# Patient Record
Sex: FEMALE | Race: WHITE | Employment: OTHER | URBAN - METROPOLITAN AREA
[De-identification: names, ages, dates, MRNs, and addresses within clinical notes are randomized per-mention and may not be internally consistent; named-entity substitution may affect disease eponyms.]

---

## 2018-03-12 ENCOUNTER — HOSPITAL ENCOUNTER (OUTPATIENT)
Dept: WOUND CARE | Age: 78
Discharge: HOME | End: 2018-03-12
Payer: MEDICARE

## 2018-03-12 VITALS
WEIGHT: 170 LBS | RESPIRATION RATE: 20 BRPM | BODY MASS INDEX: 27.32 KG/M2 | HEART RATE: 96 BPM | SYSTOLIC BLOOD PRESSURE: 138 MMHG | TEMPERATURE: 98.8 F | HEIGHT: 66 IN | DIASTOLIC BLOOD PRESSURE: 76 MMHG

## 2018-03-12 PROCEDURE — 11042 DBRDMT SUBQ TIS 1ST 20SQCM/<: CPT

## 2018-03-19 ENCOUNTER — HOSPITAL ENCOUNTER (OUTPATIENT)
Dept: WOUND CARE | Age: 78
Discharge: HOME | End: 2018-03-19
Payer: MEDICARE

## 2018-03-19 VITALS
DIASTOLIC BLOOD PRESSURE: 72 MMHG | RESPIRATION RATE: 18 BRPM | TEMPERATURE: 98.8 F | SYSTOLIC BLOOD PRESSURE: 140 MMHG | HEART RATE: 88 BPM

## 2018-03-19 PROCEDURE — 11042 DBRDMT SUBQ TIS 1ST 20SQCM/<: CPT

## 2018-03-19 NOTE — PROGRESS NOTES
Follow-Up Wound Progress Note  Alisa De La Fuente  AGE: [de-identified] y.o. GENDER: female  DOD: 1940  TODAY'S DATE:  3/19/2018      Subjective:     Ash Melton is a [de-identified] y.o. female who presents today for wound check. Wound Etiology : Nonhealing wound  Wound Location :  Right abdomen  Pain : {1/10     Abx : Absent   Cultures : None  The patient denies  any problems since last visit. Objective:    BP (!) 140/72   Pulse 88   Temp 98.8 °F (37.1 °C) (Oral)   Resp 18   Wound appears improved compared to last recheck    Fibrinous exudate - small amt    Hyperkeratotic tissue - small amt    Granulation tissue - moderate amt    Errythema - Absent   (this wound was originally measured on:)  Wound 02/19/18 Burn Abdomen Right # 1 aquired 3-1-81-Wound Length (cm): 1.9 cm  Wound 02/19/18 Burn Abdomen Right # 1 aquired 2-1-18-Wound Width (cm): 3.5 cm  Wound 02/19/18 Burn Abdomen Right # 1 aquired 2-1-18-Wound Depth (cm) : 0.2   Measurements shown are from today's visit. Wound 02/19/18 Burn Abdomen Right # 1 aquired 8-2-68-Wound Assessment: Red, Pink, Yellow     Wound 02/19/18 Burn Abdomen Right # 1 aquired 1-5-10-Wound Assessment: Red, Pink, Yellow  Wound 02/19/18 Burn Abdomen Right # 1 aquired 5-9-05-Sita-wound Assessment: Pink  Wound 02/19/18 Burn Abdomen Right # 1 aquired 6-5-94-Drainage Amount: Moderate  Wound 02/19/18 Burn Abdomen Right # 1 aquired 3-8-70-Drainage Description: Serosanguinous  Wound 02/19/18 Burn Abdomen Right # 1 aquired 2-1-18-Odor: None       Assessment:   Please refer to nursing measurements and assessment regarding wound size pre and post debridement. Wound check - Care provided includes removal of existing dressing and visual inspection    Procedure:  Lidocaine gel soaked gauze was applied per physician order at beginning of wound evaluation. It was subsequently removed.   With the patient in supine position, the wound(s) was debrided sharply of fibrotic, necrotic, and hyperkeratotic tissue, including a layer of surrounding healthy tissue using a curette for a total surface area of < 20 cm2. The skin was excised through the level of the subcutaneous. 100%% of the wound was debrided. Wound was copiously irrigated with normal saline solution. There was minimal bleeding that was controlled with pressure. Patient tolerated procedure well and was given proper instruction. Overall Wound Assessment  Wound is has slightly improved. Size has unchanged  Appearance has not changed    Plan:   Plan for wound - Dress per physician order  Treatment:     Compression : No   Offloading : Yes   Dressing : Aquacel Ag, daily   Additional Therapy :      1. Discussed appropriate home care of this wound. , Dispensed dressing supplies and instructions on their use., Wound redressed. 2. Patient instructions were given. 3. Follow up: 1 week.     Merline Amaya

## 2018-03-26 ENCOUNTER — HOSPITAL ENCOUNTER (OUTPATIENT)
Dept: WOUND CARE | Age: 78
Discharge: HOME | End: 2018-03-26
Payer: MEDICARE

## 2018-03-26 VITALS
HEIGHT: 66 IN | RESPIRATION RATE: 16 BRPM | SYSTOLIC BLOOD PRESSURE: 120 MMHG | HEART RATE: 80 BPM | BODY MASS INDEX: 27.32 KG/M2 | TEMPERATURE: 98.4 F | WEIGHT: 170 LBS | DIASTOLIC BLOOD PRESSURE: 80 MMHG

## 2018-03-26 PROCEDURE — 11042 DBRDMT SUBQ TIS 1ST 20SQCM/<: CPT

## 2018-03-26 NOTE — PLAN OF CARE
Problem: Wound:  Goal: Will show signs of wound healing; wound closure and no evidence of infection  Will show signs of wound healing; wound closure and no evidence of infection   Outcome: Ongoing      Problem: Blood Glucose:  Goal: Ability to maintain appropriate glucose levels will improve  Ability to maintain appropriate glucose levels will improve   Outcome: Ongoing

## 2018-04-02 ENCOUNTER — HOSPITAL ENCOUNTER (OUTPATIENT)
Dept: WOUND CARE | Age: 78
Discharge: HOME | End: 2018-04-02
Payer: MEDICARE

## 2018-04-02 VITALS
SYSTOLIC BLOOD PRESSURE: 158 MMHG | DIASTOLIC BLOOD PRESSURE: 78 MMHG | TEMPERATURE: 99.3 F | RESPIRATION RATE: 16 BRPM | HEART RATE: 80 BPM

## 2018-04-02 PROCEDURE — 11042 DBRDMT SUBQ TIS 1ST 20SQCM/<: CPT

## 2018-04-09 ENCOUNTER — HOSPITAL ENCOUNTER (OUTPATIENT)
Dept: WOUND CARE | Age: 78
Discharge: HOME | End: 2018-04-09
Payer: MEDICARE

## 2018-04-09 VITALS
HEART RATE: 80 BPM | WEIGHT: 170 LBS | RESPIRATION RATE: 16 BRPM | HEIGHT: 66 IN | BODY MASS INDEX: 27.32 KG/M2 | DIASTOLIC BLOOD PRESSURE: 76 MMHG | SYSTOLIC BLOOD PRESSURE: 152 MMHG | TEMPERATURE: 98.7 F

## 2018-04-09 PROCEDURE — 97597 DBRDMT OPN WND 1ST 20 CM/<: CPT

## 2018-04-09 RX ORDER — TRIAMCINOLONE ACETONIDE 0.25 MG/G
CREAM TOPICAL
Qty: 1 TUBE | Refills: 5 | Status: SHIPPED | OUTPATIENT
Start: 2018-04-09 | End: 2018-05-07 | Stop reason: ALTCHOICE

## 2018-04-16 ENCOUNTER — HOSPITAL ENCOUNTER (OUTPATIENT)
Dept: WOUND CARE | Age: 78
Discharge: HOME | End: 2018-04-16
Payer: MEDICARE

## 2018-04-16 VITALS
BODY MASS INDEX: 27.32 KG/M2 | SYSTOLIC BLOOD PRESSURE: 124 MMHG | WEIGHT: 170 LBS | TEMPERATURE: 98.7 F | HEART RATE: 72 BPM | RESPIRATION RATE: 16 BRPM | HEIGHT: 66 IN | DIASTOLIC BLOOD PRESSURE: 72 MMHG

## 2018-04-16 PROCEDURE — 11042 DBRDMT SUBQ TIS 1ST 20SQCM/<: CPT

## 2018-04-23 ENCOUNTER — HOSPITAL ENCOUNTER (OUTPATIENT)
Dept: WOUND CARE | Age: 78
Discharge: HOME | End: 2018-04-23
Payer: MEDICARE

## 2018-04-23 VITALS
DIASTOLIC BLOOD PRESSURE: 70 MMHG | SYSTOLIC BLOOD PRESSURE: 118 MMHG | RESPIRATION RATE: 16 BRPM | HEIGHT: 66 IN | HEART RATE: 76 BPM | TEMPERATURE: 98.4 F | WEIGHT: 170 LBS | BODY MASS INDEX: 27.32 KG/M2

## 2018-04-23 PROCEDURE — 97597 DBRDMT OPN WND 1ST 20 CM/<: CPT

## 2018-04-30 ENCOUNTER — HOSPITAL ENCOUNTER (OUTPATIENT)
Dept: WOUND CARE | Age: 78
Discharge: HOME | End: 2018-04-30
Payer: MEDICARE

## 2018-04-30 VITALS
TEMPERATURE: 98.3 F | RESPIRATION RATE: 16 BRPM | BODY MASS INDEX: 27.32 KG/M2 | HEART RATE: 84 BPM | SYSTOLIC BLOOD PRESSURE: 118 MMHG | WEIGHT: 170 LBS | DIASTOLIC BLOOD PRESSURE: 70 MMHG | HEIGHT: 66 IN

## 2018-04-30 PROCEDURE — 97597 DBRDMT OPN WND 1ST 20 CM/<: CPT

## 2018-05-07 ENCOUNTER — HOSPITAL ENCOUNTER (OUTPATIENT)
Dept: WOUND CARE | Age: 78
Discharge: HOME | End: 2018-05-07
Payer: MEDICARE

## 2018-05-07 VITALS
RESPIRATION RATE: 16 BRPM | BODY MASS INDEX: 27.32 KG/M2 | TEMPERATURE: 98.7 F | SYSTOLIC BLOOD PRESSURE: 130 MMHG | HEIGHT: 66 IN | WEIGHT: 170 LBS | DIASTOLIC BLOOD PRESSURE: 80 MMHG | HEART RATE: 84 BPM

## 2018-05-07 PROCEDURE — 97597 DBRDMT OPN WND 1ST 20 CM/<: CPT

## 2018-05-14 ENCOUNTER — HOSPITAL ENCOUNTER (OUTPATIENT)
Dept: WOUND CARE | Age: 78
Discharge: HOME | End: 2018-05-14
Payer: MEDICARE

## 2018-05-14 VITALS
HEIGHT: 66 IN | SYSTOLIC BLOOD PRESSURE: 132 MMHG | WEIGHT: 170 LBS | TEMPERATURE: 98.8 F | DIASTOLIC BLOOD PRESSURE: 68 MMHG | RESPIRATION RATE: 18 BRPM | BODY MASS INDEX: 27.32 KG/M2 | HEART RATE: 80 BPM

## 2018-05-14 PROCEDURE — 97597 DBRDMT OPN WND 1ST 20 CM/<: CPT

## 2018-05-21 ENCOUNTER — HOSPITAL ENCOUNTER (OUTPATIENT)
Dept: WOUND CARE | Age: 78
Discharge: HOME | End: 2018-05-21
Payer: MEDICARE

## 2018-05-21 VITALS
WEIGHT: 170 LBS | DIASTOLIC BLOOD PRESSURE: 74 MMHG | BODY MASS INDEX: 27.32 KG/M2 | RESPIRATION RATE: 16 BRPM | TEMPERATURE: 98.7 F | SYSTOLIC BLOOD PRESSURE: 132 MMHG | HEIGHT: 66 IN | HEART RATE: 102 BPM

## 2018-05-21 PROCEDURE — 97597 DBRDMT OPN WND 1ST 20 CM/<: CPT

## 2018-06-04 ENCOUNTER — HOSPITAL ENCOUNTER (OUTPATIENT)
Dept: WOUND CARE | Age: 78
Discharge: HOME | End: 2018-06-04
Payer: MEDICARE

## 2018-06-04 VITALS
SYSTOLIC BLOOD PRESSURE: 140 MMHG | WEIGHT: 165 LBS | DIASTOLIC BLOOD PRESSURE: 80 MMHG | HEART RATE: 76 BPM | BODY MASS INDEX: 26.63 KG/M2 | TEMPERATURE: 98.4 F | RESPIRATION RATE: 16 BRPM

## 2018-06-04 PROCEDURE — 97597 DBRDMT OPN WND 1ST 20 CM/<: CPT

## 2018-06-11 ENCOUNTER — HOSPITAL ENCOUNTER (OUTPATIENT)
Dept: WOUND CARE | Age: 78
Discharge: HOME | End: 2018-06-11
Payer: MEDICARE

## 2018-06-11 VITALS
TEMPERATURE: 98.6 F | RESPIRATION RATE: 16 BRPM | DIASTOLIC BLOOD PRESSURE: 70 MMHG | SYSTOLIC BLOOD PRESSURE: 140 MMHG | HEART RATE: 88 BPM

## 2018-06-11 PROCEDURE — 97597 DBRDMT OPN WND 1ST 20 CM/<: CPT

## 2018-06-18 ENCOUNTER — HOSPITAL ENCOUNTER (OUTPATIENT)
Dept: WOUND CARE | Age: 78
Discharge: HOME | End: 2018-06-18
Payer: MEDICARE

## 2018-06-18 VITALS
RESPIRATION RATE: 16 BRPM | BODY MASS INDEX: 26.52 KG/M2 | DIASTOLIC BLOOD PRESSURE: 66 MMHG | SYSTOLIC BLOOD PRESSURE: 116 MMHG | WEIGHT: 165 LBS | HEART RATE: 84 BPM | HEIGHT: 66 IN | TEMPERATURE: 98.5 F

## 2018-06-18 PROCEDURE — 97597 DBRDMT OPN WND 1ST 20 CM/<: CPT

## 2018-06-25 ENCOUNTER — HOSPITAL ENCOUNTER (OUTPATIENT)
Dept: WOUND CARE | Age: 78
Discharge: HOME | End: 2018-06-25
Payer: MEDICARE

## 2018-06-25 VITALS
HEIGHT: 66 IN | TEMPERATURE: 98.8 F | DIASTOLIC BLOOD PRESSURE: 74 MMHG | BODY MASS INDEX: 26.52 KG/M2 | HEART RATE: 56 BPM | SYSTOLIC BLOOD PRESSURE: 122 MMHG | WEIGHT: 165 LBS | RESPIRATION RATE: 16 BRPM

## 2018-06-25 PROCEDURE — 97597 DBRDMT OPN WND 1ST 20 CM/<: CPT

## 2018-07-02 ENCOUNTER — HOSPITAL ENCOUNTER (OUTPATIENT)
Dept: WOUND CARE | Age: 78
Discharge: HOME | End: 2018-07-02
Payer: MEDICARE

## 2018-07-02 VITALS
RESPIRATION RATE: 16 BRPM | HEART RATE: 84 BPM | SYSTOLIC BLOOD PRESSURE: 112 MMHG | TEMPERATURE: 98.6 F | DIASTOLIC BLOOD PRESSURE: 66 MMHG | HEIGHT: 66 IN | WEIGHT: 165 LBS | BODY MASS INDEX: 26.52 KG/M2

## 2018-07-02 PROCEDURE — 99214 OFFICE O/P EST MOD 30 MIN: CPT

## 2018-07-02 NOTE — PROGRESS NOTES
Follow-Up Wound Progress Note  Alisa De La Fuente  AGE: [de-identified] y.o. GENDER: female  DOD: 1940  TODAY'S DATE:  7/2/2018      Subjective:     Sharon Santacruz is a [de-identified] y.o. female who presents today for wound check. Wound Etiology : Nonhealing wound  Wound Location :  Right abdomen  Pain : {1/10     Abx : Absent   Cultures : None  The patient denies  any problems since last visit. Objective:    /66   Pulse 84   Temp 98.6 °F (37 °C) (Oral)   Resp 16   Ht 5' 6\" (1.676 m)   Wt 165 lb (74.8 kg)   BMI 26.63 kg/m²   Wound appears improved compared to last recheck    Fibrinous exudate - small amt    Hyperkeratotic tissue - small amt    Granulation tissue - small amt    Errythema - Absent   (this wound was originally measured on:)  Wound 02/19/18 Burn Abdomen Right # 1 aquired 0-0-08-Wound Length (cm): 0.5 cm  Wound 02/19/18 Burn Abdomen Right # 1 aquired 0-2-70-Wound Width (cm): 0.7 cm  Wound 02/19/18 Burn Abdomen Right # 1 aquired 1-6-07-Wound Depth (cm) : 0.1   Measurements shown are from today's visit. Wound 02/19/18 Burn Abdomen Right # 1 aquired 4-3-61-Wound Assessment: Red     Wound 02/19/18 Burn Abdomen Right # 1 aquired 7-2-63-Wound Assessment: Red  Wound 02/19/18 Burn Abdomen Right # 1 aquired 3-3-81-Sita-wound Assessment: Pink  Wound 02/19/18 Burn Abdomen Right # 1 aquired 1-2-28-Drainage Amount: Scant  Wound 02/19/18 Burn Abdomen Right # 1 aquired 0-2-34-Drainage Description: Serosanguinous  Wound 02/19/18 Burn Abdomen Right # 1 aquired 2-1-18-Odor: None       Assessment:   Please refer to nursing measurements and assessment regarding wound size pre and post debridement. Wound check - Care provided includes removal of existing dressing and visual inspection    Procedure:  Wound not debrided  Overall Wound Assessment  Wound is has moderately improved.     Size has decreased  Appearance has improved    Plan:   Plan for wound - Dress per physician order  Treatment:     Compression : No   Offloading : Yes   Dressing : Latrice   Additional Therapy :      1. Discussed appropriate home care of this wound. , Dispensed dressing supplies and instructions on their use., Wound redressed. 2. Patient instructions were given. 3. Follow up: 1 week.     Titi Colin

## 2018-07-16 ENCOUNTER — HOSPITAL ENCOUNTER (OUTPATIENT)
Dept: WOUND CARE | Age: 78
Discharge: HOME | End: 2018-07-16
Payer: MEDICARE

## 2018-07-16 VITALS
BODY MASS INDEX: 27.12 KG/M2 | TEMPERATURE: 99.5 F | RESPIRATION RATE: 16 BRPM | DIASTOLIC BLOOD PRESSURE: 68 MMHG | HEART RATE: 80 BPM | WEIGHT: 168 LBS | SYSTOLIC BLOOD PRESSURE: 114 MMHG

## 2018-07-16 PROCEDURE — 97597 DBRDMT OPN WND 1ST 20 CM/<: CPT

## 2018-07-16 NOTE — PROGRESS NOTES
tissue, including a layer of surrounding healthy tissue using a curette for a total surface area of < 20 cm2. The skin was excised through the level of the partial thickness. 100%% of the wound was debrided. Wound was copiously irrigated with normal saline solution. There was minimal bleeding that was controlled with pressure. Patient tolerated procedure well and was given proper instruction. Overall Wound Assessment  Wound is has moderately improved. Size has decreased  Appearance has improved    Plan:   Plan for wound - Dress per physician order  Treatment:     Compression : No   Offloading : Yes   Dressing : Latrice   Additional Therapy :      1. Discussed appropriate home care of this wound. , Dispensed dressing supplies and instructions on their use., Wound redressed. 2. Patient instructions were given.   3. Follow up: 2 weeks    Jazmín Baer

## 2018-07-30 ENCOUNTER — HOSPITAL ENCOUNTER (OUTPATIENT)
Dept: WOUND CARE | Age: 78
Discharge: HOME | End: 2018-07-30
Payer: MEDICARE

## 2018-07-30 VITALS
HEIGHT: 66 IN | DIASTOLIC BLOOD PRESSURE: 72 MMHG | HEART RATE: 64 BPM | RESPIRATION RATE: 16 BRPM | TEMPERATURE: 98.8 F | BODY MASS INDEX: 27 KG/M2 | SYSTOLIC BLOOD PRESSURE: 122 MMHG | WEIGHT: 168 LBS

## 2018-07-30 PROCEDURE — 99212 OFFICE O/P EST SF 10 MIN: CPT

## 2020-07-20 ENCOUNTER — APPOINTMENT (OUTPATIENT)
Dept: GENERAL RADIOLOGY | Age: 80
End: 2020-07-20
Payer: MEDICARE

## 2020-07-20 ENCOUNTER — HOSPITAL ENCOUNTER (EMERGENCY)
Age: 80
Discharge: HOME | End: 2020-07-20
Attending: EMERGENCY MEDICINE
Payer: MEDICARE

## 2020-07-20 VITALS
OXYGEN SATURATION: 95 % | TEMPERATURE: 98.7 F | SYSTOLIC BLOOD PRESSURE: 152 MMHG | HEART RATE: 82 BPM | RESPIRATION RATE: 18 BRPM | DIASTOLIC BLOOD PRESSURE: 85 MMHG

## 2020-07-20 LAB
ALBUMIN SERPL-MCNC: 3.9 G/DL (ref 3.5–5.2)
ALP BLD-CCNC: 62 U/L (ref 35–104)
ALT SERPL-CCNC: 14 U/L (ref 0–32)
ANION GAP SERPL CALCULATED.3IONS-SCNC: 13 MMOL/L (ref 7–16)
AST SERPL-CCNC: 26 U/L (ref 0–31)
BASOPHILS ABSOLUTE: 0.04 E9/L (ref 0–0.2)
BASOPHILS RELATIVE PERCENT: 0.5 % (ref 0–2)
BILIRUB SERPL-MCNC: <0.2 MG/DL (ref 0–1.2)
BILIRUBIN URINE: NEGATIVE
BLOOD, URINE: NEGATIVE
BUN BLDV-MCNC: 16 MG/DL (ref 8–23)
CALCIUM SERPL-MCNC: 9.4 MG/DL (ref 8.6–10.2)
CHLORIDE BLD-SCNC: 100 MMOL/L (ref 98–107)
CHP ED QC CHECK: YES
CHP ED QC CHECK: YES
CLARITY: CLEAR
CO2: 27 MMOL/L (ref 22–29)
COLOR: YELLOW
CREAT SERPL-MCNC: 0.8 MG/DL (ref 0.5–1)
EOSINOPHILS ABSOLUTE: 0.22 E9/L (ref 0.05–0.5)
EOSINOPHILS RELATIVE PERCENT: 2.9 % (ref 0–6)
GFR AFRICAN AMERICAN: >60
GFR NON-AFRICAN AMERICAN: >60 ML/MIN/1.73
GLUCOSE BLD-MCNC: 104 MG/DL
GLUCOSE BLD-MCNC: 136 MG/DL
GLUCOSE BLD-MCNC: 68 MG/DL (ref 74–99)
GLUCOSE URINE: 250 MG/DL
HCT VFR BLD CALC: 39.7 % (ref 34–48)
HEMOGLOBIN: 12.5 G/DL (ref 11.5–15.5)
IMMATURE GRANULOCYTES #: 0.03 E9/L
IMMATURE GRANULOCYTES %: 0.4 % (ref 0–5)
KETONES, URINE: ABNORMAL MG/DL
LEUKOCYTE ESTERASE, URINE: NEGATIVE
LYMPHOCYTES ABSOLUTE: 2.13 E9/L (ref 1.5–4)
LYMPHOCYTES RELATIVE PERCENT: 28.3 % (ref 20–42)
MCH RBC QN AUTO: 29.3 PG (ref 26–35)
MCHC RBC AUTO-ENTMCNC: 31.5 % (ref 32–34.5)
MCV RBC AUTO: 93 FL (ref 80–99.9)
METER GLUCOSE: 104 MG/DL (ref 74–99)
METER GLUCOSE: 136 MG/DL (ref 74–99)
MONOCYTES ABSOLUTE: 0.7 E9/L (ref 0.1–0.95)
MONOCYTES RELATIVE PERCENT: 9.3 % (ref 2–12)
NEUTROPHILS ABSOLUTE: 4.4 E9/L (ref 1.8–7.3)
NEUTROPHILS RELATIVE PERCENT: 58.6 % (ref 43–80)
NITRITE, URINE: NEGATIVE
PDW BLD-RTO: 13.8 FL (ref 11.5–15)
PH UA: 5.5 (ref 5–9)
PLATELET # BLD: 141 E9/L (ref 130–450)
PMV BLD AUTO: 11 FL (ref 7–12)
POTASSIUM SERPL-SCNC: 4.3 MMOL/L (ref 3.5–5)
PROTEIN UA: NEGATIVE MG/DL
RBC # BLD: 4.27 E12/L (ref 3.5–5.5)
SODIUM BLD-SCNC: 140 MMOL/L (ref 132–146)
SPECIFIC GRAVITY UA: 1.02 (ref 1–1.03)
TOTAL PROTEIN: 6.4 G/DL (ref 6.4–8.3)
TROPONIN: <0.01 NG/ML (ref 0–0.03)
UROBILINOGEN, URINE: 0.2 E.U./DL
WBC # BLD: 7.5 E9/L (ref 4.5–11.5)

## 2020-07-20 PROCEDURE — 99285 EMERGENCY DEPT VISIT HI MDM: CPT

## 2020-07-20 PROCEDURE — 80053 COMPREHEN METABOLIC PANEL: CPT

## 2020-07-20 PROCEDURE — 85025 COMPLETE CBC W/AUTO DIFF WBC: CPT

## 2020-07-20 PROCEDURE — 81003 URINALYSIS AUTO W/O SCOPE: CPT

## 2020-07-20 PROCEDURE — 82962 GLUCOSE BLOOD TEST: CPT

## 2020-07-20 PROCEDURE — 71045 X-RAY EXAM CHEST 1 VIEW: CPT

## 2020-07-20 PROCEDURE — 93005 ELECTROCARDIOGRAM TRACING: CPT | Performed by: EMERGENCY MEDICINE

## 2020-07-20 PROCEDURE — 84484 ASSAY OF TROPONIN QUANT: CPT

## 2020-07-20 RX ORDER — AMOXICILLIN AND CLAVULANATE POTASSIUM 875; 125 MG/1; MG/1
1 TABLET, FILM COATED ORAL ONCE
Status: DISCONTINUED | OUTPATIENT
Start: 2020-07-20 | End: 2020-07-20 | Stop reason: HOSPADM

## 2020-07-20 RX ORDER — AMOXICILLIN AND CLAVULANATE POTASSIUM 875; 125 MG/1; MG/1
1 TABLET, FILM COATED ORAL 2 TIMES DAILY
Qty: 14 TABLET | Refills: 0 | Status: SHIPPED | OUTPATIENT
Start: 2020-07-20 | End: 2020-07-27

## 2020-07-20 ASSESSMENT — PAIN SCALES - GENERAL
PAINLEVEL_OUTOF10: 0
PAINLEVEL_OUTOF10: 5

## 2020-07-20 ASSESSMENT — PAIN DESCRIPTION - ORIENTATION: ORIENTATION: RIGHT;LEFT;ANTERIOR

## 2020-07-20 ASSESSMENT — PAIN DESCRIPTION - PAIN TYPE: TYPE: ACUTE PAIN

## 2020-07-20 ASSESSMENT — PAIN DESCRIPTION - LOCATION: LOCATION: HEAD

## 2020-07-20 ASSESSMENT — PAIN DESCRIPTION - DESCRIPTORS: DESCRIPTORS: ACHING;THROBBING

## 2020-07-20 ASSESSMENT — PAIN DESCRIPTION - FREQUENCY: FREQUENCY: CONTINUOUS

## 2020-07-20 NOTE — ED NOTES
Bed: 07  Expected date:   Expected time:   Means of arrival:   Comments:  EMS     Minna Farias RN  07/20/20 [id number]

## 2020-07-20 NOTE — ED PROVIDER NOTES
tape        ---------------------------------------------------PHYSICAL EXAM--------------------------------------    Constitutional/General: Alert and oriented x3  Head: Normocephalic and atraumatic  Eyes: PERRL, EOMI, sclera non icteric  Mouth: Oropharynx clear, handling secretions, no trismus, no asymmetry of the posterior oropharynx or uvular edema  Neck: Supple, full ROM, no stridor, no meningeal signs  Respiratory: Lungs clear to auscultation bilaterally, no wheezes, rales, or rhonchi. Not in respiratory distress  Cardiovascular:  Regular rate. Regular rhythm. 2+ distal pulses. Equal extremity pulses. Chest: No chest wall tenderness  GI:  Abdomen Soft, Non tender, Non distended. No rebound, guarding, or rigidity. No pulsatile masses. Musculoskeletal: Moves all extremities x 4. Warm and well perfused, no clubbing, cyanosis, or edema. Capillary refill <3 seconds  Integument: skin warm and dry. No rashes. Neurologic: GCS 15, no focal deficits, symmetric strength 5/5 in the upper and lower extremities bilaterally. NIH is 0  Psychiatric: Normal Affect          -------------------------------------------------- RESULTS -------------------------------------------------  I have personally reviewed all laboratory and imaging results for this patient. Results are listed below.      LABS: (Lab results interpreted by me)  Results for orders placed or performed during the hospital encounter of 07/20/20   CBC Auto Differential   Result Value Ref Range    WBC 7.5 4.5 - 11.5 E9/L    RBC 4.27 3.50 - 5.50 E12/L    Hemoglobin 12.5 11.5 - 15.5 g/dL    Hematocrit 39.7 34.0 - 48.0 %    MCV 93.0 80.0 - 99.9 fL    MCH 29.3 26.0 - 35.0 pg    MCHC 31.5 (L) 32.0 - 34.5 %    RDW 13.8 11.5 - 15.0 fL    Platelets 625 980 - 381 E9/L    MPV 11.0 7.0 - 12.0 fL    Neutrophils % 58.6 43.0 - 80.0 %    Immature Granulocytes % 0.4 0.0 - 5.0 %    Lymphocytes % 28.3 20.0 - 42.0 %    Monocytes % 9.3 2.0 - 12.0 %    Eosinophils % 2.9 0.0 - 6.0 % Basophils % 0.5 0.0 - 2.0 %    Neutrophils Absolute 4.40 1.80 - 7.30 E9/L    Immature Granulocytes # 0.03 E9/L    Lymphocytes Absolute 2.13 1.50 - 4.00 E9/L    Monocytes Absolute 0.70 0.10 - 0.95 E9/L    Eosinophils Absolute 0.22 0.05 - 0.50 E9/L    Basophils Absolute 0.04 0.00 - 0.20 E9/L   Comprehensive Metabolic Panel   Result Value Ref Range    Sodium 140 132 - 146 mmol/L    Potassium 4.3 3.5 - 5.0 mmol/L    Chloride 100 98 - 107 mmol/L    CO2 27 22 - 29 mmol/L    Anion Gap 13 7 - 16 mmol/L    Glucose 68 (L) 74 - 99 mg/dL    BUN 16 8 - 23 mg/dL    CREATININE 0.8 0.5 - 1.0 mg/dL    GFR Non-African American >60 >=60 mL/min/1.73    GFR African American >60     Calcium 9.4 8.6 - 10.2 mg/dL    Total Protein 6.4 6.4 - 8.3 g/dL    Alb 3.9 3.5 - 5.2 g/dL    Total Bilirubin <0.2 0.0 - 1.2 mg/dL    Alkaline Phosphatase 62 35 - 104 U/L    ALT 14 0 - 32 U/L    AST 26 0 - 31 U/L   Troponin   Result Value Ref Range    Troponin <0.01 0.00 - 0.03 ng/mL   Urinalysis   Result Value Ref Range    Color, UA Yellow Straw/Yellow    Clarity, UA Clear Clear    Glucose, Ur 250 (A) Negative mg/dL    Bilirubin Urine Negative Negative    Ketones, Urine TRACE (A) Negative mg/dL    Specific Gravity, UA 1.025 1.005 - 1.030    Blood, Urine Negative Negative    pH, UA 5.5 5.0 - 9.0    Protein, UA Negative Negative mg/dL    Urobilinogen, Urine 0.2 <2.0 E.U./dL    Nitrite, Urine Negative Negative    Leukocyte Esterase, Urine Negative Negative   POCT Glucose   Result Value Ref Range    Meter Glucose 104 (H) 74 - 99 mg/dL   POCT Glucose   Result Value Ref Range    Glucose 104 mg/dL    QC OK? yes    POCT Glucose   Result Value Ref Range    Glucose 136 mg/dL    QC OK?  Yes    POCT Glucose   Result Value Ref Range    Meter Glucose 136 (H) 74 - 99 mg/dL   ,       RADIOLOGY:  Interpreted by Radiologist unless otherwise specified  XR CHEST PORTABLE   Final Result   Minimal atelectasis/infiltrates and pleural effusion in the left base   likely pneumonia or mild CHF. EKG Interpretation  Interpreted by emergency department physician, Dr. Adam Gutierrez, rate 79, no STEMI        ------------------------- NURSING NOTES AND VITALS REVIEWED ---------------------------   The nursing notes within the ED encounter and vital signs as below have been reviewed by myself  BP (!) 152/85   Pulse 82   Temp 98.7 °F (37.1 °C) (Oral)   Resp 18   SpO2 95%     Oxygen Saturation Interpretation: Normal    The patients available past medical records and past encounters were reviewed. ------------------------------ ED COURSE/MEDICAL DECISION MAKING----------------------  Medications   amoxicillin-clavulanate (AUGMENTIN) 875-125 MG per tablet 1 tablet (has no administration in time range)           The cardiac monitor revealed sinus with a heart rate in the 80s as interpreted by me. The cardiac monitor was ordered secondary to the patient's hypoglycemia and to monitor the patient for dysrhythmia. CPT [id number]         Medical Decision Making:    Patient was given a meal tray on arrival.  Labs and imaging reviewed. Reevaluation, she is resting comfortably. Exam unchanged. She currently has no symptoms or complaints. She overall feels improved. She would like to go home. No focal neuro deficits, overall well-appearing. Given her chest x-ray findings, patient will be started on Augmentin. She is to follow-up with her PCP in 1 to 2 days. She is educated on signs symptoms that require emergent evaluation. Counseling: The emergency provider has spoken with the patient and discussed todays results, in addition to providing specific details for the plan of care and counseling regarding the diagnosis and prognosis. Questions are answered at this time and they are agreeable with the plan.       --------------------------------- IMPRESSION AND DISPOSITION ---------------------------------    IMPRESSION  1.  Hypoglycemia        DISPOSITION  Disposition: Discharge to home  Patient condition is stable        NOTE: This report was transcribed using voice recognition software.  Every effort was made to ensure accuracy; however, inadvertent computerized transcription errors may be present        Lynne Mosley MD  07/20/20 [id number]

## 2020-07-21 LAB
EKG ATRIAL RATE: 79 BPM
EKG P AXIS: 55 DEGREES
EKG P-R INTERVAL: 136 MS
EKG Q-T INTERVAL: 384 MS
EKG QRS DURATION: 82 MS
EKG QTC CALCULATION (BAZETT): 440 MS
EKG R AXIS: 10 DEGREES
EKG T AXIS: 18 DEGREES
EKG VENTRICULAR RATE: 79 BPM

## 2020-07-21 PROCEDURE — 93010 ELECTROCARDIOGRAM REPORT: CPT | Performed by: INTERNAL MEDICINE

## 2021-04-26 ENCOUNTER — APPOINTMENT (OUTPATIENT)
Dept: GENERAL RADIOLOGY | Age: 81
DRG: 494 | End: 2021-04-26
Payer: MEDICARE

## 2021-04-26 ENCOUNTER — APPOINTMENT (OUTPATIENT)
Dept: CT IMAGING | Age: 81
DRG: 494 | End: 2021-04-26
Payer: MEDICARE

## 2021-04-26 ENCOUNTER — HOSPITAL ENCOUNTER (INPATIENT)
Age: 81
LOS: 7 days | Discharge: NURSING, REHAB OR LONG-TERM CARE | DRG: 494 | End: 2021-05-03
Attending: EMERGENCY MEDICINE | Admitting: FAMILY MEDICINE
Payer: MEDICARE

## 2021-04-26 DIAGNOSIS — R29.6 MULTIPLE FALLS: ICD-10-CM

## 2021-04-26 DIAGNOSIS — S82.892A CLOSED FRACTURE OF LEFT ANKLE, INITIAL ENCOUNTER: Primary | ICD-10-CM

## 2021-04-26 DIAGNOSIS — R42 LIGHTHEADED: ICD-10-CM

## 2021-04-26 PROBLEM — S82.842A ANKLE FRACTURE, BIMALLEOLAR, CLOSED, LEFT, INITIAL ENCOUNTER: Status: ACTIVE | Noted: 2021-04-26

## 2021-04-26 LAB
ALBUMIN SERPL-MCNC: 4.1 G/DL (ref 3.5–5.2)
ALP BLD-CCNC: 69 U/L (ref 35–104)
ALT SERPL-CCNC: 12 U/L (ref 0–32)
ANION GAP SERPL CALCULATED.3IONS-SCNC: 14 MMOL/L (ref 7–16)
APTT: 27.9 SEC (ref 24.5–35.1)
AST SERPL-CCNC: 21 U/L (ref 0–31)
BASOPHILS ABSOLUTE: 0.04 E9/L (ref 0–0.2)
BASOPHILS RELATIVE PERCENT: 0.3 % (ref 0–2)
BILIRUB SERPL-MCNC: <0.2 MG/DL (ref 0–1.2)
BUN BLDV-MCNC: 26 MG/DL (ref 6–23)
CALCIUM SERPL-MCNC: 9.8 MG/DL (ref 8.6–10.2)
CHLORIDE BLD-SCNC: 98 MMOL/L (ref 98–107)
CO2: 27 MMOL/L (ref 22–29)
CREAT SERPL-MCNC: 1 MG/DL (ref 0.5–1)
EOSINOPHILS ABSOLUTE: 0.24 E9/L (ref 0.05–0.5)
EOSINOPHILS RELATIVE PERCENT: 2.1 % (ref 0–6)
GFR AFRICAN AMERICAN: >60
GFR NON-AFRICAN AMERICAN: 53 ML/MIN/1.73
GLUCOSE BLD-MCNC: 88 MG/DL (ref 74–99)
HCT VFR BLD CALC: 43.5 % (ref 34–48)
HEMOGLOBIN: 13.6 G/DL (ref 11.5–15.5)
IMMATURE GRANULOCYTES #: 0.07 E9/L
IMMATURE GRANULOCYTES %: 0.6 % (ref 0–5)
INR BLD: 1
LYMPHOCYTES ABSOLUTE: 2.33 E9/L (ref 1.5–4)
LYMPHOCYTES RELATIVE PERCENT: 20 % (ref 20–42)
MCH RBC QN AUTO: 29 PG (ref 26–35)
MCHC RBC AUTO-ENTMCNC: 31.3 % (ref 32–34.5)
MCV RBC AUTO: 92.8 FL (ref 80–99.9)
MONOCYTES ABSOLUTE: 0.84 E9/L (ref 0.1–0.95)
MONOCYTES RELATIVE PERCENT: 7.2 % (ref 2–12)
NEUTROPHILS ABSOLUTE: 8.11 E9/L (ref 1.8–7.3)
NEUTROPHILS RELATIVE PERCENT: 69.8 % (ref 43–80)
PDW BLD-RTO: 14.3 FL (ref 11.5–15)
PLATELET # BLD: 166 E9/L (ref 130–450)
PMV BLD AUTO: 10.2 FL (ref 7–12)
POTASSIUM REFLEX MAGNESIUM: 4.7 MMOL/L (ref 3.5–5)
PROTHROMBIN TIME: 10.5 SEC (ref 9.3–12.4)
RBC # BLD: 4.69 E12/L (ref 3.5–5.5)
SODIUM BLD-SCNC: 139 MMOL/L (ref 132–146)
TOTAL PROTEIN: 7 G/DL (ref 6.4–8.3)
TROPONIN: <0.01 NG/ML (ref 0–0.03)
WBC # BLD: 11.6 E9/L (ref 4.5–11.5)

## 2021-04-26 PROCEDURE — 86900 BLOOD TYPING SEROLOGIC ABO: CPT

## 2021-04-26 PROCEDURE — 85730 THROMBOPLASTIN TIME PARTIAL: CPT

## 2021-04-26 PROCEDURE — 6360000002 HC RX W HCPCS: Performed by: STUDENT IN AN ORGANIZED HEALTH CARE EDUCATION/TRAINING PROGRAM

## 2021-04-26 PROCEDURE — 99285 EMERGENCY DEPT VISIT HI MDM: CPT

## 2021-04-26 PROCEDURE — 80053 COMPREHEN METABOLIC PANEL: CPT

## 2021-04-26 PROCEDURE — 71045 X-RAY EXAM CHEST 1 VIEW: CPT

## 2021-04-26 PROCEDURE — 85610 PROTHROMBIN TIME: CPT

## 2021-04-26 PROCEDURE — 84484 ASSAY OF TROPONIN QUANT: CPT

## 2021-04-26 PROCEDURE — 96376 TX/PRO/DX INJ SAME DRUG ADON: CPT

## 2021-04-26 PROCEDURE — 85025 COMPLETE CBC W/AUTO DIFF WBC: CPT

## 2021-04-26 PROCEDURE — 86901 BLOOD TYPING SEROLOGIC RH(D): CPT

## 2021-04-26 PROCEDURE — 73610 X-RAY EXAM OF ANKLE: CPT

## 2021-04-26 PROCEDURE — 73590 X-RAY EXAM OF LOWER LEG: CPT

## 2021-04-26 PROCEDURE — 73564 X-RAY EXAM KNEE 4 OR MORE: CPT

## 2021-04-26 PROCEDURE — 86850 RBC ANTIBODY SCREEN: CPT

## 2021-04-26 PROCEDURE — 70450 CT HEAD/BRAIN W/O DYE: CPT

## 2021-04-26 PROCEDURE — 2500000003 HC RX 250 WO HCPCS: Performed by: STUDENT IN AN ORGANIZED HEALTH CARE EDUCATION/TRAINING PROGRAM

## 2021-04-26 PROCEDURE — 96374 THER/PROPH/DIAG INJ IV PUSH: CPT

## 2021-04-26 PROCEDURE — 72125 CT NECK SPINE W/O DYE: CPT

## 2021-04-26 PROCEDURE — 73630 X-RAY EXAM OF FOOT: CPT

## 2021-04-26 PROCEDURE — 99222 1ST HOSP IP/OBS MODERATE 55: CPT | Performed by: ORTHOPAEDIC SURGERY

## 2021-04-26 PROCEDURE — 2140000000 HC CCU INTERMEDIATE R&B

## 2021-04-26 PROCEDURE — 93005 ELECTROCARDIOGRAM TRACING: CPT | Performed by: STUDENT IN AN ORGANIZED HEALTH CARE EDUCATION/TRAINING PROGRAM

## 2021-04-26 RX ORDER — ONDANSETRON 2 MG/ML
4 INJECTION INTRAMUSCULAR; INTRAVENOUS EVERY 6 HOURS PRN
Status: DISCONTINUED | OUTPATIENT
Start: 2021-04-26 | End: 2021-05-03 | Stop reason: HOSPADM

## 2021-04-26 RX ORDER — LIDOCAINE HYDROCHLORIDE AND EPINEPHRINE 10; 10 MG/ML; UG/ML
10 INJECTION, SOLUTION INFILTRATION; PERINEURAL ONCE
Status: COMPLETED | OUTPATIENT
Start: 2021-04-26 | End: 2021-04-26

## 2021-04-26 RX ORDER — PREGABALIN 50 MG/1
50 CAPSULE ORAL 3 TIMES DAILY
Status: ON HOLD | COMMUNITY
End: 2022-10-05

## 2021-04-26 RX ORDER — MORPHINE SULFATE 4 MG/ML
4 INJECTION, SOLUTION INTRAMUSCULAR; INTRAVENOUS
Status: DISCONTINUED | OUTPATIENT
Start: 2021-04-26 | End: 2021-05-03 | Stop reason: HOSPADM

## 2021-04-26 RX ORDER — MIDAZOLAM HYDROCHLORIDE 1 MG/ML
1 INJECTION INTRAMUSCULAR; INTRAVENOUS ONCE
Status: COMPLETED | OUTPATIENT
Start: 2021-04-26 | End: 2021-04-26

## 2021-04-26 RX ORDER — MORPHINE SULFATE 2 MG/ML
2 INJECTION, SOLUTION INTRAMUSCULAR; INTRAVENOUS ONCE
Status: COMPLETED | OUTPATIENT
Start: 2021-04-26 | End: 2021-04-26

## 2021-04-26 RX ORDER — OXYCODONE HYDROCHLORIDE AND ACETAMINOPHEN 5; 325 MG/1; MG/1
1 TABLET ORAL EVERY 6 HOURS PRN
Status: DISCONTINUED | OUTPATIENT
Start: 2021-04-26 | End: 2021-05-03 | Stop reason: HOSPADM

## 2021-04-26 RX ADMIN — MORPHINE SULFATE 4 MG: 4 INJECTION, SOLUTION INTRAMUSCULAR; INTRAVENOUS at 22:02

## 2021-04-26 RX ADMIN — LIDOCAINE HYDROCHLORIDE AND EPINEPHRINE 10 ML: 10; 10 INJECTION, SOLUTION INFILTRATION; PERINEURAL at 20:15

## 2021-04-26 RX ADMIN — MIDAZOLAM 1 MG: 1 INJECTION INTRAMUSCULAR; INTRAVENOUS at 20:28

## 2021-04-26 RX ADMIN — MORPHINE SULFATE 2 MG: 2 INJECTION, SOLUTION INTRAMUSCULAR; INTRAVENOUS at 19:17

## 2021-04-26 ASSESSMENT — PAIN DESCRIPTION - PAIN TYPE: TYPE: ACUTE PAIN

## 2021-04-26 ASSESSMENT — ENCOUNTER SYMPTOMS
ABDOMINAL PAIN: 0
BACK PAIN: 0
SHORTNESS OF BREATH: 0

## 2021-04-26 ASSESSMENT — PAIN SCALES - GENERAL: PAINLEVEL_OUTOF10: 6

## 2021-04-26 NOTE — ED NOTES
Bed: HF  Expected date:   Expected time:   Means of arrival: [facility]  Comments:  Jaime Jaquez, RN  04/26/21 [id number]

## 2021-04-26 NOTE — ED NOTES
Bed: 21  Expected date:   Expected time:   Means of arrival:   Comments:  BRUCE Joshi, AMY  04/26/21 [id number]

## 2021-04-26 NOTE — ED PROVIDER NOTES
HPI   49-year-old female patient presented to emergency department for chief complaint of fall. Patient complaining of left ankle pain. States that she was feeling lightheaded prior to the fall and \"seeing stars . \"  Patient did not have a loss of consciousness. Patient not complaining of any chest pain, nausea, vomiting, diarrhea, shortness of breath. Patient is complaining of left ankle pain and swelling. She is unable to ambulate on that ankle. Pain is worse with weightbearing and just touching the area, this is present at the area as well. Patient has not tried anything for pain. Review of Systems   Constitutional:        Fall   Respiratory: Negative for shortness of breath. Cardiovascular: Negative for chest pain. Gastrointestinal: Negative for abdominal pain. Genitourinary: Negative for difficulty urinating and dysuria. Musculoskeletal: Negative for back pain and neck pain. Ankle swelling and pain   Neurological:        Lightheadedness   All other systems reviewed and are negative. Physical Exam  Vitals signs and nursing note reviewed. Constitutional:       Appearance: Normal appearance. HENT:      Head: Normocephalic and atraumatic. Nose: Nose normal. No congestion. Mouth/Throat:      Mouth: Mucous membranes are moist.      Pharynx: Oropharynx is clear. Eyes:      Conjunctiva/sclera: Conjunctivae normal.      Pupils: Pupils are equal, round, and reactive to light. Neck:      Musculoskeletal: Normal range of motion and neck supple. Cardiovascular:      Rate and Rhythm: Normal rate and regular rhythm. Pulses: Normal pulses. Dorsalis pedis pulses are 2+ on the right side and 2+ on the left side. Posterior tibial pulses are 2+ on the right side and 2+ on the left side. Heart sounds: Normal heart sounds. Pulmonary:      Effort: Pulmonary effort is normal. No respiratory distress. Breath sounds: Normal breath sounds.    Abdominal: General: Bowel sounds are normal. There is no distension. Tenderness: There is no abdominal tenderness. Musculoskeletal:      Right shoulder: She exhibits no tenderness. Left shoulder: She exhibits no tenderness. Right hip: She exhibits no tenderness. Left hip: She exhibits no tenderness. Left knee: She exhibits normal range of motion and no swelling. No tenderness found. Right ankle: She exhibits normal range of motion, no swelling and no ecchymosis. No tenderness. Left ankle: She exhibits decreased range of motion, swelling and ecchymosis. Tenderness. Lateral malleolus and medial malleolus tenderness found. Cervical back: She exhibits normal range of motion and no tenderness. Thoracic back: She exhibits no tenderness. Lumbar back: She exhibits no tenderness. Legs:    Skin:     General: Skin is warm and dry. Capillary Refill: Capillary refill takes less than 2 seconds. Neurological:      General: No focal deficit present. Mental Status: She is alert. Procedures     MDM   18-year-old female patient presents to emergency department for increasing falls, lightheadedness as well as left ankle pain and swelling. Patient's left ankle is fractured, bimalleolar. Patient worked up with troponin, EKG as well as head neck CTs. Lab work is unremarkable no acute changes on her EKG. Orthopedics consulting on patient and will be down to see her in the emergency department. To be admitted for further work-up of her increasing falls and lightheadedness. Unable to do orthostatics on patient here as she has an acute ankle fracture. EKG: This EKG is signed and interpreted by me.     Rate: 93  Rhythm: Sinus  Interpretation: no acute changes  Comparison: stable as compared to patient's most recent EKG    ED Course as of Apr 26 2020   Mon Apr 26, 2021 2020 Pain improved with morphine.    [FG]      ED Course User Index  [FG] Yves Padilla DO ED Course as of Apr 26 2020 Mon Apr 26, 2021 2020 Pain improved with morphine.    [FG]      ED Course User Index  [FG] Annetta Pérez, DO       --------------------------------------------- PAST HISTORY ---------------------------------------------  Past Medical History:  has a past medical history of Arthritis, Colostomy in place [facility]), Diabetes mellitus ([facility] [address] Diverticulitis, GERD (gastroesophageal reflux disease), Hernia, History of blood transfusion, Hyperlipidemia, and Hypertension. Past Surgical History:  has a past surgical history that includes Hysterectomy; Cholecystectomy; hernia repair; Abdomen surgery; colostomy; Appendectomy; Thyroid surgery; and ileostomy or jejunostomy. Social History:  reports that she has never smoked. She has quit using smokeless tobacco. She reports current alcohol use. She reports that she does not use drugs. Family History: family history is not on file. The patients home medications have been reviewed.     Allergies: Vancomycin and Adhesive tape    -------------------------------------------------- RESULTS -------------------------------------------------    LABS:  Results for orders placed or performed during the hospital encounter of 04/26/21   CBC Auto Differential   Result Value Ref Range    WBC 11.6 (H) 4.5 - 11.5 E9/L    RBC 4.69 3.50 - 5.50 E12/L    Hemoglobin 13.6 11.5 - 15.5 g/dL    Hematocrit 43.5 34.0 - 48.0 %    MCV 92.8 80.0 - 99.9 fL    MCH 29.0 26.0 - 35.0 pg    MCHC 31.3 (L) 32.0 - 34.5 %    RDW 14.3 11.5 - 15.0 fL    Platelets 041 658 - 134 E9/L    MPV 10.2 7.0 - 12.0 fL    Neutrophils % 69.8 43.0 - 80.0 %    Immature Granulocytes % 0.6 0.0 - 5.0 %    Lymphocytes % 20.0 20.0 - 42.0 %    Monocytes % 7.2 2.0 - 12.0 %    Eosinophils % 2.1 0.0 - 6.0 %    Basophils % 0.3 0.0 - 2.0 %    Neutrophils Absolute 8.11 (H) 1.80 - 7.30 E9/L    Immature Granulocytes # 0.07 E9/L    Lymphocytes Absolute 2.33 1.50 - 4.00 E9/L    Monocytes Absolute 0.84 0.10 - 0.95 E9/L    Eosinophils Absolute 0.24 0.05 - 0.50 E9/L    Basophils Absolute 0.04 0.00 - 0.20 E9/L   Comprehensive Metabolic Panel w/ Reflex to MG   Result Value Ref Range    Sodium 139 132 - 146 mmol/L    Potassium reflex Magnesium 4.7 3.5 - 5.0 mmol/L    Chloride 98 98 - 107 mmol/L    CO2 27 22 - 29 mmol/L    Anion Gap 14 7 - 16 mmol/L    Glucose 88 74 - 99 mg/dL    BUN 26 (H) 6 - 23 mg/dL    CREATININE 1.0 0.5 - 1.0 mg/dL    GFR Non-African American 53 >=60 mL/min/1.73    GFR African American >60     Calcium 9.8 8.6 - 10.2 mg/dL    Total Protein 7.0 6.4 - 8.3 g/dL    Albumin 4.1 3.5 - 5.2 g/dL    Total Bilirubin <0.2 0.0 - 1.2 mg/dL    Alkaline Phosphatase 69 35 - 104 U/L    ALT 12 0 - 32 U/L    AST 21 0 - 31 U/L   Troponin   Result Value Ref Range    Troponin <0.01 0.00 - 0.03 ng/mL       RADIOLOGY:  CT Head WO Contrast   Final Result   No acute abnormality of the cervical spine. There is age-appropriate atrophy   and small-vessel ischemic disease. CT CERVICAL SPINE. There is no acute displaced fracture in the cervical spine. The prevertebral   soft tissues are normal.  Degenerative changes are identified from C2-T1 with   osteophytes and multilevel disc bulges. Impression      No acute displaced fracture in the cervical spine. Diffuse degenerative changes with multilevel disc bulges from C2-T1. CT Cervical Spine WO Contrast   Final Result   No acute abnormality of the cervical spine. There is age-appropriate atrophy   and small-vessel ischemic disease. CT CERVICAL SPINE. There is no acute displaced fracture in the cervical spine. The prevertebral   soft tissues are normal.  Degenerative changes are identified from C2-T1 with   osteophytes and multilevel disc bulges. Impression      No acute displaced fracture in the cervical spine. Diffuse degenerative changes with multilevel disc bulges from C2-T1.          XR ANKLE RIGHT (MIN 3 VIEWS)   Final Result Fractures of the left ankle involving the medial malleolus and distal fibula   with disruption of the ankle mortise. Soft tissue swelling predominantly laterally. Mild degenerative changes of the left knee with chondrocalcinosis along the   medial tibiofemoral joint space and small joint effusion. No acute fracture or dislocation of the right ankle. Tiny well corticated   bony fragment adjacent to the right medial malleolus which may represent   sequela from old avulsion injury. Soft tissue swelling overlying the lateral malleolus of the right ankle. Right tibiotalar joint effusion. Other chronic or incidental findings as noted. XR KNEE LEFT (MIN 4 VIEWS)   Final Result   Fractures of the left ankle involving the medial malleolus and distal fibula   with disruption of the ankle mortise. Soft tissue swelling predominantly laterally. Mild degenerative changes of the left knee with chondrocalcinosis along the   medial tibiofemoral joint space and small joint effusion. No acute fracture or dislocation of the right ankle. Tiny well corticated   bony fragment adjacent to the right medial malleolus which may represent   sequela from old avulsion injury. Soft tissue swelling overlying the lateral malleolus of the right ankle. Right tibiotalar joint effusion. Other chronic or incidental findings as noted. XR TIBIA FIBULA LEFT (2 VIEWS)   Final Result   Fractures of the left ankle involving the medial malleolus and distal fibula   with disruption of the ankle mortise. Soft tissue swelling predominantly laterally. Mild degenerative changes of the left knee with chondrocalcinosis along the   medial tibiofemoral joint space and small joint effusion. No acute fracture or dislocation of the right ankle. Tiny well corticated   bony fragment adjacent to the right medial malleolus which may represent   sequela from old avulsion injury. Soft tissue swelling overlying the lateral malleolus of the right ankle. Right tibiotalar joint effusion. Other chronic or incidental findings as noted. XR ANKLE LEFT (MIN 3 VIEWS)   Final Result   Fractures of the left ankle involving the medial malleolus and distal fibula   with disruption of the ankle mortise. Soft tissue swelling predominantly laterally. Mild degenerative changes of the left knee with chondrocalcinosis along the   medial tibiofemoral joint space and small joint effusion. No acute fracture or dislocation of the right ankle. Tiny well corticated   bony fragment adjacent to the right medial malleolus which may represent   sequela from old avulsion injury. Soft tissue swelling overlying the lateral malleolus of the right ankle. Right tibiotalar joint effusion. Other chronic or incidental findings as noted. XR CHEST PORTABLE   Final Result   Cardiomegaly with no evidence of failure or acute infiltrates. Decreased inspiration. XR FOOT LEFT (MIN 3 VIEWS)    (Results Pending)   XR ANKLE LEFT (MIN 3 VIEWS)    (Results Pending)         ------------------------- NURSING NOTES AND VITALS REVIEWED ---------------------------  Date / Time Roomed:  4/26/2021  5:06 PM  ED Bed Assignment:  21/21    The nursing notes within the ED encounter and vital signs as below have been reviewed.      Patient Vitals for the past 24 hrs:   BP Temp Temp src Pulse Resp SpO2 Height Weight   04/26/21 1709 123/85 96.9 °F (36.1 °C) Temporal 96 16 94 % 5' 5\" (1.651 m) 188 lb (85.3 kg)       Oxygen Saturation Interpretation: Normal    ------------------------------------------ PROGRESS NOTES ------------------------------------------  Re-evaluation(s):  Time: 8pm  Patients symptoms show improvement in pain  Repeat physical examination is not changed    Counseling:  I have spoken with the patient and discussed todays results, in addition to providing specific details for the plan of care and counseling regarding the diagnosis and prognosis. Their questions are answered at this time and they are agreeable with the plan of admission.    --------------------------------- ADDITIONAL PROVIDER NOTES ---------------------------------  Consultations:  Time: 8PM. Spoke with Dr. Sameer Abreu. Discussed case. They will admit the patient. This patient's ED course included: a personal history and physicial examination, re-evaluation prior to disposition, multiple bedside re-evaluations, IV medications, cardiac monitoring and continuous pulse oximetry    This patient has remained hemodynamically stable during their ED course. Diagnosis:  1. Closed fracture of left ankle, initial encounter    2. Multiple falls    3. Lightheaded        Disposition:  Patient's disposition: Admit to intermediate  Patient's condition is stable.                Jalil Barnes DO  Resident  04/26/21 [id number]

## 2021-04-26 NOTE — LETTER
[place] Department Medicaid  CERTIFICATION OF NECESSITY  FOR NON-EMERGENCY TRANSPORTATION   BY GROUND AMBULANCE      Individual Information   1. Name: Gudelia Dobbs 2. [place] Medicaid Billing Number:    3. Address: [address] [facility] [address]      Transportation Provider Information   4. Provider Name: GABRIELLA   5. [place] Medicaid Provider Number:  National Provider Identifier (NPI):      Certification  7. Criteria:  During transport, this individual requires:  [x] Medical treatment or continuous     supervision by an EMT. [] The administration or regulation of oxygen by another person. [] Supervised protective restraint. 8. Period Beginning Date: 2021    9. Length  [x] Not more than 10 day(s)  [] One Year     Additional Information Relevant to Certification   10. Comments or Explanations, If Necessary or Appropriate     ALTERED MENTAL STATUS   A&O X 1   LEFT ANKLE FRACTURE     Certifying Practitioner Information   11. Name of Practitioner: Anat Soriano MD   12. [place] Medicaid Provider Number, If Applicable:  [address] Provider Identifier (NPI):     Signature Information   14. Date of Signature: 2021 15. Name of Person Signing Emily Cardoso :AMY   16. Signature and Professional Designation: Emily Cardoso -[phone]      [facility] [address]  Rev. 2015         [address] [place] [address] Encounter Date/Time: 2021 [address] [place] [address] Account: [de-identified]    MRN: [id number]    Patient: Gudelia Dobbs    Contact Serial #: [id number]      ENCOUNTER          Patient Class: I Private Enc? No Unit RM BD: SEYZ 5WE [id number]   Hospital Service: Med/Surg   Encounter DX: Ankle fracture, bimalleo*   ADM Provider: Rupa Maguire MD   Procedure:     ATT Provider: Rupa Maguire MD   REF Provider:        Admission DX:  Ankle fracture, bimalleolar, closed, left, initial encounter and codes:       PATIENT                 Name: Gudelia Dobbs : 1940 (81 yrs)   Address: [place] [address]

## 2021-04-27 LAB
ABO/RH: NORMAL
ANION GAP SERPL CALCULATED.3IONS-SCNC: 8 MMOL/L (ref 7–16)
ANTIBODY SCREEN: NORMAL
BACTERIA: ABNORMAL /HPF
BILIRUBIN URINE: ABNORMAL
BLOOD, URINE: NEGATIVE
BUN BLDV-MCNC: 29 MG/DL (ref 6–23)
CALCIUM SERPL-MCNC: 8.9 MG/DL (ref 8.6–10.2)
CHLORIDE BLD-SCNC: 98 MMOL/L (ref 98–107)
CLARITY: CLEAR
CO2: 29 MMOL/L (ref 22–29)
COLOR: YELLOW
CREAT SERPL-MCNC: 0.9 MG/DL (ref 0.5–1)
EKG ATRIAL RATE: 93 BPM
EKG P AXIS: 59 DEGREES
EKG P-R INTERVAL: 134 MS
EKG Q-T INTERVAL: 352 MS
EKG QRS DURATION: 76 MS
EKG QTC CALCULATION (BAZETT): 437 MS
EKG R AXIS: 21 DEGREES
EKG T AXIS: 31 DEGREES
EKG VENTRICULAR RATE: 93 BPM
EPITHELIAL CELLS, UA: ABNORMAL /HPF
GFR AFRICAN AMERICAN: >60
GFR NON-AFRICAN AMERICAN: 60 ML/MIN/1.73
GLUCOSE BLD-MCNC: 121 MG/DL (ref 74–99)
GLUCOSE URINE: NEGATIVE MG/DL
HCT VFR BLD CALC: 36.4 % (ref 34–48)
HEMOGLOBIN: 11.4 G/DL (ref 11.5–15.5)
KETONES, URINE: ABNORMAL MG/DL
LEUKOCYTE ESTERASE, URINE: ABNORMAL
MCH RBC QN AUTO: 29.2 PG (ref 26–35)
MCHC RBC AUTO-ENTMCNC: 31.3 % (ref 32–34.5)
MCV RBC AUTO: 93.3 FL (ref 80–99.9)
METER GLUCOSE: 169 MG/DL (ref 74–99)
METER GLUCOSE: 214 MG/DL (ref 74–99)
NITRITE, URINE: NEGATIVE
PDW BLD-RTO: 14.4 FL (ref 11.5–15)
PH UA: 5.5 (ref 5–9)
PLATELET # BLD: 154 E9/L (ref 130–450)
PMV BLD AUTO: 10.7 FL (ref 7–12)
POTASSIUM SERPL-SCNC: 5 MMOL/L (ref 3.5–5)
PROTEIN UA: NEGATIVE MG/DL
RBC # BLD: 3.9 E12/L (ref 3.5–5.5)
RBC UA: ABNORMAL /HPF (ref 0–2)
SODIUM BLD-SCNC: 135 MMOL/L (ref 132–146)
SPECIFIC GRAVITY UA: 1.02 (ref 1–1.03)
UROBILINOGEN, URINE: 0.2 E.U./DL
WBC # BLD: 12.9 E9/L (ref 4.5–11.5)
WBC UA: ABNORMAL /HPF (ref 0–5)

## 2021-04-27 PROCEDURE — 2140000000 HC CCU INTERMEDIATE R&B

## 2021-04-27 PROCEDURE — 81001 URINALYSIS AUTO W/SCOPE: CPT

## 2021-04-27 PROCEDURE — 6360000002 HC RX W HCPCS: Performed by: STUDENT IN AN ORGANIZED HEALTH CARE EDUCATION/TRAINING PROGRAM

## 2021-04-27 PROCEDURE — 80048 BASIC METABOLIC PNL TOTAL CA: CPT

## 2021-04-27 PROCEDURE — 6370000000 HC RX 637 (ALT 250 FOR IP): Performed by: STUDENT IN AN ORGANIZED HEALTH CARE EDUCATION/TRAINING PROGRAM

## 2021-04-27 PROCEDURE — 6370000000 HC RX 637 (ALT 250 FOR IP): Performed by: FAMILY MEDICINE

## 2021-04-27 PROCEDURE — 85027 COMPLETE CBC AUTOMATED: CPT

## 2021-04-27 PROCEDURE — 82962 GLUCOSE BLOOD TEST: CPT

## 2021-04-27 RX ORDER — DEXTROSE MONOHYDRATE 25 G/50ML
12.5 INJECTION, SOLUTION INTRAVENOUS PRN
Status: DISCONTINUED | OUTPATIENT
Start: 2021-04-27 | End: 2021-05-03 | Stop reason: HOSPADM

## 2021-04-27 RX ORDER — PREGABALIN 50 MG/1
50 CAPSULE ORAL 3 TIMES DAILY
Status: DISCONTINUED | OUTPATIENT
Start: 2021-04-27 | End: 2021-05-03 | Stop reason: HOSPADM

## 2021-04-27 RX ORDER — ACETAMINOPHEN 325 MG/1
650 TABLET ORAL EVERY 6 HOURS PRN
Status: DISCONTINUED | OUTPATIENT
Start: 2021-04-27 | End: 2021-05-03 | Stop reason: HOSPADM

## 2021-04-27 RX ORDER — GLIPIZIDE 5 MG/1
5 TABLET ORAL
Status: DISCONTINUED | OUTPATIENT
Start: 2021-04-28 | End: 2021-05-03 | Stop reason: HOSPADM

## 2021-04-27 RX ORDER — HYDROCODONE BITARTRATE AND ACETAMINOPHEN 10; 325 MG/1; MG/1
1 TABLET ORAL EVERY 4 HOURS PRN
Status: DISCONTINUED | OUTPATIENT
Start: 2021-04-27 | End: 2021-05-03 | Stop reason: HOSPADM

## 2021-04-27 RX ORDER — ROSUVASTATIN CALCIUM 20 MG/1
20 TABLET, COATED ORAL DAILY
Status: DISCONTINUED | OUTPATIENT
Start: 2021-04-27 | End: 2021-05-03 | Stop reason: HOSPADM

## 2021-04-27 RX ORDER — ENALAPRIL MALEATE 5 MG/1
20 TABLET ORAL DAILY
Status: DISCONTINUED | OUTPATIENT
Start: 2021-04-27 | End: 2021-05-03 | Stop reason: HOSPADM

## 2021-04-27 RX ORDER — GLIPIZIDE 5 MG/1
5 TABLET ORAL
Status: DISCONTINUED | OUTPATIENT
Start: 2021-04-27 | End: 2021-04-27

## 2021-04-27 RX ORDER — PANTOPRAZOLE SODIUM 40 MG/1
40 TABLET, DELAYED RELEASE ORAL
Status: DISCONTINUED | OUTPATIENT
Start: 2021-04-27 | End: 2021-05-03 | Stop reason: HOSPADM

## 2021-04-27 RX ORDER — DEXTROSE MONOHYDRATE 50 MG/ML
100 INJECTION, SOLUTION INTRAVENOUS PRN
Status: DISCONTINUED | OUTPATIENT
Start: 2021-04-27 | End: 2021-05-03 | Stop reason: HOSPADM

## 2021-04-27 RX ORDER — PAROXETINE HYDROCHLORIDE 20 MG/1
40 TABLET, FILM COATED ORAL DAILY
Status: DISCONTINUED | OUTPATIENT
Start: 2021-04-27 | End: 2021-05-03 | Stop reason: HOSPADM

## 2021-04-27 RX ORDER — GLYBURIDE 5 MG/1
5 TABLET ORAL 2 TIMES DAILY WITH MEALS
Status: DISCONTINUED | OUTPATIENT
Start: 2021-04-27 | End: 2021-04-27 | Stop reason: CLARIF

## 2021-04-27 RX ORDER — NICOTINE POLACRILEX 4 MG
15 LOZENGE BUCCAL PRN
Status: DISCONTINUED | OUTPATIENT
Start: 2021-04-27 | End: 2021-05-03 | Stop reason: HOSPADM

## 2021-04-27 RX ADMIN — OXYCODONE AND ACETAMINOPHEN 1 TABLET: 5; 325 TABLET ORAL at 18:28

## 2021-04-27 RX ADMIN — ENALAPRIL MALEATE 20 MG: 5 TABLET ORAL at 10:19

## 2021-04-27 RX ADMIN — HYDROCODONE BITARTRATE AND ACETAMINOPHEN 1 TABLET: 10; 325 TABLET ORAL at 21:14

## 2021-04-27 RX ADMIN — MORPHINE SULFATE 4 MG: 4 INJECTION, SOLUTION INTRAMUSCULAR; INTRAVENOUS at 16:34

## 2021-04-27 RX ADMIN — HYDROCODONE BITARTRATE AND ACETAMINOPHEN 1 TABLET: 10; 325 TABLET ORAL at 01:08

## 2021-04-27 RX ADMIN — PAROXETINE 40 MG: 20 TABLET, FILM COATED ORAL at 10:20

## 2021-04-27 RX ADMIN — INSULIN LISPRO 2 UNITS: 100 INJECTION, SOLUTION INTRAVENOUS; SUBCUTANEOUS at 17:55

## 2021-04-27 RX ADMIN — HYDROCODONE BITARTRATE AND ACETAMINOPHEN 1 TABLET: 10; 325 TABLET ORAL at 05:07

## 2021-04-27 RX ADMIN — GLIPIZIDE 5 MG: 5 TABLET ORAL at 10:19

## 2021-04-27 RX ADMIN — INSULIN LISPRO 1 UNITS: 100 INJECTION, SOLUTION INTRAVENOUS; SUBCUTANEOUS at 22:37

## 2021-04-27 RX ADMIN — PREGABALIN 50 MG: 50 CAPSULE ORAL at 21:14

## 2021-04-27 RX ADMIN — PANTOPRAZOLE SODIUM 40 MG: 40 TABLET, DELAYED RELEASE ORAL at 10:19

## 2021-04-27 ASSESSMENT — PAIN DESCRIPTION - ORIENTATION: ORIENTATION: LEFT

## 2021-04-27 ASSESSMENT — PAIN SCALES - GENERAL
PAINLEVEL_OUTOF10: 6
PAINLEVEL_OUTOF10: 10

## 2021-04-27 ASSESSMENT — PAIN DESCRIPTION - DESCRIPTORS: DESCRIPTORS: PATIENT UNABLE TO DESCRIBE

## 2021-04-27 ASSESSMENT — PAIN DESCRIPTION - PAIN TYPE: TYPE: ACUTE PAIN

## 2021-04-27 ASSESSMENT — PAIN DESCRIPTION - LOCATION: LOCATION: ANKLE

## 2021-04-27 ASSESSMENT — ENCOUNTER SYMPTOMS
ABDOMINAL PAIN: 0
SHORTNESS OF BREATH: 0

## 2021-04-27 NOTE — PLAN OF CARE
Problem: Skin Integrity:  Goal: Will show no infection signs and symptoms  Description: Will show no infection signs and symptoms  Outcome: Met This Shift     Problem: Falls - Risk of:  Goal: Absence of physical injury  Description: Absence of physical injury  Outcome: Met This Shift

## 2021-04-27 NOTE — PROGRESS NOTES
Department of Orthopedic Surgery  Resident Progress Note    Patient seen and examined. Pain controlled. No new complaints. Denies chest pain, shortness of breath, dizziness/lightheadedness. Did not sleep well. Patient seen in ER this morning. VITALS:  /80   Pulse 77   Temp 96.9 °F (36.1 °C) (Temporal)   Resp 16   Ht 5' 5\" (1.651 m)   Wt 188 lb (85.3 kg)   SpO2 97%   BMI 31.28 kg/m²     General: alert and oriented to person, place and time, well-developed and well-nourished, in no acute distress    MUSCULOSKELETAL:   left lower extremity:  · Splint c/d/I  · Palpable Compartments soft and compressible  · +wiggling of toes  · +Brisk Cap refill, Toes warm and perfused  · Distal sensation grossly intact to toes. CBC:   Lab Results   Component Value Date    WBC 12.9 04/27/2021    HGB 11.4 04/27/2021    HCT 36.4 04/27/2021     04/27/2021     PT/INR:    Lab Results   Component Value Date    PROTIME 10.5 04/26/2021    INR 1.0 04/26/2021       ASSESSMENT  · L Bimalleolar fracture subluxation. PLAN      · Continue physical therapy and protocol: NWB - LLE  · Okay for diet today, currently we will assess swelling over the next 24 hours and consider interventions if still in house. If unable to complete ORIF while in house can follow up as outpatient. · Deep venous thrombosis prophylaxis - per primary team, early mobilization  · PT/OT  · Pain Control: IV and PO  · Monitor Labs  · Appreciative of medical care. · Will continue to follow closely.

## 2021-04-27 NOTE — CONSULTS
Department of Orthopedic Surgery  Resident Consult Note          Reason for Consult:  Left Ankle Pain    HISTORY OF PRESENT ILLNESS:       Patient is a 78 y.o. female who presents with ankle pain after fall from standing height. Pt endorses Hitting Head. Anticoagulation - ASA. The patient is community Ambulator without assist normally. She will use a powered cart if she is shopping at [employer] . Pt lives at home with her daughter who is seen at bedside. Patient has a significant history of diabetes, neuropathy, hypertension, hyperlipidemia, previous bowel surgeries. Patient states that she has seen an orthopedic surgeon in the past but would like to establish care while in house with Dr. Alyse Alcaraz. She denies numbness/tingling/paresthesias different from baseline. Denies any other orthopedic complaints at this time. Per family she has suffered from multiple recent falls lately. Patient does not recall much from this associated event and is unsure whether she lost consciousness or not. Past Medical History:        Diagnosis Date    Arthritis     [facility] [address]     Diabetes mellitus [facility] [address]     Diverticulitis     GERD (gastroesophageal reflux disease)     Hernia     History of blood transfusion     Hyperlipidemia     Hypertension      Past Surgical History:        Procedure Laterality Date    ABDOMEN SURGERY      DIVERTICULITIS    APPENDECTOMY      CHOLECYSTECTOMY      COLOSTOMY      HERNIA REPAIR      OCT 2012    HYSTERECTOMY      ILEOSTOMY OR JEJUNOSTOMY      done and reversed    THYROID SURGERY      partial thyroidectomy     Current Medications:   Current Facility-Administered Medications: lidocaine-EPINEPHrine 1 %-1:847982 injection 10 mL, 10 mL, Other, Once  midazolam (VERSED) injection 1 mg, 1 mg, Intravenous, Once  Allergies:  Vancomycin and Adhesive tape    Social History:   TOBACCO:   reports that she has never smoked.  She has quit using smokeless tobacco.  ETOH: 43.5 04/26/2021    MCV 92.8 04/26/2021    MCH 29.0 04/26/2021    MCHC 31.3 04/26/2021    RDW 14.3 04/26/2021     04/26/2021    MPV 10.2 04/26/2021     PT/INR:  No results found for: PROTIME, INR    Radiology Review:  04/26/21 - XR left ankle  There is a bimalleolar fracture subluxation, there is associated talar subluxation with this fracture. No other fractures about the foot or more proximal tibia or fibula are noted. X-ray left knee  No acute fractures dislocations noted. There is medial joint space sclerosis as well as mild loss of compartment consistent with osteoarthritis. X-ray right ankle  No acute fractures or dislocations are noted. There is mild deformity of her fibula from most likely a previous fibular fracture. There is mild ankle sclerotic changes as well consistent with mild osteoarthritis. IMPRESSION:   ·  Closed, Left Bimalleolar Ankle Fracture    PLAN:  After informed consent was verbally obtained. Ankle underwent closed reduction with application of well padded 3-sided posterior splint. Neurovascular status was unchanged  Non-weight bearing to injured extremity  Pain medication IV and PO  Continue ice and elevation to decrease swelling  Currently she is too swollen for acute operative interventions. We will continue to monitor while in house and try and reduce swelling. If still in house on Thursday, 4/28 will re-assess swelling and consider ORIF at that time. · Discussed with Dr. Aicha Patel      I have seen and evaluated the patient and agree with the above assessment on today's visit. I have performed the key components of the history and physical examination and concur completely with the findings and plans as documented. Agree with ROS, examination, FMH, PMH, PSH, SocHx, and allergies as above. Patient physically seen and examined. Patient status post fall with left bimalleolar ankle fracture.   Will follow soft tissue and will need operative intervention once soft tissues allow definitive fixation. Patient is currently neurovascularly intact reduced in a splint. To the patient my surgery in detail. I explained the risks and complications of surgery with the patient including but not limited to death from anesthesia, possible neurovascular damage, possible infection, possible nonunion, possible hardware failure, possible need for further surgery, etc.  Patient understood this, asked appropriate questions and decided to go forward with the procedure. Physical Examination:   General appearance: alert, well appearing, and in no distress,  normal appearing weight. No visible signs of trauma   Mental status: alert, oriented to person, place, and time, normal mood, behavior, speech, dress, motor activity, and thought processes  Abdomen: soft, nondistended  Resp:   resp easy and unlabored, no audible wheezes note, normal symmetrical expansion of both hemithoraces  Cardiac: distal pulses palpable, skin and extremities well perfused  Neurological: alert, oriented X3, normal speech, no focal findings or movement disorder noted, motor and sensory grossly normal bilaterally, normal muscle tone, no tremors  HEENT: normochephalic atraumatic, external ears and eyes normal, sclera normal, neck supple, no nasal discharge. Extremities:   peripheral pulses normal, no edema, redness or tenderness in the calves   Skin: normal coloration, no rashes or open wounds, no suspicious skin lesions noted  Psych: Affect euthymic   Musculoskeletal:   Extremity:  3 with above examination. ELECTRONICALLY signed by:    Marlon Michelle MD  4/29/21   This is been dictated utilizing voice recognition software. All efforts have been made to make the note accurate although inadvertent errors may be present.

## 2021-04-27 NOTE — H&P
HISTORY AND PHYSICAL             Date: 4/27/2021        Patient Name: James Records     YOB: 1940      Age:  78 y.o. Chief Complaint     Chief Complaint   Patient presents with    Fall     MECHANICAL FALL, ANKLE PAIN LEFT          History Obtained From   patient    History of Present Illness   Patient had a mechanical fall at home and landed hard on her left ankle. She is resting comfortably at this time. Past Medical History     Past Medical History:   Diagnosis Date    Arthritis     Colostomy in [facility])     Diabetes mellitus ([address]     Diverticulitis     GERD (gastroesophageal reflux disease)     Hernia     History of blood transfusion     Hyperlipidemia     Hypertension         Past Surgical History     Past Surgical History:   Procedure Laterality Date    ABDOMEN SURGERY      DIVERTICULITIS    APPENDECTOMY      CHOLECYSTECTOMY      COLOSTOMY      HERNIA REPAIR      OCT 2012    HYSTERECTOMY      ILEOSTOMY OR JEJUNOSTOMY      done and reversed    THYROID SURGERY      partial thyroidectomy        Medications Prior to Admission     Prior to Admission medications    Medication Sig Start Date End Date Taking? Authorizing Provider   pregabalin (LYRICA) 50 MG capsule Take 50 mg by mouth 3 times daily.    Yes Historical Provider, MD   aspirin 81 MG tablet Take 81 mg by mouth daily   Yes Historical Provider, MD   acetaminophen (TYLENOL) 325 MG tablet Take 650 mg by mouth every 6 hours as needed for Pain   Yes Historical Provider, MD   rosuvastatin (CRESTOR) 20 MG tablet Take 20 mg by mouth daily   Yes Historical Provider, MD   Liraglutide (VICTOZA) 18 MG/3ML SOPN SC injection Inject 1.8 mg into the skin daily    Yes Historical Provider, MD   PARoxetine (PAXIL) 40 MG tablet Take 40 mg by mouth daily    Yes Historical Provider, MD   glyBURIDE (DIABETA) 5 MG tablet Take 5 mg by mouth daily    Yes Historical Provider, MD   enalapril (VASOTEC) 20 MG tablet Take 20 mg by mouth daily is warm and dry. Capillary Refill: Capillary refill takes less than 2 seconds. Neurological:      General: No focal deficit present. Mental Status: She is alert. Mental status is at baseline.    Psychiatric:         Mood and Affect: Mood normal.         Behavior: Behavior normal.         Labs      Recent Results (from the past 24 hour(s))   CBC Auto Differential    Collection Time: 04/26/21  5:38 PM   Result Value Ref Range    WBC 11.6 (H) 4.5 - 11.5 E9/L    RBC 4.69 3.50 - 5.50 E12/L    Hemoglobin 13.6 11.5 - 15.5 g/dL    Hematocrit 43.5 34.0 - 48.0 %    MCV 92.8 80.0 - 99.9 fL    MCH 29.0 26.0 - 35.0 pg    MCHC 31.3 (L) 32.0 - 34.5 %    RDW 14.3 11.5 - 15.0 fL    Platelets 728 873 - 330 E9/L    MPV 10.2 7.0 - 12.0 fL    Neutrophils % 69.8 43.0 - 80.0 %    Immature Granulocytes % 0.6 0.0 - 5.0 %    Lymphocytes % 20.0 20.0 - 42.0 %    Monocytes % 7.2 2.0 - 12.0 %    Eosinophils % 2.1 0.0 - 6.0 %    Basophils % 0.3 0.0 - 2.0 %    Neutrophils Absolute 8.11 (H) 1.80 - 7.30 E9/L    Immature Granulocytes # 0.07 E9/L    Lymphocytes Absolute 2.33 1.50 - 4.00 E9/L    Monocytes Absolute 0.84 0.10 - 0.95 E9/L    Eosinophils Absolute 0.24 0.05 - 0.50 E9/L    Basophils Absolute 0.04 0.00 - 0.20 E9/L   Comprehensive Metabolic Panel w/ Reflex to MG    Collection Time: 04/26/21  5:38 PM   Result Value Ref Range    Sodium 139 132 - 146 mmol/L    Potassium reflex Magnesium 4.7 3.5 - 5.0 mmol/L    Chloride 98 98 - 107 mmol/L    CO2 27 22 - 29 mmol/L    Anion Gap 14 7 - 16 mmol/L    Glucose 88 74 - 99 mg/dL    BUN 26 (H) 6 - 23 mg/dL    CREATININE 1.0 0.5 - 1.0 mg/dL    GFR Non-African American 53 >=60 mL/min/1.73    GFR African American >60     Calcium 9.8 8.6 - 10.2 mg/dL    Total Protein 7.0 6.4 - 8.3 g/dL    Albumin 4.1 3.5 - 5.2 g/dL    Total Bilirubin <0.2 0.0 - 1.2 mg/dL    Alkaline Phosphatase 69 35 - 104 U/L    ALT 12 0 - 32 U/L    AST 21 0 - 31 U/L   Troponin    Collection Time: 04/26/21  5:38 PM   Result Value Ref Range    Troponin <0.01 0.00 - 0.03 ng/mL   Protime-INR    Collection Time: 04/26/21  8:11 PM   Result Value Ref Range    Protime 10.5 9.3 - 12.4 sec    INR 1.0    APTT    Collection Time: 04/26/21  8:11 PM   Result Value Ref Range    aPTT 27.9 24.5 - 35.1 sec   TYPE AND SCREEN    Collection Time: 04/26/21 11:01 PM   Result Value Ref Range    ABO/Rh A POS     Antibody Screen NEG    Urinalysis with Microscopic    Collection Time: 04/27/21  1:03 AM   Result Value Ref Range    Color, UA Yellow Straw/Yellow    Clarity, UA Clear Clear    Glucose, Ur Negative Negative mg/dL    Bilirubin Urine SMALL (A) Negative    Ketones, Urine TRACE (A) Negative mg/dL    Specific Gravity, UA 1.025 1.005 - 1.030    Blood, Urine Negative Negative    pH, UA 5.5 5.0 - 9.0    Protein, UA Negative Negative mg/dL    Urobilinogen, Urine 0.2 <2.0 E.U./dL    Nitrite, Urine Negative Negative    Leukocyte Esterase, Urine SMALL (A) Negative    WBC, UA 2-5 0 - 5 /HPF    RBC, UA 0-1 0 - 2 /HPF    Epithelial Cells, UA RARE /HPF    Bacteria, UA RARE (A) None Seen /HPF   CBC    Collection Time: 04/27/21  5:04 AM   Result Value Ref Range    WBC 12.9 (H) 4.5 - 11.5 E9/L    RBC 3.90 3.50 - 5.50 E12/L    Hemoglobin 11.4 (L) 11.5 - 15.5 g/dL    Hematocrit 36.4 34.0 - 48.0 %    MCV 93.3 80.0 - 99.9 fL    MCH 29.2 26.0 - 35.0 pg    MCHC 31.3 (L) 32.0 - 34.5 %    RDW 14.4 11.5 - 15.0 fL    Platelets 742 495 - 457 E9/L    MPV 10.7 7.0 - 12.0 fL   Basic metabolic panel    Collection Time: 04/27/21  5:04 AM   Result Value Ref Range    Sodium 135 132 - 146 mmol/L    Potassium 5.0 3.5 - 5.0 mmol/L    Chloride 98 98 - 107 mmol/L    CO2 29 22 - 29 mmol/L    Anion Gap 8 7 - 16 mmol/L    Glucose 121 (H) 74 - 99 mg/dL    BUN 29 (H) 6 - 23 mg/dL    CREATININE 0.9 0.5 - 1.0 mg/dL    GFR Non-African American 60 >=60 mL/min/1.73    GFR African American >60     Calcium 8.9 8.6 - 10.2 mg/dL        Imaging/Diagnostics Last 24 Hours   Xr Knee Left (min 4 Views)    Result Date: 4/26/2021  EXAMINATION: THREE XRAY VIEWS OF THE LEFT ANKLE;   XRAY VIEWS OF THE LEFT TIBIA AND FIBULA; FOUR XRAY VIEWS OF THE LEFT KNEE; THREE XRAY VIEWS OF THE RIGHT ANKLE 4/26/2021 5:24 pm COMPARISON: None. HISTORY: ORDERING SYSTEM PROVIDED HISTORY: fall swelling, tenderness TECHNOLOGIST PROVIDED HISTORY: Reason for exam:->fall swelling, tenderness What reading provider will be dictating this exam?->CRC FINDINGS: Fractures of the medial malleolus and distal fibula with lateral displacement of the distal fragments. Disruption of the left ankle mortise with lateral displacement of the talus in relationship to the tibia. No talar dome fracture. Soft tissue swelling predominantly laterally. No additional more proximal fractures of the tibia or fibula. No acute fracture or dislocation of the left knee. Alignment is anatomic. 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 Small joint effusion. No acute fracture or dislocation of the right ankle. Alignment is anatomic. Tiny well corticated bony fragment adjacent to the medial malleolus which may represent sequela from old avulsion injury. Soft tissue swelling overlying the lateral malleolus. Tibiotalar joint effusion. Calcification along the course of the plantar aponeurosis which may represent sequela from plantar fasciitis. Fractures of the left ankle involving the medial malleolus and distal fibula with disruption of the ankle mortise. Soft tissue swelling predominantly laterally. Mild degenerative changes of the left knee with chondrocalcinosis along the medial tibiofemoral joint space and small joint effusion. No acute fracture or dislocation of the right ankle. Tiny well corticated bony fragment adjacent to the right medial malleolus which may represent sequela from old avulsion injury. Soft tissue swelling overlying the lateral malleolus of the right ankle. Right tibiotalar joint effusion. Other chronic or incidental findings as noted. Xr Tibia Fibula Left (2 Views)    Result Date: 4/26/2021  EXAMINATION: THREE XRAY VIEWS OF THE LEFT ANKLE;   XRAY VIEWS OF THE LEFT TIBIA AND FIBULA; FOUR XRAY VIEWS OF THE LEFT KNEE; THREE XRAY VIEWS OF THE RIGHT ANKLE 4/26/2021 5:24 pm COMPARISON: None. HISTORY: ORDERING SYSTEM PROVIDED HISTORY: fall swelling, tenderness TECHNOLOGIST PROVIDED HISTORY: Reason for exam:->fall swelling, tenderness What reading provider will be dictating this exam?->CRC FINDINGS: Fractures of the medial malleolus and distal fibula with lateral displacement of the distal fragments. Disruption of the left ankle mortise with lateral displacement of the talus in relationship to the tibia. No talar dome fracture. Soft tissue swelling predominantly laterally. No additional more proximal fractures of the tibia or fibula. No acute fracture or dislocation of the left knee. Alignment is anatomic. 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 Small joint effusion. No acute fracture or dislocation of the right ankle. Alignment is anatomic. Tiny well corticated bony fragment adjacent to the medial malleolus which may represent sequela from old avulsion injury. Soft tissue swelling overlying the lateral malleolus. Tibiotalar joint effusion. Calcification along the course of the plantar aponeurosis which may represent sequela from plantar fasciitis. Fractures of the left ankle involving the medial malleolus and distal fibula with disruption of the ankle mortise. Soft tissue swelling predominantly laterally. Mild degenerative changes of the left knee with chondrocalcinosis along the medial tibiofemoral joint space and small joint effusion. No acute fracture or dislocation of the right ankle. Tiny well corticated bony fragment adjacent to the right medial malleolus which may represent sequela from old avulsion injury. Soft tissue swelling overlying the lateral malleolus of the right ankle. Right tibiotalar joint effusion. Other chronic or incidental findings as noted. Xr Ankle Left (min 3 Views)    Result Date: 4/26/2021  EXAMINATION: THREE XRAY VIEWS OF THE LEFT ANKLE;   XRAY VIEWS OF THE LEFT TIBIA AND FIBULA; FOUR XRAY VIEWS OF THE LEFT KNEE; THREE XRAY VIEWS OF THE RIGHT ANKLE 4/26/2021 5:24 pm COMPARISON: None. HISTORY: ORDERING SYSTEM PROVIDED HISTORY: fall swelling, tenderness TECHNOLOGIST PROVIDED HISTORY: Reason for exam:->fall swelling, tenderness What reading provider will be dictating this exam?->CRC FINDINGS: Fractures of the medial malleolus and distal fibula with lateral displacement of the distal fragments. Disruption of the left ankle mortise with lateral displacement of the talus in relationship to the tibia. No talar dome fracture. Soft tissue swelling predominantly laterally. No additional more proximal fractures of the tibia or fibula. No acute fracture or dislocation of the left knee. Alignment is anatomic. 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 Small joint effusion. No acute fracture or dislocation of the right ankle. Alignment is anatomic. Tiny well corticated bony fragment adjacent to the medial malleolus which may represent sequela from old avulsion injury. Soft tissue swelling overlying the lateral malleolus. Tibiotalar joint effusion. Calcification along the course of the plantar aponeurosis which may represent sequela from plantar fasciitis. Fractures of the left ankle involving the medial malleolus and distal fibula with disruption of the ankle mortise. Soft tissue swelling predominantly laterally. Mild degenerative changes of the left knee with chondrocalcinosis along the medial tibiofemoral joint space and small joint effusion. No acute fracture or dislocation of the right ankle. Tiny well corticated bony fragment adjacent to the right medial malleolus which may represent sequela from old avulsion injury.  Soft tissue swelling overlying the lateral malleolus of the right ankle. Right tibiotalar joint effusion. Other chronic or incidental findings as noted. Xr Ankle Right (min 3 Views)    Result Date: 4/26/2021  EXAMINATION: THREE XRAY VIEWS OF THE LEFT ANKLE;   XRAY VIEWS OF THE LEFT TIBIA AND FIBULA; FOUR XRAY VIEWS OF THE LEFT KNEE; THREE XRAY VIEWS OF THE RIGHT ANKLE 4/26/2021 5:24 pm COMPARISON: None. HISTORY: ORDERING SYSTEM PROVIDED HISTORY: fall swelling, tenderness TECHNOLOGIST PROVIDED HISTORY: Reason for exam:->fall swelling, tenderness What reading provider will be dictating this exam?->CRC FINDINGS: Fractures of the medial malleolus and distal fibula with lateral displacement of the distal fragments. Disruption of the left ankle mortise with lateral displacement of the talus in relationship to the tibia. No talar dome fracture. Soft tissue swelling predominantly laterally. No additional more proximal fractures of the tibia or fibula. No acute fracture or dislocation of the left knee. Alignment is anatomic. 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 Small joint effusion. No acute fracture or dislocation of the right ankle. Alignment is anatomic. Tiny well corticated bony fragment adjacent to the medial malleolus which may represent sequela from old avulsion injury. Soft tissue swelling overlying the lateral malleolus. Tibiotalar joint effusion. Calcification along the course of the plantar aponeurosis which may represent sequela from plantar fasciitis. Fractures of the left ankle involving the medial malleolus and distal fibula with disruption of the ankle mortise. Soft tissue swelling predominantly laterally. Mild degenerative changes of the left knee with chondrocalcinosis along the medial tibiofemoral joint space and small joint effusion. No acute fracture or dislocation of the right ankle. Tiny well corticated bony fragment adjacent to the right medial malleolus which may represent sequela from old avulsion injury.  Soft tissue swelling cervical spine was performed without the administration of intravenous contrast. Multiplanar reformatted images are provided for review. Dose modulation, iterative reconstruction, and/or weight based adjustment of the mA/kV was utilized to reduce the radiation dose to as low as reasonably achievable.; CT of the head was performed without the administration of intravenous contrast. Dose modulation, iterative reconstruction, and/or weight based adjustment of the mA/kV was utilized to reduce the radiation dose to as low as reasonably achievable. COMPARISON: None. HISTORY: ORDERING SYSTEM PROVIDED HISTORY: fall TECHNOLOGIST PROVIDED HISTORY: Reason for exam:->fall Decision Support Exception->Emergency Medical Condition (MA) What reading provider will be dictating this exam?->CRC FINDINGS: BONES/ALIGNMENT: There is no acute fracture or traumatic malalignment. DEGENERATIVE CHANGES: No significant degenerative changes. SOFT TISSUES: There is no prevertebral soft tissue swelling. No acute abnormality of the cervical spine. There is age-appropriate atrophy and small-vessel ischemic disease. CT CERVICAL SPINE. There is no acute displaced fracture in the cervical spine. The prevertebral soft tissues are normal.  Degenerative changes are identified from C2-T1 with osteophytes and multilevel disc bulges. Impression No acute displaced fracture in the cervical spine. Diffuse degenerative changes with multilevel disc bulges from C2-T1. Xr Chest Portable    Result Date: 4/26/2021  EXAMINATION: ONE XRAY VIEW OF THE CHEST 4/26/2021 5:24 pm COMPARISON: July 20, 2020 HISTORY: ORDERING SYSTEM PROVIDED HISTORY: lightheaded TECHNOLOGIST PROVIDED HISTORY: Reason for exam:->lightheaded What reading provider will be dictating this exam?->CRC FINDINGS: Cardiac silhouette is mildly enlarged. Decreased inspiration. No evidence of airspace consolidation or pleural effusions. The pulmonary vasculature is within normal limits. Cardiomegaly with no evidence of failure or acute infiltrates. Decreased inspiration. Assessment      Hospital Problems           Last Modified POA    Ankle fracture, bimalleolar, closed, left, initial encounter 4/26/2021 Yes      falls  DM  HTN  Hyperlipidemia    Plan   Ortho did closed reduction  Monitor exam for 24 hours and see if ORIF needed this admission. PT/OT  Pain control  Continue home meds.     Consultations Ordered:  IP CONSULT TO ORTHOPEDIC SURGERY  IP CONSULT TO INTERNAL MEDICINE  IP CONSULT TO CASE MANAGEMENT    Electronically signed by Linh Mendiola MD on 4/27/21 at 6:42 AM EDT

## 2021-04-28 LAB
ANION GAP SERPL CALCULATED.3IONS-SCNC: 8 MMOL/L (ref 7–16)
BUN BLDV-MCNC: 36 MG/DL (ref 6–23)
CALCIUM SERPL-MCNC: 9.6 MG/DL (ref 8.6–10.2)
CHLORIDE BLD-SCNC: 98 MMOL/L (ref 98–107)
CO2: 30 MMOL/L (ref 22–29)
CREAT SERPL-MCNC: 1.1 MG/DL (ref 0.5–1)
GFR AFRICAN AMERICAN: 57
GFR NON-AFRICAN AMERICAN: 47 ML/MIN/1.73
GLUCOSE BLD-MCNC: 142 MG/DL (ref 74–99)
HCT VFR BLD CALC: 39.9 % (ref 34–48)
HEMOGLOBIN: 12.4 G/DL (ref 11.5–15.5)
MCH RBC QN AUTO: 29.2 PG (ref 26–35)
MCHC RBC AUTO-ENTMCNC: 31.1 % (ref 32–34.5)
MCV RBC AUTO: 93.9 FL (ref 80–99.9)
METER GLUCOSE: 140 MG/DL (ref 74–99)
METER GLUCOSE: 180 MG/DL (ref 74–99)
METER GLUCOSE: 186 MG/DL (ref 74–99)
METER GLUCOSE: 222 MG/DL (ref 74–99)
PDW BLD-RTO: 14.5 FL (ref 11.5–15)
PLATELET # BLD: 153 E9/L (ref 130–450)
PMV BLD AUTO: 10.8 FL (ref 7–12)
POTASSIUM SERPL-SCNC: 4.9 MMOL/L (ref 3.5–5)
RBC # BLD: 4.25 E12/L (ref 3.5–5.5)
SODIUM BLD-SCNC: 136 MMOL/L (ref 132–146)
WBC # BLD: 11.5 E9/L (ref 4.5–11.5)

## 2021-04-28 PROCEDURE — 85027 COMPLETE CBC AUTOMATED: CPT

## 2021-04-28 PROCEDURE — 36415 COLL VENOUS BLD VENIPUNCTURE: CPT

## 2021-04-28 PROCEDURE — 1200000000 HC SEMI PRIVATE

## 2021-04-28 PROCEDURE — 6360000002 HC RX W HCPCS: Performed by: STUDENT IN AN ORGANIZED HEALTH CARE EDUCATION/TRAINING PROGRAM

## 2021-04-28 PROCEDURE — 6370000000 HC RX 637 (ALT 250 FOR IP): Performed by: FAMILY MEDICINE

## 2021-04-28 PROCEDURE — 82962 GLUCOSE BLOOD TEST: CPT

## 2021-04-28 PROCEDURE — 97162 PT EVAL MOD COMPLEX 30 MIN: CPT

## 2021-04-28 PROCEDURE — 80048 BASIC METABOLIC PNL TOTAL CA: CPT

## 2021-04-28 PROCEDURE — 97530 THERAPEUTIC ACTIVITIES: CPT

## 2021-04-28 RX ADMIN — INSULIN LISPRO 1 UNITS: 100 INJECTION, SOLUTION INTRAVENOUS; SUBCUTANEOUS at 11:28

## 2021-04-28 RX ADMIN — HYDROCODONE BITARTRATE AND ACETAMINOPHEN 1 TABLET: 10; 325 TABLET ORAL at 06:19

## 2021-04-28 RX ADMIN — MORPHINE SULFATE 4 MG: 4 INJECTION, SOLUTION INTRAMUSCULAR; INTRAVENOUS at 04:20

## 2021-04-28 RX ADMIN — ROSUVASTATIN 20 MG: 20 TABLET, FILM COATED ORAL at 08:08

## 2021-04-28 RX ADMIN — PREGABALIN 50 MG: 50 CAPSULE ORAL at 20:20

## 2021-04-28 RX ADMIN — PREGABALIN 50 MG: 50 CAPSULE ORAL at 08:08

## 2021-04-28 RX ADMIN — ENALAPRIL MALEATE 20 MG: 5 TABLET ORAL at 08:09

## 2021-04-28 RX ADMIN — INSULIN LISPRO 1 UNITS: 100 INJECTION, SOLUTION INTRAVENOUS; SUBCUTANEOUS at 20:21

## 2021-04-28 RX ADMIN — PANTOPRAZOLE SODIUM 40 MG: 40 TABLET, DELAYED RELEASE ORAL at 06:14

## 2021-04-28 RX ADMIN — PAROXETINE 40 MG: 20 TABLET, FILM COATED ORAL at 08:09

## 2021-04-28 RX ADMIN — INSULIN LISPRO 1 UNITS: 100 INJECTION, SOLUTION INTRAVENOUS; SUBCUTANEOUS at 16:52

## 2021-04-28 ASSESSMENT — PAIN SCALES - GENERAL
PAINLEVEL_OUTOF10: 0
PAINLEVEL_OUTOF10: 10

## 2021-04-28 ASSESSMENT — ENCOUNTER SYMPTOMS
ABDOMINAL PAIN: 0
SHORTNESS OF BREATH: 0

## 2021-04-28 ASSESSMENT — PAIN DESCRIPTION - LOCATION: LOCATION: LEG

## 2021-04-28 ASSESSMENT — PAIN DESCRIPTION - ORIENTATION: ORIENTATION: LEFT

## 2021-04-28 NOTE — PROGRESS NOTES
Progress Note  Date:2021       CE:[id number]  Patient Quynh Preston     YOB: 1940     Age:81 y.o. Patient says she still has moderate left ankle pain. Subjective    Subjective:  Symptoms:  Stable. No shortness of breath or chest pain. Diet:  Adequate intake. Activity level: Impaired due to pain. Pain:  She complains of pain that is mild. Review of Systems   Constitutional: Positive for activity change. Negative for fever. HENT: Negative for congestion. Respiratory: Negative for shortness of breath. Cardiovascular: Negative for chest pain. Gastrointestinal: Negative for abdominal pain. Genitourinary: Negative for difficulty urinating. Musculoskeletal: Positive for gait problem and joint swelling. Neurological: Negative for dizziness. Objective         Vitals Last 24 Hours:  TEMPERATURE:  Temp  Av.6 °F (35.9 °C)  Min: 96.3 °F (35.7 °C)  Max: 97 °F (36.1 °C)  RESPIRATIONS RANGE: Resp  Avg: 15.5  Min: 14  Max: 17  PULSE OXIMETRY RANGE: SpO2  Av %  Min: 89 %  Max: 98 %  PULSE RANGE: Pulse  Av.3  Min: 78  Max: 105  BLOOD PRESSURE RANGE: Systolic (17HXU), LFD:468 , Min:102 , HIW:130   ; Diastolic (59MBC), HEU:13, Min:54, Max:87    I/O (24Hr): Intake/Output Summary (Last 24 hours) at 2021 0645  Last data filed at 2021 0626  Gross per 24 hour   Intake 120 ml   Output 320 ml   Net -200 ml     Objective:  General Appearance:  Comfortable. Vital signs: (most recent): Blood pressure 137/61, pulse 88, temperature 97 °F (36.1 °C), temperature source Temporal, resp. rate 14, height 5' 5\" (1.651 m), weight 188 lb (85.3 kg), SpO2 98 %. No fever. Lungs:  Normal effort and normal respiratory rate. Breath sounds clear to auscultation. Heart: Normal rate. Regular rhythm.   S1 normal and S2 normal.      Labs/Imaging/Diagnostics    Labs:  CBC:  Recent Labs     21  1738 21  0504 21  0423   WBC 11.6* 12.9* 11.5   RBC 4. 69 3.90 4.25   HGB 13.6 11.4* 12.4   HCT 43.5 36.4 39.9   MCV 92.8 93.3 93.9   RDW 14.3 14.4 14.5    154 153     CHEMISTRIES:  Recent Labs     04/26/21  1738 04/27/21  0504 04/28/21  0423    135 136   K 4.7 5.0 4.9   CL 98 98 98   CO2 27 29 30*   BUN 26* 29* 36*   CREATININE 1.0 0.9 1.1*   GLUCOSE 88 121* 142*     PT/INR:  Recent Labs     04/26/21 2011   PROTIME 10.5   INR 1.0     APTT:  Recent Labs     04/26/21 2011   APTT 27.9     LIVER PROFILE:  Recent Labs     04/26/21  1738   AST 21   ALT 12   BILITOT <0.2   ALKPHOS 69       Imaging Last 24 Hours:  Xr Knee Left (min 4 Views)    Result Date: 4/26/2021  EXAMINATION: THREE XRAY VIEWS OF THE LEFT ANKLE;   XRAY VIEWS OF THE LEFT TIBIA AND FIBULA; FOUR XRAY VIEWS OF THE LEFT KNEE; THREE XRAY VIEWS OF THE RIGHT ANKLE 4/26/2021 5:24 pm COMPARISON: None. HISTORY: ORDERING SYSTEM PROVIDED HISTORY: fall swelling, tenderness TECHNOLOGIST PROVIDED HISTORY: Reason for exam:->fall swelling, tenderness What reading provider will be dictating this exam?->CRC FINDINGS: Fractures of the medial malleolus and distal fibula with lateral displacement of the distal fragments. Disruption of the left ankle mortise with lateral displacement of the talus in relationship to the tibia. No talar dome fracture. Soft tissue swelling predominantly laterally. No additional more proximal fractures of the tibia or fibula. No acute fracture or dislocation of the left knee. Alignment is anatomic. 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 Small joint effusion. No acute fracture or dislocation of the right ankle. Alignment is anatomic. Tiny well corticated bony fragment adjacent to the medial malleolus which may represent sequela from old avulsion injury. Soft tissue swelling overlying the lateral malleolus. Tibiotalar joint effusion. Calcification along the course of the plantar aponeurosis which may represent sequela from plantar fasciitis.      Fractures of the left ankle involving the medial malleolus and distal fibula with disruption of the ankle mortise. Soft tissue swelling predominantly laterally. Mild degenerative changes of the left knee with chondrocalcinosis along the medial tibiofemoral joint space and small joint effusion. No acute fracture or dislocation of the right ankle. Tiny well corticated bony fragment adjacent to the right medial malleolus which may represent sequela from old avulsion injury. Soft tissue swelling overlying the lateral malleolus of the right ankle. Right tibiotalar joint effusion. Other chronic or incidental findings as noted. Xr Tibia Fibula Left (2 Views)    Result Date: 4/26/2021  EXAMINATION: THREE XRAY VIEWS OF THE LEFT ANKLE;   XRAY VIEWS OF THE LEFT TIBIA AND FIBULA; FOUR XRAY VIEWS OF THE LEFT KNEE; THREE XRAY VIEWS OF THE RIGHT ANKLE 4/26/2021 5:24 pm COMPARISON: None. HISTORY: ORDERING SYSTEM PROVIDED HISTORY: fall swelling, tenderness TECHNOLOGIST PROVIDED HISTORY: Reason for exam:->fall swelling, tenderness What reading provider will be dictating this exam?->CRC FINDINGS: Fractures of the medial malleolus and distal fibula with lateral displacement of the distal fragments. Disruption of the left ankle mortise with lateral displacement of the talus in relationship to the tibia. No talar dome fracture. Soft tissue swelling predominantly laterally. No additional more proximal fractures of the tibia or fibula. No acute fracture or dislocation of the left knee. Alignment is anatomic. 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 Small joint effusion. No acute fracture or dislocation of the right ankle. Alignment is anatomic. Tiny well corticated bony fragment adjacent to the medial malleolus which may represent sequela from old avulsion injury. Soft tissue swelling overlying the lateral malleolus. Tibiotalar joint effusion.  Calcification along the course of the plantar aponeurosis which may represent sequela from plantar fasciitis. Fractures of the left ankle involving the medial malleolus and distal fibula with disruption of the ankle mortise. Soft tissue swelling predominantly laterally. Mild degenerative changes of the left knee with chondrocalcinosis along the medial tibiofemoral joint space and small joint effusion. No acute fracture or dislocation of the right ankle. Tiny well corticated bony fragment adjacent to the right medial malleolus which may represent sequela from old avulsion injury. Soft tissue swelling overlying the lateral malleolus of the right ankle. Right tibiotalar joint effusion. Other chronic or incidental findings as noted. Xr Ankle Left (min 3 Views)    Result Date: 4/26/2021  EXAMINATION: THREE XRAY VIEWS OF THE LEFT ANKLE;   XRAY VIEWS OF THE LEFT TIBIA AND FIBULA; FOUR XRAY VIEWS OF THE LEFT KNEE; THREE XRAY VIEWS OF THE RIGHT ANKLE 4/26/2021 5:24 pm COMPARISON: None. HISTORY: ORDERING SYSTEM PROVIDED HISTORY: fall swelling, tenderness TECHNOLOGIST PROVIDED HISTORY: Reason for exam:->fall swelling, tenderness What reading provider will be dictating this exam?->CRC FINDINGS: Fractures of the medial malleolus and distal fibula with lateral displacement of the distal fragments. Disruption of the left ankle mortise with lateral displacement of the talus in relationship to the tibia. No talar dome fracture. Soft tissue swelling predominantly laterally. No additional more proximal fractures of the tibia or fibula. No acute fracture or dislocation of the left knee. Alignment is anatomic. 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 Small joint effusion. No acute fracture or dislocation of the right ankle. Alignment is anatomic. Tiny well corticated bony fragment adjacent to the medial malleolus which may represent sequela from old avulsion injury. Soft tissue swelling overlying the lateral malleolus. Tibiotalar joint effusion.  Calcification along the course of the plantar aponeurosis which may represent sequela from plantar fasciitis. Fractures of the left ankle involving the medial malleolus and distal fibula with disruption of the ankle mortise. Soft tissue swelling predominantly laterally. Mild degenerative changes of the left knee with chondrocalcinosis along the medial tibiofemoral joint space and small joint effusion. No acute fracture or dislocation of the right ankle. Tiny well corticated bony fragment adjacent to the right medial malleolus which may represent sequela from old avulsion injury. Soft tissue swelling overlying the lateral malleolus of the right ankle. Right tibiotalar joint effusion. Other chronic or incidental findings as noted. Xr Ankle Right (min 3 Views)    Result Date: 4/26/2021  EXAMINATION: THREE XRAY VIEWS OF THE LEFT ANKLE;   XRAY VIEWS OF THE LEFT TIBIA AND FIBULA; FOUR XRAY VIEWS OF THE LEFT KNEE; THREE XRAY VIEWS OF THE RIGHT ANKLE 4/26/2021 5:24 pm COMPARISON: None. HISTORY: ORDERING SYSTEM PROVIDED HISTORY: fall swelling, tenderness TECHNOLOGIST PROVIDED HISTORY: Reason for exam:->fall swelling, tenderness What reading provider will be dictating this exam?->CRC FINDINGS: Fractures of the medial malleolus and distal fibula with lateral displacement of the distal fragments. Disruption of the left ankle mortise with lateral displacement of the talus in relationship to the tibia. No talar dome fracture. Soft tissue swelling predominantly laterally. No additional more proximal fractures of the tibia or fibula. No acute fracture or dislocation of the left knee. Alignment is anatomic. 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 Small joint effusion. No acute fracture or dislocation of the right ankle. Alignment is anatomic. Tiny well corticated bony fragment adjacent to the medial malleolus which may represent sequela from old avulsion injury. Soft tissue swelling overlying the lateral malleolus. Tibiotalar joint effusion. spine.  There is age-appropriate atrophy and small-vessel ischemic disease. CT CERVICAL SPINE. There is no acute displaced fracture in the cervical spine. The prevertebral soft tissues are normal.  Degenerative changes are identified from C2-T1 with osteophytes and multilevel disc bulges. Impression No acute displaced fracture in the cervical spine. Diffuse degenerative changes with multilevel disc bulges from C2-T1. Ct Cervical Spine Wo Contrast    Result Date: 4/26/2021  EXAMINATION: CT OF THE CERVICAL SPINE WITHOUT CONTRAST; CT OF THE HEAD WITHOUT CONTRAST 4/26/2021 7:02 pm TECHNIQUE: CT of the cervical spine was performed without the administration of intravenous contrast. Multiplanar reformatted images are provided for review. Dose modulation, iterative reconstruction, and/or weight based adjustment of the mA/kV was utilized to reduce the radiation dose to as low as reasonably achievable.; CT of the head was performed without the administration of intravenous contrast. Dose modulation, iterative reconstruction, and/or weight based adjustment of the mA/kV was utilized to reduce the radiation dose to as low as reasonably achievable. COMPARISON: None. HISTORY: ORDERING SYSTEM PROVIDED HISTORY: fall TECHNOLOGIST PROVIDED HISTORY: Reason for exam:->fall Decision Support Exception->Emergency Medical Condition (MA) What reading provider will be dictating this exam?->CRC FINDINGS: BONES/ALIGNMENT: There is no acute fracture or traumatic malalignment. DEGENERATIVE CHANGES: No significant degenerative changes. SOFT TISSUES: There is no prevertebral soft tissue swelling. No acute abnormality of the cervical spine. There is age-appropriate atrophy and small-vessel ischemic disease. CT CERVICAL SPINE. There is no acute displaced fracture in the cervical spine. The prevertebral soft tissues are normal.  Degenerative changes are identified from C2-T1 with osteophytes and multilevel disc bulges.  Impression No acute displaced fracture in the cervical spine. Diffuse degenerative changes with multilevel disc bulges from C2-T1. Xr Chest Portable    Result Date: 4/26/2021  EXAMINATION: ONE XRAY VIEW OF THE CHEST 4/26/2021 5:24 pm COMPARISON: July 20, 2020 HISTORY: ORDERING SYSTEM PROVIDED HISTORY: lightheaded TECHNOLOGIST PROVIDED HISTORY: Reason for exam:->lightheaded What reading provider will be dictating this exam?->CRC FINDINGS: Cardiac silhouette is mildly enlarged. Decreased inspiration. No evidence of airspace consolidation or pleural effusions. The pulmonary vasculature is within normal limits. Cardiomegaly with no evidence of failure or acute infiltrates. Decreased inspiration. Assessment//Plan           Hospital Problems           Last Modified POA    Ankle fracture, bimalleolar, closed, left, initial encounter 4/26/2021 Yes        Assessment:  (Ankle fracture, bimalleolar, closed, left, initial encounter 4/26/2021 Yes     falls  DM  HTN  Hyperlipidemia  ). Plan:   (Will see if patient can ambulate at all. If not, possible ORIF this admission  Continue meds. ).        Electronically signed by Teagan Mkceon MD on 4/28/21 at 6:45 AM EDT

## 2021-04-28 NOTE — CARE COORDINATION
Transition of care: Met with pt and pt's daughter, Raquel Rao, in room. Pt lives with her , Brii Pike in a 1 story home. Has 4 steps with 1 hand rail to back entrance of home. PTA -pt was independent with ADLs. Family provides transportation. DME- [employer], rollator, cane, wheelchair and safety rails in bathroom. Hx of yasmine but is unsure of name of facility. We discussed PT eval AM-PAC 9/24 and NT with ambulation. They are agreeable to [facility]. I provided Raquel Rao with yasmine list for [facility] to make choices. PCP is Dr. Dorie Ayoub and pharmacy is [facility] in [email or web address].  Sw/cm will follow

## 2021-04-28 NOTE — PATIENT CARE CONFERENCE
P Quality Flow/Interdisciplinary Rounds Progress Note        Quality Flow Rounds held on April 28, 2021    Disciplines Attending:  Bedside Nurse, ,  and Nursing Unit Bed[address]icha Smet[address] was admitted on 4/26/2021  5:06 PM    Anticipated Discharge Date:  Expected Discharge Date: 04/30/21    Disposition:    Subhash Score:  Subhash Scale Score: 16    Readmission Risk              Risk of Unplanned Readmission:        11           Discussed patient goal for the day, patient clinical progression, and barriers to discharge.   The following Goal(s) of the Day/Commitment(s) have been identified:  to transfer to a general floor by nayely Lopez  April 28, 2021

## 2021-04-28 NOTE — PLAN OF CARE
Problem: Pain:  Goal: Pain level will decrease  Description: Pain level will decrease  4/28/2021 1723 by Deneen Tony, RN  Outcome: Met This Shift  4/28/2021 0945 by Deneen Tony RN  Outcome: Met This Shift  Goal: Control of acute pain  Description: Control of acute pain  4/28/2021 1723 by Deneen Tony, RN  Outcome: Met This Shift  4/28/2021 0945 by Deneen Tony RN  Outcome: Met This Shift  Goal: Control of chronic pain  Description: Control of chronic pain  Outcome: Met This Shift

## 2021-04-28 NOTE — PLAN OF CARE
Problem: Pain:  Goal: Pain level will decrease  Description: Pain level will decrease  4/28/2021 0945 by Arianna Sesay RN  Outcome: Met This Shift  4/28/2021 0127 by Tyler Ram RN  Outcome: Met This Shift  Goal: Control of acute pain  Description: Control of acute pain  Outcome: Met This Shift     Problem: Skin Integrity:  Goal: Will show no infection signs and symptoms  Description: Will show no infection signs and symptoms  4/28/2021 0127 by Tyler Ram RN  Outcome: Met This Shift  Goal: Absence of new skin breakdown  Description: Absence of new skin breakdown  4/28/2021 0127 by Tyler Ram RN  Outcome: Met This Shift     Problem: Falls - Risk of:  Goal: Will remain free from falls  Description: Will remain free from falls  4/28/2021 0127 by Tyler Ram RN  Outcome: Met This Shift

## 2021-04-28 NOTE — PLAN OF CARE
Problem: Pain:  Goal: Pain level will decrease  Description: Pain level will decrease  Outcome: Met This Shift     Problem: Skin Integrity:  Goal: Will show no infection signs and symptoms  Description: Will show no infection signs and symptoms  4/28/2021 0127 by Reg Del Valle RN  Outcome: Met This Shift  4/27/2021 1750 by Aliyah Lyman RN  Outcome: Met This Shift  Goal: Absence of new skin breakdown  Description: Absence of new skin breakdown  Outcome: Met This Shift     Problem: Falls - Risk of:  Goal: Will remain free from falls  Description: Will remain free from falls  Outcome: Met This Shift  Goal: Absence of physical injury  Description: Absence of physical injury  4/27/2021 1750 by Aliyah Lyman RN  Outcome: Met This Shift

## 2021-04-28 NOTE — PROGRESS NOTES
Physical Therapy  Physical Therapy Initial Assessment     Name: Michael Looney  : 1940  MRN: [id number]    Referring Provider:  Larry Georges MD    Date of Service: 2021    Evaluating PT:  Moiseabner Tang, PT, DPT [id number]    Room #:  5531/7472-F  Diagnosis:  L ankle fracture bimalleolar  Precautions: Falls, NWB LLE, bed alarm, O2  Procedure/Surgery:  NA  PMHx/PSHx:  DM, arthritis, HLD, HTN, colostomy bag  Equipment Needs:  TBD    SUBJECTIVE:  Pt is a questionnable historian. Pt reported living with daughter and  in a 1 story home with 5 stairs to enter and 1 rail. Pt ambulated with [employer] PTA. OBJECTIVE:   Initial Evaluation  Date:  Treatment Short Term/ Long Term   Goals   AM-PAC 6 Clicks      Was pt agreeable to Eval/treatment? Yes     Does pt have pain? Yes, LLE, \"a little bit\" at rest, increased with movement but unable to rate     Bed Mobility  Rolling: NT  Supine to sit: Choco x 2 (one for LLE) with HOB elevated  Sit to supine: ModA x 2  Scooting: ModA  Supervision   Transfers Sit to stand: ModA x 2  Stand to sit: ModA x 2  Stand pivot: NT  Choco with AAD   Ambulation   NT  >15 feet with Choco with AAD   Stair negotiation: ascended and descended NT  >2 steps with 2 rails ModA   ROM BUE:  Defer to OT note  BLE:  WFL     Strength BUE:  Defer to OT note  RLE: at least 3/5 grossly  LLE: deferred testing, able to move  Increase by 1/3 MMT grade   Balance Dynamic Sitting EOB:  ModA  Static Standing:  ModA x 2 with [employer]  Dynamic Sitting EOB:  Supervision  Dynamic Standing:  Choco with [employer]     Pt is A & O x 4  Sensation:  No reported paresthesias   Edema:  None noted    Therapeutic Exercises:  NA    Patient education  Pt educated on safety    Patient response to education:   Pt verbalized understanding Pt demonstrated skill Pt requires further education in this area   partial partial yes     ASSESSMENT:    Comments:  Pt was in bed upon arrival, agreeable to initial evaluation.  Pt educated on WB precautions prior to mobility. Throughout session, pt demonstrated limited safety awareness, sporadic thinking, and limited ability to communicate complete thoughts despite being A&O x 4. Increased time required for all tasks due to impaired cognition. Pt utilized bedrail with BUE, but required assistance to reach EOB, specifically for LLE. In sitting at EOB, pt demonstrated unsteadiness that worsened with dynamic activity, including 1 posterior LOB that required assistance to recover. Pt required assistance to maintain LLE NWB status to stand from bed. In standing with WW, pt demonstrated forward flexed posture that pt was able to briefly correct. Pt experienced 1 posterior LOB that required assistance to recover and was returned to the bed. Pt was left in bed with all needs met and call light in reach. Bed alarm reactivated. Treatment:  Patient practiced and was instructed in the following treatment:     Bed mobility training - pt given verbal and tactile cues to facilitate proper sequencing and safety during supine>sit as well as provided with physical assistance to complete task    Sitting EOB for >10 minutes for upright tolerance, postural awareness and BLE ROM   Transfer training - pt was given verbal and tactile cues to facilitate proper hand placement, technique and safety during sit to stand and stand to sit as well as provided with physical assistance to complete task.  Skilled positioning - Pt placed in the chair position with pillows utilized to facilitate upright posture, joint and skin integrity, and interaction with environment. Pt's/ family goals   1. Return home    Patient and or family understand(s) diagnosis, prognosis, and plan of care.   yes    PLAN OF CARE:    Current Treatment Recommendations     [x] Strengthening     [] ROM   [x] Balance Training   [x] Endurance Training   [x] Transfer Training   [x] Gait Training   [x] Stair Training   [] Positioning   [x]

## 2021-04-29 LAB
ANION GAP SERPL CALCULATED.3IONS-SCNC: 10 MMOL/L (ref 7–16)
BUN BLDV-MCNC: 25 MG/DL (ref 6–23)
CALCIUM SERPL-MCNC: 9.6 MG/DL (ref 8.6–10.2)
CHLORIDE BLD-SCNC: 94 MMOL/L (ref 98–107)
CO2: 30 MMOL/L (ref 22–29)
CREAT SERPL-MCNC: 0.9 MG/DL (ref 0.5–1)
GFR AFRICAN AMERICAN: >60
GFR NON-AFRICAN AMERICAN: 60 ML/MIN/1.73
GLUCOSE BLD-MCNC: 150 MG/DL (ref 74–99)
HCT VFR BLD CALC: 38.6 % (ref 34–48)
HEMOGLOBIN: 11.9 G/DL (ref 11.5–15.5)
MCH RBC QN AUTO: 28.9 PG (ref 26–35)
MCHC RBC AUTO-ENTMCNC: 30.8 % (ref 32–34.5)
MCV RBC AUTO: 93.7 FL (ref 80–99.9)
METER GLUCOSE: 160 MG/DL (ref 74–99)
METER GLUCOSE: 170 MG/DL (ref 74–99)
METER GLUCOSE: 179 MG/DL (ref 74–99)
METER GLUCOSE: 264 MG/DL (ref 74–99)
PDW BLD-RTO: 14.2 FL (ref 11.5–15)
PLATELET # BLD: 137 E9/L (ref 130–450)
PMV BLD AUTO: 10.4 FL (ref 7–12)
POTASSIUM SERPL-SCNC: 4.8 MMOL/L (ref 3.5–5)
RBC # BLD: 4.12 E12/L (ref 3.5–5.5)
SARS-COV-2, NAAT: NOT DETECTED
SODIUM BLD-SCNC: 134 MMOL/L (ref 132–146)
WBC # BLD: 9.6 E9/L (ref 4.5–11.5)

## 2021-04-29 PROCEDURE — 97530 THERAPEUTIC ACTIVITIES: CPT

## 2021-04-29 PROCEDURE — 82962 GLUCOSE BLOOD TEST: CPT

## 2021-04-29 PROCEDURE — 97166 OT EVAL MOD COMPLEX 45 MIN: CPT

## 2021-04-29 PROCEDURE — 6370000000 HC RX 637 (ALT 250 FOR IP): Performed by: FAMILY MEDICINE

## 2021-04-29 PROCEDURE — 87635 SARS-COV-2 COVID-19 AMP PRB: CPT

## 2021-04-29 PROCEDURE — 85027 COMPLETE CBC AUTOMATED: CPT

## 2021-04-29 PROCEDURE — 6370000000 HC RX 637 (ALT 250 FOR IP): Performed by: STUDENT IN AN ORGANIZED HEALTH CARE EDUCATION/TRAINING PROGRAM

## 2021-04-29 PROCEDURE — 1200000000 HC SEMI PRIVATE

## 2021-04-29 PROCEDURE — 80048 BASIC METABOLIC PNL TOTAL CA: CPT

## 2021-04-29 PROCEDURE — 36415 COLL VENOUS BLD VENIPUNCTURE: CPT

## 2021-04-29 RX ADMIN — PREGABALIN 50 MG: 50 CAPSULE ORAL at 20:39

## 2021-04-29 RX ADMIN — OXYCODONE AND ACETAMINOPHEN 1 TABLET: 5; 325 TABLET ORAL at 14:00

## 2021-04-29 RX ADMIN — INSULIN LISPRO 3 UNITS: 100 INJECTION, SOLUTION INTRAVENOUS; SUBCUTANEOUS at 11:36

## 2021-04-29 RX ADMIN — INSULIN LISPRO 1 UNITS: 100 INJECTION, SOLUTION INTRAVENOUS; SUBCUTANEOUS at 08:50

## 2021-04-29 RX ADMIN — PREGABALIN 50 MG: 50 CAPSULE ORAL at 08:51

## 2021-04-29 RX ADMIN — PAROXETINE 40 MG: 20 TABLET, FILM COATED ORAL at 08:47

## 2021-04-29 RX ADMIN — GLIPIZIDE 5 MG: 5 TABLET ORAL at 08:47

## 2021-04-29 RX ADMIN — ENALAPRIL MALEATE 20 MG: 5 TABLET ORAL at 08:46

## 2021-04-29 RX ADMIN — PANTOPRAZOLE SODIUM 40 MG: 40 TABLET, DELAYED RELEASE ORAL at 06:49

## 2021-04-29 RX ADMIN — ROSUVASTATIN 20 MG: 20 TABLET, FILM COATED ORAL at 08:46

## 2021-04-29 RX ADMIN — HYDROCODONE BITARTRATE AND ACETAMINOPHEN 1 TABLET: 10; 325 TABLET ORAL at 08:51

## 2021-04-29 RX ADMIN — HYDROCODONE BITARTRATE AND ACETAMINOPHEN 1 TABLET: 10; 325 TABLET ORAL at 23:18

## 2021-04-29 RX ADMIN — INSULIN LISPRO 1 UNITS: 100 INJECTION, SOLUTION INTRAVENOUS; SUBCUTANEOUS at 20:38

## 2021-04-29 ASSESSMENT — PAIN SCALES - GENERAL
PAINLEVEL_OUTOF10: 4
PAINLEVEL_OUTOF10: 8
PAINLEVEL_OUTOF10: 10
PAINLEVEL_OUTOF10: 8
PAINLEVEL_OUTOF10: 5

## 2021-04-29 ASSESSMENT — PAIN DESCRIPTION - LOCATION
LOCATION: LEG
LOCATION: LEG

## 2021-04-29 ASSESSMENT — ENCOUNTER SYMPTOMS
SHORTNESS OF BREATH: 0
ABDOMINAL PAIN: 0

## 2021-04-29 ASSESSMENT — PAIN SCALES - WONG BAKER: WONGBAKER_NUMERICALRESPONSE: 4

## 2021-04-29 ASSESSMENT — PAIN DESCRIPTION - ONSET: ONSET: ON-GOING

## 2021-04-29 ASSESSMENT — PAIN DESCRIPTION - DESCRIPTORS
DESCRIPTORS: ACHING;DISCOMFORT;SORE
DESCRIPTORS: ACHING;DISCOMFORT;CONSTANT

## 2021-04-29 ASSESSMENT — PAIN DESCRIPTION - PAIN TYPE
TYPE: ACUTE PAIN
TYPE: ACUTE PAIN

## 2021-04-29 ASSESSMENT — PAIN DESCRIPTION - ORIENTATION: ORIENTATION: LEFT

## 2021-04-29 ASSESSMENT — PAIN DESCRIPTION - PROGRESSION: CLINICAL_PROGRESSION: GRADUALLY WORSENING

## 2021-04-29 NOTE — PROGRESS NOTES
Patient stated that she sat down on floor because she \"wanted to find her daughter\". Patient denies having pain or discomfort. Patient also states that she sat intentionally. Patient appears to be without injury, no changes from morning assessment. Daughter Ney Prescott updated.

## 2021-04-29 NOTE — PLAN OF CARE
Problem: Pain:  Goal: Pain level will decrease  Description: Pain level will decrease  4/29/2021 0353 by Vida Grijalva RN  Outcome: Met This Shift     Problem: Pain:  Goal: Control of acute pain  Description: Control of acute pain  4/29/2021 0353 by Vida Grijalva RN  Outcome: Met This Shift

## 2021-04-29 NOTE — PROGRESS NOTES
9. 6   RBC 3.90 4.25 4.12   HGB 11.4* 12.4 11.9   HCT 36.4 39.9 38.6   MCV 93.3 93.9 93.7   RDW 14.4 14.5 14.2    153 137     CHEMISTRIES:  Recent Labs     04/27/21  0504 04/28/21  0423 04/29/21  0457    136 134   K 5.0 4.9 4.8   CL 98 98 94*   CO2 29 30* 30*   BUN 29* 36* 25*   CREATININE 0.9 1.1* 0.9   GLUCOSE 121* 142* 150*     PT/INR:  Recent Labs     04/26/21 2011   PROTIME 10.5   INR 1.0     APTT:  Recent Labs     04/26/21 2011   APTT 27.9     LIVER PROFILE:  Recent Labs     04/26/21  1738   AST 21   ALT 12   BILITOT <0.2   ALKPHOS 69       Imaging Last 24 Hours:  Xr Knee Left (min 4 Views)    Result Date: 4/26/2021  EXAMINATION: THREE XRAY VIEWS OF THE LEFT ANKLE;   XRAY VIEWS OF THE LEFT TIBIA AND FIBULA; FOUR XRAY VIEWS OF THE LEFT KNEE; THREE XRAY VIEWS OF THE RIGHT ANKLE 4/26/2021 5:24 pm COMPARISON: None. HISTORY: ORDERING SYSTEM PROVIDED HISTORY: fall swelling, tenderness TECHNOLOGIST PROVIDED HISTORY: Reason for exam:->fall swelling, tenderness What reading provider will be dictating this exam?->CRC FINDINGS: Fractures of the medial malleolus and distal fibula with lateral displacement of the distal fragments. Disruption of the left ankle mortise with lateral displacement of the talus in relationship to the tibia. No talar dome fracture. Soft tissue swelling predominantly laterally. No additional more proximal fractures of the tibia or fibula. No acute fracture or dislocation of the left knee. Alignment is anatomic. 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 Small joint effusion. No acute fracture or dislocation of the right ankle. Alignment is anatomic. Tiny well corticated bony fragment adjacent to the medial malleolus which may represent sequela from old avulsion injury. Soft tissue swelling overlying the lateral malleolus. Tibiotalar joint effusion. Calcification along the course of the plantar aponeurosis which may represent sequela from plantar fasciitis. Fractures of the left ankle involving the medial malleolus and distal fibula with disruption of the ankle mortise. Soft tissue swelling predominantly laterally. Mild degenerative changes of the left knee with chondrocalcinosis along the medial tibiofemoral joint space and small joint effusion. No acute fracture or dislocation of the right ankle. Tiny well corticated bony fragment adjacent to the right medial malleolus which may represent sequela from old avulsion injury. Soft tissue swelling overlying the lateral malleolus of the right ankle. Right tibiotalar joint effusion. Other chronic or incidental findings as noted. Xr Tibia Fibula Left (2 Views)    Result Date: 4/26/2021  EXAMINATION: THREE XRAY VIEWS OF THE LEFT ANKLE;   XRAY VIEWS OF THE LEFT TIBIA AND FIBULA; FOUR XRAY VIEWS OF THE LEFT KNEE; THREE XRAY VIEWS OF THE RIGHT ANKLE 4/26/2021 5:24 pm COMPARISON: None. HISTORY: ORDERING SYSTEM PROVIDED HISTORY: fall swelling, tenderness TECHNOLOGIST PROVIDED HISTORY: Reason for exam:->fall swelling, tenderness What reading provider will be dictating this exam?->CRC FINDINGS: Fractures of the medial malleolus and distal fibula with lateral displacement of the distal fragments. Disruption of the left ankle mortise with lateral displacement of the talus in relationship to the tibia. No talar dome fracture. Soft tissue swelling predominantly laterally. No additional more proximal fractures of the tibia or fibula. No acute fracture or dislocation of the left knee. Alignment is anatomic. 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 Small joint effusion. No acute fracture or dislocation of the right ankle. Alignment is anatomic. Tiny well corticated bony fragment adjacent to the medial malleolus which may represent sequela from old avulsion injury. Soft tissue swelling overlying the lateral malleolus. Tibiotalar joint effusion.  Calcification along the course of the plantar aponeurosis which may represent sequela from plantar fasciitis. Fractures of the left ankle involving the medial malleolus and distal fibula with disruption of the ankle mortise. Soft tissue swelling predominantly laterally. Mild degenerative changes of the left knee with chondrocalcinosis along the medial tibiofemoral joint space and small joint effusion. No acute fracture or dislocation of the right ankle. Tiny well corticated bony fragment adjacent to the right medial malleolus which may represent sequela from old avulsion injury. Soft tissue swelling overlying the lateral malleolus of the right ankle. Right tibiotalar joint effusion. Other chronic or incidental findings as noted. Xr Ankle Left (min 3 Views)    Result Date: 4/26/2021  EXAMINATION: THREE XRAY VIEWS OF THE LEFT ANKLE;   XRAY VIEWS OF THE LEFT TIBIA AND FIBULA; FOUR XRAY VIEWS OF THE LEFT KNEE; THREE XRAY VIEWS OF THE RIGHT ANKLE 4/26/2021 5:24 pm COMPARISON: None. HISTORY: ORDERING SYSTEM PROVIDED HISTORY: fall swelling, tenderness TECHNOLOGIST PROVIDED HISTORY: Reason for exam:->fall swelling, tenderness What reading provider will be dictating this exam?->CRC FINDINGS: Fractures of the medial malleolus and distal fibula with lateral displacement of the distal fragments. Disruption of the left ankle mortise with lateral displacement of the talus in relationship to the tibia. No talar dome fracture. Soft tissue swelling predominantly laterally. No additional more proximal fractures of the tibia or fibula. No acute fracture or dislocation of the left knee. Alignment is anatomic. 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 Small joint effusion. No acute fracture or dislocation of the right ankle. Alignment is anatomic. Tiny well corticated bony fragment adjacent to the medial malleolus which may represent sequela from old avulsion injury. Soft tissue swelling overlying the lateral malleolus. Tibiotalar joint effusion.  Calcification along the course of the plantar aponeurosis which may represent sequela from plantar fasciitis. Fractures of the left ankle involving the medial malleolus and distal fibula with disruption of the ankle mortise. Soft tissue swelling predominantly laterally. Mild degenerative changes of the left knee with chondrocalcinosis along the medial tibiofemoral joint space and small joint effusion. No acute fracture or dislocation of the right ankle. Tiny well corticated bony fragment adjacent to the right medial malleolus which may represent sequela from old avulsion injury. Soft tissue swelling overlying the lateral malleolus of the right ankle. Right tibiotalar joint effusion. Other chronic or incidental findings as noted. Xr Ankle Right (min 3 Views)    Result Date: 4/26/2021  EXAMINATION: THREE XRAY VIEWS OF THE LEFT ANKLE;   XRAY VIEWS OF THE LEFT TIBIA AND FIBULA; FOUR XRAY VIEWS OF THE LEFT KNEE; THREE XRAY VIEWS OF THE RIGHT ANKLE 4/26/2021 5:24 pm COMPARISON: None. HISTORY: ORDERING SYSTEM PROVIDED HISTORY: fall swelling, tenderness TECHNOLOGIST PROVIDED HISTORY: Reason for exam:->fall swelling, tenderness What reading provider will be dictating this exam?->CRC FINDINGS: Fractures of the medial malleolus and distal fibula with lateral displacement of the distal fragments. Disruption of the left ankle mortise with lateral displacement of the talus in relationship to the tibia. No talar dome fracture. Soft tissue swelling predominantly laterally. No additional more proximal fractures of the tibia or fibula. No acute fracture or dislocation of the left knee. Alignment is anatomic. 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 Small joint effusion. No acute fracture or dislocation of the right ankle. Alignment is anatomic. Tiny well corticated bony fragment adjacent to the medial malleolus which may represent sequela from old avulsion injury. Soft tissue swelling overlying the lateral malleolus.  Tibiotalar joint effusion. Calcification along the course of the plantar aponeurosis which may represent sequela from plantar fasciitis. Fractures of the left ankle involving the medial malleolus and distal fibula with disruption of the ankle mortise. Soft tissue swelling predominantly laterally. Mild degenerative changes of the left knee with chondrocalcinosis along the medial tibiofemoral joint space and small joint effusion. No acute fracture or dislocation of the right ankle. Tiny well corticated bony fragment adjacent to the right medial malleolus which may represent sequela from old avulsion injury. Soft tissue swelling overlying the lateral malleolus of the right ankle. Right tibiotalar joint effusion. Other chronic or incidental findings as noted. Ct Head Wo Contrast    Result Date: 4/26/2021  EXAMINATION: CT OF THE CERVICAL SPINE WITHOUT CONTRAST; CT OF THE HEAD WITHOUT CONTRAST 4/26/2021 7:02 pm TECHNIQUE: CT of the cervical spine was performed without the administration of intravenous contrast. Multiplanar reformatted images are provided for review. Dose modulation, iterative reconstruction, and/or weight based adjustment of the mA/kV was utilized to reduce the radiation dose to as low as reasonably achievable.; CT of the head was performed without the administration of intravenous contrast. Dose modulation, iterative reconstruction, and/or weight based adjustment of the mA/kV was utilized to reduce the radiation dose to as low as reasonably achievable. COMPARISON: None. HISTORY: ORDERING SYSTEM PROVIDED HISTORY: fall TECHNOLOGIST PROVIDED HISTORY: Reason for exam:->fall Decision Support Exception->Emergency Medical Condition ([place]) What reading provider will be dictating this exam?->CRC FINDINGS: BONES/ALIGNMENT: There is no acute fracture or traumatic malalignment. DEGENERATIVE CHANGES: No significant degenerative changes. SOFT TISSUES: There is no prevertebral soft tissue swelling.      No acute abnormality of the cervical spine. There is age-appropriate atrophy and small-vessel ischemic disease. CT CERVICAL SPINE. There is no acute displaced fracture in the cervical spine. The prevertebral soft tissues are normal.  Degenerative changes are identified from C2-T1 with osteophytes and multilevel disc bulges. Impression No acute displaced fracture in the cervical spine. Diffuse degenerative changes with multilevel disc bulges from C2-T1. Ct Cervical Spine Wo Contrast    Result Date: 4/26/2021  EXAMINATION: CT OF THE CERVICAL SPINE WITHOUT CONTRAST; CT OF THE HEAD WITHOUT CONTRAST 4/26/2021 7:02 pm TECHNIQUE: CT of the cervical spine was performed without the administration of intravenous contrast. Multiplanar reformatted images are provided for review. Dose modulation, iterative reconstruction, and/or weight based adjustment of the mA/kV was utilized to reduce the radiation dose to as low as reasonably achievable.; CT of the head was performed without the administration of intravenous contrast. Dose modulation, iterative reconstruction, and/or weight based adjustment of the mA/kV was utilized to reduce the radiation dose to as low as reasonably achievable. COMPARISON: None. HISTORY: ORDERING SYSTEM PROVIDED HISTORY: fall TECHNOLOGIST PROVIDED HISTORY: Reason for exam:->fall Decision Support Exception->Emergency Medical Condition (MA) What reading provider will be dictating this exam?->CRC FINDINGS: BONES/ALIGNMENT: There is no acute fracture or traumatic malalignment. DEGENERATIVE CHANGES: No significant degenerative changes. SOFT TISSUES: There is no prevertebral soft tissue swelling. No acute abnormality of the cervical spine. There is age-appropriate atrophy and small-vessel ischemic disease. CT CERVICAL SPINE. There is no acute displaced fracture in the cervical spine. The prevertebral soft tissues are normal.  Degenerative changes are identified from C2-T1 with osteophytes and multilevel disc bulges. Impression No acute displaced fracture in the cervical spine. Diffuse degenerative changes with multilevel disc bulges from C2-T1. Xr Chest Portable    Result Date: 4/26/2021  EXAMINATION: ONE XRAY VIEW OF THE CHEST 4/26/2021 5:24 pm COMPARISON: July 20, 2020 HISTORY: ORDERING SYSTEM PROVIDED HISTORY: lightheaded TECHNOLOGIST PROVIDED HISTORY: Reason for exam:->lightheaded What reading provider will be dictating this exam?->CRC FINDINGS: Cardiac silhouette is mildly enlarged. Decreased inspiration. No evidence of airspace consolidation or pleural effusions. The pulmonary vasculature is within normal limits. Cardiomegaly with no evidence of failure or acute infiltrates. Decreased inspiration. Assessment//Plan           Hospital Problems           Last Modified POA    Ankle fracture, bimalleolar, closed, left, initial encounter 4/26/2021 Yes        Assessment:  (Ankle fracture, bimalleolar, closed, left, initial encounter 4/26/2021 Yes     falls  DM  HTN  Hyperlipidemia  ). Plan:   (Continue to monitor   Ortho following  Patient agreeable to [facility]. ).

## 2021-04-29 NOTE — PROGRESS NOTES
Dr. Mg Girard replied. To continue to watch patient. Tele sitter requested but unavailable, patient added to list. Bed alarm on, 3 side rail up and non-skid socks applied.

## 2021-04-29 NOTE — PROGRESS NOTES
OCCUPATIONAL THERAPY INITIAL EVALUATION      Date:2021  Patient Name: Anastasia Rodgers  MRN: [id number]  : 1940  Room: [address] [place] [address]A  Referring Provider:  Chang Zamora MD    Modified [place] Scale (MRS)  Score     Description  0             No symptoms  1             No significant disability despite symptoms  2             Slight disability; able to look after own affairs  3             Moderate disability; able to ambulate without assist/ requires assist with ADLs  4             Moderate/Severe disability;requires assist to ambulate/assist with ADLs  5             Severe disability;bedridden/incontinent   6               Score:   4    Evaluating OT: Mariajose Keith OTR/L   License #  [id number]     -PAC Daily Activity Raw Score:     Recommended Adaptive Equipment:  TBD     Diagnosis: L Bimalleolar fracture subluxation   Patient presented to ED for trauma/ s/p fall    Surgery:   Past Surgical History:   Procedure Laterality Date    ABDOMEN SURGERY      DIVERTICULITIS    APPENDECTOMY      CHOLECYSTECTOMY      COLOSTOMY      HERNIA REPAIR      OCT 2012    HYSTERECTOMY      ILEOSTOMY OR JEJUNOSTOMY      done and reversed    THYROID SURGERY      partial thyroidectomy      Pertinent Medical History:   Past Medical History:   Diagnosis Date    Arthritis     [facility]     Diabetes mellitus ([facility]     Diverticulitis     GERD (gastroesophageal reflux disease)     Hernia     History of blood transfusion     Hyperlipidemia     Hypertension       Precautions:  Falls, NWB L LE, bed/chair alarm, colostomy     Home Living: Pt. Is questionable historian. Dtr. Suzette present. Pt lives with dtr. in a 1 story with 4 steps to enter and 1 HR.   Bathroom setup: walk in shower, std. commode   Equipment owned: shower seat, rollator walker, w/c, sp cane    Prior Level of Function: Ind. with ADLs , family assist with IADLs; ambulated rollator in home, electric cart in [employer]  Driving: family drives  Occupation: retired [employer]    Pain Level: minimal L LE pain at rest, 10/10 with minimal ax.  RN informed & able to medicate  Cognition: A&O: x3 but noted periods of confusion & decreased attn. To task; Follows 1 step directions   Memory:  Fair-   Sequencing:  Poor+/fair-   Problem solving:  Poor+/fair-   Judgement/safety:  Fair-     Functional Assessment:   Initial Eval Status  Date: 4- Treatment Status  Date: Short Term Goals = Long Term Goals  Treatment frequency: 3-5 days   Feeding Stand by Assist with lunch tray  Mod I    Grooming Minimal Assist  seated   Modified [place]    UB Dressing Moderate Assist   Set up/ Supervision   LB Dressing Dependent   Supervision    Bathing Dep with sim. task  Stand by Assist    Toileting NT  Supervision    Bed Mobility  Supine to sit: NT, pt. Received sitting EOB with dtr. present  Sit to supine: Moderate Assist x2  Supine to sit: Modified [place]   Sit to supine: Modified [place]    Functional Transfers Moderate Assist x2 with sit <> stand, SPT x2 to & from bedside chair with ww. Max cues to maintain NWB L LE  Stand by Assist    Functional Mobility Maximal Assist x2 with ww 2 small shuffle steps to & from EOB<> chair  Stand by Assist    Balance Sitting:     Static:  SBA/Sup    Dynamic:Min A  Standing: Max A      Activity Tolerance Fair- lt. Ax.  spO2 & HR WFL  Fair+ with lt./mod. ax. Visual/  Perceptual Glasses: yes        Safety Awareness Fair-                    Fair+/good     Hand dominance: R     Strength ROM Additional Info:    RUE  Grossly WFL  WFL good  and wfl FMC/dexterity noted during ADL tasks     LUE Grossly WFL  WFL good  and wfl FMC/dexterity noted during ADL tasks       Hearing: min. [place]   Sensation:  Pt. c/o no numbness/ tingling B UE  Tone: WFL   Edema: none noted B UE                            Comments: Upon arrival, patient supine in bed, cleared by Nursing, agreeable to OT.  Pt demonstrating fair understanding of education/techniques, requiring additional training / education. At end of session, patient returned to supine in bed per pt. request, all needs met, RN notified, with call light and phone within reach, all lines and tubes intact. Pt would benefit from continued skilled OT to increase safety and independence with completion of ADL/iADL tasks, functional transfers/mobility to improve independence and quality of life. Treatment:  OT services provided include: facilitation of bed mobility/ADLs, safe transfer training, skilled monitoring of vitals & pt.'s response to treatment, instruction/training on safe & adapted techniques within precautions for completion of ADLs, proper posture and/or positioning, facilitation of functional seated & standing tolerance & balance ax. during ADLs and functional ax., skilled cuing on hand placement & proper body mechanics during functional transfer/mobility training with focus on safety, technique, precautions. Pt.  also Instructed RE: safe transfers/mobility, ADL training, role of OT, E.C. techniques, treatment plan, recs. , prec. Eval Complexity:   · moderate Complexity  · History: Expanded chart review of medical records and additional review of physical, cognitive, or psychosocial history related to current functional performance  · Exam: 3+ performance deficits  · Assistance/Modification: Mod/max assistance or modifications required to perform tasks. May have comorbidities that affect occupational performance.     Assessment of current deficits:    Functional mobility [x]  ADLs [x] Strength [x]  Cognition []  Functional transfers  [x] IADLs [x] Safety Awareness [x]  Endurance [x]  Fine Motor Coordination [] Balance [x] Vision/perception [] Sensation []   Gross Motor Coordination [] ROM [] Delirium []                  Motor Control []   Plan of Care: 2-4 days/week for 1-2 weeks PRN   ADL retraining/adapted techniques and AE recommendations to increase functional independence within precautions                    Energy conservation techniques to improve tolerance for selfcare routine   Functional transfer/mobility training/DME recommendations for increased independence, safety and fall prevention         Patient/family education to increase safety and functional independence             Environmental modifications for safe mobility and completion of ADLs                             Therapeutic activity to improve functional performance during ADLs. Therapeutic exercise to improve tolerance and functional strength for ADLs   Balance retraining/tolerance tasks for facilitation of postural control with dynamic challenges during ADLs . Positioning to improve functional independence- importance of elevating L LE    Rehab Potential:  Good for established goals     Patient / Family Goal: to go to Rehab/ get stronger      Patient and/or family were instructed on functional diagnosis, prognosis/goals and OT plan of care. Demonstrated fair understanding. Eval Complexity: moderate      Time In: 12:45  Time Out: 13:30  Total Treatment Time: 30    Min Units   OT Eval Low 15881       OT Eval Medium 02144  x    OT Eval High 20375       OT Re-Eval [id number]       Therapeutic Ex 18523       Therapeutic Activities 02003  15  1   ADL/Self Care 54387       Orthotic Management 83089       Neuro Re-Ed [id number]       Non-Billable Time          Evaluation Time includes thorough review of current medical information, gathering information on past medical history/social history and prior level of function, completion of standardized testing/informal observation of tasks, assessment of data and education on plan of care and goals. Mariajose Keith, OTR/L   License #  [id number]

## 2021-04-29 NOTE — CARE COORDINATION
Pt's daughter has chosen [facility] for rehab. Referral made to [address]. Will need covid-19.  Anna Eli [phone]

## 2021-04-29 NOTE — PROGRESS NOTES
Physical Therapy  Treatment Note    Name: Toby Vázquez  : 1940  MRN: [id number]    Referring Provider:  Burgess Jose Maria MD    Date of Service: 2021    Evaluating PT:  Elyse Awad, PT, DPT [id number]    Room #:  9133/2748-A  Diagnosis:  L ankle fracture bimalleolar  Precautions: Falls, NWB LLE, bed alarm, O2  Procedure/Surgery:  NA  PMHx/PSHx:  DM, arthritis, HLD, HTN, colostomy bag  Equipment Needs:  TBD    SUBJECTIVE:  Pt is a questionnable historian. Pt reported living with daughter and  in a 1 story home with 5 stairs to enter and 1 rail. Pt ambulated with [employer] PTA. OBJECTIVE:   Initial Evaluation  Date:  Treatment  Date: 2021 Short Term/ Long Term   Goals   AM-PAC 6 Clicks     Was pt agreeable to Eval/treatment? Yes yes    Does pt have pain? Yes, LLE, \"a little bit\" at rest, increased with movement but unable to rate LLE pain with movement    Bed Mobility  Rolling: NT  Supine to sit: Choco x 2 (one for LLE) with HOB elevated  Sit to supine: ModA x 2  Scooting: ModA Rolling: modA  Supine to sit: NT  Sit to supine: maxA x 2  Scooting: maxA Supervision   Transfers Sit to stand: ModA x 2  Stand to sit: ModA x 2  Stand pivot: NT Sit<>stand: maxA x 2  Stand pivot: modA x 2 front [employer] with AAD   Ambulation   NT NT >15 feet with Choco with AAD   Stair negotiation: ascended and descended NT NT >2 steps with 2 rails ModA   ROM BUE:  Defer to OT note  BLE:  WFL     Strength BUE:  Defer to OT note  RLE: at least 3/5 grossly  LLE: deferred testing, able to move  Increase by 1/3 MMT grade   Balance Dynamic Sitting EOB:  ModA  Static Standing:  ModA x 2 with [employer] Sitting EOB; SBA  Dynamic standing: modA x2 front [employer] Dynamic Sitting EOB:  Supervision  Dynamic Standing:  Choco with [employer]     Pt is A & O x 4  Sensation:  Pt denies numbness and tingling to extremities  Edema:  unremarkable    Patient education  Pt educated on role of PT intervention.   Pt educated on safety in room with utilization of call light for assistance with mobility. Pt educated on importance of maximizing OOB time by transferring to bedside chair for meals and ambulating to bathroom/transferring to bedside commode with assistance from nursing and therapy staff to increase functional activity tolerance and overall functional independence. Patient response to education:   Pt verbalized understanding Pt demonstrated skill Pt requires further education in this area   yes yes yes     ASSESSMENT:    Comments:  RN cleared pt for activity prior to session. Pt received seated in chair and agreeable to PT intervention with OT collaboration at this time. Pt performed all functional mobility as noted above. Pt required two person assistance for basic transfer chair>bed. Pt limited by pain and weight bearing restrictions to LLE. Pt returned to supine at end of session and left with all needs met and call light in reach. Pt requires continued skilled PT intervention for the purposes of maximizing functional mobility and independence by addressing deficits described above. Treatment:  Patient practiced and was instructed in the following treatment:     Therapeutic Activities Completed:  o Functional mobility as noted above:   - Bed mobility: maxA x 2 sit>supine. Max VC and hand over hand guidance to facilitate efficient use of BUE on bed rail to promote more independent completion of task. Difficulty elevating legs to mattress.    - Transfer training: STS maxA x 2 from EOB and chair. Cues for proper hand placement and sequencing with poor follow through of cues. Stand pivot with front [employer] modA x 2. Difficulty advancing RLE and maintaining NWB of LLE.   o Skilled repositioning in supine with HOB elevated for comfort and LLE propped on pillow. o Pt education as noted above. PLAN:    Patient is making fair progress towards established goals. Will continue with current POC.       Time in  1320  Time out 1335    Total Treatment Time  15 minutes     CPT codes:  [] Gait training 52016 0 minutes  [] Manual therapy 63917 0 minutes  [x] Therapeutic activities 54664 15 minutes  [] Therapeutic exercises 33282 0 minutes  [] Neuromuscular reeducation 66904 0 minutes    Wilber Zapata, PT, DPT  [id number]

## 2021-04-29 NOTE — PROGRESS NOTES
Department of Orthopedic Surgery  Resident Progress Note    Patient seen and examined. Pain controlled. No new complaints. Denies chest pain, shortness of breath, dizziness/lightheadedness. VITALS:  BP (!) 135/94   Pulse 96   Temp 97.8 °F (36.6 °C) (Temporal)   Resp 16   Ht 5' 5\" (1.651 m)   Wt 188 lb (85.3 kg)   SpO2 97%   BMI 31.28 kg/m²     General: alert and oriented to person, place and time, well-developed and well-nourished, in no acute distress    MUSCULOSKELETAL:   left lower extremity:  · Splint c/d/I  · Palpable Compartments soft and compressible  · +wiggling of toes  · +Brisk Cap refill, Toes warm and perfused  · Distal sensation grossly intact to toes. CBC:   Lab Results   Component Value Date    WBC 9.6 04/29/2021    HGB 11.9 04/29/2021    HCT 38.6 04/29/2021     04/29/2021     PT/INR:    Lab Results   Component Value Date    PROTIME 10.5 04/26/2021    INR 1.0 04/26/2021       ASSESSMENT  · L Bimalleolar fracture subluxation. PLAN      · Continue physical therapy and protocol: NWB - LLE  · Deep venous thrombosis prophylaxis - per primary team, early mobilization  · PT/OT  · Pain Control: IV and PO  · Monitor Labs  · Appreciative of medical care.   · Dc- ok for discharger per orthopedics with outpatient follow up   · Discuss with attending

## 2021-04-30 ENCOUNTER — ANESTHESIA EVENT (OUTPATIENT)
Dept: OPERATING ROOM | Age: 81
DRG: 494 | End: 2021-04-30
Payer: MEDICARE

## 2021-04-30 DIAGNOSIS — S82.842A ANKLE FRACTURE, BIMALLEOLAR, CLOSED, LEFT, INITIAL ENCOUNTER: Primary | ICD-10-CM

## 2021-04-30 LAB
ANION GAP SERPL CALCULATED.3IONS-SCNC: 9 MMOL/L (ref 7–16)
BUN BLDV-MCNC: 21 MG/DL (ref 6–23)
CALCIUM SERPL-MCNC: 9.5 MG/DL (ref 8.6–10.2)
CHLORIDE BLD-SCNC: 98 MMOL/L (ref 98–107)
CO2: 26 MMOL/L (ref 22–29)
CREAT SERPL-MCNC: 0.8 MG/DL (ref 0.5–1)
GFR AFRICAN AMERICAN: >60
GFR NON-AFRICAN AMERICAN: >60 ML/MIN/1.73
GLUCOSE BLD-MCNC: 146 MG/DL (ref 74–99)
HCT VFR BLD CALC: 40.2 % (ref 34–48)
HEMOGLOBIN: 12.7 G/DL (ref 11.5–15.5)
MCH RBC QN AUTO: 30.2 PG (ref 26–35)
MCHC RBC AUTO-ENTMCNC: 31.6 % (ref 32–34.5)
MCV RBC AUTO: 95.7 FL (ref 80–99.9)
METER GLUCOSE: 132 MG/DL (ref 74–99)
METER GLUCOSE: 231 MG/DL (ref 74–99)
METER GLUCOSE: 267 MG/DL (ref 74–99)
METER GLUCOSE: 296 MG/DL (ref 74–99)
PDW BLD-RTO: 14.5 FL (ref 11.5–15)
PLATELET # BLD: 177 E9/L (ref 130–450)
PMV BLD AUTO: 11.5 FL (ref 7–12)
POTASSIUM SERPL-SCNC: 4.7 MMOL/L (ref 3.5–5)
POTASSIUM SERPL-SCNC: 5.8 MMOL/L (ref 3.5–5)
RBC # BLD: 4.2 E12/L (ref 3.5–5.5)
SODIUM BLD-SCNC: 133 MMOL/L (ref 132–146)
WBC # BLD: 10.5 E9/L (ref 4.5–11.5)

## 2021-04-30 PROCEDURE — 84132 ASSAY OF SERUM POTASSIUM: CPT

## 2021-04-30 PROCEDURE — 1200000000 HC SEMI PRIVATE

## 2021-04-30 PROCEDURE — 97535 SELF CARE MNGMENT TRAINING: CPT

## 2021-04-30 PROCEDURE — 97530 THERAPEUTIC ACTIVITIES: CPT

## 2021-04-30 PROCEDURE — 6370000000 HC RX 637 (ALT 250 FOR IP): Performed by: FAMILY MEDICINE

## 2021-04-30 PROCEDURE — 36415 COLL VENOUS BLD VENIPUNCTURE: CPT

## 2021-04-30 PROCEDURE — 85027 COMPLETE CBC AUTOMATED: CPT

## 2021-04-30 PROCEDURE — 80048 BASIC METABOLIC PNL TOTAL CA: CPT

## 2021-04-30 PROCEDURE — 6370000000 HC RX 637 (ALT 250 FOR IP): Performed by: STUDENT IN AN ORGANIZED HEALTH CARE EDUCATION/TRAINING PROGRAM

## 2021-04-30 PROCEDURE — 82962 GLUCOSE BLOOD TEST: CPT

## 2021-04-30 RX ADMIN — PREGABALIN 50 MG: 50 CAPSULE ORAL at 07:48

## 2021-04-30 RX ADMIN — PREGABALIN 50 MG: 50 CAPSULE ORAL at 21:19

## 2021-04-30 RX ADMIN — GLIPIZIDE 5 MG: 5 TABLET ORAL at 07:47

## 2021-04-30 RX ADMIN — PANTOPRAZOLE SODIUM 40 MG: 40 TABLET, DELAYED RELEASE ORAL at 07:48

## 2021-04-30 RX ADMIN — INSULIN LISPRO 2 UNITS: 100 INJECTION, SOLUTION INTRAVENOUS; SUBCUTANEOUS at 12:01

## 2021-04-30 RX ADMIN — PREGABALIN 50 MG: 50 CAPSULE ORAL at 14:31

## 2021-04-30 RX ADMIN — PAROXETINE 40 MG: 20 TABLET, FILM COATED ORAL at 07:48

## 2021-04-30 RX ADMIN — OXYCODONE AND ACETAMINOPHEN 1 TABLET: 5; 325 TABLET ORAL at 21:18

## 2021-04-30 RX ADMIN — ROSUVASTATIN 20 MG: 20 TABLET, FILM COATED ORAL at 07:48

## 2021-04-30 RX ADMIN — INSULIN LISPRO 3 UNITS: 100 INJECTION, SOLUTION INTRAVENOUS; SUBCUTANEOUS at 18:41

## 2021-04-30 RX ADMIN — ENALAPRIL MALEATE 20 MG: 5 TABLET ORAL at 07:48

## 2021-04-30 RX ADMIN — INSULIN LISPRO 2 UNITS: 100 INJECTION, SOLUTION INTRAVENOUS; SUBCUTANEOUS at 21:20

## 2021-04-30 RX ADMIN — OXYCODONE AND ACETAMINOPHEN 1 TABLET: 5; 325 TABLET ORAL at 02:51

## 2021-04-30 ASSESSMENT — PAIN SCALES - GENERAL
PAINLEVEL_OUTOF10: 8
PAINLEVEL_OUTOF10: 0

## 2021-04-30 ASSESSMENT — PAIN DESCRIPTION - ORIENTATION
ORIENTATION: LEFT
ORIENTATION: LEFT

## 2021-04-30 ASSESSMENT — PAIN - FUNCTIONAL ASSESSMENT: PAIN_FUNCTIONAL_ASSESSMENT: PREVENTS OR INTERFERES SOME ACTIVE ACTIVITIES AND ADLS

## 2021-04-30 ASSESSMENT — ENCOUNTER SYMPTOMS
SHORTNESS OF BREATH: 0
ABDOMINAL PAIN: 0

## 2021-04-30 ASSESSMENT — PAIN DESCRIPTION - PROGRESSION: CLINICAL_PROGRESSION: NOT CHANGED

## 2021-04-30 ASSESSMENT — PAIN DESCRIPTION - LOCATION: LOCATION: LEG

## 2021-04-30 NOTE — PROGRESS NOTES
OT BEDSIDE TREATMENT NOTE      Date:2021  Patient Name: Malini Strickland  MRN: [id number]  : 1940  Room: [facility]A     Per OT Eval:    Evaluating OT: Trini Keith OTR/L   License #  [id number]      -PAC Daily Activity Raw Score:      Recommended Adaptive Equipment:  TBD      Diagnosis: L Bimalleolar fracture subluxation   Patient presented to ED for trauma/ s/p fall     Surgery:   Past Surgical History         Past Surgical History:   Procedure Laterality Date    ABDOMEN SURGERY         DIVERTICULITIS    APPENDECTOMY        CHOLECYSTECTOMY        COLOSTOMY        HERNIA REPAIR         OCT 2012    HYSTERECTOMY        ILEOSTOMY OR JEJUNOSTOMY         done and reversed    THYROID SURGERY         partial thyroidectomy         Pertinent Medical History:   Past Medical History        Past Medical History:   Diagnosis Date    Arthritis      Colostomy in place [facility])      Diabetes mellitus ([facility] [address]      Diverticulitis      GERD (gastroesophageal reflux disease)      Hernia      History of blood transfusion      Hyperlipidemia      Hypertension           Precautions:  Falls, NWB L LE, bed/chair alarm, colostomy     Home Living: Pt. Is questionable historian. Dtr. Suzette present. Pt lives with dtr. in a 1 story with 4 steps to enter and 1 HR. Bathroom setup: walk in shower, std. commode   Equipment owned: shower seat, rollator walker, w/c, sp cane     Prior Level of Function: Ind. with ADLs , family assist with IADLs; ambulated rollator in home, electric cart in [employer]  Driving: family drives  Occupation: retired [employer]     Pain Level: Pt did not complain of pain this session  Cognition: A&O: x3 but noted periods of confusion & decreased attn. To task;  Follows 1 step directions              Memory:  Fair-              Sequencing:  Poor+/fair-              Problem solving:  Poor+/fair-              Judgement/safety:  Fair-                Functional Assessment:    Initial Eval Status Date: 4- Treatment Status  Date: 4/30/21 Short Term Goals = Long Term Goals  Treatment frequency: 3-5 days   Feeding Stand by Assist with lunch tray  SBA  Pt seated upright in chair eating of lunch meal, assistance to open of packages Mod I    Grooming Minimal Assist  seated   SBA  Pt washed face, applied deodorant seated EOB Modified [place]    UB Dressing Moderate Assist   DNT  modA per previous level Set up/ Supervision   LB Dressing Dependent   SBA-socks  Pt donned/doffed sock to RLE seated EOB using of cross over technique    SBA/max-pants  Pt able to thread pants over feet seated EOB, requiring maxA to stand and pull over hips   Supervision    Bathing Dep with sim. task modA   Simulated task  Pt able to wash of UB and RLE using cross over technique, requiring assistance to stand and wash of buttocks/talia area  Stand by Assist    Toileting NT DNT  Supervision    Bed Mobility  Supine to sit: NT, pt. Received sitting EOB with dtr. present  Sit to supine: Moderate Assist x2  Enma  Supine<>EOB    HOB slightly elevated with use of bed rail and LLE assist Supine to sit: Modified [place]   Sit to supine: Modified [place]    Functional Transfers Moderate Assist x2 with sit <> stand, SPT x2 to & from bedside chair with ww. Max cues to maintain NWB L LE maxA  Sit to Stand  Stand to Sit  Stand Pivot Transfer EOB<>Chair with w.w. Cueing for hand placement, with 2nd person assist for safety  Stand by Assist    Functional Mobility Maximal Assist x2 with ww 2 small shuffle steps to & from EOB<> chair  DNT Stand by Assist    Balance Sitting:     Static:  SBA/Sup    Dynamic:Min A  Standing: Max A   Sitting EOB:  SBA  ~12mins    Standing:  maxA     Activity Tolerance Fair- lt. Ax.  spO2 & HR WFL  Fair- Fair+ with lt./mod. ax.    Visual/  Perceptual Glasses: yes          Safety Awareness Fair-                     Fair+/good      Hand dominance: R       Strength ROM Additional Info:    [facility]  [facility] good  and wfl FMC/dexterity noted during ADL tasks      LUE Grossly WFL  WFL good  and wfl FMC/dexterity noted during ADL tasks         Hearing: min. [place]   Sensation:  Pt. c/o no numbness/ tingling B UE  Tone: WFL   Edema: none noted B UE      Education:  Pt was educated through out treatment regarding precautions to follow, proper technique & safety with bed mobility, functional transfers, techniques to assist with LB dressing/bathing tasks to improve safety & prevent falls and allow pt to return home safely. Pt also educated and completed of BUE exercises while seated EOB, for 10reps in all planes, with rest breaks, focusing on increasing of ROM and prevention of contractures, encourgaing pt to complete daily. Comments: Upon arrival pt seated upright in bed, agreeable to therapy, speaking with nursing okaying pt to be seen this session. Pt completed of bed mobility, transfers, ADL tasks, unsupported sitting balance at EOB and UB exercises throughout session. At end of session *** all lines and tubes intact, call light within reach. · Pt has made *** progress towards set goals.    · Continue with current plan of care      Treatment Time In:***            Treatment Time Out: ***               Treatment Charges: Mins Units   Ther Ex  37609     Manual Therapy 29035     Thera Activities 85930     ADL/Home Mgt 84701     Neuro Re-ed 95419     Group Therapy      Orthotic manage/training  88870     Non-Billable Time     Total Timed Treatment *** ***        Elissa IBANEZ/L [id number]

## 2021-04-30 NOTE — CARE COORDINATION
Maryanne has accepted the pt. HENS, ambulance form and envelope in soft chart.  Emily Cardoso -[phone]

## 2021-04-30 NOTE — PROGRESS NOTES
OT BEDSIDE TREATMENT NOTE      Date:2021  Patient Name: Simin Mclean  MRN: [id number]  : 1940  Room: [address] [place] [address]-A     Per OT Eval:    Evaluating OT: Wolf Keith OTR/L   License #  [id number]      -PAC Daily Activity Raw Score: 15/24     Recommended Adaptive Equipment:  TBD      Diagnosis: L Bimalleolar fracture subluxation   Patient presented to ED for trauma/ s/p fall     Surgery:   Past Surgical History         Past Surgical History:   Procedure Laterality Date    ABDOMEN SURGERY         DIVERTICULITIS    APPENDECTOMY        CHOLECYSTECTOMY        COLOSTOMY        HERNIA REPAIR         OCT 2012    HYSTERECTOMY        ILEOSTOMY OR JEJUNOSTOMY         done and reversed    THYROID SURGERY         partial thyroidectomy         Pertinent Medical History:   Past Medical History        Past Medical History:   Diagnosis Date    Arthritis      [facility])      Diabetes mellitus ([facility] [address]      Diverticulitis      GERD (gastroesophageal reflux disease)      Hernia      History of blood transfusion      Hyperlipidemia      Hypertension           Precautions:  Falls, NWB L LE, bed/chair alarm, colostomy     Home Living: Pt. Is questionable historian. Dtr. Suzette present. Pt lives with dtr. in a 1 story with 4 steps to enter and 1 HR. Bathroom setup: walk in shower, std. commode   Equipment owned: shower seat, rollator walker, w/c, sp cane     Prior Level of Function: Ind. with ADLs , family assist with IADLs; ambulated rollator in home, electric cart in [employer]  Driving: family drives  Occupation: retired [employer]     Pain Level: Pt did not complain of pain this session  Cognition: A&O: x3 but noted periods of confusion & decreased attn. To task;  Follows 1 step directions              Memory:  Fair-              Sequencing:  Poor+/fair-              Problem solving:  Poor+/fair-              Judgement/safety:  Fair-                Functional Assessment:    Initial Eval Status  Date: 4- Treatment Status  Date: 4/30/21 Short Term Goals = Long Term Goals  Treatment frequency: 3-5 days   Feeding Stand by Assist with lunch tray  SBA  Pt seated upright in chair eating of lunch meal, assistance to open of packages Mod I    Grooming Minimal Assist  seated   SBA  Pt washed face, applied deodorant seated EOB Modified [place]    UB Dressing Moderate Assist   DNT  modA per previous level Set up/ Supervision   LB Dressing Dependent   SBA-socks  Pt donned/doffed sock to RLE seated EOB using of cross over technique    maxA-pants  Pt able to thread pants over feet seated EOB, requiring maxA to stand and pull over hips   Supervision    Bathing Dep with sim. task modA   Simulated task  Pt able to wash of UB and RLE using cross over technique, requiring assistance to stand and wash of buttocks/talia area  Stand by Assist    Toileting NT DNT  Supervision    Bed Mobility  Supine to sit: NT, pt. Received sitting EOB with dtr. present  Sit to supine: Moderate Assist x2  Enma  Supine<>EOB    HOB slightly elevated with use of bed rail and LLE assist Supine to sit: Modified [place]   Sit to supine: Modified [place]    Functional Transfers Moderate Assist x2 with sit <> stand, SPT x2 to & from bedside chair with ww. Max cues to maintain NWB L LE maxAx2  Sit to Stand  Stand to Sit    maxAx2  Stand Pivot Transfer EOB<>Chair with w.w. Cueing for hand placement and to follow of NWB LLE precautions  Stand by Assist    Functional Mobility Maximal Assist x2 with ww 2 small shuffle steps to & from EOB<> chair  DNT Stand by Assist    Balance Sitting:     Static:  SBA/Sup    Dynamic:Min A  Standing: Max A   Sitting EOB:  SBA  ~12mins    Standing:  maxA     Activity Tolerance Fair- lt. Ax.  spO2 & HR WFL  Fair- Fair+ with lt./mod. ax.    Visual/  Perceptual Glasses: yes          Safety Awareness Fair-                     Fair+/good      Hand dominance: R       Strength ROM Additional Info: RUE  Grossly WFL  WFL good  and wfl FMC/dexterity noted during ADL tasks      LUE Grossly WFL  WFL good  and wfl FMC/dexterity noted during ADL tasks         Hearing: min. [place]   Sensation:  Pt. c/o no numbness/ tingling B UE  Tone: WFL   Edema: none noted B UE      Education:  Pt was educated through out treatment regarding precautions to follow, proper technique & safety with bed mobility, functional transfers, techniques to assist with LB dressing/bathing tasks to improve safety & prevent falls and allow pt to return home safely. Pt also educated and completed of BUE exercises while seated EOB, for 10reps in all planes, with rest breaks, focusing on increasing of ROM and prevention of contractures, encourgaing pt to complete daily. Comments: Upon arrival pt seated upright in bed, agreeable to therapy, speaking with nursing okaying pt to be seen this session. Pt completed of bed mobility, transfers, ADL tasks, unsupported sitting balance at EOB and UB exercises throughout session. PT present to assist with transfers due to pt's current assist levels and for safety. At end of session, pt seated upright in chair, all lines and tubes intact, call light within reach. · Pt has made fair progress towards set goals.    · Continue with current plan of care focusing on increasing of independency with transfers and ADL tasks      Treatment Time In: 1:05pm           Treatment Time Out: 1:30pm            Treatment Charges: Mins Units   Ther Ex  22374     Manual Therapy 53453     Thera Activities 17268 17 1   ADL/Home Mgt 55704 8 1   Neuro Re-ed 77579     Group Therapy      Orthotic manage/training  79243     Non-Billable Time     Total Timed Treatment 25 2        Elissa Del Valle IBANEZ/L [id number]

## 2021-04-30 NOTE — PROGRESS NOTES
Patient's son Guzman Rene ([phone]) would like an update from physicians and case management. Patient did give permission to share information with Nikki Rawls.

## 2021-04-30 NOTE — PROGRESS NOTES
Physical Therapy  Treatment Note    Name: Gudelia Dobbs  : 1940  MRN: [id number]    Referring Provider:  Rupa Maguire MD    Date of Service: 2021    Evaluating PT:  Sumitpetty Mantilla, PT, DPT [id number]    Room #:  1723/5615-V  Diagnosis:  L ankle fracture bimalleolar  Precautions: Falls, NWB LLE, bed alarm, O2  Procedure/Surgery:  NA  PMHx/PSHx:  DM, arthritis, HLD, HTN, colostomy bag  Equipment Needs:  TBD    SUBJECTIVE:  Pt is a questionnable historian. Pt reported living with daughter and  in a 1 story home with 5 stairs to enter and 1 rail. Pt ambulated with [facility] PTA. OBJECTIVE:   Initial Evaluation  Date:  Treatment  Date: 2021 Short Term/ Long Term   Goals   AM-PAC 6 Clicks  92/37    Was pt agreeable to Eval/treatment? Yes yes    Does pt have pain? Yes, LLE, \"a little bit\" at rest, increased with movement but unable to rate LLE pain with movement    Bed Mobility  Rolling: NT  Supine to sit: Choco x 2 (one for LLE) with HOB elevated  Sit to supine: ModA x 2  Scooting: ModA Rolling: nt  Supine to sit: NT  Sit to supine: nt    Scooting: NT Supervision   Transfers Sit to stand: ModA x 2  Stand to sit: ModA x 2  Stand pivot: NT Sit<>stand: maxA x 2  Stand pivot: modA x 2 front [employer] with AAD   Ambulation   NT NT >15 feet with Choco with AAD   Stair negotiation: ascended and descended NT NT >2 steps with 2 rails ModA   ROM BUE:  Defer to OT note  BLE:  WFL     Strength BUE:  Defer to OT note  RLE: at least 3/5 grossly  LLE: deferred testing, able to move  Increase by 1/3 MMT grade   Balance Dynamic Sitting EOB:  ModA  Static Standing:  ModA x 2 with [facility] Sitting EOB; SBA  Dynamic standing: modA x2 front [facility] Dynamic Sitting EOB:  Supervision  Dynamic Standing:  Choco with [facility]     Pt is A & O x 4    Patient education  Pt educated on transfer technique and how to use ww and ue usage for hopping/scooting on her foor.   Pt sitting on eob upon arrival.  Pt performed sit to stand with max assist of 2. Pt has difficulty with midway transfer fully extending right knee and uprighting herself. Once pt upright she is able to maintain nwb, she fatigues quickly. Pt educated on safety in room with utilization of call light for assistance with mobility. Returned to assist pt on bed pan. Pt stood 2 x max assist 2. Pt sat on bedpan and pt urinated. Bed pan removed pt cleaned and back sitting in chair. Patient response to education:   Pt verbalized understanding Pt demonstrated skill Pt requires further education in this area   yes yes yes     ASSESSMENT:    Comments:  RN cleared pt for activity prior to session. Pt received seated in chair and agreeable to PT intervention with OT collaboration at this time. Pt performed all functional mobility as noted above. Pt required two person assistance for basic transfer chair>bed. Pt limited by pain and weight bearing restrictions to LLE. Pt returned to supine at end of session and left with all needs met and call light in reach. Pt requires continued skilled PT intervention for the purposes of maximizing functional mobility and independence by addressing deficits described above. Treatment:  Patient practiced and was instructed in the following treatment:     Therapeutic Activities Completed:  o Functional mobility as noted above:   - Transfer training: STS maxA x 2 from EOB and chair. Cues for proper hand placement and sequencing with poor follow through of cues. Stand pivot with front [address] modA x 2.    -   -    o Pt education as noted above. PLAN:    Patient is making fair progress towards established goals. Will continue with current POC.       Time in  115  Time out  128    time  205 to 215     Total Treatment Time  25 minutes     CPT codes:  [] Gait training 33400 0 minutes  [] Manual therapy 32228 0 minutes  [x] Therapeutic activities 26455 25 minutes  [] Therapeutic exercises 46032 0 minutes  [] Neuromuscular reeducation 58192 0 minutes    Liliana Becker, [address]

## 2021-04-30 NOTE — PROGRESS NOTES
Progress Note  Date:2021       OMTV:[id number]  Patient Lázaro Smith     YOB: 1940     Age:81 y.o. Patient says she still has moderate left ankle pain. Subjective    Subjective:  Symptoms:  Stable. No shortness of breath or chest pain. Diet:  Adequate intake. Activity level: Impaired due to pain. Pain:  She complains of pain that is mild. Review of Systems   Constitutional: Positive for activity change. Negative for fever. HENT: Negative for congestion. Respiratory: Negative for shortness of breath. Cardiovascular: Negative for chest pain. Gastrointestinal: Negative for abdominal pain. Genitourinary: Negative for difficulty urinating. Musculoskeletal: Positive for gait problem and joint swelling. Neurological: Negative for dizziness. Objective         Vitals Last 24 Hours:  TEMPERATURE:  Temp  Av.9 °F (36.1 °C)  Min: 96.2 °F (35.7 °C)  Max: 97.6 °F (36.4 °C)  RESPIRATIONS RANGE: Resp  Av.5  Min: 16  Max: 17  PULSE OXIMETRY RANGE: SpO2  Av.3 %  Min: 93 %  Max: 95 %  PULSE RANGE: Pulse  Av.5  Min: 92  Max: 99  BLOOD PRESSURE RANGE: Systolic (42JGU), MR , Min:128 , PELON:367   ; Diastolic (36ULO), ORA:83, Min:62, Max:95    I/O (24Hr): Intake/Output Summary (Last 24 hours) at 2021 0626  Last data filed at 2021  Gross per 24 hour   Intake 360 ml   Output --   Net 360 ml     Objective:  General Appearance:  Comfortable. Vital signs: (most recent): Blood pressure 138/65, pulse 94, temperature 97.2 °F (36.2 °C), temperature source Temporal, resp. rate 16, height 5' 5\" (1.651 m), weight 188 lb (85.3 kg), SpO2 94 %. No fever. Lungs:  Normal effort and normal respiratory rate. Breath sounds clear to auscultation. Heart: Normal rate. Regular rhythm.   S1 normal and S2 normal.      Labs/Imaging/Diagnostics    Labs:  CBC:  Recent Labs     21  0423 21  0457   WBC 11.5 9.6   RBC 4.25 4.12   HGB 12.4 11.9   HCT 39.9 38.6   MCV 93.9 93.7   RDW 14.5 14.2    137     CHEMISTRIES:  Recent Labs     04/28/21  0423 04/29/21  0457    134   K 4.9 4.8   CL 98 94*   CO2 30* 30*   BUN 36* 25*   CREATININE 1.1* 0.9   GLUCOSE 142* 150*     PT/INR:  No results for input(s): PROTIME, INR in the last 72 hours. APTT:  No results for input(s): APTT in the last 72 hours. LIVER PROFILE:  No results for input(s): AST, ALT, BILIDIR, BILITOT, ALKPHOS in the last 72 hours. Imaging Last 24 Hours:  Xr Knee Left (min 4 Views)    Result Date: 4/26/2021  EXAMINATION: THREE XRAY VIEWS OF THE LEFT ANKLE;   XRAY VIEWS OF THE LEFT TIBIA AND FIBULA; FOUR XRAY VIEWS OF THE LEFT KNEE; THREE XRAY VIEWS OF THE RIGHT ANKLE 4/26/2021 5:24 pm COMPARISON: None. HISTORY: ORDERING SYSTEM PROVIDED HISTORY: fall swelling, tenderness TECHNOLOGIST PROVIDED HISTORY: Reason for exam:->fall swelling, tenderness What reading provider will be dictating this exam?->CRC FINDINGS: Fractures of the medial malleolus and distal fibula with lateral displacement of the distal fragments. Disruption of the left ankle mortise with lateral displacement of the talus in relationship to the tibia. No talar dome fracture. Soft tissue swelling predominantly laterally. No additional more proximal fractures of the tibia or fibula. No acute fracture or dislocation of the left knee. Alignment is anatomic. 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 Small joint effusion. No acute fracture or dislocation of the right ankle. Alignment is anatomic. Tiny well corticated bony fragment adjacent to the medial malleolus which may represent sequela from old avulsion injury. Soft tissue swelling overlying the lateral malleolus. Tibiotalar joint effusion. Calcification along the course of the plantar aponeurosis which may represent sequela from plantar fasciitis.      Fractures of the left ankle involving the medial malleolus and distal fibula with disruption of the ankle mortise. Soft tissue swelling predominantly laterally. Mild degenerative changes of the left knee with chondrocalcinosis along the medial tibiofemoral joint space and small joint effusion. No acute fracture or dislocation of the right ankle. Tiny well corticated bony fragment adjacent to the right medial malleolus which may represent sequela from old avulsion injury. Soft tissue swelling overlying the lateral malleolus of the right ankle. Right tibiotalar joint effusion. Other chronic or incidental findings as noted. Xr Tibia Fibula Left (2 Views)    Result Date: 4/26/2021  EXAMINATION: THREE XRAY VIEWS OF THE LEFT ANKLE;   XRAY VIEWS OF THE LEFT TIBIA AND FIBULA; FOUR XRAY VIEWS OF THE LEFT KNEE; THREE XRAY VIEWS OF THE RIGHT ANKLE 4/26/2021 5:24 pm COMPARISON: None. HISTORY: ORDERING SYSTEM PROVIDED HISTORY: fall swelling, tenderness TECHNOLOGIST PROVIDED HISTORY: Reason for exam:->fall swelling, tenderness What reading provider will be dictating this exam?->CRC FINDINGS: Fractures of the medial malleolus and distal fibula with lateral displacement of the distal fragments. Disruption of the left ankle mortise with lateral displacement of the talus in relationship to the tibia. No talar dome fracture. Soft tissue swelling predominantly laterally. No additional more proximal fractures of the tibia or fibula. No acute fracture or dislocation of the left knee. Alignment is anatomic. 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 Small joint effusion. No acute fracture or dislocation of the right ankle. Alignment is anatomic. Tiny well corticated bony fragment adjacent to the medial malleolus which may represent sequela from old avulsion injury. Soft tissue swelling overlying the lateral malleolus. Tibiotalar joint effusion. Calcification along the course of the plantar aponeurosis which may represent sequela from plantar fasciitis.      Fractures of the left ankle involving the medial malleolus and distal fibula with disruption of the ankle mortise. Soft tissue swelling predominantly laterally. Mild degenerative changes of the left knee with chondrocalcinosis along the medial tibiofemoral joint space and small joint effusion. No acute fracture or dislocation of the right ankle. Tiny well corticated bony fragment adjacent to the right medial malleolus which may represent sequela from old avulsion injury. Soft tissue swelling overlying the lateral malleolus of the right ankle. Right tibiotalar joint effusion. Other chronic or incidental findings as noted. Xr Ankle Left (min 3 Views)    Result Date: 4/26/2021  EXAMINATION: THREE XRAY VIEWS OF THE LEFT ANKLE;   XRAY VIEWS OF THE LEFT TIBIA AND FIBULA; FOUR XRAY VIEWS OF THE LEFT KNEE; THREE XRAY VIEWS OF THE RIGHT ANKLE 4/26/2021 5:24 pm COMPARISON: None. HISTORY: ORDERING SYSTEM PROVIDED HISTORY: fall swelling, tenderness TECHNOLOGIST PROVIDED HISTORY: Reason for exam:->fall swelling, tenderness What reading provider will be dictating this exam?->CRC FINDINGS: Fractures of the medial malleolus and distal fibula with lateral displacement of the distal fragments. Disruption of the left ankle mortise with lateral displacement of the talus in relationship to the tibia. No talar dome fracture. Soft tissue swelling predominantly laterally. No additional more proximal fractures of the tibia or fibula. No acute fracture or dislocation of the left knee. Alignment is anatomic. 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 Small joint effusion. No acute fracture or dislocation of the right ankle. Alignment is anatomic. Tiny well corticated bony fragment adjacent to the medial malleolus which may represent sequela from old avulsion injury. Soft tissue swelling overlying the lateral malleolus. Tibiotalar joint effusion. Calcification along the course of the plantar aponeurosis which may represent sequela from plantar fasciitis.      Fractures of the left ankle involving the medial malleolus and distal fibula with disruption of the ankle mortise. Soft tissue swelling predominantly laterally. Mild degenerative changes of the left knee with chondrocalcinosis along the medial tibiofemoral joint space and small joint effusion. No acute fracture or dislocation of the right ankle. Tiny well corticated bony fragment adjacent to the right medial malleolus which may represent sequela from old avulsion injury. Soft tissue swelling overlying the lateral malleolus of the right ankle. Right tibiotalar joint effusion. Other chronic or incidental findings as noted. Xr Ankle Right (min 3 Views)    Result Date: 4/26/2021  EXAMINATION: THREE XRAY VIEWS OF THE LEFT ANKLE;   XRAY VIEWS OF THE LEFT TIBIA AND FIBULA; FOUR XRAY VIEWS OF THE LEFT KNEE; THREE XRAY VIEWS OF THE RIGHT ANKLE 4/26/2021 5:24 pm COMPARISON: None. HISTORY: ORDERING SYSTEM PROVIDED HISTORY: fall swelling, tenderness TECHNOLOGIST PROVIDED HISTORY: Reason for exam:->fall swelling, tenderness What reading provider will be dictating this exam?->CRC FINDINGS: Fractures of the medial malleolus and distal fibula with lateral displacement of the distal fragments. Disruption of the left ankle mortise with lateral displacement of the talus in relationship to the tibia. No talar dome fracture. Soft tissue swelling predominantly laterally. No additional more proximal fractures of the tibia or fibula. No acute fracture or dislocation of the left knee. Alignment is anatomic. 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 Small joint effusion. No acute fracture or dislocation of the right ankle. Alignment is anatomic. Tiny well corticated bony fragment adjacent to the medial malleolus which may represent sequela from old avulsion injury. Soft tissue swelling overlying the lateral malleolus. Tibiotalar joint effusion.  Calcification along the course of the plantar aponeurosis which may represent sequela from plantar fasciitis. Fractures of the left ankle involving the medial malleolus and distal fibula with disruption of the ankle mortise. Soft tissue swelling predominantly laterally. Mild degenerative changes of the left knee with chondrocalcinosis along the medial tibiofemoral joint space and small joint effusion. No acute fracture or dislocation of the right ankle. Tiny well corticated bony fragment adjacent to the right medial malleolus which may represent sequela from old avulsion injury. Soft tissue swelling overlying the lateral malleolus of the right ankle. Right tibiotalar joint effusion. Other chronic or incidental findings as noted. Ct Head Wo Contrast    Result Date: 4/26/2021  EXAMINATION: CT OF THE CERVICAL SPINE WITHOUT CONTRAST; CT OF THE HEAD WITHOUT CONTRAST 4/26/2021 7:02 pm TECHNIQUE: CT of the cervical spine was performed without the administration of intravenous contrast. Multiplanar reformatted images are provided for review. Dose modulation, iterative reconstruction, and/or weight based adjustment of the mA/kV was utilized to reduce the radiation dose to as low as reasonably achievable.; CT of the head was performed without the administration of intravenous contrast. Dose modulation, iterative reconstruction, and/or weight based adjustment of the mA/kV was utilized to reduce the radiation dose to as low as reasonably achievable. COMPARISON: None. HISTORY: ORDERING SYSTEM PROVIDED HISTORY: fall TECHNOLOGIST PROVIDED HISTORY: Reason for exam:->fall Decision Support Exception->Emergency Medical Condition (MA) What reading provider will be dictating this exam?->CRC FINDINGS: BONES/ALIGNMENT: There is no acute fracture or traumatic malalignment. DEGENERATIVE CHANGES: No significant degenerative changes. SOFT TISSUES: There is no prevertebral soft tissue swelling. No acute abnormality of the cervical spine. There is age-appropriate atrophy and small-vessel ischemic disease. CT CERVICAL SPINE. There is no acute displaced fracture in the cervical spine. The prevertebral soft tissues are normal.  Degenerative changes are identified from C2-T1 with osteophytes and multilevel disc bulges. Impression No acute displaced fracture in the cervical spine. Diffuse degenerative changes with multilevel disc bulges from C2-T1. Ct Cervical Spine Wo Contrast    Result Date: 4/26/2021  EXAMINATION: CT OF THE CERVICAL SPINE WITHOUT CONTRAST; CT OF THE HEAD WITHOUT CONTRAST 4/26/2021 7:02 pm TECHNIQUE: CT of the cervical spine was performed without the administration of intravenous contrast. Multiplanar reformatted images are provided for review. Dose modulation, iterative reconstruction, and/or weight based adjustment of the mA/kV was utilized to reduce the radiation dose to as low as reasonably achievable.; CT of the head was performed without the administration of intravenous contrast. Dose modulation, iterative reconstruction, and/or weight based adjustment of the mA/kV was utilized to reduce the radiation dose to as low as reasonably achievable. COMPARISON: None. HISTORY: ORDERING SYSTEM PROVIDED HISTORY: fall TECHNOLOGIST PROVIDED HISTORY: Reason for exam:->fall Decision Support Exception->Emergency Medical Condition (MA) What reading provider will be dictating this exam?->CRC FINDINGS: BONES/ALIGNMENT: There is no acute fracture or traumatic malalignment. DEGENERATIVE CHANGES: No significant degenerative changes. SOFT TISSUES: There is no prevertebral soft tissue swelling. No acute abnormality of the cervical spine. There is age-appropriate atrophy and small-vessel ischemic disease. CT CERVICAL SPINE. There is no acute displaced fracture in the cervical spine. The prevertebral soft tissues are normal.  Degenerative changes are identified from C2-T1 with osteophytes and multilevel disc bulges. Impression No acute displaced fracture in the cervical spine.  Diffuse degenerative changes with multilevel disc bulges from C2-T1. Xr Chest Portable    Result Date: 4/26/2021  EXAMINATION: ONE XRAY VIEW OF THE CHEST 4/26/2021 5:24 pm COMPARISON: July 20, 2020 HISTORY: ORDERING SYSTEM PROVIDED HISTORY: lightheaded TECHNOLOGIST PROVIDED HISTORY: Reason for exam:->lightheaded What reading provider will be dictating this exam?->CRC FINDINGS: Cardiac silhouette is mildly enlarged. Decreased inspiration. No evidence of airspace consolidation or pleural effusions. The pulmonary vasculature is within normal limits. Cardiomegaly with no evidence of failure or acute infiltrates. Decreased inspiration. Assessment//Plan           Hospital Problems           Last Modified POA    Ankle fracture, bimalleolar, closed, left, initial encounter 4/26/2021 Yes    Closed fracture of left ankle 4/29/2021 Yes        Assessment:  (Ankle fracture, bimalleolar, closed, left, initial encounter 4/26/2021 Yes     falls  DM  HTN  Hyperlipidemia  ). Plan:   (Continue to monitor   Ortho following  Patient agreeable to [facility]. Can discharge when approved as no surgical intervention planned at this time. ).

## 2021-05-01 ENCOUNTER — ANESTHESIA (OUTPATIENT)
Dept: OPERATING ROOM | Age: 81
DRG: 494 | End: 2021-05-01
Payer: MEDICARE

## 2021-05-01 ENCOUNTER — APPOINTMENT (OUTPATIENT)
Dept: GENERAL RADIOLOGY | Age: 81
DRG: 494 | End: 2021-05-01
Payer: MEDICARE

## 2021-05-01 VITALS
TEMPERATURE: 98.8 F | DIASTOLIC BLOOD PRESSURE: 71 MMHG | RESPIRATION RATE: 11 BRPM | SYSTOLIC BLOOD PRESSURE: 107 MMHG | OXYGEN SATURATION: 100 %

## 2021-05-01 LAB
ABO/RH: NORMAL
ANION GAP SERPL CALCULATED.3IONS-SCNC: 12 MMOL/L (ref 7–16)
ANTIBODY SCREEN: NORMAL
BUN BLDV-MCNC: 30 MG/DL (ref 6–23)
CALCIUM SERPL-MCNC: 9.6 MG/DL (ref 8.6–10.2)
CHLORIDE BLD-SCNC: 100 MMOL/L (ref 98–107)
CO2: 27 MMOL/L (ref 22–29)
CREAT SERPL-MCNC: 0.9 MG/DL (ref 0.5–1)
GFR AFRICAN AMERICAN: >60
GFR NON-AFRICAN AMERICAN: 60 ML/MIN/1.73
GLUCOSE BLD-MCNC: 158 MG/DL (ref 74–99)
HCT VFR BLD CALC: 38.7 % (ref 34–48)
HEMOGLOBIN: 11.8 G/DL (ref 11.5–15.5)
MCH RBC QN AUTO: 28.9 PG (ref 26–35)
MCHC RBC AUTO-ENTMCNC: 30.5 % (ref 32–34.5)
MCV RBC AUTO: 94.9 FL (ref 80–99.9)
METER GLUCOSE: 188 MG/DL (ref 74–99)
METER GLUCOSE: 218 MG/DL (ref 74–99)
METER GLUCOSE: 333 MG/DL (ref 74–99)
METER GLUCOSE: 378 MG/DL (ref 74–99)
PDW BLD-RTO: 14.4 FL (ref 11.5–15)
PLATELET # BLD: 187 E9/L (ref 130–450)
PMV BLD AUTO: 10.8 FL (ref 7–12)
POTASSIUM SERPL-SCNC: 4.9 MMOL/L (ref 3.5–5)
RBC # BLD: 4.08 E12/L (ref 3.5–5.5)
SODIUM BLD-SCNC: 139 MMOL/L (ref 132–146)
WBC # BLD: 11.1 E9/L (ref 4.5–11.5)

## 2021-05-01 PROCEDURE — C1713 ANCHOR/SCREW BN/BN,TIS/BN: HCPCS | Performed by: ORTHOPAEDIC SURGERY

## 2021-05-01 PROCEDURE — 7100000001 HC PACU RECOVERY - ADDTL 15 MIN: Performed by: ORTHOPAEDIC SURGERY

## 2021-05-01 PROCEDURE — 2709999900 HC NON-CHARGEABLE SUPPLY: Performed by: ORTHOPAEDIC SURGERY

## 2021-05-01 PROCEDURE — 1200000000 HC SEMI PRIVATE

## 2021-05-01 PROCEDURE — 80048 BASIC METABOLIC PNL TOTAL CA: CPT

## 2021-05-01 PROCEDURE — 86900 BLOOD TYPING SEROLOGIC ABO: CPT

## 2021-05-01 PROCEDURE — 86850 RBC ANTIBODY SCREEN: CPT

## 2021-05-01 PROCEDURE — 6360000002 HC RX W HCPCS

## 2021-05-01 PROCEDURE — 0QSH04Z REPOSITION LEFT TIBIA WITH INTERNAL FIXATION DEVICE, OPEN APPROACH: ICD-10-PCS | Performed by: ORTHOPAEDIC SURGERY

## 2021-05-01 PROCEDURE — 6360000002 HC RX W HCPCS: Performed by: STUDENT IN AN ORGANIZED HEALTH CARE EDUCATION/TRAINING PROGRAM

## 2021-05-01 PROCEDURE — 2580000003 HC RX 258

## 2021-05-01 PROCEDURE — 6370000000 HC RX 637 (ALT 250 FOR IP): Performed by: STUDENT IN AN ORGANIZED HEALTH CARE EDUCATION/TRAINING PROGRAM

## 2021-05-01 PROCEDURE — 82962 GLUCOSE BLOOD TEST: CPT

## 2021-05-01 PROCEDURE — 86901 BLOOD TYPING SEROLOGIC RH(D): CPT

## 2021-05-01 PROCEDURE — 3700000001 HC ADD 15 MINUTES (ANESTHESIA): Performed by: ORTHOPAEDIC SURGERY

## 2021-05-01 PROCEDURE — 36415 COLL VENOUS BLD VENIPUNCTURE: CPT

## 2021-05-01 PROCEDURE — 2500000003 HC RX 250 WO HCPCS: Performed by: STUDENT IN AN ORGANIZED HEALTH CARE EDUCATION/TRAINING PROGRAM

## 2021-05-01 PROCEDURE — 7100000000 HC PACU RECOVERY - FIRST 15 MIN: Performed by: ORTHOPAEDIC SURGERY

## 2021-05-01 PROCEDURE — 27814 TREATMENT OF ANKLE FRACTURE: CPT | Performed by: ORTHOPAEDIC SURGERY

## 2021-05-01 PROCEDURE — 3600000015 HC SURGERY LEVEL 5 ADDTL 15MIN: Performed by: ORTHOPAEDIC SURGERY

## 2021-05-01 PROCEDURE — 6370000000 HC RX 637 (ALT 250 FOR IP): Performed by: ORTHOPAEDIC SURGERY

## 2021-05-01 PROCEDURE — 3209999900 FLUORO FOR SURGICAL PROCEDURES

## 2021-05-01 PROCEDURE — 6360000002 HC RX W HCPCS: Performed by: ANESTHESIOLOGY

## 2021-05-01 PROCEDURE — 2500000003 HC RX 250 WO HCPCS: Performed by: ORTHOPAEDIC SURGERY

## 2021-05-01 PROCEDURE — 2500000003 HC RX 250 WO HCPCS

## 2021-05-01 PROCEDURE — 2720000010 HC SURG SUPPLY STERILE: Performed by: ORTHOPAEDIC SURGERY

## 2021-05-01 PROCEDURE — 3600000005 HC SURGERY LEVEL 5 BASE: Performed by: ORTHOPAEDIC SURGERY

## 2021-05-01 PROCEDURE — 3700000000 HC ANESTHESIA ATTENDED CARE: Performed by: ORTHOPAEDIC SURGERY

## 2021-05-01 PROCEDURE — 73610 X-RAY EXAM OF ANKLE: CPT

## 2021-05-01 PROCEDURE — 85027 COMPLETE CBC AUTOMATED: CPT

## 2021-05-01 PROCEDURE — C1769 GUIDE WIRE: HCPCS | Performed by: ORTHOPAEDIC SURGERY

## 2021-05-01 DEVICE — SCREW BNE L16MM DIA2.7MM CORT S STL ST FULL THRD FOR SM: Type: IMPLANTABLE DEVICE | Site: ANKLE | Status: FUNCTIONAL

## 2021-05-01 DEVICE — SCREW CORTES 3.5MM SELF-TAPPING 18MM: Type: IMPLANTABLE DEVICE | Site: ANKLE | Status: FUNCTIONAL

## 2021-05-01 DEVICE — SCREW BNE L46MM DIA4MM S STL CANN SHT 1/3 THRD SM HEX SOCK: Type: IMPLANTABLE DEVICE | Site: ANKLE | Status: FUNCTIONAL

## 2021-05-01 DEVICE — SCREW BNE L14MM DIA3.5MM CORT S STL ST NONCANNULATED LOK: Type: IMPLANTABLE DEVICE | Site: ANKLE | Status: FUNCTIONAL

## 2021-05-01 DEVICE — WASHER ORTH DIA7MM FOR CANN SCR: Type: IMPLANTABLE DEVICE | Site: ANKLE | Status: FUNCTIONAL

## 2021-05-01 DEVICE — PLATE BNE L93MM 8 H S STL 1/3 TBLR LOK COMPR W/ CLLR FOR: Type: IMPLANTABLE DEVICE | Site: ANKLE | Status: FUNCTIONAL

## 2021-05-01 DEVICE — SCREW BNE L20MM DIA3.5MM CORT S STL ST NONCANNULATED LOK: Type: IMPLANTABLE DEVICE | Site: ANKLE | Status: FUNCTIONAL

## 2021-05-01 DEVICE — SCREW BNE L16MM DIA4MM CANC S STL ST CANN NONLOCKING FULL: Type: IMPLANTABLE DEVICE | Site: ANKLE | Status: FUNCTIONAL

## 2021-05-01 DEVICE — SCREW BNE L16MM DIA3.5MM CORT S STL ST LOK FULL THRD: Type: IMPLANTABLE DEVICE | Site: ANKLE | Status: FUNCTIONAL

## 2021-05-01 RX ORDER — PROMETHAZINE HYDROCHLORIDE 25 MG/ML
25 INJECTION, SOLUTION INTRAMUSCULAR; INTRAVENOUS PRN
Status: DISCONTINUED | OUTPATIENT
Start: 2021-05-01 | End: 2021-05-01 | Stop reason: HOSPADM

## 2021-05-01 RX ORDER — NEOSTIGMINE METHYLSULFATE 1 MG/ML
INJECTION, SOLUTION INTRAVENOUS PRN
Status: DISCONTINUED | OUTPATIENT
Start: 2021-05-01 | End: 2021-05-01 | Stop reason: SDUPTHER

## 2021-05-01 RX ORDER — ONDANSETRON 2 MG/ML
INJECTION INTRAMUSCULAR; INTRAVENOUS PRN
Status: DISCONTINUED | OUTPATIENT
Start: 2021-05-01 | End: 2021-05-01 | Stop reason: SDUPTHER

## 2021-05-01 RX ORDER — GLYCOPYRROLATE 1 MG/5 ML
SYRINGE (ML) INTRAVENOUS PRN
Status: DISCONTINUED | OUTPATIENT
Start: 2021-05-01 | End: 2021-05-01 | Stop reason: SDUPTHER

## 2021-05-01 RX ORDER — DIAPER,BRIEF,INFANT-TODD,DISP
EACH MISCELLANEOUS PRN
Status: DISCONTINUED | OUTPATIENT
Start: 2021-05-01 | End: 2021-05-01 | Stop reason: ALTCHOICE

## 2021-05-01 RX ORDER — HYDROCODONE BITARTRATE AND ACETAMINOPHEN 5; 325 MG/1; MG/1
1 TABLET ORAL EVERY 6 HOURS PRN
Qty: 28 TABLET | Refills: 0 | Status: SHIPPED | OUTPATIENT
Start: 2021-05-01 | End: 2021-05-08

## 2021-05-01 RX ORDER — LIDOCAINE HYDROCHLORIDE 10 MG/ML
INJECTION, SOLUTION INFILTRATION; PERINEURAL PRN
Status: DISCONTINUED | OUTPATIENT
Start: 2021-05-01 | End: 2021-05-01 | Stop reason: ALTCHOICE

## 2021-05-01 RX ORDER — ASPIRIN 81 MG/1
81 TABLET ORAL 2 TIMES DAILY
Qty: 56 TABLET | Refills: 0 | Status: SHIPPED | OUTPATIENT
Start: 2021-05-01 | End: 2021-05-29

## 2021-05-01 RX ORDER — FENTANYL CITRATE 50 UG/ML
INJECTION, SOLUTION INTRAMUSCULAR; INTRAVENOUS PRN
Status: DISCONTINUED | OUTPATIENT
Start: 2021-05-01 | End: 2021-05-01 | Stop reason: SDUPTHER

## 2021-05-01 RX ORDER — PHENYLEPHRINE HCL IN 0.9% NACL 1 MG/10 ML
SYRINGE (ML) INTRAVENOUS PRN
Status: DISCONTINUED | OUTPATIENT
Start: 2021-05-01 | End: 2021-05-01 | Stop reason: SDUPTHER

## 2021-05-01 RX ORDER — ROCURONIUM BROMIDE 10 MG/ML
INJECTION, SOLUTION INTRAVENOUS PRN
Status: DISCONTINUED | OUTPATIENT
Start: 2021-05-01 | End: 2021-05-01 | Stop reason: SDUPTHER

## 2021-05-01 RX ORDER — DEXAMETHASONE SODIUM PHOSPHATE 10 MG/ML
INJECTION INTRAMUSCULAR; INTRAVENOUS PRN
Status: DISCONTINUED | OUTPATIENT
Start: 2021-05-01 | End: 2021-05-01 | Stop reason: SDUPTHER

## 2021-05-01 RX ORDER — ASPIRIN 81 MG/1
81 TABLET ORAL 2 TIMES DAILY
Status: DISCONTINUED | OUTPATIENT
Start: 2021-05-01 | End: 2021-05-03 | Stop reason: HOSPADM

## 2021-05-01 RX ORDER — LIDOCAINE HYDROCHLORIDE 20 MG/ML
INJECTION, SOLUTION INTRAVENOUS PRN
Status: DISCONTINUED | OUTPATIENT
Start: 2021-05-01 | End: 2021-05-01 | Stop reason: SDUPTHER

## 2021-05-01 RX ORDER — SODIUM CHLORIDE 9 MG/ML
INJECTION, SOLUTION INTRAVENOUS CONTINUOUS PRN
Status: DISCONTINUED | OUTPATIENT
Start: 2021-05-01 | End: 2021-05-01 | Stop reason: SDUPTHER

## 2021-05-01 RX ORDER — MEPERIDINE HYDROCHLORIDE 25 MG/ML
12.5 INJECTION INTRAMUSCULAR; INTRAVENOUS; SUBCUTANEOUS EVERY 5 MIN PRN
Status: DISCONTINUED | OUTPATIENT
Start: 2021-05-01 | End: 2021-05-01 | Stop reason: HOSPADM

## 2021-05-01 RX ORDER — LABETALOL HYDROCHLORIDE 5 MG/ML
5 INJECTION, SOLUTION INTRAVENOUS EVERY 10 MIN PRN
Status: DISCONTINUED | OUTPATIENT
Start: 2021-05-01 | End: 2021-05-01 | Stop reason: HOSPADM

## 2021-05-01 RX ORDER — PROPOFOL 10 MG/ML
INJECTION, EMULSION INTRAVENOUS PRN
Status: DISCONTINUED | OUTPATIENT
Start: 2021-05-01 | End: 2021-05-01 | Stop reason: SDUPTHER

## 2021-05-01 RX ADMIN — Medication 2000 MG: at 07:22

## 2021-05-01 RX ADMIN — Medication 100 MCG: at 07:20

## 2021-05-01 RX ADMIN — ONDANSETRON HYDROCHLORIDE 4 MG: 2 INJECTION, SOLUTION INTRAMUSCULAR; INTRAVENOUS at 08:30

## 2021-05-01 RX ADMIN — FENTANYL CITRATE 25 MCG: 50 INJECTION, SOLUTION INTRAMUSCULAR; INTRAVENOUS at 07:05

## 2021-05-01 RX ADMIN — MORPHINE SULFATE 4 MG: 4 INJECTION, SOLUTION INTRAMUSCULAR; INTRAVENOUS at 19:52

## 2021-05-01 RX ADMIN — Medication 3 MG: at 08:30

## 2021-05-01 RX ADMIN — PROPOFOL 150 MG: 10 INJECTION, EMULSION INTRAVENOUS at 07:14

## 2021-05-01 RX ADMIN — INSULIN LISPRO 4 UNITS: 100 INJECTION, SOLUTION INTRAVENOUS; SUBCUTANEOUS at 16:44

## 2021-05-01 RX ADMIN — ROCURONIUM BROMIDE 30 MG: 10 INJECTION, SOLUTION INTRAVENOUS at 07:14

## 2021-05-01 RX ADMIN — Medication 0.5 MG: at 08:30

## 2021-05-01 RX ADMIN — SODIUM CHLORIDE: 9 INJECTION, SOLUTION INTRAVENOUS at 07:03

## 2021-05-01 RX ADMIN — HYDROMORPHONE HYDROCHLORIDE 0.25 MG: 1 INJECTION, SOLUTION INTRAMUSCULAR; INTRAVENOUS; SUBCUTANEOUS at 09:22

## 2021-05-01 RX ADMIN — ASPIRIN 81 MG: 81 TABLET, COATED ORAL at 19:48

## 2021-05-01 RX ADMIN — MORPHINE SULFATE 4 MG: 4 INJECTION, SOLUTION INTRAMUSCULAR; INTRAVENOUS at 14:32

## 2021-05-01 RX ADMIN — CEFAZOLIN 2000 MG: 10 INJECTION, POWDER, FOR SOLUTION INTRAVENOUS at 16:17

## 2021-05-01 RX ADMIN — Medication 50 MCG: at 07:29

## 2021-05-01 RX ADMIN — PREGABALIN 50 MG: 50 CAPSULE ORAL at 13:28

## 2021-05-01 RX ADMIN — Medication 0.2 MG: at 07:34

## 2021-05-01 RX ADMIN — PREGABALIN 50 MG: 50 CAPSULE ORAL at 19:48

## 2021-05-01 RX ADMIN — Medication 50 MCG: at 07:25

## 2021-05-01 RX ADMIN — DEXAMETHASONE SODIUM PHOSPHATE 10 MG: 10 INJECTION INTRAMUSCULAR; INTRAVENOUS at 07:25

## 2021-05-01 RX ADMIN — FENTANYL CITRATE 75 MCG: 50 INJECTION, SOLUTION INTRAMUSCULAR; INTRAVENOUS at 07:14

## 2021-05-01 RX ADMIN — LIDOCAINE HYDROCHLORIDE 100 MG: 20 INJECTION, SOLUTION INTRAVENOUS at 07:14

## 2021-05-01 RX ADMIN — MORPHINE SULFATE 4 MG: 4 INJECTION, SOLUTION INTRAMUSCULAR; INTRAVENOUS at 11:23

## 2021-05-01 RX ADMIN — FENTANYL CITRATE 25 MCG: 50 INJECTION, SOLUTION INTRAMUSCULAR; INTRAVENOUS at 07:45

## 2021-05-01 RX ADMIN — INSULIN LISPRO 2 UNITS: 100 INJECTION, SOLUTION INTRAVENOUS; SUBCUTANEOUS at 13:16

## 2021-05-01 RX ADMIN — INSULIN LISPRO 3 UNITS: 100 INJECTION, SOLUTION INTRAVENOUS; SUBCUTANEOUS at 21:44

## 2021-05-01 ASSESSMENT — PULMONARY FUNCTION TESTS
PIF_VALUE: 23
PIF_VALUE: 25
PIF_VALUE: 0
PIF_VALUE: 18
PIF_VALUE: 23
PIF_VALUE: 23
PIF_VALUE: 18
PIF_VALUE: 23
PIF_VALUE: 29
PIF_VALUE: 26
PIF_VALUE: 23
PIF_VALUE: 23
PIF_VALUE: 19
PIF_VALUE: 1
PIF_VALUE: 16
PIF_VALUE: 1
PIF_VALUE: 18
PIF_VALUE: 23
PIF_VALUE: 18
PIF_VALUE: 23
PIF_VALUE: 21
PIF_VALUE: 23
PIF_VALUE: 24
PIF_VALUE: 1
PIF_VALUE: 23
PIF_VALUE: 23
PIF_VALUE: 18
PIF_VALUE: 23
PIF_VALUE: 23
PIF_VALUE: 2
PIF_VALUE: 23
PIF_VALUE: 23
PIF_VALUE: 18
PIF_VALUE: 18
PIF_VALUE: 1
PIF_VALUE: 23
PIF_VALUE: 18
PIF_VALUE: 25
PIF_VALUE: 18
PIF_VALUE: 23
PIF_VALUE: 2
PIF_VALUE: 19

## 2021-05-01 ASSESSMENT — PAIN DESCRIPTION - PAIN TYPE
TYPE: SURGICAL PAIN

## 2021-05-01 ASSESSMENT — PAIN SCALES - GENERAL
PAINLEVEL_OUTOF10: 0
PAINLEVEL_OUTOF10: 10
PAINLEVEL_OUTOF10: 6
PAINLEVEL_OUTOF10: 7

## 2021-05-01 ASSESSMENT — PAIN DESCRIPTION - FREQUENCY: FREQUENCY: CONTINUOUS

## 2021-05-01 ASSESSMENT — PAIN DESCRIPTION - ONSET
ONSET: ON-GOING
ONSET: ON-GOING

## 2021-05-01 ASSESSMENT — ENCOUNTER SYMPTOMS
ABDOMINAL PAIN: 0
SHORTNESS OF BREATH: 0

## 2021-05-01 ASSESSMENT — PAIN DESCRIPTION - ORIENTATION
ORIENTATION: LEFT
ORIENTATION: LEFT
ORIENTATION: LEFT;LOWER

## 2021-05-01 ASSESSMENT — LIFESTYLE VARIABLES: SMOKING_STATUS: 0

## 2021-05-01 ASSESSMENT — PAIN DESCRIPTION - LOCATION
LOCATION: LEG
LOCATION: LEG

## 2021-05-01 NOTE — ANESTHESIA PRE PROCEDURE
0.25 mg  0.25 mg Intravenous Q5 Min PRN Tavon Peralta MD        HYDROmorphone (DILAUDID) injection 0.5 mg  0.5 mg Intravenous Q5 Min PRN Tavon Peralta MD        acetaminophen (TYLENOL) tablet 650 mg  650 mg Oral Q6H PRN Chuck Borges MD        enalapril (VASOTEC) tablet 20 mg  20 mg Oral Daily Chuck Borges MD   20 mg at 04/30/21 0748    pantoprazole (PROTONIX) tablet 40 mg  40 mg Oral QAM AC Chuck Borges MD   Stopped at 05/01/21 0618    PARoxetine (PAXIL) tablet 40 mg  40 mg Oral Daily Chuck Borges MD   40 mg at 04/30/21 3741    rosuvastatin (CRESTOR) tablet 20 mg  20 mg Oral Daily Chuck Borges MD   20 mg at 04/30/21 0748    insulin lispro (HUMALOG) injection vial 0-6 Units  0-6 Units Subcutaneous TID [facility] Chuck Borges MD   Stopped at 05/01/21 0618    insulin lispro (HUMALOG) injection vial 0-3 Units  0-3 Units Subcutaneous Nightly Chuck Borges MD   2 Units at 04/30/21 2120    glucose (GLUTOSE) 40 % oral gel 15 g  15 g Oral PRN Chuck Borges MD        dextrose 50 % IV solution  12.5 g Intravenous PRN Chuck Borges MD        glucagon (rDNA) injection 1 mg  1 mg Intramuscular PRN Chuck Borges MD        dextrose 5 % solution  100 mL/hr Intravenous PRN Chuck Borges MD        HYDROcodone-acetaminophen [facility])  MG per tablet 1 tablet  1 tablet Oral Q4H PRN Chuck Borges MD   1 tablet at 04/29/21 2318    ceFAZolin (ANCEF) 2000 mg in sterile water 20 mL IV syringe  2,000 mg Intravenous See Admin Instructions Joni Gutierrez DO        glipiZIDE (GLUCOTROL) tablet 5 mg  5 mg Oral QAM AC Chuck Borges MD   Stopped at 05/01/21 0618    [Held by provider] Liraglutide (VICTOZA) SC injection 1.8 mg  1.8 mg Subcutaneous Daily Chuck Borges MD        pregabalin (LYRICA) capsule 50 mg  50 mg Oral TID Chuck Borges MD   50 mg at 04/30/21 2119    05/01/21 (!) 149/81   07/20/20 (!) 152/85   07/30/18 122/72       NPO Status: Time of last liquid consumption: 2300                        Time of last solid consumption: 2300                        Date of last liquid consumption: 04/30/21                        Date of last solid food consumption: 04/30/21    BMI:   Wt Readings from Last 3 Encounters:   04/26/21 188 lb (85.3 kg)   07/30/18 168 lb (76.2 kg)   07/16/18 168 lb (76.2 kg)     Body mass index is 31.28 kg/m². CBC:   Lab Results   Component Value Date    WBC 10.5 04/30/2021    RBC 4.20 04/30/2021    HGB 12.7 04/30/2021    HCT 40.2 04/30/2021    MCV 95.7 04/30/2021    RDW 14.5 04/30/2021     04/30/2021       CMP:   Lab Results   Component Value Date     04/30/2021    K 4.7 04/30/2021    K 4.7 04/26/2021    CL 98 04/30/2021    CO2 26 04/30/2021    BUN 21 04/30/2021    CREATININE 0.8 04/30/2021    GFRAA >60 04/30/2021    LABGLOM >60 04/30/2021    GLUCOSE 146 04/30/2021    GLUCOSE 107 03/31/2012    PROT 7.0 04/26/2021    CALCIUM 9.5 04/30/2021    BILITOT <0.2 04/26/2021    ALKPHOS 69 04/26/2021    AST 21 04/26/2021    ALT 12 04/26/2021       POC Tests: No results for input(s): POCGLU, POCNA, POCK, POCCL, POCBUN, POCHEMO, POCHCT in the last 72 hours.     Coags:   Lab Results   Component Value Date    PROTIME 10.5 04/26/2021    INR 1.0 04/26/2021    APTT 27.9 04/26/2021       HCG (If Applicable): No results found for: PREGTESTUR, PREGSERUM, HCG, HCGQUANT     ABGs: No results found for: PHART, PO2ART, YSW8GVQ, VDP0KLP, BEART, [id number]     Type & Screen (If Applicable):  No results found for: LABABO, LABRH    Drug/Infectious Status (If Applicable):  No results found for: HIV, HEPCAB    COVID-19 Screening (If Applicable):   Lab Results   Component Value Date    COVID19 Not Detected 04/29/2021           Anesthesia Evaluation  Patient summary reviewed and Nursing notes reviewed no history of anesthetic complications:   Airway: Mallampati: II  TM distance: >3 FB   Neck ROM: limited  Mouth opening: > = 3 FB Dental:    (+) edentulous      Pulmonary:Negative Pulmonary ROS breath sounds clear to auscultation      (-) not a current smoker                           Cardiovascular:    (+) hypertension:, hyperlipidemia      ECG reviewed  Rhythm: regular  Rate: normal           Beta Blocker:  Not on Beta Blocker         Neuro/Psych:   Negative Neuro/Psych ROS              GI/Hepatic/Renal:   (+) GERD:,          ROS comment: Colostomy d/t diverticulitis. Endo/Other:    (+) DiabetesType II DM, using insulin, blood dyscrasia (ASA last 4/27): anticoagulation therapy, arthritis: OA., .                 Abdominal:           Vascular: negative vascular ROS. Anesthesia Plan      general     ASA 3       Induction: intravenous. BIS  MIPS: Postoperative opioids intended, Prophylactic antiemetics administered and Postoperative trial extubation. Anesthetic plan and risks discussed with patient. Use of blood products discussed with patient whom consented to blood products. Plan discussed with CRNA and attending. Cosme Lowe RN   5/1/2021    Pt seen, examined, chart reviewed, plan discussed.   [facility]  5/1/2021  6:54 AM

## 2021-05-01 NOTE — ANESTHESIA POSTPROCEDURE EVALUATION
Department of Anesthesiology  Postprocedure Note    Patient: Davida Ibrahim  MRN: [id number]  YOB: 1940  Date of evaluation: 5/1/2021  Time:  7:54 PM     Procedure Summary     Date: 05/01/21 Room / Location: [place] [address] / [facility]    Anesthesia Start: 0703 Anesthesia Stop: 6299    Procedure: LEFT ANKLE OPEN REDUCTION INTERNAL FIXATION--SYNTHES (Left Leg Lower) Diagnosis: (/)    Surgeons: Estefanía Barraza MD Responsible Provider: Fred Boone MD    Anesthesia Type: general ASA Status: 3          Anesthesia Type: general    Venessa Phase I: Venessa Score: 9    Venessa Phase II:      Last vitals: Reviewed and per EMR flowsheets.        Anesthesia Post Evaluation    Patient location during evaluation: PACU  Patient participation: complete - patient participated  Level of consciousness: awake and alert  Airway patency: patent  Nausea & Vomiting: no nausea and no vomiting  Complications: no  Cardiovascular status: hemodynamically stable and blood pressure returned to baseline  Respiratory status: acceptable  Hydration status: euvolemic

## 2021-05-01 NOTE — OP NOTE
Operative Note      Patient: Isac Tipton  YOB: 1940  MRN: [id number]    Date of Procedure: 5/1/2021    Pre-Op Diagnosis: Left closed bimalleolar ankle fracture    Post-Op Diagnosis: Same         Procedure:  Open reduction internal fixation left closed bimalleolar ankle fracture with stress view of the syndesmosis under anesthesia      Surgeon(s):  Sheryl Lvoe MD    Assistant:   Resident: Maude Fox DO; Moi Robin DO; Jacinto Baeza DO    Anesthesia: General    Estimated Blood Loss (mL): Minimal    Complications: None    Specimens:   * No specimens in log *    Implants:  Implant Name Type Inv.  Item Serial No.  Lot No. LRB No. Used Action   SCREW BNE L16MM DIA2.7MM NAIDA S STL ST FULL THRD FOR SM  SCREW BNE L16MM DIA2.7MM NAIDA S STL ST FULL THRD FOR SM  [employer]  Left 1 Implanted   3.5MM CORTEX SCREW SELF-TAPPING 18MM  3.5MM CORTEX SCREW SELF-TAPPING 18MM  [employer]WD  Left 1 Implanted   SCREW BNE L20MM DIA3.5MM NAIDA S STL ST NONCANNULATED DAVID  SCREW BNE L20MM DIA3.5MM NAIDA S STL ST NONCANNULATED DAVID  [employer] [place]  Left 1 Implanted   PLATE BNE [id number] 8 H S STL 1/3 TBLR DAVID COMPR W/ CLLR FOR  PLATE BNE [id number] 8 H S STL 1/3 TBLR DAVID COMPR W/ CLLR FOR  DE[employer]WD  Left 1 Implanted   SCREW BNE L14MM DIA3.5MM NAIDA S STL ST NONCANNULATED DAVID  SCREW BNE L14MM DIA3.5MM NAIDA S STL ST NONCANNULATED DAVID  DE[employer]WD  Left 4 Implanted   SCREW BNE L16MM DIA3.5MM NAIDA S STL ST DAVID FULL THRD  SCREW BNE L16MM DIA3.5MM NAIDA S STL ST DAVID FULL THRD  DE[employer]WD  Left 1 Implanted   WASHER ORTH DIA7MM FOR REMA SCR  WASHER ORTH DIA7MM FOR REMA SCR  DEPU[employer] [place]-  Left 1 Implanted   SCREW BNE L16MM DIA4MM CANC S STL ST REMA NONLOCKING FULL  SCREW BNE L16MM DIA4MM CANC S STL ST REMA NONLOCKING FULL  DEPU[employer]  Left 1 Implanted   SCREW BNE L46MM DIA4MM S JOE HODGSON SHT 1/3 THRD SM HEX SOCK  SCREW BNE L46MM DIA4MM S STL REMA SHT 1/3 THRD SM HEX SOCK  [employer]WD  Left 1 Implanted         Drains:   Colostomy LLQ (Active)   Stomal Appliance 1 piece 04/29/21 1030   Stoma  Assessment Red 04/30/21 2000   Mucocutaneous Junction Intact 04/28/21 0626   Peristomal Assessment Intact; Clean 04/30/21 0800   Stool Appearance Loose 04/29/21 2000   Stool Color Brown 04/30/21 2000   Stool Amount Small 04/29/21 2000   Output (mL) 20 ml 04/28/21 4912       Findings: Syndesmosis was stable on testing after fixation of the lateral and medial malleoli    Detailed Description of Procedure:   Patient was brought to the operating room in a supine position on a hospital bed. Patient was transferred to the operating room table by multiple individuals in a safe fashion with anesthesia in control of the patient's C-spine and airway. Once on the operating table, all points of pressure were identified and well-padded. A tourniquet was applied to the patient's left upper thigh. The patient's left lower extremity was sterilely prepped and draped in the standard orthopedic fashion. A timeout was performed indicating the appropriate identification of the patient, the procedure to be performed, and the side to be performed upon. This was agreed upon by all individuals in the room. A subcutaneous approach was marked out the left fibula and a medial malleolus approach were marked out. An Esmarch was applied. Tourniquet was elevated to 275 mmHg. The fibular incision was made with a 10 blade scalpel. Careful dissection was carried to identify and protect the superficial peroneal nerve. Subperiosteal dissection was carried out the fracture site. Patient did have poor bone quality. The fracture site was cleaned out irrigated and clamped. A 2.7 mm lag screw was placed from anterior to posterior. Once this is in position an 8 hole one third tubular plate was contoured and a bicortical screw was placed in the shaft.   Fluoroscopy was used to confirm the positioning the plate. One locking screw and cancellous screw were placed distally as well as multiple bicortical screws proximally. Attention was turned the medial malleolus with incision was created with a 10 blade. Careful dissection was carried out to identify and protect the saphenous vein. The fracture site was then cleared out and irrigated. Is then clamped with a pointed reduction clamp utilizing dental pick for fine tuning of the reduction. A guidewire for a 4.0 mm cannulated screw was put in position. The near cortex was drilled and a partially threaded 46 mm 4.0 mm cannulated screw with washer was put in position. This was checked under fluoroscopy and did compress the fracture nicely. Is at this point time that we obtained a mortise view which was intact and then stressed the ankle with an external rotation stress. The mortise was stable. Final fluoroscopy showed hardware in good position with ankle anatomically reduced. The wounds were ema irrigated sterile normal saline. The subcutaneous tissue was closed with 2-0 Monocryl and skin with 3-0 nylon. Patient then had bacitracin and Xeroform placed over incisions as well as a well-padded 3 sided splint put in position. She was reversed of anesthesia without complication taken to the PACU in stable condition. Postoperative plan:  1. Strict nonweightbearing left lower extremity    2. DVT prophylaxis in the form of aspirin as an outpatient orally    3. Follow my office in 2 weeks for x-rays of left ankle and skin check with most likely suture removal and transition into a boot.       Electronically signed by Krupa Narayan MD on 5/1/2021 at 8:15 AM

## 2021-05-01 NOTE — PROGRESS NOTES
HCT 38.6 40.2   MCV 93.7 95.7   RDW 14.2 14.5    177     CHEMISTRIES:  Recent Labs     04/29/21  0457 04/30/21  0528 04/30/21  1046    133  --    K 4.8 5.8* 4.7   CL 94* 98  --    CO2 30* 26  --    BUN 25* 21  --    CREATININE 0.9 0.8  --    GLUCOSE 150* 146*  --      PT/INR:  No results for input(s): PROTIME, INR in the last 72 hours. APTT:  No results for input(s): APTT in the last 72 hours. LIVER PROFILE:  No results for input(s): AST, ALT, BILIDIR, BILITOT, ALKPHOS in the last 72 hours. Imaging Last 24 Hours:  Xr Knee Left (min 4 Views)    Result Date: 4/26/2021  EXAMINATION: THREE XRAY VIEWS OF THE LEFT ANKLE;   XRAY VIEWS OF THE LEFT TIBIA AND FIBULA; FOUR XRAY VIEWS OF THE LEFT KNEE; THREE XRAY VIEWS OF THE RIGHT ANKLE 4/26/2021 5:24 pm COMPARISON: None. HISTORY: ORDERING SYSTEM PROVIDED HISTORY: fall swelling, tenderness TECHNOLOGIST PROVIDED HISTORY: Reason for exam:->fall swelling, tenderness What reading provider will be dictating this exam?->CRC FINDINGS: Fractures of the medial malleolus and distal fibula with lateral displacement of the distal fragments. Disruption of the left ankle mortise with lateral displacement of the talus in relationship to the tibia. No talar dome fracture. Soft tissue swelling predominantly laterally. No additional more proximal fractures of the tibia or fibula. No acute fracture or dislocation of the left knee. Alignment is anatomic. 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 Small joint effusion. No acute fracture or dislocation of the right ankle. Alignment is anatomic. Tiny well corticated bony fragment adjacent to the medial malleolus which may represent sequela from old avulsion injury. Soft tissue swelling overlying the lateral malleolus. Tibiotalar joint effusion. Calcification along the course of the plantar aponeurosis which may represent sequela from plantar fasciitis.      Fractures of the left ankle involving the medial malleolus and distal fibula with disruption of the ankle mortise. Soft tissue swelling predominantly laterally. Mild degenerative changes of the left knee with chondrocalcinosis along the medial tibiofemoral joint space and small joint effusion. No acute fracture or dislocation of the right ankle. Tiny well corticated bony fragment adjacent to the right medial malleolus which may represent sequela from old avulsion injury. Soft tissue swelling overlying the lateral malleolus of the right ankle. Right tibiotalar joint effusion. Other chronic or incidental findings as noted. Xr Tibia Fibula Left (2 Views)    Result Date: 4/26/2021  EXAMINATION: THREE XRAY VIEWS OF THE LEFT ANKLE;   XRAY VIEWS OF THE LEFT TIBIA AND FIBULA; FOUR XRAY VIEWS OF THE LEFT KNEE; THREE XRAY VIEWS OF THE RIGHT ANKLE 4/26/2021 5:24 pm COMPARISON: None. HISTORY: ORDERING SYSTEM PROVIDED HISTORY: fall swelling, tenderness TECHNOLOGIST PROVIDED HISTORY: Reason for exam:->fall swelling, tenderness What reading provider will be dictating this exam?->CRC FINDINGS: Fractures of the medial malleolus and distal fibula with lateral displacement of the distal fragments. Disruption of the left ankle mortise with lateral displacement of the talus in relationship to the tibia. No talar dome fracture. Soft tissue swelling predominantly laterally. No additional more proximal fractures of the tibia or fibula. No acute fracture or dislocation of the left knee. Alignment is anatomic. 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 Small joint effusion. No acute fracture or dislocation of the right ankle. Alignment is anatomic. Tiny well corticated bony fragment adjacent to the medial malleolus which may represent sequela from old avulsion injury. Soft tissue swelling overlying the lateral malleolus. Tibiotalar joint effusion. Calcification along the course of the plantar aponeurosis which may represent sequela from plantar fasciitis. Fractures of the left ankle involving the medial malleolus and distal fibula with disruption of the ankle mortise. Soft tissue swelling predominantly laterally. Mild degenerative changes of the left knee with chondrocalcinosis along the medial tibiofemoral joint space and small joint effusion. No acute fracture or dislocation of the right ankle. Tiny well corticated bony fragment adjacent to the right medial malleolus which may represent sequela from old avulsion injury. Soft tissue swelling overlying the lateral malleolus of the right ankle. Right tibiotalar joint effusion. Other chronic or incidental findings as noted. Xr Ankle Left (min 3 Views)    Result Date: 4/26/2021  EXAMINATION: THREE XRAY VIEWS OF THE LEFT ANKLE;   XRAY VIEWS OF THE LEFT TIBIA AND FIBULA; FOUR XRAY VIEWS OF THE LEFT KNEE; THREE XRAY VIEWS OF THE RIGHT ANKLE 4/26/2021 5:24 pm COMPARISON: None. HISTORY: ORDERING SYSTEM PROVIDED HISTORY: fall swelling, tenderness TECHNOLOGIST PROVIDED HISTORY: Reason for exam:->fall swelling, tenderness What reading provider will be dictating this exam?->CRC FINDINGS: Fractures of the medial malleolus and distal fibula with lateral displacement of the distal fragments. Disruption of the left ankle mortise with lateral displacement of the talus in relationship to the tibia. No talar dome fracture. Soft tissue swelling predominantly laterally. No additional more proximal fractures of the tibia or fibula. No acute fracture or dislocation of the left knee. Alignment is anatomic. 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 Small joint effusion. No acute fracture or dislocation of the right ankle. Alignment is anatomic. Tiny well corticated bony fragment adjacent to the medial malleolus which may represent sequela from old avulsion injury. Soft tissue swelling overlying the lateral malleolus. Tibiotalar joint effusion.  Calcification along the course of the plantar aponeurosis which may represent sequela from plantar fasciitis. Fractures of the left ankle involving the medial malleolus and distal fibula with disruption of the ankle mortise. Soft tissue swelling predominantly laterally. Mild degenerative changes of the left knee with chondrocalcinosis along the medial tibiofemoral joint space and small joint effusion. No acute fracture or dislocation of the right ankle. Tiny well corticated bony fragment adjacent to the right medial malleolus which may represent sequela from old avulsion injury. Soft tissue swelling overlying the lateral malleolus of the right ankle. Right tibiotalar joint effusion. Other chronic or incidental findings as noted. Xr Ankle Right (min 3 Views)    Result Date: 4/26/2021  EXAMINATION: THREE XRAY VIEWS OF THE LEFT ANKLE;   XRAY VIEWS OF THE LEFT TIBIA AND FIBULA; FOUR XRAY VIEWS OF THE LEFT KNEE; THREE XRAY VIEWS OF THE RIGHT ANKLE 4/26/2021 5:24 pm COMPARISON: None. HISTORY: ORDERING SYSTEM PROVIDED HISTORY: fall swelling, tenderness TECHNOLOGIST PROVIDED HISTORY: Reason for exam:->fall swelling, tenderness What reading provider will be dictating this exam?->CRC FINDINGS: Fractures of the medial malleolus and distal fibula with lateral displacement of the distal fragments. Disruption of the left ankle mortise with lateral displacement of the talus in relationship to the tibia. No talar dome fracture. Soft tissue swelling predominantly laterally. No additional more proximal fractures of the tibia or fibula. No acute fracture or dislocation of the left knee. Alignment is anatomic. 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 Small joint effusion. No acute fracture or dislocation of the right ankle. Alignment is anatomic. Tiny well corticated bony fragment adjacent to the medial malleolus which may represent sequela from old avulsion injury. Soft tissue swelling overlying the lateral malleolus. Tibiotalar joint effusion.  Calcification along the course of the plantar aponeurosis which may represent sequela from plantar fasciitis. Fractures of the left ankle involving the medial malleolus and distal fibula with disruption of the ankle mortise. Soft tissue swelling predominantly laterally. Mild degenerative changes of the left knee with chondrocalcinosis along the medial tibiofemoral joint space and small joint effusion. No acute fracture or dislocation of the right ankle. Tiny well corticated bony fragment adjacent to the right medial malleolus which may represent sequela from old avulsion injury. Soft tissue swelling overlying the lateral malleolus of the right ankle. Right tibiotalar joint effusion. Other chronic or incidental findings as noted. Ct Head Wo Contrast    Result Date: 4/26/2021  EXAMINATION: CT OF THE CERVICAL SPINE WITHOUT CONTRAST; CT OF THE HEAD WITHOUT CONTRAST 4/26/2021 7:02 pm TECHNIQUE: CT of the cervical spine was performed without the administration of intravenous contrast. Multiplanar reformatted images are provided for review. Dose modulation, iterative reconstruction, and/or weight based adjustment of the mA/kV was utilized to reduce the radiation dose to as low as reasonably achievable.; CT of the head was performed without the administration of intravenous contrast. Dose modulation, iterative reconstruction, and/or weight based adjustment of the mA/kV was utilized to reduce the radiation dose to as low as reasonably achievable. COMPARISON: None. HISTORY: ORDERING SYSTEM PROVIDED HISTORY: fall TECHNOLOGIST PROVIDED HISTORY: Reason for exam:->fall Decision Support Exception->Emergency Medical Condition (MA) What reading provider will be dictating this exam?->CRC FINDINGS: BONES/ALIGNMENT: There is no acute fracture or traumatic malalignment. DEGENERATIVE CHANGES: No significant degenerative changes. SOFT TISSUES: There is no prevertebral soft tissue swelling. No acute abnormality of the cervical spine.   There is

## 2021-05-02 LAB
ANION GAP SERPL CALCULATED.3IONS-SCNC: 10 MMOL/L (ref 7–16)
BUN BLDV-MCNC: 27 MG/DL (ref 6–23)
CALCIUM SERPL-MCNC: 10.1 MG/DL (ref 8.6–10.2)
CHLORIDE BLD-SCNC: 100 MMOL/L (ref 98–107)
CO2: 30 MMOL/L (ref 22–29)
CREAT SERPL-MCNC: 0.8 MG/DL (ref 0.5–1)
GFR AFRICAN AMERICAN: >60
GFR NON-AFRICAN AMERICAN: >60 ML/MIN/1.73
GLUCOSE BLD-MCNC: 211 MG/DL (ref 74–99)
HCT VFR BLD CALC: 38.3 % (ref 34–48)
HEMOGLOBIN: 11.8 G/DL (ref 11.5–15.5)
MCH RBC QN AUTO: 29.4 PG (ref 26–35)
MCHC RBC AUTO-ENTMCNC: 30.8 % (ref 32–34.5)
MCV RBC AUTO: 95.3 FL (ref 80–99.9)
METER GLUCOSE: 199 MG/DL (ref 74–99)
METER GLUCOSE: 278 MG/DL (ref 74–99)
METER GLUCOSE: 304 MG/DL (ref 74–99)
METER GLUCOSE: 337 MG/DL (ref 74–99)
PDW BLD-RTO: 14.1 FL (ref 11.5–15)
PLATELET # BLD: 217 E9/L (ref 130–450)
PMV BLD AUTO: 10.4 FL (ref 7–12)
POTASSIUM SERPL-SCNC: 5.4 MMOL/L (ref 3.5–5)
RBC # BLD: 4.02 E12/L (ref 3.5–5.5)
SODIUM BLD-SCNC: 140 MMOL/L (ref 132–146)
WBC # BLD: 13.2 E9/L (ref 4.5–11.5)

## 2021-05-02 PROCEDURE — 6370000000 HC RX 637 (ALT 250 FOR IP): Performed by: STUDENT IN AN ORGANIZED HEALTH CARE EDUCATION/TRAINING PROGRAM

## 2021-05-02 PROCEDURE — 36415 COLL VENOUS BLD VENIPUNCTURE: CPT

## 2021-05-02 PROCEDURE — 82962 GLUCOSE BLOOD TEST: CPT

## 2021-05-02 PROCEDURE — 97164 PT RE-EVAL EST PLAN CARE: CPT

## 2021-05-02 PROCEDURE — 80048 BASIC METABOLIC PNL TOTAL CA: CPT

## 2021-05-02 PROCEDURE — 6360000002 HC RX W HCPCS: Performed by: STUDENT IN AN ORGANIZED HEALTH CARE EDUCATION/TRAINING PROGRAM

## 2021-05-02 PROCEDURE — 1200000000 HC SEMI PRIVATE

## 2021-05-02 PROCEDURE — 85027 COMPLETE CBC AUTOMATED: CPT

## 2021-05-02 PROCEDURE — 2500000003 HC RX 250 WO HCPCS: Performed by: STUDENT IN AN ORGANIZED HEALTH CARE EDUCATION/TRAINING PROGRAM

## 2021-05-02 PROCEDURE — 97168 OT RE-EVAL EST PLAN CARE: CPT

## 2021-05-02 PROCEDURE — 97535 SELF CARE MNGMENT TRAINING: CPT

## 2021-05-02 PROCEDURE — 97530 THERAPEUTIC ACTIVITIES: CPT

## 2021-05-02 RX ADMIN — INSULIN LISPRO 1 UNITS: 100 INJECTION, SOLUTION INTRAVENOUS; SUBCUTANEOUS at 08:34

## 2021-05-02 RX ADMIN — GLIPIZIDE 5 MG: 5 TABLET ORAL at 06:13

## 2021-05-02 RX ADMIN — PREGABALIN 50 MG: 50 CAPSULE ORAL at 20:41

## 2021-05-02 RX ADMIN — ROSUVASTATIN 20 MG: 20 TABLET, FILM COATED ORAL at 08:33

## 2021-05-02 RX ADMIN — INSULIN LISPRO 4 UNITS: 100 INJECTION, SOLUTION INTRAVENOUS; SUBCUTANEOUS at 11:12

## 2021-05-02 RX ADMIN — PREGABALIN 50 MG: 50 CAPSULE ORAL at 13:22

## 2021-05-02 RX ADMIN — PAROXETINE 40 MG: 20 TABLET, FILM COATED ORAL at 08:34

## 2021-05-02 RX ADMIN — MORPHINE SULFATE 4 MG: 4 INJECTION, SOLUTION INTRAMUSCULAR; INTRAVENOUS at 22:17

## 2021-05-02 RX ADMIN — HYDROCODONE BITARTRATE AND ACETAMINOPHEN 1 TABLET: 10; 325 TABLET ORAL at 11:46

## 2021-05-02 RX ADMIN — ENALAPRIL MALEATE 20 MG: 5 TABLET ORAL at 08:33

## 2021-05-02 RX ADMIN — PREGABALIN 50 MG: 50 CAPSULE ORAL at 08:33

## 2021-05-02 RX ADMIN — ASPIRIN 81 MG: 81 TABLET, COATED ORAL at 08:34

## 2021-05-02 RX ADMIN — HYDROCODONE BITARTRATE AND ACETAMINOPHEN 1 TABLET: 10; 325 TABLET ORAL at 06:14

## 2021-05-02 RX ADMIN — CEFAZOLIN 2000 MG: 10 INJECTION, POWDER, FOR SOLUTION INTRAVENOUS at 00:09

## 2021-05-02 RX ADMIN — OXYCODONE AND ACETAMINOPHEN 1 TABLET: 5; 325 TABLET ORAL at 04:29

## 2021-05-02 RX ADMIN — INSULIN LISPRO 3 UNITS: 100 INJECTION, SOLUTION INTRAVENOUS; SUBCUTANEOUS at 16:39

## 2021-05-02 RX ADMIN — HYDROCODONE BITARTRATE AND ACETAMINOPHEN 1 TABLET: 10; 325 TABLET ORAL at 20:45

## 2021-05-02 RX ADMIN — PANTOPRAZOLE SODIUM 40 MG: 40 TABLET, DELAYED RELEASE ORAL at 06:13

## 2021-05-02 RX ADMIN — ASPIRIN 81 MG: 81 TABLET, COATED ORAL at 20:41

## 2021-05-02 RX ADMIN — INSULIN LISPRO 2 UNITS: 100 INJECTION, SOLUTION INTRAVENOUS; SUBCUTANEOUS at 20:42

## 2021-05-02 ASSESSMENT — PAIN SCALES - GENERAL
PAINLEVEL_OUTOF10: 4
PAINLEVEL_OUTOF10: 7
PAINLEVEL_OUTOF10: 0
PAINLEVEL_OUTOF10: 7
PAINLEVEL_OUTOF10: 6
PAINLEVEL_OUTOF10: 0

## 2021-05-02 ASSESSMENT — PAIN DESCRIPTION - ONSET
ONSET: ON-GOING

## 2021-05-02 ASSESSMENT — PAIN DESCRIPTION - LOCATION
LOCATION: LEG
LOCATION: LEG

## 2021-05-02 ASSESSMENT — PAIN DESCRIPTION - PAIN TYPE
TYPE: SURGICAL PAIN
TYPE: ACUTE PAIN;SURGICAL PAIN

## 2021-05-02 ASSESSMENT — PAIN DESCRIPTION - FREQUENCY
FREQUENCY: CONTINUOUS

## 2021-05-02 ASSESSMENT — PAIN DESCRIPTION - DESCRIPTORS
DESCRIPTORS: ACHING;DISCOMFORT;DULL
DESCRIPTORS: ACHING;CONSTANT;DISCOMFORT
DESCRIPTORS: ACHING;DULL;DISCOMFORT
DESCRIPTORS: ACHING;DISCOMFORT

## 2021-05-02 ASSESSMENT — ENCOUNTER SYMPTOMS
ABDOMINAL PAIN: 0
SHORTNESS OF BREATH: 0

## 2021-05-02 ASSESSMENT — PAIN DESCRIPTION - ORIENTATION: ORIENTATION: LEFT

## 2021-05-02 NOTE — PROGRESS NOTES
Progress Note  Date:2021       WVZT:8121/0756-F  Patient Omaira Sage     YOB: 1940     Age:81 y.o. Patient says she still has moderate left ankle pain. Subjective    Subjective:  Symptoms:  Stable. No shortness of breath or chest pain. Diet:  Adequate intake. Activity level: Impaired due to pain. Pain:  She complains of pain that is mild. Review of Systems   Constitutional: Positive for activity change. Negative for fever. HENT: Negative for congestion. Respiratory: Negative for shortness of breath. Cardiovascular: Negative for chest pain. Gastrointestinal: Negative for abdominal pain. Genitourinary: Negative for difficulty urinating. Musculoskeletal: Positive for gait problem and joint swelling. Neurological: Negative for dizziness. Objective         Vitals Last 24 Hours:  TEMPERATURE:  Temp  Av.1 °F (36.7 °C)  Min: 96.9 °F (36.1 °C)  Max: 98.8 °F (37.1 °C)  RESPIRATIONS RANGE: Resp  Av.9  Min: 0  Max: 20  PULSE OXIMETRY RANGE: SpO2  Av.1 %  Min: 88 %  Max: 100 %  PULSE RANGE: Pulse  Av.8  Min: 63  Max: 104  BLOOD PRESSURE RANGE: Systolic (98CVL), OZK:718 , Min:78 , RWO:341   ; Diastolic (14ZES), ZFP:85, Min:49, Max:101    I/O (24Hr): Intake/Output Summary (Last 24 hours) at 2021 0714  Last data filed at 2021 0645  Gross per 24 hour   Intake 1340 ml   Output 20 ml   Net 1320 ml     Objective:  General Appearance:  Comfortable. Vital signs: (most recent): Blood pressure (!) 155/70, pulse 100, temperature 96.9 °F (36.1 °C), temperature source Temporal, resp. rate 16, height 5' 5\" (1.651 m), weight 188 lb (85.3 kg), SpO2 97 %. No fever. Lungs:  Normal effort and normal respiratory rate. Breath sounds clear to auscultation. Heart: Normal rate. Regular rhythm.   S1 normal and S2 normal.      Labs/Imaging/Diagnostics    Labs:  CBC:  Recent Labs     21  0528 21  0552   WBC 10.5 11.1   RBC 4.20 4.08 Fractures of the left ankle involving the medial malleolus and distal fibula with disruption of the ankle mortise. Soft tissue swelling predominantly laterally. Mild degenerative changes of the left knee with chondrocalcinosis along the medial tibiofemoral joint space and small joint effusion. No acute fracture or dislocation of the right ankle. Tiny well corticated bony fragment adjacent to the right medial malleolus which may represent sequela from old avulsion injury. Soft tissue swelling overlying the lateral malleolus of the right ankle. Right tibiotalar joint effusion. Other chronic or incidental findings as noted. Xr Ankle Left (min 3 Views)    Result Date: 4/26/2021  EXAMINATION: THREE XRAY VIEWS OF THE LEFT ANKLE;   XRAY VIEWS OF THE LEFT TIBIA AND FIBULA; FOUR XRAY VIEWS OF THE LEFT KNEE; THREE XRAY VIEWS OF THE RIGHT ANKLE 4/26/2021 5:24 pm COMPARISON: None. HISTORY: ORDERING SYSTEM PROVIDED HISTORY: fall swelling, tenderness TECHNOLOGIST PROVIDED HISTORY: Reason for exam:->fall swelling, tenderness What reading provider will be dictating this exam?->CRC FINDINGS: Fractures of the medial malleolus and distal fibula with lateral displacement of the distal fragments. Disruption of the left ankle mortise with lateral displacement of the talus in relationship to the tibia. No talar dome fracture. Soft tissue swelling predominantly laterally. No additional more proximal fractures of the tibia or fibula. No acute fracture or dislocation of the left knee. Alignment is anatomic. 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 Small joint effusion. No acute fracture or dislocation of the right ankle. Alignment is anatomic. Tiny well corticated bony fragment adjacent to the medial malleolus which may represent sequela from old avulsion injury. Soft tissue swelling overlying the lateral malleolus. Tibiotalar joint effusion.  Calcification along the course of the plantar aponeurosis which may represent sequela from plantar fasciitis. Fractures of the left ankle involving the medial malleolus and distal fibula with disruption of the ankle mortise. Soft tissue swelling predominantly laterally. Mild degenerative changes of the left knee with chondrocalcinosis along the medial tibiofemoral joint space and small joint effusion. No acute fracture or dislocation of the right ankle. Tiny well corticated bony fragment adjacent to the right medial malleolus which may represent sequela from old avulsion injury. Soft tissue swelling overlying the lateral malleolus of the right ankle. Right tibiotalar joint effusion. Other chronic or incidental findings as noted. Xr Ankle Right (min 3 Views)    Result Date: 4/26/2021  EXAMINATION: THREE XRAY VIEWS OF THE LEFT ANKLE;   XRAY VIEWS OF THE LEFT TIBIA AND FIBULA; FOUR XRAY VIEWS OF THE LEFT KNEE; THREE XRAY VIEWS OF THE RIGHT ANKLE 4/26/2021 5:24 pm COMPARISON: None. HISTORY: ORDERING SYSTEM PROVIDED HISTORY: fall swelling, tenderness TECHNOLOGIST PROVIDED HISTORY: Reason for exam:->fall swelling, tenderness What reading provider will be dictating this exam?->CRC FINDINGS: Fractures of the medial malleolus and distal fibula with lateral displacement of the distal fragments. Disruption of the left ankle mortise with lateral displacement of the talus in relationship to the tibia. No talar dome fracture. Soft tissue swelling predominantly laterally. No additional more proximal fractures of the tibia or fibula. No acute fracture or dislocation of the left knee. Alignment is anatomic. 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 Small joint effusion. No acute fracture or dislocation of the right ankle. Alignment is anatomic. Tiny well corticated bony fragment adjacent to the medial malleolus which may represent sequela from old avulsion injury. Soft tissue swelling overlying the lateral malleolus. Tibiotalar joint effusion.  Calcification along the course of the plantar aponeurosis which may represent sequela from plantar fasciitis. Fractures of the left ankle involving the medial malleolus and distal fibula with disruption of the ankle mortise. Soft tissue swelling predominantly laterally. Mild degenerative changes of the left knee with chondrocalcinosis along the medial tibiofemoral joint space and small joint effusion. No acute fracture or dislocation of the right ankle. Tiny well corticated bony fragment adjacent to the right medial malleolus which may represent sequela from old avulsion injury. Soft tissue swelling overlying the lateral malleolus of the right ankle. Right tibiotalar joint effusion. Other chronic or incidental findings as noted. Ct Head Wo Contrast    Result Date: 4/26/2021  EXAMINATION: CT OF THE CERVICAL SPINE WITHOUT CONTRAST; CT OF THE HEAD WITHOUT CONTRAST 4/26/2021 7:02 pm TECHNIQUE: CT of the cervical spine was performed without the administration of intravenous contrast. Multiplanar reformatted images are provided for review. Dose modulation, iterative reconstruction, and/or weight based adjustment of the mA/kV was utilized to reduce the radiation dose to as low as reasonably achievable.; CT of the head was performed without the administration of intravenous contrast. Dose modulation, iterative reconstruction, and/or weight based adjustment of the mA/kV was utilized to reduce the radiation dose to as low as reasonably achievable. COMPARISON: None. HISTORY: ORDERING SYSTEM PROVIDED HISTORY: fall TECHNOLOGIST PROVIDED HISTORY: Reason for exam:->fall Decision Support Exception->Emergency Medical Condition (MA) What reading provider will be dictating this exam?->CRC FINDINGS: BONES/ALIGNMENT: There is no acute fracture or traumatic malalignment. DEGENERATIVE CHANGES: No significant degenerative changes. SOFT TISSUES: There is no prevertebral soft tissue swelling. No acute abnormality of the cervical spine.   There is age-appropriate atrophy and small-vessel ischemic disease. CT CERVICAL SPINE. There is no acute displaced fracture in the cervical spine. The prevertebral soft tissues are normal.  Degenerative changes are identified from C2-T1 with osteophytes and multilevel disc bulges. Impression No acute displaced fracture in the cervical spine. Diffuse degenerative changes with multilevel disc bulges from C2-T1. Ct Cervical Spine Wo Contrast    Result Date: 4/26/2021  EXAMINATION: CT OF THE CERVICAL SPINE WITHOUT CONTRAST; CT OF THE HEAD WITHOUT CONTRAST 4/26/2021 7:02 pm TECHNIQUE: CT of the cervical spine was performed without the administration of intravenous contrast. Multiplanar reformatted images are provided for review. Dose modulation, iterative reconstruction, and/or weight based adjustment of the mA/kV was utilized to reduce the radiation dose to as low as reasonably achievable.; CT of the head was performed without the administration of intravenous contrast. Dose modulation, iterative reconstruction, and/or weight based adjustment of the mA/kV was utilized to reduce the radiation dose to as low as reasonably achievable. COMPARISON: None. HISTORY: ORDERING SYSTEM PROVIDED HISTORY: fall TECHNOLOGIST PROVIDED HISTORY: Reason for exam:->fall Decision Support Exception->Emergency Medical Condition (MA) What reading provider will be dictating this exam?->CRC FINDINGS: BONES/ALIGNMENT: There is no acute fracture or traumatic malalignment. DEGENERATIVE CHANGES: No significant degenerative changes. SOFT TISSUES: There is no prevertebral soft tissue swelling. No acute abnormality of the cervical spine. There is age-appropriate atrophy and small-vessel ischemic disease. CT CERVICAL SPINE. There is no acute displaced fracture in the cervical spine. The prevertebral soft tissues are normal.  Degenerative changes are identified from C2-T1 with osteophytes and multilevel disc bulges.  Impression No acute displaced fracture in the cervical spine. Diffuse degenerative changes with multilevel disc bulges from C2-T1. Xr Chest Portable    Result Date: 4/26/2021  EXAMINATION: ONE XRAY VIEW OF THE CHEST 4/26/2021 5:24 pm COMPARISON: July 20, 2020 HISTORY: ORDERING SYSTEM PROVIDED HISTORY: lightheaded TECHNOLOGIST PROVIDED HISTORY: Reason for exam:->lightheaded What reading provider will be dictating this exam?->CRC FINDINGS: Cardiac silhouette is mildly enlarged. Decreased inspiration. No evidence of airspace consolidation or pleural effusions. The pulmonary vasculature is within normal limits. Cardiomegaly with no evidence of failure or acute infiltrates. Decreased inspiration. Assessment//Plan           Hospital Problems           Last Modified POA    Ankle fracture, bimalleolar, closed, left, initial encounter 4/26/2021 Yes    Closed fracture of left ankle 4/29/2021 Yes        Assessment:  (Ankle fracture, bimalleolar, closed, left, initial encounter 4/26/2021 Yes     falls  DM  HTN  Hyperlipidemia  ). Plan:   (Stable after surgery. Monitor exam.  Pain control. Continue meds. Rehab planning. ).

## 2021-05-02 NOTE — PROGRESS NOTES
Occupational Therapy  OCCUPATIONAL THERAPY RE- EVALUATION      Date:2021  Patient Name: Malini Strickland  MRN: [id number]  : 1940  Room: [address] [place], [place] [address]    Evaluating OT: Ernestina Upton OTR/L   Referring provider: Jose Miguel Dorsey MD    AM-PAC Daily Activity Raw Score:   Recommended Adaptive Equipment:continue to assess; pt may need tub bench, bedside commode, AD     Diagnosis:  L ankle fracture bimalleolar  Surgery:  L ORIF closed bimalleolar ankle fracture    Pertinent Medical History: DM, arthritis, HLD, HTN, colostomy bag    Precautions: Falls, NWB LLE, bed alarm, O2, TSM     Home Living: Pt ? Historian,  lives with dtr and   in a 1 story house with 5 step(s) to enter and 1 rail(s); bed/bath on 1st floor  Bathroom setup: Walk-in shower   Equipment owned: shower chair, w/c, Rollator, SPC, electric cart    Prior Level of Function:  Ind. with ADLs , family assist with IADLs; ambulated rollator in home, electric cart in [employer] store  Driving: family drives  Occupation: retired [employer]    Pain Level: No c/o pain this session   Cognition:A&O: x3 but noted periods of confusion & decreased attn.  To task; Follows 1 step directions              Memory:  Fair-              Sequencing:  Poor+/fair-              Problem solving:  Poor+/fair-              Judgement/safety:  Fair-     Functional Assessment:   RE Eval Status  Date:  Treatment Status  Date: Short Term Goals  Treatment frequency: 1-3x/week on PRN    Feeding IND     Grooming/Hygiene Mod I seated   Mod I standing      UB Dressing Min A   SBA     LB Dressing Mod A   SBA    Bathing Mod A     Toileting Mod A   SBA   Bed Mobility  Supine to sit: Choco with HOB elevated  Sit to supine: NT     Functional Transfers Sit to stand: ModA  Stand to sit: ModA   Stand pivot: ModA x 2 with WW-assist needed to elevate LE  SBA with AD   Functional Mobility Mod Ax2 2 hops at bedside with [facility]   SBA     Balance Dynamic Sitting EOB:  Choco  Dynamic Standing:  ModA x 2 with [employer]     Endurance/Activity Tolerance F   F+     Visual/  Perceptual Glasses: yes; veronika WFL        UE strengthening    See UE Assessment Below  G tolerance  For BUE AROM/AAROM in all planes to improve overall function for ADL tasks      UE Assessment  Hand dominance: R     Strength ROM  Comments   RUE  Proximal: 4-/5  Distal: 4/5 Proximal:WFL  Distal: WFL G  and G FMC/dexterity noted during ADL tasks   LUE Proximal: 4-/5  Distal: 4/5 Proximal: WFL  Distal: WFL G  and G FMC/dexterity noted during ADL tasks     Hearing: WFL  Sensation: Mild In L foot   Tone: grossly WNL  Edeme: mild in LLE                              Comments:  Upon arrival pt supine in bed. After Bed mobility, dressing task attempt, functional transfers and mobility as above,  pt left in chair for self feeding task and hygiene tasks, pt left with all devices within reach, all lines and tubes intact. Overall, patient demonstrates modreate difficulties with completion of BADLs and IADLs. Factors contributing to these difficulties include precautions, ?cognition, decreased endurance, and generalized weakness. Based on patient's functional performance as stated above and level of assistance needed prior to admission, this therapist believes that the patient would benefit from Continued therapy for ADL retraining, strengthening, building endurance/activity tolerance and overall functioning in order to safely return home. Treatment:     ? ADL completion: Self-care retraining for the above-mentioned ADLs; training on proper hand placement, safety technique, sequencing, and energy conservation techniques during ADLs and while completing functional transfers. ? Cognitive/Perceptual training: retraining exercises to improve attention, mentation, and spatial awareness for ADLs & transfers.   ? Skilled positioning: Pt properly positioned using pillows and bed mechanics to improve interaction with environment, overall functioning and decrease/prevent edema and contractures. Assessment of current deficits:   Functional mobility [x]  ADLs [x] Strength [x]  Cognition []  Functional transfers  [x] IADLs [x] Safety Awareness [x]  Endurance [x]  Fine Motor Coordination [] Balance [x] Vision/perception [] Sensation []   Gross Motor Coordination [] ROM [] Delirium []                  Motor Control []     Plan of Care: 1-3 days/week for 1-2 weeks PRN   [x]ADL retraining/adapted techniques and AE recommendations to increase functional independence within precautions                    [x]Energy conservation techniques to improve tolerance for selfcare routine   [x]Functional transfer/mobility training/DME recommendations for increased independence, safety and fall prevention         [x]Patient/family education to increase safety and functional independence             [x]Environmental modifications for safe mobility and completion of ADLs                             [x]Cognitive retraining ex's to improve problem solving skills & safe participation in ADLs/IADLs     []Sensory re-education techniques to improve extremity awareness, maintain skin integrity and improve hand function                             []Visual/Perceptual retraining  to improve body awareness and safety during transfers and ADLs  []Splinting/positioning needs to maintain joint/skin integrity and prevent contractures  [x]Therapeutic activity to improve functional performance during ADLs. [x]Therapeutic exercise to improve tolerance and functional strength for ADLs   [x]Balance retraining/tolerance tasks for facilitation of postural control with dynamic challenges during ADLs .   []Neuromuscular re-education: facilitation of righting/equilibrium reactions, midline orientation, scapular stability/mobility, Normalization muscle     tone and facilitation active functional movement/Attention                         [x]Delirium prevention/treatment    []Positioning to improve functional independence  []Other:       Rehab Potential:  Good for established goals     Patient / Family Goal: No goals specifically stated at this time; no family present. Patient and/or family were instructed on functional diagnosis, prognosis/goals and OT plan of care. Demonstrated F understanding. Time In:0755  Time Out: 0815  Total Treatment Time:10    Min Units   OT Eval Low 36751       OT Eval Medium 57833      OT Eval High 99747       OT Re-Eval [id number]  10     Therapeutic Ex 17394       Therapeutic Activities 42181       ADL/Self Care 75259  10     Orthotic Management 67424       Neuro Re-Ed 68120       Non-Billable Time          Evaluation Time includes thorough review of current medical information, gathering information on past medical history/social history and prior level of function, completion of standardized testing/informal observation of tasks, assessment of data and education on plan of care and goals.       Gabbie Howell, OTR/L   2922

## 2021-05-02 NOTE — PROGRESS NOTES
Department of Orthopedic Surgery  Resident Progress Note    POD 1. Patient seen and examined. Pain controlled. No new complaints. Denies chest pain, shortness of breath, dizziness/lightheadedness.      VITALS:  /60   Pulse 62   Temp 97 °F (36.1 °C) (Temporal)   Resp 17   Ht 5' 5\" (1.651 m)   Wt 188 lb (85.3 kg)   SpO2 92%   BMI 31.28 kg/m²     General: in no acute distress and alert    MUSCULOSKELETAL:   left lower extremity:  · Dressing C/D/I  · Compartments soft and compressible  · +PF/DF/EHL  · +2/4 DP & PT pulses, Brisk Cap refill, Toes warm and perfused  · Distal sensation grossly intact to Peroneals, Sural, Saphenous, and tibial nrs    CBC:   Lab Results   Component Value Date    WBC 13.2 05/02/2021    HGB 11.8 05/02/2021    HCT 38.3 05/02/2021     05/02/2021     PT/INR:    Lab Results   Component Value Date    PROTIME 10.5 04/26/2021    INR 1.0 04/26/2021       ASSESSMENT  · S/P left ankle ORIF-5/1/2021    PLAN      · Continue physical therapy and protocol: NWB -left LE  · 24 hour abx coverage-completed  · Deep venous thrombosis prophylaxis -ASA 81, early mobilization  · PT/OT  · Pain Control: IV and PO  · Monitor H&H-11.8  · Discharge planning: Home versus acute rehab, okay to discharge from orthopedic standpoint would appreciate therapy and social work/case management input    Electronically signed by Lakesha Gonzáles DO on 5/2/2021 at 12:00 PM

## 2021-05-02 NOTE — PROGRESS NOTES
Physical Therapy  Physical Therapy Re-Assessment    Name: Yary Myers  : 1940  MRN: [id number]    Referring Provider:  Jorge Luis Curran MD    Date of Service: 2021    Evaluating PT:  Elizabeth Yepez, PT, DPT [id number]    Room #:  1602/1495-O  Diagnosis:  L ankle fracture bimalleolar  Precautions: Falls, NWB LLE, bed alarm, O2, TSM  Procedure/Surgery:   L ORIF closed bimalleolar ankle fracture  PMHx/PSHx:  DM, arthritis, HLD, HTN, colostomy bag  Equipment Needs:  TBD    SUBJECTIVE:  Pt is a questionnable historian. Pt reported living with daughter and  in a 1 story home with 5 stairs to enter and 1 rail. Pt ambulated with [employer] PTA. OBJECTIVE:   Re-Evaluation  Date: 2021 Treatment Short Term/ Long Term   Goals   AM-PAC 6 Clicks      Was pt agreeable to Eval/treatment? Yes     Does pt have pain? No reported pain     Bed Mobility  Rolling: NT  Supine to sit: Choco with HOB elevated  Sit to supine: NT  Scooting: Choco  Mod. Independent   Transfers Sit to stand: ModA  Stand to sit: ModA   Stand pivot: ModA x 2 with [employer]  SBA with AAD   Ambulation   NT  >15 feet with SBA with AAD   Stair negotiation: ascended and descended NT  >2 steps with 2 rails ModA   ROM BUE:  Defer to OT note  BLE:  WFL     Strength BUE:  Defer to OT note  RLE: 4/5  LLE: at least 2/5 knee extension, hip flexion  Increase by 1/3 MMT grade   Balance Dynamic Sitting EOB:  Choco  Dynamic Standing:  ModA x 2 with [employer]  Dynamic Sitting EOB:  Mod.  Independent  Dynamic Standing:  SBA with [employer]     Pt is A & O x 3-4 (self, place, year, stating \"end of April\" as month)  Sensation:  No reported paresthesias   Edema:  None noted    Therapeutic Exercises:  NA    Patient education  Pt educated on safety    Patient response to education:   Pt verbalized understanding Pt demonstrated skill Pt requires further education in this area   partial partial yes     ASSESSMENT:    Comments:  Pt was in bed upon arrival, agreeable to Complexity PT evaluation [id number]  []? Moderate Complexity PT evaluation 63632  []? High Complexity PT evaluation [id number]  [x]? PT Re-evaluation [id number]  []? Gait training 64985 - minutes  []? Manual therapy 00416 - minutes  [x]? Therapeutic activities 87499 15 minutes  []? Therapeutic exercises 12716 - minutes  []?  Neuromuscular reeducation 61369 - minutes      [address] [facility], [facility]     Iram Langley, [address], [place]  [id number]

## 2021-05-03 VITALS
WEIGHT: 188 LBS | RESPIRATION RATE: 18 BRPM | BODY MASS INDEX: 31.32 KG/M2 | TEMPERATURE: 97.2 F | OXYGEN SATURATION: 95 % | HEART RATE: 81 BPM | DIASTOLIC BLOOD PRESSURE: 62 MMHG | SYSTOLIC BLOOD PRESSURE: 130 MMHG | HEIGHT: 65 IN

## 2021-05-03 LAB
ANION GAP SERPL CALCULATED.3IONS-SCNC: 8 MMOL/L (ref 7–16)
BUN BLDV-MCNC: 37 MG/DL (ref 6–23)
CALCIUM SERPL-MCNC: 9.5 MG/DL (ref 8.6–10.2)
CHLORIDE BLD-SCNC: 101 MMOL/L (ref 98–107)
CO2: 29 MMOL/L (ref 22–29)
CREAT SERPL-MCNC: 1 MG/DL (ref 0.5–1)
GFR AFRICAN AMERICAN: >60
GFR NON-AFRICAN AMERICAN: 53 ML/MIN/1.73
GLUCOSE BLD-MCNC: 199 MG/DL (ref 74–99)
HCT VFR BLD CALC: 38.9 % (ref 34–48)
HEMOGLOBIN: 12.1 G/DL (ref 11.5–15.5)
MCH RBC QN AUTO: 29.5 PG (ref 26–35)
MCHC RBC AUTO-ENTMCNC: 31.1 % (ref 32–34.5)
MCV RBC AUTO: 94.9 FL (ref 80–99.9)
METER GLUCOSE: 187 MG/DL (ref 74–99)
METER GLUCOSE: 238 MG/DL (ref 74–99)
METER GLUCOSE: 297 MG/DL (ref 74–99)
PDW BLD-RTO: 14 FL (ref 11.5–15)
PLATELET # BLD: 234 E9/L (ref 130–450)
PMV BLD AUTO: 10.2 FL (ref 7–12)
POTASSIUM SERPL-SCNC: 5.1 MMOL/L (ref 3.5–5)
RBC # BLD: 4.1 E12/L (ref 3.5–5.5)
SODIUM BLD-SCNC: 138 MMOL/L (ref 132–146)
WBC # BLD: 13.4 E9/L (ref 4.5–11.5)

## 2021-05-03 PROCEDURE — 85027 COMPLETE CBC AUTOMATED: CPT

## 2021-05-03 PROCEDURE — 97530 THERAPEUTIC ACTIVITIES: CPT

## 2021-05-03 PROCEDURE — 36415 COLL VENOUS BLD VENIPUNCTURE: CPT

## 2021-05-03 PROCEDURE — 97535 SELF CARE MNGMENT TRAINING: CPT

## 2021-05-03 PROCEDURE — 80048 BASIC METABOLIC PNL TOTAL CA: CPT

## 2021-05-03 PROCEDURE — 6370000000 HC RX 637 (ALT 250 FOR IP): Performed by: STUDENT IN AN ORGANIZED HEALTH CARE EDUCATION/TRAINING PROGRAM

## 2021-05-03 PROCEDURE — 82962 GLUCOSE BLOOD TEST: CPT

## 2021-05-03 RX ADMIN — ROSUVASTATIN 20 MG: 20 TABLET, FILM COATED ORAL at 09:16

## 2021-05-03 RX ADMIN — OXYCODONE AND ACETAMINOPHEN 1 TABLET: 5; 325 TABLET ORAL at 09:17

## 2021-05-03 RX ADMIN — INSULIN LISPRO 2 UNITS: 100 INJECTION, SOLUTION INTRAVENOUS; SUBCUTANEOUS at 12:16

## 2021-05-03 RX ADMIN — OXYCODONE AND ACETAMINOPHEN 1 TABLET: 5; 325 TABLET ORAL at 16:28

## 2021-05-03 RX ADMIN — ENALAPRIL MALEATE 20 MG: 5 TABLET ORAL at 09:12

## 2021-05-03 RX ADMIN — ASPIRIN 81 MG: 81 TABLET, COATED ORAL at 09:12

## 2021-05-03 RX ADMIN — PREGABALIN 50 MG: 50 CAPSULE ORAL at 14:48

## 2021-05-03 RX ADMIN — PREGABALIN 50 MG: 50 CAPSULE ORAL at 09:12

## 2021-05-03 RX ADMIN — PANTOPRAZOLE SODIUM 40 MG: 40 TABLET, DELAYED RELEASE ORAL at 07:19

## 2021-05-03 RX ADMIN — OXYCODONE AND ACETAMINOPHEN 1 TABLET: 5; 325 TABLET ORAL at 01:37

## 2021-05-03 RX ADMIN — PAROXETINE 40 MG: 20 TABLET, FILM COATED ORAL at 09:12

## 2021-05-03 RX ADMIN — INSULIN LISPRO 1 UNITS: 100 INJECTION, SOLUTION INTRAVENOUS; SUBCUTANEOUS at 09:12

## 2021-05-03 RX ADMIN — GLIPIZIDE 5 MG: 5 TABLET ORAL at 07:19

## 2021-05-03 RX ADMIN — INSULIN LISPRO 3 UNITS: 100 INJECTION, SOLUTION INTRAVENOUS; SUBCUTANEOUS at 16:33

## 2021-05-03 ASSESSMENT — PAIN DESCRIPTION - ONSET
ONSET: GRADUAL
ONSET: ON-GOING

## 2021-05-03 ASSESSMENT — PAIN DESCRIPTION - FREQUENCY: FREQUENCY: CONTINUOUS

## 2021-05-03 ASSESSMENT — PAIN SCALES - GENERAL
PAINLEVEL_OUTOF10: 3
PAINLEVEL_OUTOF10: 9
PAINLEVEL_OUTOF10: 7
PAINLEVEL_OUTOF10: 0

## 2021-05-03 ASSESSMENT — ENCOUNTER SYMPTOMS
SHORTNESS OF BREATH: 0
ABDOMINAL PAIN: 0

## 2021-05-03 ASSESSMENT — PAIN - FUNCTIONAL ASSESSMENT: PAIN_FUNCTIONAL_ASSESSMENT: PREVENTS OR INTERFERES WITH ALL ACTIVE AND SOME PASSIVE ACTIVITIES

## 2021-05-03 ASSESSMENT — PAIN DESCRIPTION - DESCRIPTORS: DESCRIPTORS: CONSTANT;DISCOMFORT;ACHING

## 2021-05-03 ASSESSMENT — PAIN DESCRIPTION - PROGRESSION: CLINICAL_PROGRESSION: GRADUALLY WORSENING

## 2021-05-03 ASSESSMENT — PAIN DESCRIPTION - ORIENTATION: ORIENTATION: LEFT

## 2021-05-03 NOTE — DISCHARGE INSTR - COC
Continuity of Care Form    Patient Name: Colleen Pacheco   :  1940  MRN:  [id number]    Admit date:  2021  Discharge date:  ***    Code Status Order: Full Code   Advance Directives:   Advance Care Flowsheet Documentation       Date/Time Healthcare Directive Type of Healthcare Directive Copy in [address] Agent's Name Healthcare Agent's Phone Number    21 1624  Yes, patient has an advance directive for healthcare treatment  Living will  --  --  --  --            Admitting Physician:  Christine Lee MD  PCP: Junaid Daniels MD    Discharging Nurse: [facility] Unit/Room#: 4677/4940-B  Discharging Unit Phone Number: ***    Emergency Contact:   Extended Emergency Contact Information  Primary Emergency Contact: Alice Brunson  Address: [address] Phone: [phone]  Relation: Spouse  Secondary Emergency Contact: Farrukh Card  Address: [address] Phone: [phone]  Relation: Child    Past Surgical History:  Past Surgical History:   Procedure Laterality Date    ABDOMEN SURGERY      DIVERTICULITIS    ANKLE FRACTURE SURGERY Left 2021    LEFT ANKLE OPEN REDUCTION INTERNAL FIXATION--SYNTHES performed by Bertram Fry MD at [address] [place] [address]    Thad Laboy De Patricia      done and reversed    THYROID SURGERY      partial thyroidectomy       Immunization History:   Immunization History   Administered Date(s) Administered    Tdap (Boostrix, Adacel) 2016       Active Problems:  Patient Active Problem List   Diagnosis Code    Pelvic abscess in female N73.9    Ankle fracture, bimalleolar, closed, left, initial encounter S82.842A    Closed fracture of left ankle S82.892A       Isolation/Infection: Isolation            No Isolation          Patient Infection Status       None to display            Nurse Assessment:  Last Vital Signs: BP (!) 100/59   Pulse 102   Temp 97.1 °F (36.2 °C) (Temporal)   Resp 16   Ht 5' 5\" (1.651 m)   Wt 188 lb (85.3 kg)   SpO2 92%   BMI 31.28 kg/m²     Last documented pain score (0-10 scale): Pain Level: 0  Last Weight:   Wt Readings from Last 1 Encounters:   21 188 lb (85.3 kg)     Mental Status:  oriented and alert CONFUSED AT TIMES    IV Access:  - None    Nursing Mobility/ADLs:  Walking   {[facility] DME ZFJA:537290852}  Transfer  {[facility] DME FTPM:319737022}  Bathing  {[facility] DME YDOH:833507968}  Dressing  {[facility] DME FFVH:050811419}  Toileting  {[facility] DME MBN}  Feeding  {[facility] BXCZ:439991960}  Med Admin  {[facility] UVOY:478899619}  Med Delivery   { [facility] MED Delivery:757829815}    Wound Care Documentation and Therapy:  Wound 16 Laceration Finger (Comment which one) Left 1.5 cm (Active)   Number of days: 1590        Elimination:  Continence:   · Bowel: {YES / DV:63098}  · Bladder: {YES / GS:32214}  Urinary Catheter: {Urinary Catheter:345071982}   Colostomy/Ileostomy/Ileal Conduit: {YES / L}  Colostomy LLQ-Stomal Appliance: 1 piece  Colostomy LLQ-Stoma  Assessment: Red  Colostomy LLQ-Mucocutaneous Junction: Intact  Colostomy LLQ-Peristomal Assessment: Intact, Clean  Colostomy LLQ-Stool Appearance: Loose  Colostomy LLQ-Stool Color: Brown  Colostomy LLQ-Stool Amount: Small  Colostomy LLQ-Output (mL): 0 ml    Date of Last BM: ***    Intake/Output Summary (Last 24 hours) at 5/3/2021 0757  Last data filed at 5/3/2021 0600  Gross per 24 hour   Intake 1020 ml   Output --   Net 1020 ml     I/O last 3 completed shifts: In: 1020 [P.O.:1020]  Out: -     Safety Concerns: At Risk for Falls    Impairments/Disabilities:      None    Nutrition Therapy:  Current Nutrition Therapy:   [address] Diet List:799591419}    Routes of Feeding: Oral  Liquids:  Thin Liquids  Daily Fluid Restriction: no  Last Modified Barium Swallow with Video (Video Swallowing Test): not done    Treatments at the Time of Hospital Discharge:   Respiratory Treatments: ***  Oxygen Therapy:  is not on home oxygen therapy. Ventilator:    - No ventilator support    Rehab Therapies: Physical Therapy and Occupational Therapy  Weight Bearing Status/Restrictions: Non-weight bearing on left leg  Other Medical Equipment (for information only, NOT a DME order):  walker  Other Treatments: ***    Patient's personal belongings (please select all that are sent with patient):  {[facility] DME Belongings:513328720}    RN SIGNATURE:  {Esignature:744791514}    CASE MANAGEMENT/SOCIAL WORK SECTION    Inpatient Status Date: ***    Readmission Risk Assessment Score:  Readmission Risk              Risk of Unplanned Readmission:        11           Discharging to Facility/ Agency   · Name: [facility]  · Address:  · Phone:  · Fax:    Dialysis Facility (if applicable)   · Name:  · Address:  · Dialysis Schedule:  · Phone:  · Fax:    / signature: Charle Fothergill Mosconi RN     PHYSICIAN SECTION    Prognosis: {Prognosis:3778691222}    Condition at Discharge: [facility] Patient Condition:113533291}    Rehab Potential (if transferring to Rehab): {Prognosis:3257224730}    Recommended Labs or Other Treatments After Discharge: ***    Physician Certification: I certify the above information and transfer of Ousmane Ruiz  is necessary for the continuing treatment of the diagnosis listed and that she requires Yehuda Clevelanduel for less 30 days.      Update Admission H&P: {P DME Changes in CIPHL:218487942}    PHYSICIAN SIGNATURE:  Snow Dietrich MD

## 2021-05-03 NOTE — CARE COORDINATION
Transportation has been arranged for 5:00PM with [address] will cover expenses. Daughter, Vasiliy Camarillo, nursing and facility notified.  Jocelyne Harding [phone]

## 2021-05-03 NOTE — DISCHARGE SUMMARY
Discharge Summary    Date: 5/3/2021  Patient Name: Toby Vázquez YOB: 1940 Age: 78 y.o. Admit Date: 4/26/2021  Discharge Date: 5/3/2021  Discharge Condition: Stable    Admission Diagnosis  Ankle fracture, bimalleolar, closed, left, initial encounter (W96.942L)     Discharge Diagnosis  Active Problems: Ankle fracture, bimalleolar, closed, left, initial encounter Closed fracture of left ankleResolved Problems: * No resolved hospital problems. [facility] Stay  Narrative of Hospital Course:  Patient admitted for fall and left ankle fracture. After no improvement with conservative therapy, she had surgery. Agreed to rehab. Consultants:  IP CONSULT TO ORTHOPEDIC SURGERYIP CONSULT TO INTERNAL MEDICINEIP CONSULT TO CASE MANAGEMENT    Surgeries/procedures Performed:       Treatments:           Discharge Plan/Disposition:  To [facility]/Incidental Findings Requiring Follow Up:    Patient Instructions:    Diet: Diabetic Diet    Activity:Activity as Tolerated  For number of days (if applicable): Other Instructions:    Provider Follow-Up:   No follow-ups on file. Significant Diagnostic Studies:    Recent Labs:  Admission on 04/26/2021No results displayed because visit has over 200 results. ------------    Radiology last 7 days:  Xr Knee Left (min 4 Views)Result Date: 4/26/2021Fractures of the left ankle involving the medial malleolus and distal fibula with disruption of the ankle mortise. Soft tissue swelling predominantly laterally. Mild degenerative changes of the left knee with chondrocalcinosis along the medial tibiofemoral joint space and small joint effusion. No acute fracture or dislocation of the right ankle. Tiny well corticated bony fragment adjacent to the right medial malleolus which may represent sequela from old avulsion injury. Soft tissue swelling overlying the lateral malleolus of the right ankle. Right tibiotalar joint effusion.  Other chronic or incidental findings as noted. Xr Tibia Fibula Left (2 Views)Result Date: 4/26/2021Fractures of the left ankle involving the medial malleolus and distal fibula with disruption of the ankle mortise. Soft tissue swelling predominantly laterally. Mild degenerative changes of the left knee with chondrocalcinosis along the medial tibiofemoral joint space and small joint effusion. No acute fracture or dislocation of the right ankle. Tiny well corticated bony fragment adjacent to the right medial malleolus which may represent sequela from old avulsion injury. Soft tissue swelling overlying the lateral malleolus of the right ankle. Right tibiotalar joint effusion. Other chronic or incidental findings as noted. Xr Ankle Left (min 3 Views)Result Date: 5/1/2021Status post ORIF of bimalleolar fractures Xr Ankle Left (min 3 Views)Result Date: 4/29/2021Left lower extremity splint place. Interval reduction fracture/dislocation at tibiotalar joint with improved alignment. Xr Ankle Left (min 3 Views)Result Date: 4/26/2021Fractures of the left ankle involving the medial malleolus and distal fibula with disruption of the ankle mortise. Soft tissue swelling predominantly laterally. Mild degenerative changes of the left knee with chondrocalcinosis along the medial tibiofemoral joint space and small joint effusion. No acute fracture or dislocation of the right ankle. Tiny well corticated bony fragment adjacent to the right medial malleolus which may represent sequela from old avulsion injury. Soft tissue swelling overlying the lateral malleolus of the right ankle. Right tibiotalar joint effusion. Other chronic or incidental findings as noted. Xr Ankle Right (min 3 Views)Result Date: 4/26/2021Fractures of the left ankle involving the medial malleolus and distal fibula with disruption of the ankle mortise. Soft tissue swelling predominantly laterally.  Mild degenerative changes of the left knee with chondrocalcinosis along the medial tibiofemoral joint space and small joint effusion. No acute fracture or dislocation of the right ankle. Tiny well corticated bony fragment adjacent to the right medial malleolus which may represent sequela from old avulsion injury. Soft tissue swelling overlying the lateral malleolus of the right ankle. Right tibiotalar joint effusion. Other chronic or incidental findings as noted. Ct Head Wo ContrastResult Date: 4/26/2021No acute abnormality of the cervical spine. There is age-appropriate atrophy and small-vessel ischemic disease. CT CERVICAL SPINE. There is no acute displaced fracture in the cervical spine. The prevertebral soft tissues are normal.  Degenerative changes are identified from C2-T1 with osteophytes and multilevel disc bulges. Impression No acute displaced fracture in the cervical spine. Diffuse degenerative changes with multilevel disc bulges from C2-T1. Ct Cervical Spine Wo ContrastResult Date: 4/26/2021No acute abnormality of the cervical spine. There is age-appropriate atrophy and small-vessel ischemic disease. CT CERVICAL SPINE. There is no acute displaced fracture in the cervical spine. The prevertebral soft tissues are normal.  Degenerative changes are identified from C2-T1 with osteophytes and multilevel disc bulges. Impression No acute displaced fracture in the cervical spine. Diffuse degenerative changes with multilevel disc bulges from C2-T1. Xr Chest PortableResult Date: 4/26/2021Cardiomegaly with no evidence of failure or acute infiltrates. Decreased inspiration. Fluoro For Surgical ProceduresResult Date: 5/1/2021Intraprocedural fluoroscopic spot images as above. See separate procedure report for more information. [unfilled]    Discharge Medications    Current Discharge Medication ListSTART taking these medicationsHYDROcodone-acetaminophen (NORCO) 5-325 MG per tabletTake 1 tablet by mouth every 6 hours as needed for Pain for up to 7 days. Intended supply: 7 days.  Take lowest dose possible to manage painQty: 28 tablet Refills: 0Comments: Reduce doses taken as pain becomes manageableAssociated Diagnoses:Closed fracture of left ankle, initial encounteraspirin EC 81 MG EC tabletTake 1 tablet by mouth 2 times daily for 28 daysQty: 56 tablet Refills: 0    Current Discharge Medication List    Current Discharge Medication ListCONTINUE these medications which have NOT CHANGEDpregabalin (LYRICA) 50 MG capsuleTake 50 mg by mouth 3 times daily. acetaminophen (TYLENOL) 325 MG tabletTake 650 mg by mouth every 6 hours as needed for Painrosuvastatin (CRESTOR) 20 MG tabletTake 20 mg by mouth dailyLiraglutide (VICTOZA) 18 MG/3ML SOPN SC injectionInject 1.8 mg into the skin daily PARoxetine (PAXIL) 40 MG tabletTake 40 mg by mouth daily glyBURIDE (DIABETA) 5 MG tabletTake 5 mg by mouth daily enalapril (VASOTEC) 20 MG tabletTake 20 mg by mouth daily lansoprazole (PREVACID) 30 MG capsuleTake 30 mg by mouth daily. metformin (GLUCOPHAGE) 500 MG tabletTake 1,000 mg by mouth 2 times daily (with meals). Current Discharge Medication ListSTOP taking these medicationsaspirin 81 MG tabletComments:Reason for Stopping:pravastatin (PRAVACHOL) 40 MG tabletComments:Reason for Stopping:    Time Spent on Discharge:1E] minutes were spent in patient examination, evaluation, counseling as well as medication reconciliation, prescriptions for required medications, discharge plan, and follow up.     Electronically signed by Ema Quispe MD on 5/3/21 at 11:40 AM EDT

## 2021-05-03 NOTE — PROGRESS NOTES
Occupational Therapy  OT BEDSIDE TREATMENT NOTE      Date:5/3/2021  Patient Name: German Rosenberg  MRN: [id number]  : 1940  Room: [address] [place] [address]-A     Evaluating OT: Ely Graff OTR/L   Referring provider: Jim Saini MD     AM-PAC Daily Activity Raw Score:   Recommended Adaptive Equipment: tub bench, bedside commode, LB AE      Diagnosis:  L ankle fracture bimalleolar  Surgery:  L ORIF closed bimalleolar ankle fracture    Pertinent Medical History: DM, arthritis, HLD, HTN, colostomy bag     Precautions: Falls, NWB LLE, bed alarm, O2, TSM     Home Living: Pt ? Historian,  lives with dtr and   in a 1 story house with 5 step(s) to enter and 1 rail(s); bed/bath on 1st floor  Bathroom setup: Walk-in shower   Equipment owned: shower chair, w/c, Rollator, [facility], electric cart     Prior Level of Function:  Ind. with ADLs , family assist with IADLs; ambulated rollator in home, electric cart in grocery store  Driving: family drives  Occupation: retired [employer]     Pain Level: pt reports left ankle pain   Cognition:A&O: x3 but noted periods of confusion & decreased attn.  To task; Follows 1 step directions              Memory:  Fair-              Sequencing:  Poor+              Problem solving:  Poor+              Judgement/safety:  Fair-                Functional Assessment:    RE Eval Status  Date:  Treatment Status  Date: 5/3 Short Term Goals  Treatment frequency: 1-3x/week on PRN    Feeding IND Ind      Grooming/Hygiene Mod I seated Set up  To comb hair and wash face seated    Mod I standing       UB Dressing Min A Min A  To don/doff gown seated in bedside chair   SBA      LB Dressing Mod A Mod A  To don underwear seated in bedside chair and standing to pull over hips   SBA    Bathing Mod A  Mod A  Seated in bedside chair     Toileting Mod A Mod A  per last tx   SBA   Bed Mobility  Supine to sit: Choco with HOB elevated  Sit to supine: NT Min A- supine>sit  Educated pt on technique to increase independence.        Functional Transfers Sit to stand: ModA  Stand to sit: ModA   Stand pivot: ModA x 2 with WW-assist needed to elevate LE  Mod A- sit<->stand  Cuing for hand placement  Mod A x 2- stand pivot transfer  Using w/w, poor ability to follow NWB SBA with AD   Functional Mobility Mod Ax2 2 hops at bedside with [facility] N/T   SBA     Balance Dynamic Sitting EOB:  Choco  Dynamic Standing:  ModA x 2 with [facility]  Dynamic Sitting EOB:  SBA  Dynamic Standing:  Mod A  with [facility]     Endurance/Activity Tolerance F  Fair  F+     Visual/  Perceptual Glasses: yes; gorssly WFL          UE strengthening     See UE Assessment Below   G tolerance  For BUE AROM/AAROM in all planes to improve overall function for ADL tasks        Comments: Upon arrival pt supine in bed. Pt educated on techniques to increase independence and safety during ADL's, bed mobility, and functional transfers. At end of session pt left seated in bedside chair, call light within reach. · Pt has made fair progress towards set goals.      · Continue with current plan of care    Treatment Time In: 8:40            Treatment Time Out: 9:05             Treatment Charges: Mins Units   Ther Ex  [id number]     Manual Therapy [phone]     Thera Activities [id number] 10 1   ADL/Home Mgt 07367 14 1   Neuro Re-ed 85332     Group Therapy      Orthotic manage/training  32692     Non-Billable Time     Total Timed Treatment 24 2       Shasha Rascon

## 2021-05-03 NOTE — PLAN OF CARE
Problem: Pain:  Goal: Pain level will decrease  Description: Pain level will decrease  Outcome: Completed  Goal: Control of acute pain  Description: Control of acute pain  Outcome: Completed  Goal: Control of chronic pain  Description: Control of chronic pain  Outcome: Completed     Problem: Skin Integrity:  Goal: Will show no infection signs and symptoms  Description: Will show no infection signs and symptoms  Outcome: Completed  Goal: Absence of new skin breakdown  Description: Absence of new skin breakdown  Outcome: Completed     Problem: Falls - Risk of:  Goal: Will remain free from falls  Description: Will remain free from falls  Outcome: Completed  Goal: Absence of physical injury  Description: Absence of physical injury  Outcome: Completed     Problem: Musculor/Skeletal Functional Status  Goal: Highest potential functional level  Outcome: Completed  Goal: Absence of falls  Outcome: Completed

## 2021-05-03 NOTE — PROGRESS NOTES
Progress Note  Date:5/3/2021       JANET:[id number]  Patient Dean [facility]     YOB: 1940     Age:81 y.o. Patient says she still has moderate left ankle pain, but improved today. Subjective    Subjective:  Symptoms:  Stable. No shortness of breath or chest pain. Diet:  Adequate intake. Activity level: Impaired due to pain. Pain:  She complains of pain that is mild. Review of Systems   Constitutional: Positive for activity change. Negative for fever. HENT: Negative for congestion. Respiratory: Negative for shortness of breath. Cardiovascular: Negative for chest pain. Gastrointestinal: Negative for abdominal pain. Genitourinary: Negative for difficulty urinating. Musculoskeletal: Positive for gait problem and joint swelling. Neurological: Negative for dizziness. Objective         Vitals Last 24 Hours:  TEMPERATURE:  Temp  Av °F (36.1 °C)  Min: 97 °F (36.1 °C)  Max: 97.1 °F (36.2 °C)  RESPIRATIONS RANGE: Resp  Av.7  Min: 16  Max: 17  PULSE OXIMETRY RANGE: SpO2  Av %  Min: 92 %  Max: 92 %  PULSE RANGE: Pulse  Av.7  Min: 62  Max: 102  BLOOD PRESSURE RANGE: Systolic (97FWN), WES:996 , Min:100 , OWEN:701   ; Diastolic (19GMC), MAGGIE:56, Min:55, Max:60    I/O (24Hr): Intake/Output Summary (Last 24 hours) at 5/3/2021 0609  Last data filed at 5/3/2021 0600  Gross per 24 hour   Intake 1100 ml   Output 0 ml   Net 1100 ml     Objective:  General Appearance:  Comfortable. Vital signs: (most recent): Blood pressure (!) 100/59, pulse 102, temperature 97.1 °F (36.2 °C), temperature source Temporal, resp. rate 16, height 5' 5\" (1.651 m), weight 188 lb (85.3 kg), SpO2 92 %. No fever. Lungs:  Normal effort and normal respiratory rate. Breath sounds clear to auscultation. Heart: Normal rate. Regular rhythm.   S1 normal and S2 normal.      Labs/Imaging/Diagnostics    Labs:  CBC:  Recent Labs     21  0552 21  0707   WBC 11.1 13.2*   RBC 4. 08 4.02   HGB 11.8 11.8   HCT 38.7 38.3   MCV 94.9 95.3   RDW 14.4 14.1    217     CHEMISTRIES:  Recent Labs     04/30/21  1046 05/01/21  0552 05/02/21  0707   NA  --  139 140   K 4.7 4.9 5.4*   CL  --  100 100   CO2  --  27 30*   BUN  --  30* 27*   CREATININE  --  0.9 0.8   GLUCOSE  --  158* 211*     PT/INR:  No results for input(s): PROTIME, INR in the last 72 hours. APTT:  No results for input(s): APTT in the last 72 hours. LIVER PROFILE:  No results for input(s): AST, ALT, BILIDIR, BILITOT, ALKPHOS in the last 72 hours. Imaging Last 24 Hours:  Xr Knee Left (min 4 Views)    Result Date: 4/26/2021  EXAMINATION: THREE XRAY VIEWS OF THE LEFT ANKLE;   XRAY VIEWS OF THE LEFT TIBIA AND FIBULA; FOUR XRAY VIEWS OF THE LEFT KNEE; THREE XRAY VIEWS OF THE RIGHT ANKLE 4/26/2021 5:24 pm COMPARISON: None. HISTORY: ORDERING SYSTEM PROVIDED HISTORY: fall swelling, tenderness TECHNOLOGIST PROVIDED HISTORY: Reason for exam:->fall swelling, tenderness What reading provider will be dictating this exam?->CRC FINDINGS: Fractures of the medial malleolus and distal fibula with lateral displacement of the distal fragments. Disruption of the left ankle mortise with lateral displacement of the talus in relationship to the tibia. No talar dome fracture. Soft tissue swelling predominantly laterally. No additional more proximal fractures of the tibia or fibula. No acute fracture or dislocation of the left knee. Alignment is anatomic. 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 Small joint effusion. No acute fracture or dislocation of the right ankle. Alignment is anatomic. Tiny well corticated bony fragment adjacent to the medial malleolus which may represent sequela from old avulsion injury. Soft tissue swelling overlying the lateral malleolus. Tibiotalar joint effusion. Calcification along the course of the plantar aponeurosis which may represent sequela from plantar fasciitis.      Fractures of the left ankle involving the medial malleolus and distal fibula with disruption of the ankle mortise. Soft tissue swelling predominantly laterally. Mild degenerative changes of the left knee with chondrocalcinosis along the medial tibiofemoral joint space and small joint effusion. No acute fracture or dislocation of the right ankle. Tiny well corticated bony fragment adjacent to the right medial malleolus which may represent sequela from old avulsion injury. Soft tissue swelling overlying the lateral malleolus of the right ankle. Right tibiotalar joint effusion. Other chronic or incidental findings as noted. Xr Tibia Fibula Left (2 Views)    Result Date: 4/26/2021  EXAMINATION: THREE XRAY VIEWS OF THE LEFT ANKLE;   XRAY VIEWS OF THE LEFT TIBIA AND FIBULA; FOUR XRAY VIEWS OF THE LEFT KNEE; THREE XRAY VIEWS OF THE RIGHT ANKLE 4/26/2021 5:24 pm COMPARISON: None. HISTORY: ORDERING SYSTEM PROVIDED HISTORY: fall swelling, tenderness TECHNOLOGIST PROVIDED HISTORY: Reason for exam:->fall swelling, tenderness What reading provider will be dictating this exam?->CRC FINDINGS: Fractures of the medial malleolus and distal fibula with lateral displacement of the distal fragments. Disruption of the left ankle mortise with lateral displacement of the talus in relationship to the tibia. No talar dome fracture. Soft tissue swelling predominantly laterally. No additional more proximal fractures of the tibia or fibula. No acute fracture or dislocation of the left knee. Alignment is anatomic. 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 Small joint effusion. No acute fracture or dislocation of the right ankle. Alignment is anatomic. Tiny well corticated bony fragment adjacent to the medial malleolus which may represent sequela from old avulsion injury. Soft tissue swelling overlying the lateral malleolus. Tibiotalar joint effusion.  Calcification along the course of the plantar aponeurosis which may represent sequela from plantar fasciitis. Fractures of the left ankle involving the medial malleolus and distal fibula with disruption of the ankle mortise. Soft tissue swelling predominantly laterally. Mild degenerative changes of the left knee with chondrocalcinosis along the medial tibiofemoral joint space and small joint effusion. No acute fracture or dislocation of the right ankle. Tiny well corticated bony fragment adjacent to the right medial malleolus which may represent sequela from old avulsion injury. Soft tissue swelling overlying the lateral malleolus of the right ankle. Right tibiotalar joint effusion. Other chronic or incidental findings as noted. Xr Ankle Left (min 3 Views)    Result Date: 4/26/2021  EXAMINATION: THREE XRAY VIEWS OF THE LEFT ANKLE;   XRAY VIEWS OF THE LEFT TIBIA AND FIBULA; FOUR XRAY VIEWS OF THE LEFT KNEE; THREE XRAY VIEWS OF THE RIGHT ANKLE 4/26/2021 5:24 pm COMPARISON: None. HISTORY: ORDERING SYSTEM PROVIDED HISTORY: fall swelling, tenderness TECHNOLOGIST PROVIDED HISTORY: Reason for exam:->fall swelling, tenderness What reading provider will be dictating this exam?->CRC FINDINGS: Fractures of the medial malleolus and distal fibula with lateral displacement of the distal fragments. Disruption of the left ankle mortise with lateral displacement of the talus in relationship to the tibia. No talar dome fracture. Soft tissue swelling predominantly laterally. No additional more proximal fractures of the tibia or fibula. No acute fracture or dislocation of the left knee. Alignment is anatomic. 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 Small joint effusion. No acute fracture or dislocation of the right ankle. Alignment is anatomic. Tiny well corticated bony fragment adjacent to the medial malleolus which may represent sequela from old avulsion injury. Soft tissue swelling overlying the lateral malleolus. Tibiotalar joint effusion.  Calcification along the course of the plantar aponeurosis along the course of the plantar aponeurosis which may represent sequela from plantar fasciitis. Fractures of the left ankle involving the medial malleolus and distal fibula with disruption of the ankle mortise. Soft tissue swelling predominantly laterally. Mild degenerative changes of the left knee with chondrocalcinosis along the medial tibiofemoral joint space and small joint effusion. No acute fracture or dislocation of the right ankle. Tiny well corticated bony fragment adjacent to the right medial malleolus which may represent sequela from old avulsion injury. Soft tissue swelling overlying the lateral malleolus of the right ankle. Right tibiotalar joint effusion. Other chronic or incidental findings as noted. Ct Head Wo Contrast    Result Date: 4/26/2021  EXAMINATION: CT OF THE CERVICAL SPINE WITHOUT CONTRAST; CT OF THE HEAD WITHOUT CONTRAST 4/26/2021 7:02 pm TECHNIQUE: CT of the cervical spine was performed without the administration of intravenous contrast. Multiplanar reformatted images are provided for review. Dose modulation, iterative reconstruction, and/or weight based adjustment of the mA/kV was utilized to reduce the radiation dose to as low as reasonably achievable.; CT of the head was performed without the administration of intravenous contrast. Dose modulation, iterative reconstruction, and/or weight based adjustment of the mA/kV was utilized to reduce the radiation dose to as low as reasonably achievable. COMPARISON: None. HISTORY: ORDERING SYSTEM PROVIDED HISTORY: fall TECHNOLOGIST PROVIDED HISTORY: Reason for exam:->fall Decision Support Exception->Emergency Medical Condition (MA) What reading provider will be dictating this exam?->CRC FINDINGS: BONES/ALIGNMENT: There is no acute fracture or traumatic malalignment. DEGENERATIVE CHANGES: No significant degenerative changes. SOFT TISSUES: There is no prevertebral soft tissue swelling. No acute abnormality of the cervical spine.   There fracture in the cervical spine. Diffuse degenerative changes with multilevel disc bulges from C2-T1. Xr Chest Portable    Result Date: 4/26/2021  EXAMINATION: ONE XRAY VIEW OF THE CHEST 4/26/2021 5:24 pm COMPARISON: July 20, 2020 HISTORY: ORDERING SYSTEM PROVIDED HISTORY: lightheaded TECHNOLOGIST PROVIDED HISTORY: Reason for exam:->lightheaded What reading provider will be dictating this exam?->CRC FINDINGS: Cardiac silhouette is mildly enlarged. Decreased inspiration. No evidence of airspace consolidation or pleural effusions. The pulmonary vasculature is within normal limits. Cardiomegaly with no evidence of failure or acute infiltrates. Decreased inspiration. Assessment//Plan           Hospital Problems           Last Modified POA    Ankle fracture, bimalleolar, closed, left, initial encounter 4/26/2021 Yes    Closed fracture of left ankle 4/29/2021 Yes        Assessment:  (Ankle fracture, bimalleolar, closed, left, initial encounter 4/26/2021 Yes     falls  DM  HTN  Hyperlipidemia  ). Plan:   (Stable after surgery. Monitor exam.  Pain control. Continue meds. Rehab planning. ).

## 2021-05-03 NOTE — PROGRESS NOTES
education  Pt educated on role of PT    Patient response to education:   Pt verbalized understanding Pt demonstrated skill Pt requires further education in this area   x x x     ASSESSMENT:    Comments:  Pt received in supine agreeable to PT evaluation. Pt educated on NWB precaution. Pt requiring increased time for all mobility due to pain and fatigue. Pt required cues and assistance to perform sit to stand transfer. Pt required assist to maintain NWB during transfer. Patient would benefit from continued skilled PT to maximize functional mobility independence. Treatment:  Patient practiced and was instructed in the following treatment:     Bed mobility- verbal cues to facilitate independence   Functional transfers-Verbal cues for proper positioning and sequencing to perform transfers safely with maximum independence. PLAN:      PLAN OF CARE:    Current Treatment Recommendations     [x] Strengthening     [x] ROM   [x] Balance Training   [x] Endurance Training    [x] Transfer Training   [x] Gait Training   [] Stair Training   [x] Positioning   [x] Safety and Education Training   [x] Patient/Caregiver Education   [x] HEP  [] Other       Patient is making good progress towards established goals. Will continue with current POC.       Time in  0830  Time out  0855    Total Treatment Time  25 minutes     CPT codes:  [] Gait training 28889 0 minutes  [] Manual therapy 23480 0 minutes  [x] Therapeutic activities 90201 25 minutes  [] Therapeutic exercises 44543 0 minutes  [] Neuromuscular reeducation 21934 0 minutes    Kristen Mares PT, DPT  [id number]

## 2021-05-03 NOTE — PLAN OF CARE
Problem: Pain:  Goal: Pain level will decrease  Description: Pain level will decrease  Outcome: Met This Shift     Problem: Skin Integrity:  Goal: Will show no infection signs and symptoms  Description: Will show no infection signs and symptoms  Outcome: Met This Shift     Problem: Falls - Risk of:  Goal: Will remain free from falls  Description: Will remain free from falls  Outcome: Met This Shift     Problem: Falls - Risk of:  Goal: Absence of physical injury  Description: Absence of physical injury  Outcome: Met This Shift

## 2021-05-17 ENCOUNTER — HOSPITAL ENCOUNTER (OUTPATIENT)
Dept: GENERAL RADIOLOGY | Age: 81
Discharge: HOME | End: 2021-05-19
Payer: MEDICARE

## 2021-05-17 ENCOUNTER — OFFICE VISIT (OUTPATIENT)
Dept: ORTHOPEDIC SURGERY | Age: 81
End: 2021-05-17
Payer: MEDICARE

## 2021-05-17 VITALS — BODY MASS INDEX: 29.16 KG/M2 | HEIGHT: 65 IN | TEMPERATURE: 97.2 F | WEIGHT: 175 LBS

## 2021-05-17 DIAGNOSIS — S82.892D CLOSED FRACTURE OF LEFT ANKLE WITH ROUTINE HEALING, SUBSEQUENT ENCOUNTER: ICD-10-CM

## 2021-05-17 DIAGNOSIS — S82.842A ANKLE FRACTURE, BIMALLEOLAR, CLOSED, LEFT, INITIAL ENCOUNTER: ICD-10-CM

## 2021-05-17 DIAGNOSIS — S82.842A ANKLE FRACTURE, BIMALLEOLAR, CLOSED, LEFT, INITIAL ENCOUNTER: Primary | ICD-10-CM

## 2021-05-17 PROCEDURE — 99212 OFFICE O/P EST SF 10 MIN: CPT

## 2021-05-17 PROCEDURE — 73610 X-RAY EXAM OF ANKLE: CPT

## 2021-05-17 PROCEDURE — 99024 POSTOP FOLLOW-UP VISIT: CPT | Performed by: PHYSICIAN ASSISTANT

## 2021-05-17 NOTE — PROGRESS NOTES
Patient here for a 2 week postop check L Dean fx ORIF. DOS 05/03/2021. Patient states that the foot just hurts, no other concerns.             Electronically signed by Kisha Sanchez MA on 5/17/2021 at 12:00 PM
Sutures intact both inner and outer lateral aspect of ankle. Outer aspect appears to have healed well. Skin is well approximated and dry. Sutures were removed without complication and steri strips were applied. Inner aspect of ankle appears slightly reddened. Scant amount of yellow crusted drainage apparent medial portion of suture line. Sutures in this area (x4) were left intact as directed. Remainder of sutures were removed without complication and steri strips were applied. Will follow up in one week for removal of remainder of sutures.
purulence or dehiscence. mild edema noted  Compartments supple throughout thigh and leg  Calf supple and not tender  negative Homans  Demonstrates active ankle dorsi and plantar flexion with some limitation due to swelling and immobilization. She wiggles all of her toes without difficulty. States sensation intact to touch in sural, deep peroneal, superficial peroneal, saphenous, posterior tibial  nerve distributions to foot/ankle. Palpable dorsalis pedis and posterior tibialis pulses, cap refill brisk in toes, foot warm/perfused. She presents today in a wheelchair. Temp 97.2 °F (36.2 °C) (Temporal)   Ht 5' 5\" (1.651 m)   Wt 175 lb (79.4 kg)   BMI 29.12 kg/m²     XR:   X-rays taken in the office today 3 views left ankle show the ORIF left bimalleolar ankle fracture with the hardware in stable position and alignment. Mortise is concentric. No evidence of hardware loosening or failure seen. Assessment:  1. Ankle fracture, bimalleolar, closed, left, subsequent encounter. Plan:  Reviewed x-rays with patient today in office     Continue nonweightbearing on left lower extremity in the boot. Okay to remove boot for daily skin checks, washings and gentle range of motion exercises of the ankle and toes. Okay to leave incision open to air if no drainage    Continue physical therapy at the facility. Continue baby aspirin for DVT prophylaxis. Follow-up in 1 week for wound check and likely removal of remaining sutures. Call with any questions or concerns. Electronically signed by ABELARDO Sarah on 5/17/2021 at 12:15 PM  Note: This report was completed using [employer] voiced recognition software. Every effort has been made to ensure accuracy; however, inadvertent computerized transcription errors may be present.

## 2021-05-17 NOTE — PATIENT INSTRUCTIONS
Continue nonweightbearing on left lower extremity in the boot. Okay to remove boot for daily skin checks, washings and gentle range of motion exercises of the ankle and toes. Okay to leave incision open to air if no drainage    Continue baby aspirin for DVT prophylaxis. Follow-up in 1 week for wound check and likely removal of remaining sutures. Call with any questions or concerns.

## 2021-05-24 ENCOUNTER — OFFICE VISIT (OUTPATIENT)
Dept: ORTHOPEDIC SURGERY | Age: 81
End: 2021-05-24
Payer: MEDICARE

## 2021-05-24 VITALS — TEMPERATURE: 98.1 F

## 2021-05-24 DIAGNOSIS — S82.842A ANKLE FRACTURE, BIMALLEOLAR, CLOSED, LEFT, INITIAL ENCOUNTER: Primary | ICD-10-CM

## 2021-05-24 PROCEDURE — 99024 POSTOP FOLLOW-UP VISIT: CPT | Performed by: PHYSICIAN ASSISTANT

## 2021-05-24 PROCEDURE — 99212 OFFICE O/P EST SF 10 MIN: CPT

## 2021-05-24 RX ORDER — PANTOPRAZOLE SODIUM 40 MG/1
40 TABLET, DELAYED RELEASE ORAL DAILY
Status: ON HOLD | COMMUNITY
End: 2022-10-05

## 2021-05-24 RX ORDER — ATORVASTATIN CALCIUM 40 MG/1
40 TABLET, FILM COATED ORAL DAILY
COMMUNITY

## 2021-05-24 RX ORDER — HYDROCODONE BITARTRATE AND ACETAMINOPHEN 5; 325 MG/1; MG/1
1 TABLET ORAL EVERY 6 HOURS PRN
Status: ON HOLD | COMMUNITY
End: 2022-10-05

## 2021-05-24 NOTE — PATIENT INSTRUCTIONS
INSTRUCTIONS FOR FACILITY    NWNORBERTO RUBALCAVA    Maintain boot at all times except during therapy and showering and for daily skin checks    Continue PT following ankle protocol at 3 week mercedes    Remaining sutures removed today and steri strips placed. Steri strips can come off in 7-10 days if they do not fall off sooner    Continue ASA 81mg BID. Pain control per facility physician. Can continue Norco, lyrica, tylenol as well as ice, elevation, compression     Follow up in 3 weeks.

## 2021-05-24 NOTE — PROGRESS NOTES
Chief Complaint   Patient presents with    Wound Check     Suture removal left ankle       OP:SURGEON: Dr. Jaosn Whaley MD  DATE OF PROCEDURE:  5-1-21  PROCEDURE: ORIF left bimalleolar ankle fracture    Subjective:  Jessica Fleming is approximately 3 weeks from above date of surgery. She is nonweightbearing the left lower extremity wearing walking boot at all times except for therapy sessions, skin checks when showering. Denies any drainage, warmth, erythema about her incision lines. Steri-Strips still intact from suture removal last week. Still has some sutures remaining. No new injuries or any other orthopedic complaints. Taking Norco, Lyrica, Tylenol per SNF. Patient does have baseline diabetes and states that she had some increased neuropathy upon suture removal last week. Still taking aspirin 81 mg twice daily for DVT prophylaxis. Participates in physical therapy following ankle protocol. Denies calf pain, CP, SOB, fever, chills. Review of Systems -    General ROS: negative for - chills, fatigue, fever or night sweats  Respiratory ROS: no cough, shortness of breath, or wheezing  Cardiovascular ROS: no chest pain or dyspnea on exertion  Gastrointestinal ROS: no abdominal pain, nausea, vomiting, diarrhea, constipation,or black or bloody stools  Genitourinary: no hematuria, dysuria, or incontinence   Musculoskeletal ROS: negative for -back or neck pain or stiffness, also see HPI  Neurological ROS: no TIA or stroke symptoms       Objective:    General: Alert and oriented X 3, normocephalic atraumatic, external ears and eye normal, sclera clear, no acute distress, respirations easy and unlabored with no audible wheezes, skin warm and dry, speech and dress appropriate for noted age, affect euthymic.     Extremity:  Left Lower Extremity  Skin clean dry and intact, without signs of infection  Incisions well approximated without signs of redness, warmth or drainage- sutures and steri strips intact   Mild edema and ecchymosis noted about the ankle and calf   Moderate TTP about incision lines   Compartments supple throughout thigh and leg  Calf supple and nontender  Demonstrates active knee flexion/extension, ankle plantar/dorsiflexion/great toe extension. States sensation intact to touch in sural/deep peroneal/superficial peroneal/saphenous/posterior tibial nerve distributions to foot/ankle. Palpable dorsalis pedis and posterior tibialis pulses, cap refill brisk in toes, foot warm/perfused. Temp 98.1 °F (36.7 °C) (Oral)         Assessment:   Diagnosis Orders   1. Ankle fracture, bimalleolar, closed, left, initial encounter  XR ANKLE LEFT (MIN 3 VIEWS)       Plan:  Reviewed x-rays with patient today in office     NWB L LE    Maintain boot at all times except during therapy and showering and for daily skin checks    Continue PT following ankle protocol at 3 week mercedes    Remaining sutures removed today and steri strips placed. Steri strips can come off in 7-10 days if they do not fall off sooner    Continue ASA 81mg BID. Pain control per facility physician. Can continue Norco, lyrica, tylenol as well as ice, elevation, compression     Follow up in 3 weeks. Electronically signed by Aram Conde PA-C on 5/24/2021 at 8:42 AM  Note: This report was completed using [employer] voiced recognition software. Every effort has been made to ensure accuracy; however, inadvertent computerized transcription errors may be present.

## 2021-06-14 ENCOUNTER — HOSPITAL ENCOUNTER (OUTPATIENT)
Dept: GENERAL RADIOLOGY | Age: 81
Discharge: HOME | End: 2021-06-16
Payer: MEDICARE

## 2021-06-14 ENCOUNTER — OFFICE VISIT (OUTPATIENT)
Dept: ORTHOPEDIC SURGERY | Age: 81
End: 2021-06-14
Payer: MEDICARE

## 2021-06-14 VITALS — TEMPERATURE: 97.4 F

## 2021-06-14 DIAGNOSIS — S82.842A ANKLE FRACTURE, BIMALLEOLAR, CLOSED, LEFT, INITIAL ENCOUNTER: ICD-10-CM

## 2021-06-14 DIAGNOSIS — S82.842A ANKLE FRACTURE, BIMALLEOLAR, CLOSED, LEFT, INITIAL ENCOUNTER: Primary | ICD-10-CM

## 2021-06-14 PROCEDURE — 73610 X-RAY EXAM OF ANKLE: CPT

## 2021-06-14 PROCEDURE — 99212 OFFICE O/P EST SF 10 MIN: CPT

## 2021-06-14 PROCEDURE — 99024 POSTOP FOLLOW-UP VISIT: CPT | Performed by: PHYSICIAN ASSISTANT

## 2021-06-14 NOTE — PROGRESS NOTES
Chief Complaint   Patient presents with    Wound Check     left ankle lateral incision    Post-Op Check     Left ankle ORIF on 5/1/2021       OP:SURGEON: Dr. Shari Gaming MD  DATE OF Radha Lindy left bimalleolar ankle fracture    Subjective:  Latoya Boogie is approximately 6 weeks the above date of surgery. She is currently residing in a skilled nursing facility. Hopes to go home this week. Active in physical therapy and independent exercises has maintained a boot to left lower extremity without any issues. Steri-Strips still remaining intact patient denies any open skin, has been showering normally without issues. Pain is controlled, but patient still does have mild intermittent pain about her incision line to the left ankle. Denies any warmth, drainage, fever, chills, malaise, chest pain, shortness of breath, calf pain, increased paresthesias. Still taking aspirin 81 mg twice daily for DVT prophylaxis. Review of Systems -    General ROS: negative for - chills, fatigue, fever or night sweats  Respiratory ROS: no cough, shortness of breath, or wheezing  Cardiovascular ROS: no chest pain or dyspnea on exertion  Gastrointestinal ROS: no abdominal pain, nausea, vomiting, diarrhea, constipation,or black or bloody stools  Genitourinary: no hematuria, dysuria, or incontinence   Musculoskeletal ROS: negative for -back or neck pain or stiffness, also see HPI  Neurological ROS: no TIA or stroke symptoms       Objective:    General: Alert and oriented X 3, normocephalic atraumatic, external ears and eye normal, sclera clear, no acute distress, respirations easy and unlabored with no audible wheezes, skin warm and dry, speech and dress appropriate for noted age, affect euthymic.     Extremity:  Left Lower Extremity  Skin clean dry and intact, without signs of infection  Incisions well approximated - small open area of skin without warmth, drainage, or erythema to proximal lateral incision line after removal of steri strips   Mild edema noted about ankle and foot   Compartments supple throughout thigh and leg  Calf supple and nontender  Demonstrates active knee flexion/extension, ankle plantar/dorsiflexion/great toe extension. States sensation intact to touch in sural/deep peroneal/superficial peroneal/saphenous/posterior tibial nerve distributions to foot/ankle. Palpable dorsalis pedis and posterior tibialis pulses, cap refill brisk in toes, foot warm/perfused. Mild TTP about incision lines              Temp 97.4 °F (36.3 °C) (Oral)     XR:   3 views of L ankle demonstrating interval healing appreciated to bimalleolar ankle fracture. Mortise without interval widening. Hardware remains intact without interval displacement, loosening, or failure. No acute fractures or dislocations or any other osseus abnormality identified. Assessment:   Diagnosis Orders   1. Ankle fracture, bimalleolar, closed, left, initial encounter  XR ANKLE LEFT (MIN 3 VIEWS)       Plan:   Reviewed x-rays with patient today in office    Remove steri-strips from surgical incision line. DSD to proximal lateral incision line. Continue monitoring for sxs or signs of infx. No Soaking or submerging incision in water until skin is fully healed.  WB:  Partial weight bearing left lower extremity with boot advancing 25 %/week if tolerable- use assistive devices if needed   Walking Boot: On at all times, can remove for hygiene and therapy   Therapy: Continue PT following ankle protocol at 6 wks   DVT: Continue with ASA 81 mg BID as ordered until WB then can dc    Tight control of blood glucose and adequate Ca and Vit D and protein intake     Follow up in 6 wks with repeat xr     Electronically signed by Aram Conde PA-C on 6/14/2021 at 10:02 AM  Note: This report was completed using [employer] voiced recognition software.   Every effort has been made to ensure accuracy; however, inadvertent computerized transcription errors may be present.

## 2021-06-14 NOTE — PATIENT INSTRUCTIONS
INSTRUCTIONS FOR FACILITY    Start progressive weightbearing on the on left lower extremity in Boot. Start with 25% of body weight for 7-10 days and if tolerating well, no significant increased pain or swelling ok to progress to 50% of body weight in Boot for 7-10 days. Then to 75% of body weight for 7-10 days, then to 100% on on left lower extremity in the Boot. Once full weight bearing in the Boot for 1-2 weeks and tolerating well OK to start weaning out of Boot. Continue aspirin twice daily for DVT prophylaxis until full weightbearing, then can discontinue. Pain control per facility physician. Continue ice, elevation, compression with Ace wrap. Small open area of skin to the proximal lateral incision line at the left ankle, keep dry and covered at all times except for showering. Okay to shower, no bathing or submerging until skin is fully healed. Pat dry with clean towel and reapply a dry sterile dressing. Continue physical therapy following ankle protocol at 6 weeks. Would strongly recommend home physical therapy once discharged from facility. Continue assistive devices if needed. Follow-up in approximately 6 weeks. Call with any questions or concerns.

## 2021-06-14 NOTE — PROGRESS NOTES
Malini Strickland is a 78 y.o. female who presents for follow up of left ankle    SURGEON: Dr. Mary Looney MD  Date of Injury/Surgery: 5/1/2021  Date last seen in office: 5/24/2021    Symptoms: better  New complaints: none    Cast/Splint, Brace, or Dressings: Clean, dry and intact and Taken down for visit    Incision(s): Edges approximated no redness    Weightbearing: left lower Non-weight bearing      Assistive device Wheelchair  Participating in therapy (location if yes)?  yes, [place]    Refills Needed: None  Order/Referral Needed: N/A

## 2021-07-28 ENCOUNTER — HOSPITAL ENCOUNTER (OUTPATIENT)
Dept: GENERAL RADIOLOGY | Age: 81
Discharge: HOME | End: 2021-07-30
Payer: MEDICARE

## 2021-07-28 ENCOUNTER — OFFICE VISIT (OUTPATIENT)
Dept: ORTHOPEDIC SURGERY | Age: 81
End: 2021-07-28
Payer: MEDICARE

## 2021-07-28 VITALS — TEMPERATURE: 98.5 F

## 2021-07-28 DIAGNOSIS — S82.842A ANKLE FRACTURE, BIMALLEOLAR, CLOSED, LEFT, INITIAL ENCOUNTER: Primary | ICD-10-CM

## 2021-07-28 DIAGNOSIS — S82.842A ANKLE FRACTURE, BIMALLEOLAR, CLOSED, LEFT, INITIAL ENCOUNTER: ICD-10-CM

## 2021-07-28 PROCEDURE — 99024 POSTOP FOLLOW-UP VISIT: CPT | Performed by: PHYSICIAN ASSISTANT

## 2021-07-28 PROCEDURE — 99212 OFFICE O/P EST SF 10 MIN: CPT

## 2021-07-28 PROCEDURE — 73610 X-RAY EXAM OF ANKLE: CPT

## 2021-07-28 NOTE — PROGRESS NOTES
Chief Complaint   Patient presents with    Ankle Pain     Left Ankle. 5/10 without activity and 8/10 pain with activity. Pt has issues with initial movement. Pt has difficultly sleeping at night due to increase ankle pain. Pt expressed having Right Hip pain from altered gait. OP:SURGEON: Dr. Cristina Guerra MD  DATE OF PROCEDURE: 5-1-21  PROCEDURE: Open reduction internal fixation left closed bimalleolar ankle fracture with stress view of the syndesmosis under anesthesia     POD: 3 months    Subjective:  Ebony Perez is following up from the above surgery. She is WBAT on left lower extremity. She ambulates with assistive device, walker. Pain to extremity is mild and is not taking prescribed pain medication. They denies numbness or tingling to the left lower extremity. Denies calf pain, chest pain, or shortness of breath. Patient has finished DVT prophylaxis. Patient is not participating in therapy at this time. She presents today with her daughter and states that overall she is doing well. Complains of only some mild intermittent soreness over the outside portion of her left ankle. She states that she has been ambulating without the walker and only uses the walker when she is out and about for stability. She has been weightbearing as tolerated in a regular shoe without difficulty or pain. Review of Systems -  All pertinent positives/negative in HPI     Objective:    General: Alert and oriented X 3, normocephalic atraumatic, external ears and eye normal, sclera clear, no acute distress, respirations easy and unlabored with no audible wheezes, skin warm and dry, speech and dress appropriate for noted age, affect euthymic.     Extremity:  Left Lower Extremity  Skin clean dry and intact, without signs of infection   Incision well-healed  no edema noted  Compartments supple throughout thigh and leg  Calf supple and not tender  negative Homans  Demonstrates active motion with left ankle dorsi/plantar flexion. Wiggles toes without difficulty. Ambulates well without the use of any assistive devices. States sensation intact to touch in sural, deep peroneal, superficial peroneal, saphenous, posterior tibial  nerve distributions to foot/ankle. Palpable dorsalis pedis and posterior tibialis pulses, cap refill brisk in toes, foot warm/perfused. Temp 98.5 °F (36.9 °C) (Oral)     XR:   3 views left ankle demonstrate the ORIF bimalleolar left ankle fracture with the hardware in stable position and alignment. No evidence of hardware loosening or failure. Mortise is concentric. Assessment:   Diagnosis Orders   1. Ankle fracture, bimalleolar, closed, left, initial encounter       Plan:  X-rays reviewed and discussed. Continue weightbearing as tolerated left lower extremity. Okay to come out of boot into a regular shoe which patient is already doing. Continue with home exercise program.    Follow-up in 3 months for reevaluation and x-rays. Call if any questions or concerns. Electronically signed by ABELARDO Park on 7/28/2021 at 9:24 AM  Note: This report was completed using [email or web address] voiced recognition software. Every effort has been made to ensure accuracy; however, inadvertent computerized transcription errors may be present.

## 2021-07-28 NOTE — PROGRESS NOTES
Geni Cabrera is a 78 y.o. female who presents for follow up of Left Ankle    SURGEON: Dr. Awais Pacheco MD  Date of Injury/Surgery: 5/3/2021  Date last seen in office: 6/14/2021    Symptoms: better  New complaints: Patient expressed increased heel pain with the walking boot. Patient expressed increased shoulder pain from Fall Left shoulder. Patient expressed having a catching sensation. Cast/Splint, Brace, or Dressings: Patient expressed the walking boot has increased discomfort.       Assistive device Walker - standard

## 2021-11-01 ENCOUNTER — TELEPHONE (OUTPATIENT)
Dept: ORTHOPEDIC SURGERY | Age: 81
End: 2021-11-01

## 2021-11-01 DIAGNOSIS — S82.892D CLOSED FRACTURE OF LEFT ANKLE WITH ROUTINE HEALING, SUBSEQUENT ENCOUNTER: Primary | ICD-10-CM

## 2022-08-28 ENCOUNTER — HOSPITAL ENCOUNTER (INPATIENT)
Age: 82
LOS: 10 days | Discharge: HOME HEALTH | DRG: 390 | End: 2022-09-08
Attending: EMERGENCY MEDICINE | Admitting: FAMILY MEDICINE
Payer: MEDICARE

## 2022-08-28 DIAGNOSIS — K56.609 INTESTINAL OBSTRUCTION, UNSPECIFIED CAUSE, UNSPECIFIED WHETHER PARTIAL OR COMPLETE (HCC): ICD-10-CM

## 2022-08-28 DIAGNOSIS — E87.5 HYPERKALEMIA: ICD-10-CM

## 2022-08-28 DIAGNOSIS — R10.9 ABDOMINAL PAIN, UNSPECIFIED ABDOMINAL LOCATION: Primary | ICD-10-CM

## 2022-08-28 LAB
BASOPHILS ABSOLUTE: 0.05 E9/L (ref 0–0.2)
BASOPHILS RELATIVE PERCENT: 0.4 % (ref 0–2)
EOSINOPHILS ABSOLUTE: 0.14 E9/L (ref 0.05–0.5)
EOSINOPHILS RELATIVE PERCENT: 1 % (ref 0–6)
HCT VFR BLD CALC: 42.6 % (ref 34–48)
HEMOGLOBIN: 12.7 G/DL (ref 11.5–15.5)
IMMATURE GRANULOCYTES #: 0.09 E9/L
IMMATURE GRANULOCYTES %: 0.7 % (ref 0–5)
LYMPHOCYTES ABSOLUTE: 1.47 E9/L (ref 1.5–4)
LYMPHOCYTES RELATIVE PERCENT: 10.9 % (ref 20–42)
MCH RBC QN AUTO: 26.9 PG (ref 26–35)
MCHC RBC AUTO-ENTMCNC: 29.8 % (ref 32–34.5)
MCV RBC AUTO: 90.3 FL (ref 80–99.9)
MONOCYTES ABSOLUTE: 0.53 E9/L (ref 0.1–0.95)
MONOCYTES RELATIVE PERCENT: 3.9 % (ref 2–12)
NEUTROPHILS ABSOLUTE: 11.17 E9/L (ref 1.8–7.3)
NEUTROPHILS RELATIVE PERCENT: 83.1 % (ref 43–80)
PDW BLD-RTO: 14.8 FL (ref 11.5–15)
PLATELET # BLD: 198 E9/L (ref 130–450)
PMV BLD AUTO: 10.6 FL (ref 7–12)
RBC # BLD: 4.72 E12/L (ref 3.5–5.5)
WBC # BLD: 13.5 E9/L (ref 4.5–11.5)

## 2022-08-28 PROCEDURE — 85025 COMPLETE CBC W/AUTO DIFF WBC: CPT

## 2022-08-28 PROCEDURE — 96375 TX/PRO/DX INJ NEW DRUG ADDON: CPT

## 2022-08-28 PROCEDURE — 99285 EMERGENCY DEPT VISIT HI MDM: CPT

## 2022-08-28 PROCEDURE — 96365 THER/PROPH/DIAG IV INF INIT: CPT

## 2022-08-28 PROCEDURE — 36415 COLL VENOUS BLD VENIPUNCTURE: CPT

## 2022-08-28 PROCEDURE — 93005 ELECTROCARDIOGRAM TRACING: CPT | Performed by: EMERGENCY MEDICINE

## 2022-08-28 PROCEDURE — 96361 HYDRATE IV INFUSION ADD-ON: CPT

## 2022-08-28 PROCEDURE — 83605 ASSAY OF LACTIC ACID: CPT

## 2022-08-28 PROCEDURE — 96374 THER/PROPH/DIAG INJ IV PUSH: CPT

## 2022-08-28 RX ORDER — FENTANYL CITRATE 50 UG/ML
25 INJECTION, SOLUTION INTRAMUSCULAR; INTRAVENOUS ONCE
Status: COMPLETED | OUTPATIENT
Start: 2022-08-29 | End: 2022-08-29

## 2022-08-28 RX ORDER — SODIUM CHLORIDE 9 MG/ML
INJECTION, SOLUTION INTRAVENOUS CONTINUOUS
Status: DISCONTINUED | OUTPATIENT
Start: 2022-08-28 | End: 2022-09-07

## 2022-08-28 ASSESSMENT — PAIN SCALES - GENERAL: PAINLEVEL_OUTOF10: 10

## 2022-08-28 ASSESSMENT — PAIN DESCRIPTION - LOCATION: LOCATION: ABDOMEN

## 2022-08-28 ASSESSMENT — PAIN - FUNCTIONAL ASSESSMENT: PAIN_FUNCTIONAL_ASSESSMENT: 0-10

## 2022-08-28 ASSESSMENT — PAIN DESCRIPTION - DESCRIPTORS: DESCRIPTORS: SHARP

## 2022-08-28 ASSESSMENT — PAIN DESCRIPTION - PAIN TYPE: TYPE: ACUTE PAIN

## 2022-08-28 ASSESSMENT — PAIN DESCRIPTION - FREQUENCY: FREQUENCY: CONTINUOUS

## 2022-08-28 ASSESSMENT — PAIN DESCRIPTION - ORIENTATION: ORIENTATION: RIGHT

## 2022-08-28 ASSESSMENT — PAIN DESCRIPTION - ONSET: ONSET: SUDDEN

## 2022-08-29 ENCOUNTER — APPOINTMENT (OUTPATIENT)
Dept: GENERAL RADIOLOGY | Age: 82
DRG: 390 | End: 2022-08-29
Payer: MEDICARE

## 2022-08-29 ENCOUNTER — APPOINTMENT (OUTPATIENT)
Dept: CT IMAGING | Age: 82
DRG: 390 | End: 2022-08-29
Payer: MEDICARE

## 2022-08-29 PROBLEM — K56.609 BOWEL OBSTRUCTION (HCC): Status: ACTIVE | Noted: 2022-08-29

## 2022-08-29 LAB
ALBUMIN SERPL-MCNC: 3.8 G/DL (ref 3.5–5.2)
ALP BLD-CCNC: 77 U/L (ref 35–104)
ALT SERPL-CCNC: 11 U/L (ref 0–32)
ANION GAP SERPL CALCULATED.3IONS-SCNC: 10 MMOL/L (ref 7–16)
ANION GAP SERPL CALCULATED.3IONS-SCNC: 12 MMOL/L (ref 7–16)
AST SERPL-CCNC: 20 U/L (ref 0–31)
BILIRUB SERPL-MCNC: 0.3 MG/DL (ref 0–1.2)
BILIRUBIN URINE: NEGATIVE
BLOOD, URINE: NEGATIVE
BUN BLDV-MCNC: 18 MG/DL (ref 6–23)
BUN BLDV-MCNC: 19 MG/DL (ref 6–23)
CALCIUM SERPL-MCNC: 8.5 MG/DL (ref 8.6–10.2)
CALCIUM SERPL-MCNC: 9.9 MG/DL (ref 8.6–10.2)
CHLORIDE BLD-SCNC: 100 MMOL/L (ref 98–107)
CHLORIDE BLD-SCNC: 105 MMOL/L (ref 98–107)
CLARITY: CLEAR
CO2: 27 MMOL/L (ref 22–29)
CO2: 30 MMOL/L (ref 22–29)
COLOR: YELLOW
CREAT SERPL-MCNC: 0.9 MG/DL (ref 0.5–1)
CREAT SERPL-MCNC: 1 MG/DL (ref 0.5–1)
EKG ATRIAL RATE: 64 BPM
EKG P AXIS: 28 DEGREES
EKG P-R INTERVAL: 132 MS
EKG Q-T INTERVAL: 390 MS
EKG QRS DURATION: 82 MS
EKG QTC CALCULATION (BAZETT): 402 MS
EKG R AXIS: 18 DEGREES
EKG T AXIS: 33 DEGREES
EKG VENTRICULAR RATE: 64 BPM
GFR AFRICAN AMERICAN: >60
GFR AFRICAN AMERICAN: >60
GFR NON-AFRICAN AMERICAN: 53 ML/MIN/1.73
GFR NON-AFRICAN AMERICAN: 60 ML/MIN/1.73
GLUCOSE BLD-MCNC: 148 MG/DL (ref 74–99)
GLUCOSE BLD-MCNC: 169 MG/DL (ref 74–99)
GLUCOSE URINE: NEGATIVE MG/DL
KETONES, URINE: ABNORMAL MG/DL
LACTIC ACID: 2.7 MMOL/L (ref 0.5–2.2)
LACTIC ACID: 4.1 MMOL/L (ref 0.5–2.2)
LEUKOCYTE ESTERASE, URINE: NEGATIVE
LIPASE: 31 U/L (ref 13–60)
METER GLUCOSE: 123 MG/DL (ref 74–99)
METER GLUCOSE: 138 MG/DL (ref 74–99)
NITRITE, URINE: NEGATIVE
PH UA: 6 (ref 5–9)
POTASSIUM SERPL-SCNC: 4.7 MMOL/L (ref 3.5–5)
POTASSIUM SERPL-SCNC: 5.7 MMOL/L (ref 3.5–5)
POTASSIUM SERPL-SCNC: 6 MMOL/L (ref 3.5–5)
PROTEIN UA: NEGATIVE MG/DL
REASON FOR REJECTION: NORMAL
REJECTED TEST: NORMAL
SODIUM BLD-SCNC: 142 MMOL/L (ref 132–146)
SODIUM BLD-SCNC: 142 MMOL/L (ref 132–146)
SPECIFIC GRAVITY UA: 1.02 (ref 1–1.03)
TOTAL PROTEIN: 6.5 G/DL (ref 6.4–8.3)
TROPONIN, HIGH SENSITIVITY: 6 NG/L (ref 0–9)
UROBILINOGEN, URINE: 0.2 E.U./DL

## 2022-08-29 PROCEDURE — 6360000002 HC RX W HCPCS: Performed by: FAMILY MEDICINE

## 2022-08-29 PROCEDURE — 80048 BASIC METABOLIC PNL TOTAL CA: CPT

## 2022-08-29 PROCEDURE — 6370000000 HC RX 637 (ALT 250 FOR IP): Performed by: STUDENT IN AN ORGANIZED HEALTH CARE EDUCATION/TRAINING PROGRAM

## 2022-08-29 PROCEDURE — 2500000003 HC RX 250 WO HCPCS: Performed by: EMERGENCY MEDICINE

## 2022-08-29 PROCEDURE — 2580000003 HC RX 258: Performed by: EMERGENCY MEDICINE

## 2022-08-29 PROCEDURE — 74177 CT ABD & PELVIS W/CONTRAST: CPT

## 2022-08-29 PROCEDURE — 84132 ASSAY OF SERUM POTASSIUM: CPT

## 2022-08-29 PROCEDURE — 74018 RADEX ABDOMEN 1 VIEW: CPT

## 2022-08-29 PROCEDURE — 6370000000 HC RX 637 (ALT 250 FOR IP): Performed by: EMERGENCY MEDICINE

## 2022-08-29 PROCEDURE — 71045 X-RAY EXAM CHEST 1 VIEW: CPT

## 2022-08-29 PROCEDURE — 6360000002 HC RX W HCPCS: Performed by: EMERGENCY MEDICINE

## 2022-08-29 PROCEDURE — 36415 COLL VENOUS BLD VENIPUNCTURE: CPT

## 2022-08-29 PROCEDURE — 6370000000 HC RX 637 (ALT 250 FOR IP): Performed by: FAMILY MEDICINE

## 2022-08-29 PROCEDURE — 2580000003 HC RX 258: Performed by: FAMILY MEDICINE

## 2022-08-29 PROCEDURE — 81003 URINALYSIS AUTO W/O SCOPE: CPT

## 2022-08-29 PROCEDURE — 80053 COMPREHEN METABOLIC PANEL: CPT

## 2022-08-29 PROCEDURE — 83605 ASSAY OF LACTIC ACID: CPT

## 2022-08-29 PROCEDURE — 6360000004 HC RX CONTRAST MEDICATION: Performed by: RADIOLOGY

## 2022-08-29 PROCEDURE — 84484 ASSAY OF TROPONIN QUANT: CPT

## 2022-08-29 PROCEDURE — 2140000000 HC CCU INTERMEDIATE R&B

## 2022-08-29 PROCEDURE — 2580000003 HC RX 258: Performed by: STUDENT IN AN ORGANIZED HEALTH CARE EDUCATION/TRAINING PROGRAM

## 2022-08-29 PROCEDURE — 83690 ASSAY OF LIPASE: CPT

## 2022-08-29 PROCEDURE — 82962 GLUCOSE BLOOD TEST: CPT

## 2022-08-29 RX ORDER — LIDOCAINE HYDROCHLORIDE 20 MG/ML
JELLY TOPICAL ONCE
Status: COMPLETED | OUTPATIENT
Start: 2022-08-29 | End: 2022-08-29

## 2022-08-29 RX ORDER — DEXTROSE MONOHYDRATE 100 MG/ML
INJECTION, SOLUTION INTRAVENOUS CONTINUOUS PRN
Status: DISCONTINUED | OUTPATIENT
Start: 2022-08-29 | End: 2022-09-08 | Stop reason: HOSPADM

## 2022-08-29 RX ORDER — INSULIN LISPRO 100 [IU]/ML
0-4 INJECTION, SOLUTION INTRAVENOUS; SUBCUTANEOUS NIGHTLY
Status: DISCONTINUED | OUTPATIENT
Start: 2022-08-29 | End: 2022-09-08 | Stop reason: HOSPADM

## 2022-08-29 RX ORDER — SODIUM CHLORIDE, SODIUM LACTATE, POTASSIUM CHLORIDE, CALCIUM CHLORIDE 600; 310; 30; 20 MG/100ML; MG/100ML; MG/100ML; MG/100ML
500 INJECTION, SOLUTION INTRAVENOUS ONCE
Status: COMPLETED | OUTPATIENT
Start: 2022-08-29 | End: 2022-08-29

## 2022-08-29 RX ORDER — INSULIN LISPRO 100 [IU]/ML
0-4 INJECTION, SOLUTION INTRAVENOUS; SUBCUTANEOUS
Status: DISCONTINUED | OUTPATIENT
Start: 2022-08-29 | End: 2022-09-08 | Stop reason: HOSPADM

## 2022-08-29 RX ORDER — DEXTROSE MONOHYDRATE 25 G/50ML
25 INJECTION, SOLUTION INTRAVENOUS ONCE
Status: COMPLETED | OUTPATIENT
Start: 2022-08-29 | End: 2022-08-29

## 2022-08-29 RX ORDER — MORPHINE SULFATE 2 MG/ML
2 INJECTION, SOLUTION INTRAMUSCULAR; INTRAVENOUS EVERY 4 HOURS PRN
Status: DISCONTINUED | OUTPATIENT
Start: 2022-08-29 | End: 2022-09-08 | Stop reason: HOSPADM

## 2022-08-29 RX ORDER — FENTANYL CITRATE 50 UG/ML
25 INJECTION, SOLUTION INTRAMUSCULAR; INTRAVENOUS ONCE
Status: COMPLETED | OUTPATIENT
Start: 2022-08-29 | End: 2022-08-29

## 2022-08-29 RX ORDER — 0.9 % SODIUM CHLORIDE 0.9 %
1000 INTRAVENOUS SOLUTION INTRAVENOUS ONCE
Status: COMPLETED | OUTPATIENT
Start: 2022-08-29 | End: 2022-08-29

## 2022-08-29 RX ORDER — ATORVASTATIN CALCIUM 40 MG/1
40 TABLET, FILM COATED ORAL DAILY
Status: DISCONTINUED | OUTPATIENT
Start: 2022-08-30 | End: 2022-09-08 | Stop reason: HOSPADM

## 2022-08-29 RX ORDER — PREGABALIN 50 MG/1
50 CAPSULE ORAL 3 TIMES DAILY
Status: DISCONTINUED | OUTPATIENT
Start: 2022-08-29 | End: 2022-09-08 | Stop reason: HOSPADM

## 2022-08-29 RX ORDER — PAROXETINE HYDROCHLORIDE 20 MG/1
40 TABLET, FILM COATED ORAL DAILY
Status: DISCONTINUED | OUTPATIENT
Start: 2022-08-30 | End: 2022-09-08 | Stop reason: HOSPADM

## 2022-08-29 RX ORDER — OXYMETAZOLINE HYDROCHLORIDE 0.05 G/100ML
2 SPRAY NASAL ONCE
Status: COMPLETED | OUTPATIENT
Start: 2022-08-29 | End: 2022-08-29

## 2022-08-29 RX ORDER — ENALAPRIL MALEATE 5 MG/1
20 TABLET ORAL DAILY
Status: DISCONTINUED | OUTPATIENT
Start: 2022-08-30 | End: 2022-09-08 | Stop reason: HOSPADM

## 2022-08-29 RX ADMIN — NASAL DECONGESTANT 2 SPRAY: 0.05 SPRAY NASAL at 09:25

## 2022-08-29 RX ADMIN — SODIUM CHLORIDE, POTASSIUM CHLORIDE, SODIUM LACTATE AND CALCIUM CHLORIDE 500 ML: 600; 310; 30; 20 INJECTION, SOLUTION INTRAVENOUS at 16:46

## 2022-08-29 RX ADMIN — FENTANYL CITRATE 25 MCG: 0.05 INJECTION, SOLUTION INTRAMUSCULAR; INTRAVENOUS at 04:55

## 2022-08-29 RX ADMIN — FENTANYL CITRATE 25 MCG: 0.05 INJECTION, SOLUTION INTRAMUSCULAR; INTRAVENOUS at 00:15

## 2022-08-29 RX ADMIN — MORPHINE SULFATE 2 MG: 2 INJECTION, SOLUTION INTRAMUSCULAR; INTRAVENOUS at 16:52

## 2022-08-29 RX ADMIN — PREGABALIN 50 MG: 50 CAPSULE ORAL at 23:09

## 2022-08-29 RX ADMIN — Medication 5 UNITS: at 04:10

## 2022-08-29 RX ADMIN — IOPAMIDOL 75 ML: 755 INJECTION, SOLUTION INTRAVENOUS at 02:03

## 2022-08-29 RX ADMIN — LIDOCAINE HYDROCHLORIDE: 20 JELLY TOPICAL at 09:27

## 2022-08-29 RX ADMIN — Medication 1000 MG: at 04:12

## 2022-08-29 RX ADMIN — SODIUM CHLORIDE: 9 INJECTION, SOLUTION INTRAVENOUS at 00:01

## 2022-08-29 RX ADMIN — DEXTROSE MONOHYDRATE 25 G: 25 INJECTION, SOLUTION INTRAVENOUS at 04:11

## 2022-08-29 RX ADMIN — SODIUM CHLORIDE: 9 INJECTION, SOLUTION INTRAVENOUS at 12:25

## 2022-08-29 RX ADMIN — SODIUM CHLORIDE: 9 INJECTION, SOLUTION INTRAVENOUS at 16:45

## 2022-08-29 RX ADMIN — SODIUM CHLORIDE 1000 ML: 9 INJECTION, SOLUTION INTRAVENOUS at 02:44

## 2022-08-29 ASSESSMENT — PAIN DESCRIPTION - DESCRIPTORS
DESCRIPTORS: STABBING
DESCRIPTORS: SHARP;STABBING

## 2022-08-29 ASSESSMENT — PAIN DESCRIPTION - ORIENTATION
ORIENTATION: LOWER;UPPER
ORIENTATION: LOWER;UPPER

## 2022-08-29 ASSESSMENT — PAIN SCALES - GENERAL
PAINLEVEL_OUTOF10: 6
PAINLEVEL_OUTOF10: 4
PAINLEVEL_OUTOF10: 8
PAINLEVEL_OUTOF10: 10
PAINLEVEL_OUTOF10: 6

## 2022-08-29 ASSESSMENT — PAIN DESCRIPTION - LOCATION
LOCATION: ABDOMEN
LOCATION: ABDOMEN

## 2022-08-29 ASSESSMENT — PAIN - FUNCTIONAL ASSESSMENT: PAIN_FUNCTIONAL_ASSESSMENT: 0-10

## 2022-08-29 ASSESSMENT — ENCOUNTER SYMPTOMS
VOMITING: 1
ABDOMINAL PAIN: 1
NAUSEA: 1
SHORTNESS OF BREATH: 0

## 2022-08-29 NOTE — CONSULTS
GENERAL SURGERY  CONSULT NOTE  8/29/2022    Physician Consulted: Dr. Rosette Sanchez    Reason for Consult: SBO  Referring Physician: Dr. Dianne MURILLO  Ligia Abdi is a 78 y.o. female with hx of DM, DVT, diverticulitis s/p proctocolectomy w/ colostomy 24 years ago and large ventral hernia s/p strattice mesh repair in 2013, which recurred in 2014. who presents for evaluation of one day of nausea, vomiting, and abdominal pain with associated low ostomy output. She states last normal ostomy output was Friday evening. Abdominal pain is mainly on the right side and cramping in nature. Does not take anticoagulation or antiplatelet agents. She has had colonoscopies in the past but is unsure when the last one, she thinks maybe in 2014. Denies fevers, dark or bloody stools, weight loss, chills. She is having associated SOB with saturations in 80's improved with NC oxygen.      Surgical hx:   Ex lap MARC, sigmoid takedown, colovaginal and colocutaneous fistula takedown and sigmoidectomy 2005   Appendectomy, cholecystectomy, hysterectomy and bowel resection for diverticulitis   Partial thyroidectomy 2003   Lap ventral and parastomal hernia repair 2013 with strattice mesh  IVC filter 2004     Afebrile, tachy to 110, normotensive   K 5.7 lactate 2.7, wbc 13.5   CTAP with dilated loops of small bowel, concerning for SBO, free fluid and stranding surrounding bowel       Past Medical History:   Diagnosis Date    Arthritis     Colostomy in place ([facility] [address]     Diabetes mellitus ([facility] [address]     Diverticulitis     GERD (gastroesophageal reflux disease)     Hernia     History of blood transfusion     Hyperlipidemia     Hypertension        Past Surgical History:   Procedure Laterality Date    ABDOMEN SURGERY      DIVERTICULITIS    ANKLE FRACTURE SURGERY Left 5/1/2021    LEFT ANKLE OPEN REDUCTION INTERNAL FIXATION--SYNTHES performed by Sigrid Melendez MD at [facility] 2012    HYSTERECTOMY (CERVIX STATUS UNKNOWN)      ILEOSTOMY OR JEJUNOSTOMY      done and reversed    THYROID SURGERY      partial thyroidectomy       Medications Prior to Admission    Prior to Admission medications    Medication Sig Start Date End Date Taking? Authorizing Provider   blood glucose test strips (ASCENSIA AUTODISC VI;ONE TOUCH ULTRA TEST VI) strip OneTouch Ultra Blue Test Strip    Historical Provider, MD   pantoprazole (PROTONIX) 40 MG tablet Take 40 mg by mouth daily    Historical Provider, MD   atorvastatin (LIPITOR) 40 MG tablet Take 40 mg by mouth daily    Historical Provider, MD   HYDROcodone-acetaminophen (NORCO) 5-325 MG per tablet Take 1 tablet by mouth every 6 hours as needed for Pain. Historical Provider, MD   aspirin EC 81 MG EC tablet Take 1 tablet by mouth 2 times daily for 28 days 5/1/21 5/29/21  Giovana Nunez DO   pregabalin (LYRICA) 50 MG capsule Take 50 mg by mouth 3 times daily. Historical Provider, MD   acetaminophen (TYLENOL) 325 MG tablet Take 650 mg by mouth every 6 hours as needed for Pain    Historical Provider, MD   rosuvastatin (CRESTOR) 20 MG tablet Take 20 mg by mouth daily  Patient not taking: Reported on 5/24/2021    Historical Provider, MD   Liraglutide (VICTOZA) 18 MG/3ML SOPN SC injection Inject 1.8 mg into the skin daily     Historical Provider, MD   PARoxetine (PAXIL) 40 MG tablet Take 40 mg by mouth daily     Historical Provider, MD   glyBURIDE (DIABETA) 5 MG tablet Take 5 mg by mouth daily     Historical Provider, MD   enalapril (VASOTEC) 20 MG tablet Take 20 mg by mouth daily     Historical Provider, MD   lansoprazole (PREVACID) 30 MG capsule Take 30 mg by mouth daily. Patient not taking: Reported on 5/24/2021    Historical Provider, MD   metformin (GLUCOPHAGE) 500 MG tablet Take 1,000 mg by mouth 2 times daily (with meals).       Historical Provider, MD       Allergies   Allergen Reactions    Daptomycin Anaphylaxis     Wheezing/Stridor    Vancomycin Anaphylaxis and Rash    Adhesive Tape Itching and Rash       History reviewed. No pertinent family history. Social History     Tobacco Use    Smoking status: Never    Smokeless tobacco: Former   Substance Use Topics    Alcohol use: Yes     Comment: very seldom    Drug use: No         Review of Systems: pertinent ROS listed in HPI, all others negative       PHYSICAL EXAM:    Vitals:    08/29/22 0500   BP: 134/76   Pulse: 96   Resp: 15   Temp:    SpO2: 96%       GENERAL:  NAD. Slightly confused. HEAD:  Normocephalic. Atraumatic. EYES:   No scleral icterus. PERRL. LUNGS:  No increased work of breathing. On 4L NC  CARDIOVASCULAR: RR  ABDOMEN:  Soft, distended, ventral wall hernia soft. Tender in RUQ/RLQ with voluntary guarding, no rebound tenderness or rigidity. EXTREMITIES:   MAEx4. Atraumatic. No LE edema. SKIN:  Warm and dry  NEUROLOGIC:  GCS 14- somewhat confused  RECTAL: Ostomy in place with minimal liquid output       ASSESSMENT/PLAN:  78 y.o. female with hx of diverticulitis proctocolectomy w/ colostomy 24 years ago and large ventral hernia s/p strattice mesh repair in 2013, fistula takedowns and lysis of adhesions now with SBO likely 2/2 adhesive disease    NPO   IVF   NG to LIWS   Lactate q6h   Hyperkalemia- treated with Ca gluconate and insulin in ED, repeat pending   Monitor wbc, fever curve  Pain and nausea control per primary   SBFT tentatively tomorrow. Plan discussed with Dr. Dane Campbell.     Sharlene Baca MD  Surgery Resident PGY-2  8/29/2022  5:29 AM

## 2022-08-29 NOTE — ED NOTES
Pt assisted onto bed pan and repositioned for comfort. No other needs voiced at this time.       Nadine Garcia RN  08/29/22 0557

## 2022-08-29 NOTE — ED PROVIDER NOTES
HPI:  8/28/22, Time: 11:15 PM EDT         Shweta Chang is a 78 y.o. female presenting to the ED for abdominal pain, beginning since 5 pm ago. The complaint has been persistent, moderate in severity, and worsened by nothing. Patient presenting here because of abdominal pain with vomiting. Patient reports history of colostomy. Patient had history of diverticulitis. Patient reports no chest pain or difficulty breathing she reports no fever or chills. Patient reporting no urinary symptoms. Patient reporting no headache or syncopal event she reports no black or tarry stools she reports no hematemesis. Patient was medicated with Zofran prior to arrival    ROS:   Pertinent positives and negatives are stated within HPI, all other systems reviewed and are negative.  --------------------------------------------- PAST HISTORY ---------------------------------------------  Past Medical History:  has a past medical history of Arthritis, Colostomy in place [facility]), Diabetes mellitus ([facility] [address] Diverticulitis, GERD (gastroesophageal reflux disease), Hernia, History of blood transfusion, Hyperlipidemia, and Hypertension. Past Surgical History:  has a past surgical history that includes Hysterectomy; Cholecystectomy; hernia repair; Abdomen surgery; colostomy; Appendectomy; Thyroid surgery; Jejunostomy; and Ankle fracture surgery (Left, 5/1/2021). Social History:  reports that she has never smoked. She has quit using smokeless tobacco. She reports current alcohol use. She reports that she does not use drugs. Family History: family history is not on file. The patients home medications have been reviewed.     Allergies: Daptomycin, Vancomycin, and Adhesive tape    ---------------------------------------------------PHYSICAL EXAM--------------------------------------    Constitutional/General: Alert and oriented x3,  Head: Normocephalic and atraumatic  Eyes: PERRL, EOMI  Mouth: Oropharynx clear, handling secretions, no trismus  Neck: Supple, full ROM, non tender to palpation in the midline, no stridor, no crepitus, no meningeal signs  Pulmonary: Lungs clear to auscultation bilaterally, no wheezes, rales, or rhonchi. Not in respiratory distress  Cardiovascular:  Regular rate. Regular rhythm. No murmurs, gallops, or rubs. 2+ distal pulses  Chest: no chest wall tenderness  Abdomen: Soft. Tender throughout abdomen but mainly upper and lower as well as noted colostomy bag left side of abdomen. No pulsatile masses appreciated. Musculoskeletal: Moves all extremities x 4. Warm and well perfused, no clubbing, cyanosis, or edema. Capillary refill <3 seconds  Skin: warm and dry. No rashes. Neurologic: GCS 15, CN 2-12 grossly intact, no focal deficits, symmetric strength 5/5 in the upper and lower extremities bilaterally  Psych: Normal Affect    -------------------------------------------------- RESULTS -------------------------------------------------  I have personally reviewed all laboratory and imaging results for this patient. Results are listed below.      LABS:  Results for orders placed or performed during the hospital encounter of 08/28/22   CBC with Auto Differential   Result Value Ref Range    WBC 13.5 (H) 4.5 - 11.5 E9/L    RBC 4.72 3.50 - 5.50 E12/L    Hemoglobin 12.7 11.5 - 15.5 g/dL    Hematocrit 42.6 34.0 - 48.0 %    MCV 90.3 80.0 - 99.9 fL    MCH 26.9 26.0 - 35.0 pg    MCHC 29.8 (L) 32.0 - 34.5 %    RDW 14.8 11.5 - 15.0 fL    Platelets 878 570 - 681 E9/L    MPV 10.6 7.0 - 12.0 fL    Neutrophils % 83.1 (H) 43.0 - 80.0 %    Immature Granulocytes % 0.7 0.0 - 5.0 %    Lymphocytes % 10.9 (L) 20.0 - 42.0 %    Monocytes % 3.9 2.0 - 12.0 %    Eosinophils % 1.0 0.0 - 6.0 %    Basophils % 0.4 0.0 - 2.0 %    Neutrophils Absolute 11.17 (H) 1.80 - 7.30 E9/L    Immature Granulocytes # 0.09 E9/L    Lymphocytes Absolute 1.47 (L) 1.50 - 4.00 E9/L    Monocytes Absolute 0.53 0.10 - 0.95 E9/L    Eosinophils Absolute 0.14 0.05 - 0.50 E9/L Basophils Absolute 0.05 0.00 - 0.20 E9/L   Lactic Acid   Result Value Ref Range    Lactic Acid 4.1 (HH) 0.5 - 2.2 mmol/L   Urinalysis   Result Value Ref Range    Color, UA Yellow Straw/Yellow    Clarity, UA Clear Clear    Glucose, Ur Negative Negative mg/dL    Bilirubin Urine Negative Negative    Ketones, Urine TRACE (A) Negative mg/dL    Specific Gravity, UA 1.025 1.005 - 1.030    Blood, Urine Negative Negative    pH, UA 6.0 5.0 - 9.0    Protein, UA Negative Negative mg/dL    Urobilinogen, Urine 0.2 <2.0 E.U./dL    Nitrite, Urine Negative Negative    Leukocyte Esterase, Urine Negative Negative   SPECIMEN REJECTION   Result Value Ref Range    Rejected Test BMP LIPAS TRP5     Reason for Rejection see below    Comprehensive Metabolic Panel   Result Value Ref Range    Sodium 142 132 - 146 mmol/L    Potassium 6.0 (H) 3.5 - 5.0 mmol/L    Chloride 100 98 - 107 mmol/L    CO2 30 (H) 22 - 29 mmol/L    Anion Gap 12 7 - 16 mmol/L    Glucose 169 (H) 74 - 99 mg/dL    BUN 19 6 - 23 mg/dL    Creatinine 1.0 0.5 - 1.0 mg/dL    GFR Non-African American 53 >=60 mL/min/1.73    GFR African American >60     Calcium 9.9 8.6 - 10.2 mg/dL    Total Protein 6.5 6.4 - 8.3 g/dL    Albumin 3.8 3.5 - 5.2 g/dL    Total Bilirubin 0.3 0.0 - 1.2 mg/dL    Alkaline Phosphatase 77 35 - 104 U/L    ALT 11 0 - 32 U/L    AST 20 0 - 31 U/L   Lipase   Result Value Ref Range    Lipase 31 13 - 60 U/L   Troponin   Result Value Ref Range    Troponin, High Sensitivity 6 0 - 9 ng/L   Potassium   Result Value Ref Range    Potassium 5.7 (H) 3.5 - 5.0 mmol/L   Lactic Acid   Result Value Ref Range    Lactic Acid 2.7 (H) 0.5 - 2.2 mmol/L   EKG 12 Lead   Result Value Ref Range    Ventricular Rate 64 BPM    Atrial Rate 64 BPM    P-R Interval 132 ms    QRS Duration 82 ms    Q-T Interval 390 ms    QTc Calculation (Bazett) 402 ms    P Axis 28 degrees    R Axis 18 degrees    T Axis 33 degrees       RADIOLOGY:  Interpreted by Radiologist.  XR CHEST PORTABLE   Final Result Consolidation/pneumonia cannot be excluded at the left lung base. CT ABDOMEN PELVIS W IV CONTRAST Additional Contrast? None   Final Result   Dilated small bowel loops concerning for small bowel obstruction. Consider   small-bowel follow-through. Left-sided colostomy is unremarkable. EKG: This EKG is signed and interpreted by me. Rate: 64  Rhythm: Sinus  Interpretation: no acute changes  Comparison: stable as compared to patient's most recent EKG        ------------------------- NURSING NOTES AND VITALS REVIEWED ---------------------------   The nursing notes within the ED encounter and vital signs as below have been reviewed by myself. /76   Pulse 96   Temp 97.4 °F (36.3 °C) (Oral)   Resp 15   Ht 5' 9\" (1.753 m)   Wt 179 lb (81.2 kg)   SpO2 96%   BMI 26.43 kg/m²   Oxygen Saturation Interpretation: Normal    The patients available past medical records and past encounters were reviewed. ------------------------------ ED COURSE/MEDICAL DECISION MAKING----------------------  Medications   0.9 % sodium chloride infusion ( IntraVENous New Bag 8/29/22 0001)   fentaNYL (SUBLIMAZE) injection 25 mcg (25 mcg IntraVENous Given 8/29/22 0015)   0.9 % sodium chloride bolus (0 mLs IntraVENous Stopped 8/29/22 0410)   iopamidol (ISOVUE-370) 76 % injection 75 mL (75 mLs IntraVENous Given 8/29/22 0203)   insulin regular (HUMULIN R;NOVOLIN R) injection 5 Units (5 Units IntraVENous Given 8/29/22 0410)   dextrose 50 % IV solution (25 g IntraVENous Given 8/29/22 0411)   calcium gluconate 1000 mg in dextrose 5% 100 mL IVPB (0 mg IntraVENous Stopped 8/29/22 0530)   fentaNYL (SUBLIMAZE) injection 25 mcg (25 mcg IntraVENous Given 8/29/22 0455)             Medical Decision Making:   Patient presenting here because of abdominal pain. Patient reporting symptoms started this evening. Patient reporting nausea vomiting.   She has history of colostomy history of diverticular disease patient reporting no chest pain or difficulty breathing. Labs noted reviewed CT there is no concern for bowel obstruction. Patient also noted to have mild hyperkalemia. Her initial potassium was 6 repeat was 5.7 she was medicated with calcium and D50 and insulin. Patient made aware of findings and plan. Patient will be admitted     Re-Evaluations:  Patient reevaluated and unchanged. Patient was made aware of findings and plan. Patient will be admitted. Patient reporting no chest pain or difficulty breathing. I was notified by nursing staff that pulse ox was in the upper 80s 88% to be exact patient will have chest x-ray ordered patient is in no respiratory distress. Consultations:             Dr Macey Sierra and general surgery    Critical Care:   0 critical care time      This patient's ED course included: a personal history and physicial eaxmination    This patient has been closely monitored during their ED course. Counseling: The emergency provider has spoken with the patient and discussed todays results, in addition to providing specific details for the plan of care and counseling regarding the diagnosis and prognosis. Questions are answered at this time and they are agreeable with the plan.       --------------------------------- IMPRESSION AND DISPOSITION ---------------------------------    IMPRESSION  1. Abdominal pain, unspecified abdominal location    2. Intestinal obstruction, unspecified cause, unspecified whether partial or complete ([address]    3. Hyperkalemia        DISPOSITION  Disposition: admit to monitored bed  Patient condition is stable        NOTE: This report was transcribed using voice recognition software.  Every effort was made to ensure accuracy; however, inadvertent computerized transcription errors may be present          Jl Ortiz MD  08/29/22 MD Jero  08/29/22 3092

## 2022-08-29 NOTE — PROGRESS NOTES
Echo Galicia was ordered liraglutide (Victoza) which is a nonformulary medication. This medication will need to be supplied by the patient as the pharmacy does not carry this non-formulary medication. If the medication has not been administered by 1400 on the following day from the time the order was placed, a pharmacist will follow-up with the nurse of the patient to assess the capability of the patient to bring in the medication. If it is determined that the patient cannot supply the medication and it is not available to be dispensed from the pharmacy, the provider will be notified.

## 2022-08-30 ENCOUNTER — APPOINTMENT (OUTPATIENT)
Dept: GENERAL RADIOLOGY | Age: 82
DRG: 390 | End: 2022-08-30
Payer: MEDICARE

## 2022-08-30 LAB
ANION GAP SERPL CALCULATED.3IONS-SCNC: 8 MMOL/L (ref 7–16)
BUN BLDV-MCNC: 17 MG/DL (ref 6–23)
CALCIUM SERPL-MCNC: 8.8 MG/DL (ref 8.6–10.2)
CHLORIDE BLD-SCNC: 106 MMOL/L (ref 98–107)
CO2: 29 MMOL/L (ref 22–29)
CREAT SERPL-MCNC: 0.9 MG/DL (ref 0.5–1)
GFR AFRICAN AMERICAN: >60
GFR NON-AFRICAN AMERICAN: 60 ML/MIN/1.73
GLUCOSE BLD-MCNC: 116 MG/DL (ref 74–99)
HCT VFR BLD CALC: 35.3 % (ref 34–48)
HEMOGLOBIN: 10.7 G/DL (ref 11.5–15.5)
MCH RBC QN AUTO: 27.6 PG (ref 26–35)
MCHC RBC AUTO-ENTMCNC: 30.3 % (ref 32–34.5)
MCV RBC AUTO: 91.2 FL (ref 80–99.9)
METER GLUCOSE: 104 MG/DL (ref 74–99)
METER GLUCOSE: 117 MG/DL (ref 74–99)
METER GLUCOSE: 131 MG/DL (ref 74–99)
METER GLUCOSE: 176 MG/DL (ref 74–99)
PDW BLD-RTO: 14.8 FL (ref 11.5–15)
PLATELET # BLD: 150 E9/L (ref 130–450)
PMV BLD AUTO: 10.8 FL (ref 7–12)
POTASSIUM SERPL-SCNC: 4.5 MMOL/L (ref 3.5–5)
RBC # BLD: 3.87 E12/L (ref 3.5–5.5)
SODIUM BLD-SCNC: 143 MMOL/L (ref 132–146)
WBC # BLD: 8.6 E9/L (ref 4.5–11.5)

## 2022-08-30 PROCEDURE — 82962 GLUCOSE BLOOD TEST: CPT

## 2022-08-30 PROCEDURE — 6370000000 HC RX 637 (ALT 250 FOR IP): Performed by: FAMILY MEDICINE

## 2022-08-30 PROCEDURE — 74018 RADEX ABDOMEN 1 VIEW: CPT

## 2022-08-30 PROCEDURE — 80048 BASIC METABOLIC PNL TOTAL CA: CPT

## 2022-08-30 PROCEDURE — 2580000003 HC RX 258: Performed by: FAMILY MEDICINE

## 2022-08-30 PROCEDURE — 85027 COMPLETE CBC AUTOMATED: CPT

## 2022-08-30 PROCEDURE — 2700000000 HC OXYGEN THERAPY PER DAY

## 2022-08-30 PROCEDURE — 36415 COLL VENOUS BLD VENIPUNCTURE: CPT

## 2022-08-30 PROCEDURE — 2140000000 HC CCU INTERMEDIATE R&B

## 2022-08-30 PROCEDURE — 6360000002 HC RX W HCPCS: Performed by: FAMILY MEDICINE

## 2022-08-30 RX ADMIN — MORPHINE SULFATE 2 MG: 2 INJECTION, SOLUTION INTRAMUSCULAR; INTRAVENOUS at 06:12

## 2022-08-30 RX ADMIN — PREGABALIN 50 MG: 50 CAPSULE ORAL at 21:21

## 2022-08-30 RX ADMIN — PAROXETINE 40 MG: 20 TABLET, FILM COATED ORAL at 08:28

## 2022-08-30 RX ADMIN — ATORVASTATIN CALCIUM 40 MG: 40 TABLET, FILM COATED ORAL at 08:28

## 2022-08-30 RX ADMIN — PREGABALIN 50 MG: 50 CAPSULE ORAL at 13:10

## 2022-08-30 RX ADMIN — ENALAPRIL MALEATE 20 MG: 5 TABLET ORAL at 08:27

## 2022-08-30 RX ADMIN — PREGABALIN 50 MG: 50 CAPSULE ORAL at 08:28

## 2022-08-30 RX ADMIN — SODIUM CHLORIDE: 9 INJECTION, SOLUTION INTRAVENOUS at 10:48

## 2022-08-30 RX ADMIN — MORPHINE SULFATE 2 MG: 2 INJECTION, SOLUTION INTRAMUSCULAR; INTRAVENOUS at 01:16

## 2022-08-30 ASSESSMENT — PAIN SCALES - PAIN ASSESSMENT IN ADVANCED DEMENTIA (PAINAD)
TOTALSCORE: 0
BREATHING: 0
NEGVOCALIZATION: 0
FACIALEXPRESSION: 0
CONSOLABILITY: 0
BODYLANGUAGE: 0

## 2022-08-30 ASSESSMENT — PAIN DESCRIPTION - LOCATION
LOCATION: ABDOMEN

## 2022-08-30 ASSESSMENT — ENCOUNTER SYMPTOMS
ABDOMINAL PAIN: 1
SHORTNESS OF BREATH: 0

## 2022-08-30 ASSESSMENT — PAIN SCALES - GENERAL
PAINLEVEL_OUTOF10: 5
PAINLEVEL_OUTOF10: 6
PAINLEVEL_OUTOF10: 6
PAINLEVEL_OUTOF10: 0

## 2022-08-30 NOTE — PROGRESS NOTES
Pt very agitated, confused, and verbally aggressive, attempting to climb out of bed. Bed alarm set, pt resituated in bed, and RN attempted to reorient pt. Pt unable to be reoriented, very tearful and agitated. Several minutes later, this RN called back to pt's room as she had pulled out her NG tube. Dr. Mayuri Meadows perfectserved regarding agitation and NG tube, and directed RN to replace it. NG tube replaced and bridled, KUB obtained.  initiated for pt safety and to avoid further removal of important lines, tubes, and monitors.

## 2022-08-30 NOTE — PROGRESS NOTES
08/29/22  0237 08/29/22  0611 08/30/22  0500     --  142 143   K 6.0* 5.7* 4.7 4.5     --  105 106   CO2 30*  --  27 29   BUN 19  --  18 17   CREATININE 1.0  --  0.9 0.9   GLUCOSE 169*  --  148* 116*     PT/INR:No results for input(s): PROTIME, INR in the last 72 hours. APTT:No results for input(s): APTT in the last 72 hours. LIVER PROFILE:  Recent Labs     08/29/22  0040   AST 20   ALT 11   BILITOT 0.3   ALKPHOS 77       Imaging Last 24 Hours:  CT ABDOMEN PELVIS W IV CONTRAST Additional Contrast? None    Result Date: 8/29/2022  EXAMINATION: CT OF THE ABDOMEN AND PELVIS WITH CONTRAST8/29/2022 2:00 am TECHNIQUE: CT of the abdomen and pelvis was performed with the administration of intravenous contrast. Multiplanar reformatted images are provided for review. Automated exposure control, iterative reconstruction, and/or weight based adjustment of the mA/kV was utilized to reduce the radiation dose to as low as reasonably achievable. COMPARISON: None HISTORY: ORDERING SYSTEM PROVIDED HISTORY: abdominal pain TECHNOLOGIST PROVIDED HISTORY: Reason for exam:->abdominal pain Additional Contrast?->None Decision Support Exception - unselect if not a suspected or confirmed emergency medical condition->Emergency Medical Condition (MA) What reading provider will be dictating this exam?->CRC FINDINGS: THORACIC STRUCTURES: Unremarkable. LIVER:  The liver is normal in size, contour and attenuation. No focal mass. No intra or extrahepatic bile duct dilation. GALL BLADDER: Unremarkable. SPLEEN:  Unremarkable. PANCREAS:  Unremarkable. ADRENAL GLANDS:  Unremarkable. ESOPHAGUS AND STOMACH:  Unremarkable. BOWEL: Small bowel: There is distended loops of small bowel identified with collapse of the terminal ileum. Large bowel: The there is a left colostomy. The appendix is within normal limits. There is no intraperitoneal free air or fluid. URINARY/GENITAL TRACT: Kidneys:  The kidneys are unremarkable. .  No evidence of hydronephrosis, renal calcifications or solid renal mass. Ureters: The ureters are normal course and caliber. There is no evidence of ureter calculus/calculi. URINARY BLADDER:  The urinary bladder is well distended without wall thickening or focal mass. REPRODUCTIVE ORGANS:  Unremarkable. BLOOD VESSELS: Unremarkable given patients age. An IVC filter is visualized. LYMPH NODES:  No evidence of intraabdominal or intrapelvic lymphadenopathy. ABDOMINAL WALL & SOFT TISSUES:  There is diastasis of the rectus abdominus muscles. OSSEOUS STRUCTURES: No acute osseous lesion. Dilated small bowel loops concerning for small bowel obstruction. Consider small-bowel follow-through. Left-sided colostomy is unremarkable. XR CHEST PORTABLE    Result Date: 8/29/2022  EXAMINATION: ONE XRAY VIEW OF THE CHEST8/29/2022 TECHNIQUE: Frontal view submitted COMPARISON: None. HISTORY: ORDERING SYSTEM PROVIDED HISTORY: hypoxia TECHNOLOGIST PROVIDED HISTORY: Reason for exam:->hypoxia What reading provider will be dictating this exam?->CRC FINDINGS: The lungs demonstrate no large pleural effusion, or pneumothorax. Opacification at the left lung base is identified, consolidation/pneumonia cannot be excluded the cardiomediastinal silhouette is stable. Consolidation/pneumonia cannot be excluded at the left lung base. XR ABDOMEN FOR NG/OG/NE TUBE PLACEMENT    Result Date: 8/29/2022  EXAMINATION: ONE SUPINE XRAY VIEW(S) OF THE ABDOMEN 8/29/2022 9:53 am COMPARISON: None. HISTORY: ORDERING SYSTEM PROVIDED HISTORY: Confirmation of course of NG/OG/NE tube and location of tip of tube TECHNOLOGIST PROVIDED HISTORY: Reason for exam:->Confirmation of course of NG/OG/NE tube and location of tip of tube Portable? ->Yes What reading provider will be dictating this exam?->CRC FINDINGS: Single AP abdomen with lower chest demonstrates a orogastric tube below the GE junction with tip in the fundus of the stomach in satisfactory position.  There is an

## 2022-08-30 NOTE — PROGRESS NOTES
GENERAL SURGERY  DAILY PROGRESS NOTE  8/30/2022    Subjective:  No events overnight. Improved abdominal pain this AM but still present. Increased ostomy output with only minimal NG tube output. Denies any further nausea/vomiting. Objective:  /60   Pulse 95   Temp 97.7 °F (36.5 °C) (Oral)   Resp 18   Ht 5' 9\" (1.753 m)   Wt 179 lb (81.2 kg)   SpO2 97%   BMI 26.43 kg/m²     General appearance: alert, cooperative and in no acute distress. Eyes: grossly normal  Lungs: nonlabored breathing   Heart: regular rate  Abdomen: soft, non-tender, non distended. Ventral hernia soft and nontender. Ostomy with increased brown stool in ostomy bag. Skin: No skin abnormalities  Neurologic: Alert and oriented x 3. Grossly normal  Musculoskeletal: No clubbing cyanosis or edema    Assessment/Plan:  78 y.o. 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 Increased ostomy output   - Clamp NG tube and advance to clear liquid diet  - Add milk of magnesia to bowel regimen.  - Continue to follow bowel function.      Electronically signed by Artur Zhao DO on 8/30/2022 at 6:58 AM    Seen/examined  Agree with above  Abdomen soft  Ostomy functioning  Clamp NG, start clears- may remove NG later today  Caleb

## 2022-08-30 NOTE — PLAN OF CARE
Problem: Chronic Conditions and Co-morbidities  Goal: Patient's chronic conditions and co-morbidity symptoms are monitored and maintained or improved  8/30/2022 1547 by Charli Tanner RN  Outcome: Progressing  8/30/2022 1148 by Duncan Montilla RN  Outcome: Progressing     Problem: Discharge Planning  Goal: Discharge to home or other facility with appropriate resources  8/30/2022 1148 by Duncan Montilla RN  Outcome: Progressing     Problem: Pain  Goal: Verbalizes/displays adequate comfort level or baseline comfort level  8/30/2022 1547 by Charli Tanner RN  Outcome: Progressing  8/30/2022 1148 by Duncan Montilla RN  Outcome: Progressing     Problem: Skin/Tissue Integrity  Goal: Absence of new skin breakdown  Description: 1. Monitor for areas of redness and/or skin breakdown  2. Assess vascular access sites hourly  3. Every 4-6 hours minimum:  Change oxygen saturation probe site  4. Every 4-6 hours:  If on nasal continuous positive airway pressure, respiratory therapy assess nares and determine need for appliance change or resting period.   8/30/2022 1547 by Charli Tanner RN  Outcome: Progressing  8/30/2022 1148 by Duncan Montilla RN  Outcome: Progressing

## 2022-08-30 NOTE — CARE COORDINATION
Per nursing rounds, patient confused. Call made to patient's , Cleopatra Paz for transition of care planning; spoke with him and daughter, Shilo Benjamin over the phone. He reports the patient and their daughter, Shilo Benjamin all live in a home together, 4 steps to enter. He acknowledges he and Shilo Benjamin are at home 24/7, the patient is independent at home and ambulates with a walker; has a wheelchair at home. Patient currently on 3L NC, no oxygen at baseline; they report no preference on DME company if oxygen is needed at discharge. No hx of home care, hx of [facility] in the past. Cleopatra Paz reports his wife has never wanted to go to a nursing facility and he would like for her to return home if she is able. Uses [facility]) and PCP is Dr. Preston Vidal. Patient will need to be evaluated by PT/OT. SW/CM will continue to follow. The Plan for Transition of Care is related to the following treatment goals: discharge planning    The Patient and/or patient representative Segun Dowd was provided with a choice of provider and agrees with the discharge plan. [x] Yes [] No    Freedom of choice list was provided with basic dialogue that supports the patient's individualized plan of care/goals, treatment preferences and shares the quality data associated with the providers.  [x] Yes [] No     Dane Luna, MSW, LSW [phone]

## 2022-08-31 LAB
ANION GAP SERPL CALCULATED.3IONS-SCNC: 11 MMOL/L (ref 7–16)
BUN BLDV-MCNC: 11 MG/DL (ref 6–23)
CALCIUM SERPL-MCNC: 8.9 MG/DL (ref 8.6–10.2)
CHLORIDE BLD-SCNC: 102 MMOL/L (ref 98–107)
CO2: 28 MMOL/L (ref 22–29)
CREAT SERPL-MCNC: 0.7 MG/DL (ref 0.5–1)
GFR AFRICAN AMERICAN: >60
GFR NON-AFRICAN AMERICAN: >60 ML/MIN/1.73
GLUCOSE BLD-MCNC: 127 MG/DL (ref 74–99)
HCT VFR BLD CALC: 34 % (ref 34–48)
HEMOGLOBIN: 10.5 G/DL (ref 11.5–15.5)
MCH RBC QN AUTO: 27 PG (ref 26–35)
MCHC RBC AUTO-ENTMCNC: 30.9 % (ref 32–34.5)
MCV RBC AUTO: 87.4 FL (ref 80–99.9)
METER GLUCOSE: 102 MG/DL (ref 74–99)
METER GLUCOSE: 109 MG/DL (ref 74–99)
METER GLUCOSE: 122 MG/DL (ref 74–99)
PDW BLD-RTO: 14.6 FL (ref 11.5–15)
PLATELET # BLD: 142 E9/L (ref 130–450)
PMV BLD AUTO: 10.6 FL (ref 7–12)
POTASSIUM SERPL-SCNC: 3.9 MMOL/L (ref 3.5–5)
RBC # BLD: 3.89 E12/L (ref 3.5–5.5)
SODIUM BLD-SCNC: 141 MMOL/L (ref 132–146)
WBC # BLD: 8.6 E9/L (ref 4.5–11.5)

## 2022-08-31 PROCEDURE — 2700000000 HC OXYGEN THERAPY PER DAY

## 2022-08-31 PROCEDURE — 6370000000 HC RX 637 (ALT 250 FOR IP): Performed by: FAMILY MEDICINE

## 2022-08-31 PROCEDURE — 36415 COLL VENOUS BLD VENIPUNCTURE: CPT

## 2022-08-31 PROCEDURE — 1200000000 HC SEMI PRIVATE

## 2022-08-31 PROCEDURE — 85027 COMPLETE CBC AUTOMATED: CPT

## 2022-08-31 PROCEDURE — 80048 BASIC METABOLIC PNL TOTAL CA: CPT

## 2022-08-31 PROCEDURE — 2580000003 HC RX 258: Performed by: FAMILY MEDICINE

## 2022-08-31 PROCEDURE — 82962 GLUCOSE BLOOD TEST: CPT

## 2022-08-31 PROCEDURE — 2140000000 HC CCU INTERMEDIATE R&B

## 2022-08-31 RX ADMIN — ATORVASTATIN CALCIUM 40 MG: 40 TABLET, FILM COATED ORAL at 09:57

## 2022-08-31 RX ADMIN — PREGABALIN 50 MG: 50 CAPSULE ORAL at 19:59

## 2022-08-31 RX ADMIN — SODIUM CHLORIDE: 9 INJECTION, SOLUTION INTRAVENOUS at 10:54

## 2022-08-31 RX ADMIN — SODIUM CHLORIDE: 9 INJECTION, SOLUTION INTRAVENOUS at 23:18

## 2022-08-31 RX ADMIN — ENALAPRIL MALEATE 20 MG: 5 TABLET ORAL at 09:59

## 2022-08-31 RX ADMIN — PREGABALIN 50 MG: 50 CAPSULE ORAL at 15:30

## 2022-08-31 RX ADMIN — PREGABALIN 50 MG: 50 CAPSULE ORAL at 10:04

## 2022-08-31 RX ADMIN — PAROXETINE 40 MG: 20 TABLET, FILM COATED ORAL at 09:58

## 2022-08-31 ASSESSMENT — ENCOUNTER SYMPTOMS
SHORTNESS OF BREATH: 0
ABDOMINAL PAIN: 1

## 2022-08-31 ASSESSMENT — PAIN SCALES - GENERAL: PAINLEVEL_OUTOF10: 0

## 2022-08-31 NOTE — PROGRESS NOTES
GENERAL SURGERY  DAILY PROGRESS NOTE  8/31/2022    Subjective:  Patient very confused this morning. NG tube in place and clamped. Does not appear to have any abdominal pain. Objective:  BP (!) 138/57   Pulse (!) 108   Temp 98.2 °F (36.8 °C) (Oral)   Resp 16   Ht 5' 9\" (1.753 m)   Wt 179 lb (81.2 kg)   SpO2 97%   BMI 26.43 kg/m²     General appearance: In no acute distress. Eyes: grossly normal  Lungs: nonlabored breathing   Heart: tachycardic, hypertensive  Abdomen: soft, non-tender, non distended. Ventral hernia soft and nontender. Ostomy with brown stool in ostomy bag. NG in place and clamped. Skin: No skin abnormalities  Neurologic: Confused. Musculoskeletal: No clubbing cyanosis or edema    Assessment/Plan:  78 y.o. 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 - Monitor abdominal exam.  - Ok for clear liquid diet as tolerated. - Does not need NG tube from surgical POV, recognize that patient may benefit from NG for other purposes as patient probably cannot take in PO and medications due to current mental status. - Continue bowel regimen.  - Continue to follow bowel function.      Electronically signed by Carlos Patel MD on 8/31/2022 at 6:18 AM    Seen/examined  Agree with above  Keep NG for meds/feeds but watch for aspiration risk- can remove when AMS resolves  JSGadyMD Pediatric Endocrinology Follow-up Consultation    Patient: Maci Nieves MRN# [id number]   YOB: 2017 Age: 1 year 6 month old   Date of Visit: Oct 27, 2017    Dear Dr. Shyanne Irizarry:    I had the pleasure of seeing your patient, Maci Nieves in the Pediatric Endocrinology Clinic, [facility], on Oct 27, 2017 for a follow-up consultation of congenital hypothyroidism .           Problem list:     Patient Active Problem List    Diagnosis Date Noted     Febrile UTI less than 6 months 2015     Priority: Medium     Hypothyroidism, congenital, transient 2015     Priority: Medium     LIkely transient.  Maci 's TPO antibodies are mildly elevated. Because both mother and brother have hypothyroidism it is possible that Roberts thyroid function are recovering from the effect of maternal blocking antibodies.               HPI:   Maci returns for routine follow-up.  My last visit with Maci was in December. As you will recall, Maci had evidence for an elevated TSH level on  screen, which remained modestly elevated on subsequent testing. She was initially started on 25 mcg of levothyroxine therapy. This resulted in suppressed TSH level back in 2015 down to 0.02 with an elevated free T4 of 2.13. I subsequently did decrease her to 12.5 mcg but then slowly increased her over the next several months based on her lab values.  She has been on 25 mcg since 2016.  At out last appointment in April, I made no additional changes.  She had labs at end of May that were normal/improved.       Mom reports she is doing well.  She is running and energetic throughout the day.  No concerns about her thyroid dose.  She is getting her dose consistently every morning.  She is sleeping well.  She naps daily.  No problems with constipation.  No dry skin.  Development progressing normally.  She is speaking English and Ukranian at  "home though it is difficult to understand her at times.  Otherwise has been healthy.    History was obtained from patient's mother.          Social History:     Social History     Social History Narrative   No new changes at home   (home) - changed to center  Eating well    Social history was reviewed and is unchanged. Refer to the initial note.         Family History:     Family History   Problem Relation Age of Onset     Thyroid Disease Mother      mom diagnosed in 2011 - on 200 mcg     Thyroid Disease Brother      congential hypothyroidism - NBS had TSH of 86.1, dropped to 13 then increased to 37.     DIABETES No family hx of        Family history was reviewed and is unchanged. Refer to the initial note.         Allergies:   No Known Allergies          Medications:     Current Outpatient Prescriptions   Medication Sig Dispense Refill     levothyroxine (SYNTHROID/LEVOTHROID) 25 MCG tablet Take 1 tablet (25 mcg) by mouth daily 90 tablet 3     Acetaminophen (TYLENOL PO) Reported on 5/15/2017       MOTRIN IB PO Reported on 5/15/2017       albuterol (2.5 MG/3ML) 0.083% nebulizer solution Take 1 vial (2.5 mg) by nebulization every 4 hours as needed for shortness of breath / dyspnea or wheezing (Patient not taking: Reported on 5/15/2017) 30 vial 0             Review of Systems:   Gen: Negative  Eye: Negative  ENT: recent injury to eye/face at   Pulmonary:  Negative  Cardio: Negative  Gastrointestinal: Negative  Hematologic: Negative  Genitourinary: Negative  Musculoskeletal: Negative  Psychiatric: Negative  Neurologic: Negative  Skin: Negative  Endocrine: see HPI.            Physical Exam:   Height 0.937 m (3' 0.89\"), weight 14.3 kg (31 lb 8.4 oz).  No blood pressure reading on file for this encounter.  Height: 93.7 cm  (30.12\") 82 %ile based on CDC 2-20 Years stature-for-age data using vitals from 10/27/2017.  Weight: 14.3 kg (actual weight), 79 %ile based on CDC 2-20 Years weight-for-age data using " vitals from 10/27/2017.  BMI: Body mass index is 16.29 kg/(m^2). 58 %ile based on CDC 2-20 Years BMI-for-age data using vitals from 10/27/2017.      Constitutional: awake, alert, cooperative, no apparent distress  Eyes: RRX2, normal corneal light reglex  ENT: Normocephalic, without obvious abnormality,normal facial features, non-dysmorphic  Neck: , thyroid symmetric, not enlarged and no tenderness  Hematologic / Lymphatic: no cervical lymphadenopathy  Lungs: No increased work of breathing, clear to auscultation bilaterally with good air entry.  Cardiovascular: Regular rate and rhythm, no murmurs.  Abdomen: No scars, soft, non-distended, non-tender, no masses palpated, no hepatosplenomegaly  Musculoskeletal: There is no redness, warmth, or swelling of the joints.    Neurologic: Normal tone and refelexes  Neuropsychiatric: age appropriate  Skin: no lesions        Laboratory results:     TSH   Date Value Ref Range Status   05/30/2017 2.99 0.40 - 4.00 mU/L Final   02/04/2017 3.28 0.40 - 4.00 mU/L Final     T4 Free   Date Value Ref Range Status   05/30/2017 1.43 0.76 - 1.46 ng/dL Final   02/04/2017 1.23 0.76 - 1.46 ng/dL Final          Assessment and Plan:   Chantell is 30 month old female with a history for mild congenital hypothyroidism with normal growth and development on 25 mcg of levothyroxine daily.  She is clinically euthyroid today.  I am hopeful that given the trend in her thyroid testing on a low dose of thyroid hormone (now 1.7 mcg/kg/dose), that her previous abnormalities were a transient phenomenon.  Since she is now at 2.5 years old, I recommended a plan for a gentle wean to get her down to about 1 mcg/kg/day.  If her labs remain normal at this dose, it would be reasonable to discontinue her thyroid hormone and observe whether there is any requirement for her or not.      Patient Instructions   Change levthyroxine to alternating 12.5 mcg and 25 mcg every other day (12.5 mcg M, W, Th, [place])  Repeat labs in 6 weeks  - we will discuss at that point and decide whether to make additional changes at that time.  Follow-up visit on as needed basis pending her lab results on the dose change(s)      Thank you for allowing me to participate in the care of your patient.  Please do not hesitate to call with questions or concerns.    Sincerely,    Dre Mckeon MD    Pager [phone]        CC  Patient Care Team:  Shyanne Irizarry MD as PCP - General (Pediatrics)  SHYANNE IRIZARRY    Copy to patient   CINTHYA [place] ROSA  [id number] [facility] [id number]

## 2022-08-31 NOTE — CARE COORDINATION
Per nursing rounds, patient okay to start clear liquid diet today and remains confused. Patient has NG tube in place and clamped; per general surgery, patient does not need NG tube. Patient pending SLP evaluation. Discharge plan unclear at this time and patient will need evaluated by PT/OT. SW/CM will continue to follow.     Lexi Evans, MSW, LSW [phone]

## 2022-08-31 NOTE — PLAN OF CARE
Problem: Chronic Conditions and Co-morbidities  Goal: Patient's chronic conditions and co-morbidity symptoms are monitored and maintained or improved  8/31/2022 0128 by Aby Christine RN  Outcome: Progressing  8/30/2022 1547 by Mike Calabrese RN  Outcome: Progressing  8/30/2022 1148 by Yudy Barber RN  Outcome: Progressing     Problem: Discharge Planning  Goal: Discharge to home or other facility with appropriate resources  8/31/2022 0128 by Aby Christine RN  Outcome: Progressing  8/30/2022 1148 by Yudy Barber RN  Outcome: Progressing     Problem: Pain  Goal: Verbalizes/displays adequate comfort level or baseline comfort level  8/31/2022 0128 by Aby Christine RN  Outcome: Progressing  8/30/2022 1547 by Mike Calabrese RN  Outcome: Progressing  8/30/2022 1148 by Yudy Barber RN  Outcome: Progressing     Problem: Skin/Tissue Integrity  Goal: Absence of new skin breakdown  Description: 1. Monitor for areas of redness and/or skin breakdown  2. Assess vascular access sites hourly  3. Every 4-6 hours minimum:  Change oxygen saturation probe site  4. Every 4-6 hours:  If on nasal continuous positive airway pressure, respiratory therapy assess nares and determine need for appliance change or resting period. 8/31/2022 0128 by Aby Christine RN  Outcome: Progressing  8/30/2022 1547 by Mike Calabrese RN  Outcome: Progressing  8/30/2022 1148 by Yudy Barber RN  Outcome: Progressing     Problem: Confusion  Goal: Confusion, delirium, dementia, or psychosis is improved or at baseline  Description: INTERVENTIONS:  1. Assess for possible contributors to thought disturbance, including medications, impaired vision or hearing, underlying metabolic abnormalities, dehydration, psychiatric diagnoses, and notify attending LIP  2. [place] high risk fall precautions, as indicated  3. Provide frequent short contacts to provide reality reorientation, refocusing and direction  4.  Decrease environmental stimuli, including noise as appropriate  5. Monitor and intervene to maintain adequate nutrition, hydration, elimination, sleep and activity  6. If unable to ensure safety without constant attention obtain sitter and review sitter guidelines with assigned personnel  7.  Initiate Psychosocial CNS and Spiritual Care consult, as indicated  8/31/2022 0128 by Annelise Camacho RN  Outcome: Progressing  Flowsheets (Taken 8/30/2022 1958)  Effect of thought disturbance (confusion, delirium, dementia, or psychosis) are managed with adequate functional status:   Assess for contributors to thought disturbance, including medications, impaired vision or hearing, underlying metabolic abnormalities, dehydration, psychiatric diagnoses, notify [place] high risk fall precautions, as indicated  8/30/2022 1148 by Luiz Tate RN  Outcome: Progressing  Flowsheets (Taken 8/30/2022 0800)  Effect of thought disturbance (confusion, delirium, dementia, or psychosis) are managed with adequate functional status:   Assess for contributors to thought disturbance, including medications, impaired vision or hearing, underlying metabolic abnormalities, dehydration, psychiatric diagnoses, notify [place] high risk fall precautions, as indicated     Problem: Safety - Adult  Goal: Free from fall injury  8/31/2022 0128 by Annelise Camacho RN  Outcome: Progressing  8/30/2022 1547 by Clair Yepez RN  Outcome: Progressing  8/30/2022 1148 by Luiz Tate RN  Outcome: Progressing  Flowsheets (Taken 8/30/2022 1108)  Free From Fall Injury: Instruct family/caregiver on patient safety     Problem: ABCDS Injury Assessment  Goal: Absence of physical injury  8/31/2022 0128 by Annelise Camacho RN  Outcome: Progressing  8/30/2022 1547 by Clair Yepez RN  Outcome: Progressing  8/30/2022 1148 by Luiz Tate RN  Outcome: Progressing

## 2022-08-31 NOTE — PROGRESS NOTES
Progress Note  Date:2022       [facility]:[id number]  Patient Lisa Beltran     Date of Birth:[birth year]     Age:82 y.o. Patient says she feels fair today. A little confused at this time. Subjective    Subjective:  Symptoms:  Stable. No shortness of breath or chest pain. Diet:  NPO. Activity level: Impaired due to pain. Pain:  She complains of pain that is mild. Review of Systems   Constitutional:  Negative for activity change and fever. HENT:  Negative for congestion. Respiratory:  Negative for shortness of breath. Cardiovascular:  Negative for chest pain. Gastrointestinal:  Positive for abdominal pain. Neurological:  Negative for dizziness. Objective         Vitals Last 24 Hours:  TEMPERATURE:  Temp  Av.4 °F (36.9 °C)  Min: 98.2 °F (36.8 °C)  Max: 98.5 °F (36.9 °C)  RESPIRATIONS RANGE: Resp  Avg: 15.7  Min: 15  Max: 16  PULSE OXIMETRY RANGE: SpO2  Av.8 %  Min: 97 %  Max: 99 %  PULSE RANGE: Pulse  Av  Min: 102  Max: 111  BLOOD PRESSURE RANGE: Systolic (32KRC), ACR:005 , Min:130 , VDP:909   ; Diastolic (39BPK), ANALI:95, Min:57, Max:81    I/O (24Hr): Intake/Output Summary (Last 24 hours) at 2022 6818  Last data filed at 2022 1631  Gross per 24 hour   Intake 200 ml   Output 100 ml   Net 100 ml       Objective:  General Appearance:  Comfortable. Vital signs: (most recent): Blood pressure (!) 138/57, pulse (!) 108, temperature 98.2 °F (36.8 °C), temperature source Oral, resp. rate 16, height 5' 9\" (1.753 m), weight 179 lb (81.2 kg), SpO2 97 %. No fever. Lungs:  Normal effort and normal respiratory rate. Breath sounds clear to auscultation. Heart: Normal rate. Regular rhythm. S1 normal and S2 normal.    Abdomen: There is generalized tenderness.      Labs/Imaging/Diagnostics    Labs:  CBC:  Recent Labs     22  2345 22  0500 22  0504   WBC 13.5* 8.6 8.6   RBC 4.72 3.87 3.89   HGB 12.7 10.7* 10.5*   HCT 42.6 35.3 34.0   MCV 90.3 91.2 87.4   RDW 14.8 14.8 14.6    150 142       CHEMISTRIES:  Recent Labs     08/29/22  0611 08/30/22  0500 08/31/22  0504    143 141   K 4.7 4.5 3.9    106 102   CO2 27 29 28   BUN 18 17 11   CREATININE 0.9 0.9 0.7   GLUCOSE 148* 116* 127*       PT/INR:No results for input(s): PROTIME, INR in the last 72 hours. APTT:No results for input(s): APTT in the last 72 hours. LIVER PROFILE:  Recent Labs     08/29/22  0040   AST 20   ALT 11   BILITOT 0.3   ALKPHOS 77         Imaging Last 24 Hours:  CT ABDOMEN PELVIS W IV CONTRAST Additional Contrast? None    Result Date: 8/29/2022  EXAMINATION: CT OF THE ABDOMEN AND PELVIS WITH CONTRAST8/29/2022 2:00 am TECHNIQUE: CT of the abdomen and pelvis was performed with the administration of intravenous contrast. Multiplanar reformatted images are provided for review. Automated exposure control, iterative reconstruction, and/or weight based adjustment of the mA/kV was utilized to reduce the radiation dose to as low as reasonably achievable. COMPARISON: None HISTORY: ORDERING SYSTEM PROVIDED HISTORY: abdominal pain TECHNOLOGIST PROVIDED HISTORY: Reason for exam:->abdominal pain Additional Contrast?->None Decision Support Exception - unselect if not a suspected or confirmed emergency medical condition->Emergency Medical Condition (MA) What reading provider will be dictating this exam?->CRC FINDINGS: THORACIC STRUCTURES: Unremarkable. LIVER:  The liver is normal in size, contour and attenuation. No focal mass. No intra or extrahepatic bile duct dilation. GALL BLADDER: Unremarkable. SPLEEN:  Unremarkable. PANCREAS:  Unremarkable. ADRENAL GLANDS:  Unremarkable. ESOPHAGUS AND STOMACH:  Unremarkable. BOWEL: Small bowel: There is distended loops of small bowel identified with collapse of the terminal ileum. Large bowel: The there is a left colostomy. The appendix is within normal limits. There is no intraperitoneal free air or fluid.  URINARY/GENITAL TRACT: Kidneys:  The kidneys are unremarkable. .  No evidence of hydronephrosis, renal calcifications or solid renal mass. Ureters: The ureters are normal course and caliber. There is no evidence of ureter calculus/calculi. URINARY BLADDER:  The urinary bladder is well distended without wall thickening or focal mass. REPRODUCTIVE ORGANS:  Unremarkable. BLOOD VESSELS: Unremarkable given patients age. An IVC filter is visualized. LYMPH NODES:  No evidence of intraabdominal or intrapelvic lymphadenopathy. ABDOMINAL WALL & SOFT TISSUES:  There is diastasis of the rectus abdominus muscles. OSSEOUS STRUCTURES: No acute osseous lesion. Dilated small bowel loops concerning for small bowel obstruction. Consider small-bowel follow-through. Left-sided colostomy is unremarkable. XR CHEST PORTABLE    Result Date: 8/29/2022  EXAMINATION: ONE XRAY VIEW OF THE CHEST8/29/2022 TECHNIQUE: Frontal view submitted COMPARISON: None. HISTORY: ORDERING SYSTEM PROVIDED HISTORY: hypoxia TECHNOLOGIST PROVIDED HISTORY: Reason for exam:->hypoxia What reading provider will be dictating this exam?->CRC FINDINGS: The lungs demonstrate no large pleural effusion, or pneumothorax. Opacification at the left lung base is identified, consolidation/pneumonia cannot be excluded the cardiomediastinal silhouette is stable. Consolidation/pneumonia cannot be excluded at the left lung base. XR ABDOMEN FOR NG/OG/NE TUBE PLACEMENT    Result Date: 8/29/2022  EXAMINATION: ONE SUPINE XRAY VIEW(S) OF THE ABDOMEN 8/29/2022 9:53 am COMPARISON: None. HISTORY: ORDERING SYSTEM PROVIDED HISTORY: Confirmation of course of NG/OG/NE tube and location of tip of tube TECHNOLOGIST PROVIDED HISTORY: Reason for exam:->Confirmation of course of NG/OG/NE tube and location of tip of tube Portable? ->Yes What reading provider will be dictating this exam?->CRC FINDINGS: Single AP abdomen with lower chest demonstrates a orogastric tube below the GE junction with tip in the fundus of the stomach in satisfactory position. There is an umbrella device within the inferior vena cava. There is a a small left-sided pleural effusion. Satisfactory placement of orogastric tube. Assessment//Plan           Hospital Problems             Last Modified POA    * (Principal) Bowel obstruction ([address] 8/29/2022 Yes   Assessment:   (* (Principal) Bowel obstruction ([address] 8/29/2022 Yes  Hyperkalemia  Abdominal pain  DM  HTN  Hyperlipidemia     Plan  Surgery following  Surgery said she does not need NG tube for surgical reasons but agree will leave for now due to her mental status. Pain control  Monitor labs and correct as needed. Continue rest of meds. ).

## 2022-09-01 LAB
ANION GAP SERPL CALCULATED.3IONS-SCNC: 11 MMOL/L (ref 7–16)
BUN BLDV-MCNC: 7 MG/DL (ref 6–23)
CALCIUM SERPL-MCNC: 8.9 MG/DL (ref 8.6–10.2)
CHLORIDE BLD-SCNC: 102 MMOL/L (ref 98–107)
CO2: 27 MMOL/L (ref 22–29)
CREAT SERPL-MCNC: 0.7 MG/DL (ref 0.5–1)
GFR AFRICAN AMERICAN: >60
GFR NON-AFRICAN AMERICAN: >60 ML/MIN/1.73
GLUCOSE BLD-MCNC: 146 MG/DL (ref 74–99)
HCT VFR BLD CALC: 34.4 % (ref 34–48)
HEMOGLOBIN: 10.8 G/DL (ref 11.5–15.5)
MCH RBC QN AUTO: 26.9 PG (ref 26–35)
MCHC RBC AUTO-ENTMCNC: 31.4 % (ref 32–34.5)
MCV RBC AUTO: 85.8 FL (ref 80–99.9)
METER GLUCOSE: 120 MG/DL (ref 74–99)
METER GLUCOSE: 149 MG/DL (ref 74–99)
METER GLUCOSE: 151 MG/DL (ref 74–99)
METER GLUCOSE: 151 MG/DL (ref 74–99)
PDW BLD-RTO: 14.4 FL (ref 11.5–15)
PLATELET # BLD: 158 E9/L (ref 130–450)
PMV BLD AUTO: 10.4 FL (ref 7–12)
POTASSIUM SERPL-SCNC: 3.6 MMOL/L (ref 3.5–5)
RBC # BLD: 4.01 E12/L (ref 3.5–5.5)
SODIUM BLD-SCNC: 140 MMOL/L (ref 132–146)
WBC # BLD: 8.8 E9/L (ref 4.5–11.5)

## 2022-09-01 PROCEDURE — 1200000000 HC SEMI PRIVATE

## 2022-09-01 PROCEDURE — 92523 SPEECH SOUND LANG COMPREHEN: CPT

## 2022-09-01 PROCEDURE — 2140000000 HC CCU INTERMEDIATE R&B

## 2022-09-01 PROCEDURE — 97530 THERAPEUTIC ACTIVITIES: CPT

## 2022-09-01 PROCEDURE — 97535 SELF CARE MNGMENT TRAINING: CPT

## 2022-09-01 PROCEDURE — 82962 GLUCOSE BLOOD TEST: CPT

## 2022-09-01 PROCEDURE — 6370000000 HC RX 637 (ALT 250 FOR IP): Performed by: FAMILY MEDICINE

## 2022-09-01 PROCEDURE — 36415 COLL VENOUS BLD VENIPUNCTURE: CPT

## 2022-09-01 PROCEDURE — 2580000003 HC RX 258: Performed by: FAMILY MEDICINE

## 2022-09-01 PROCEDURE — 85027 COMPLETE CBC AUTOMATED: CPT

## 2022-09-01 PROCEDURE — 97165 OT EVAL LOW COMPLEX 30 MIN: CPT

## 2022-09-01 PROCEDURE — 80048 BASIC METABOLIC PNL TOTAL CA: CPT

## 2022-09-01 RX ADMIN — PREGABALIN 50 MG: 50 CAPSULE ORAL at 15:18

## 2022-09-01 RX ADMIN — SODIUM CHLORIDE: 9 INJECTION, SOLUTION INTRAVENOUS at 21:27

## 2022-09-01 RX ADMIN — ENALAPRIL MALEATE 20 MG: 5 TABLET ORAL at 10:18

## 2022-09-01 RX ADMIN — ATORVASTATIN CALCIUM 40 MG: 40 TABLET, FILM COATED ORAL at 10:18

## 2022-09-01 RX ADMIN — SODIUM CHLORIDE: 9 INJECTION, SOLUTION INTRAVENOUS at 10:23

## 2022-09-01 RX ADMIN — PREGABALIN 50 MG: 50 CAPSULE ORAL at 10:18

## 2022-09-01 RX ADMIN — PAROXETINE 40 MG: 20 TABLET, FILM COATED ORAL at 10:17

## 2022-09-01 RX ADMIN — PREGABALIN 50 MG: 50 CAPSULE ORAL at 21:26

## 2022-09-01 ASSESSMENT — ENCOUNTER SYMPTOMS
SHORTNESS OF BREATH: 0
ABDOMINAL PAIN: 1

## 2022-09-01 ASSESSMENT — PAIN SCALES - GENERAL: PAINLEVEL_OUTOF10: 0

## 2022-09-01 NOTE — PROGRESS NOTES
SPEECH/LANGUAGE PATHOLOGY  SPEECH/LANGUAGE/COGNITIVE EVALUATION   and PLAN OF CARE      PATIENT NAME:  Shari Baldwin  (female)     MRN:  [id number]    :  1940  (78 y.o.)  STATUS:  Inpatient: Room 6416/6416-B    TODAY'S DATE:  2022  REFERRING PROVIDER:   ***   SPECIFIC PROVIDER ORDER: {slporders:41567}  Date of order:  ***  REASON FOR REFERRAL:  ***  EVALUATING THERAPIST: YESICA Mark    ADMITTING DIAGNOSIS: Hyperkalemia [E87.5]  Bowel obstruction (HCC) [K56.609]  Abdominal pain, unspecified abdominal location [R10.9]  Intestinal obstruction, unspecified cause, unspecified whether partial or complete ([facility] [K56.609]    VISIT DIAGNOSIS:   Visit Diagnoses         Codes    Abdominal pain, unspecified abdominal location    -  Primary R10.9    Hyperkalemia     E87.5               SPEECH THERAPY  PLAN OF CARE   The speech therapy  POC is established based on physician order, speech pathology diagnosis and results of clinical assessment     SPEECH PATHOLOGY DIAGNOSIS:    ***    {kmintervention:61356}     Conditions Requiring Skilled Therapeutic Intervention for speech, language and/or cognition    {SLP speech conditions POC:75895}    Specific Speech Therapy Interventions to Include:   {SLP treatments:56722}    Specific instructions for next treatment:     {slp specific instructions:78531:p}    SHORT/LONG TERM GOALS  {kmstg/lt}    Patient goals: Patient/family involved in developing goals and treatment plan:   Treatment goals discussed with {kmptfam:38717}   The {kmpatientfamily:04389}   The patient/family {kmpatientgoals:62565},     This plan may be re-evaluated and revised as warranted.         Rehabilitation Potential/Prognosis: {UGTAXX:601189273}                CLINICAL ASSESSMENT:  MOTOR SPEECH       Oral Peripheral Examination   {kmoralmotor:16887}    Parameters of Speech Production  Respiration:  {kmrespiration:87800}  Articulation:  {kmartic:81693}  Resonance:  {kmresonance:81340}  Quality: {kmvocalquality:93590}  Pitch:    {kmvocalpitch:49971}  Intensity: {kmintensity:14809}  Fluency:  {kmfluency:92433}  Prosody {kmprosody:52743}    RECEPTIVE LANGUAGE    Comprehension of Yes/No Questions:   {kmreceptive:56658}    Process  Simple Verbal Commands:   {kmreceptive:33723}  Process Intermediate Verbal Commands:   {kmreceptive:77529}  Process Complex Verbal Commands:     {kmreceptive:17324}    Comprehension of Conversation:      {kmreceptive:77037}      EXPRESSIVE LANGUAGE     Serials: {kmfunctional:61817}    Imitation:  Words   {kmfunctional:10944}   Sentences {kmfunctional:00033}    Naming:  (Modality used:  {kmmodality:81085})  Confrontation Naming  {kmfunctional:28296}  Functional Description  {kmfunctional:80340}  Response Naming: {kmfunctional:09371}    Conversation:      {kmconversation:29349}    COGNITION     Attention/Orientation  Attention: {kmattention:19734}  Orientation:  Oriented to {kmorientation:34042}    Memory   Immediate Recall: Repeated {kmwords:49445}    Delayed Recall:   Recalled {kmwords:68027}    Long Term Recall:   Recalled {kmbiographical:94272}    Organization/Problem Solving/Reasoning   Verbal Sequencing:   {kmfunctional:56152}        Verbal Problem solving:   {kmfunctional:07794}          CLINICAL OBSERVATIONS NOTED DURING THE EVALUATION  {kmclinicalobservations:95289}                  EDUCATION:   The Speech Language Pathologist (SLP) completed education regarding results of evaluation and that intervention {KMWARRANTED:16160} at this time. Learner: {slp education learner:34313}  Education: {slp education areas:51072}  Evaluation of Education:  {slp education assess:91781}    Evaluation Time includes thorough review of current medical information, gathering information on past medical history/social history and prior level of function, completion of standardized testing/informal observation of tasks, assessment of data and education on plan of care and goals.       CPT code: 67050  [facility] speech sound lang comprehension      The admitting diagnosis and active problem list, as listed below have been reviewed prior to initiation of this evaluation.         ACTIVE PROBLEM LIST:   Patient Active Problem List   Diagnosis    Pelvic abscess in female    Ankle fracture, bimalleolar, closed, left, initial encounter    Closed fracture of left ankle    Bowel obstruction (HCC)       ***

## 2022-09-01 NOTE — PLAN OF CARE
Problem: Chronic Conditions and Co-morbidities  Goal: Patient's chronic conditions and co-morbidity symptoms are monitored and maintained or improved  9/1/2022 1107 by Toni Barrow RN  Outcome: Progressing  8/31/2022 2223 by Stephie Martinez RN  Outcome: Progressing     Problem: Discharge Planning  Goal: Discharge to home or other facility with appropriate resources  9/1/2022 1107 by Toni Barrow RN  Outcome: Progressing  8/31/2022 2223 by Stephie Martinez RN  Outcome: Progressing     Problem: Pain  Goal: Verbalizes/displays adequate comfort level or baseline comfort level  9/1/2022 1107 by Toni Barrow RN  Outcome: Progressing  8/31/2022 2223 by Stephie Martinez RN  Outcome: Progressing     Problem: Skin/Tissue Integrity  Goal: Absence of new skin breakdown  Description: 1. Monitor for areas of redness and/or skin breakdown  2. Assess vascular access sites hourly  3. Every 4-6 hours minimum:  Change oxygen saturation probe site  4. Every 4-6 hours:  If on nasal continuous positive airway pressure, respiratory therapy assess nares and determine need for appliance change or resting period. 9/1/2022 1107 by Toni Barrow RN  Outcome: Progressing  8/31/2022 2223 by Stephie Martinez RN  Outcome: Progressing     Problem: Confusion  Goal: Confusion, delirium, dementia, or psychosis is improved or at baseline  Description: INTERVENTIONS:  1. Assess for possible contributors to thought disturbance, including medications, impaired vision or hearing, underlying metabolic abnormalities, dehydration, psychiatric diagnoses, and notify attending LIP  2. Teller high risk fall precautions, as indicated  3. Provide frequent short contacts to provide reality reorientation, refocusing and direction  4. Decrease environmental stimuli, including noise as appropriate  5. Monitor and intervene to maintain adequate nutrition, hydration, elimination, sleep and activity  6.  If unable to ensure safety without constant attention obtain sitter and review sitter guidelines with assigned personnel  7.  Initiate Psychosocial CNS and Spiritual Care consult, as indicated  9/1/2022 1107 by Kartik James RN  Outcome: Progressing  8/31/2022 2223 by Francisco J Weeks RN  Outcome: Progressing     Problem: Safety - Adult  Goal: Free from fall injury  9/1/2022 1107 by Kartik James RN  Outcome: Progressing  8/31/2022 2223 by Francisco J Weeks RN  Outcome: Progressing     Problem: ABCDS Injury Assessment  Goal: Absence of physical injury  9/1/2022 1107 by Kartik James RN  Outcome: Progressing  8/31/2022 2223 by Francisco J Weeks RN  Outcome: Progressing

## 2022-09-01 NOTE — PROGRESS NOTES
GENERAL SURGERY  DAILY PROGRESS NOTE  9/1/2022    Subjective:  Patient without NG tube this morning. She is still very confused. She does not appear to be in any pain. Objective:  BP (!) 142/69   Pulse 94   Temp 98.2 °F (36.8 °C) (Oral)   Resp 18   Ht 5' 9\" (1.753 m)   Wt 179 lb (81.2 kg)   SpO2 92%   BMI 26.43 kg/m²     General appearance: In no acute distress. Eyes: grossly normal  Lungs: nonlabored breathing   Heart: regular rate, hypertensive  Abdomen: soft, non-tender, non distended. Ventral hernia soft and nontender. Ostomy with gas and brown stool in ostomy bag. Skin: No skin abnormalities  Neurologic: Confused. Musculoskeletal: No clubbing cyanosis or edema    Assessment/Plan:  78 y.o. 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 - Monitor abdominal exam.  - Ok for clear liquid diet as tolerated. - Does not need NG tube from surgical POV, recognize that patient may benefit from NG for other purposes as patient probably cannot take in PO and medications due to current mental status, but be cautious for risk of aspiration.   - SLP evaluation pending.  - Continue bowel regimen.  - Continue to follow bowel function.      Electronically signed by Gale Bull MD on 9/1/2022 at 6:30 AM    Seen/examined  Still altered  With lots of stoma output  Abdomen soft and non-distended no guarding  Advance diet as tolerated  Discharge planning  LAURAGaAaron

## 2022-09-01 NOTE — PROGRESS NOTES
Occupational Therapy  OCCUPATIONAL THERAPY INITIAL EVALUATION    [address] [id number] [address]      Date:2022                                                Patient Name: Maryann Sherman  MRN: [id number]  : 1940  Room: [address] [place] [address]    Evaluating OT: Kamini Sanders OTR/L #[id number]     Referring Provider: Merline Khan MD  Specific Provider Orders/Date: OT eval and treat 22    Diagnosis: Hyperkalemia [E87.5]  Bowel obstruction ([address] [K56.609]  Abdominal pain, unspecified abdominal location [R10.9]  Intestinal obstruction, unspecified cause, unspecified whether partial or complete ([address] [K56.609]   Pt admitted to hospital with abdominal pain. + SBO and AMS     Pertinent Medical History:  has a past medical history of Arthritis, Colostomy in place [facility]), Diabetes mellitus ([address] Diverticulitis, GERD (gastroesophageal reflux disease), Hernia, History of blood transfusion, Hyperlipidemia, and Hypertension.        Precautions:  Fall Risk,  , bed alarm, cognition     Assessment of current deficits    [x] Functional mobility  [x]ADLs  [x] Strength               [x]Cognition    [x] Functional transfers   [x] IADLs         [x] Safety Awareness   [x]Endurance    [] Fine Coordination              [x] Balance      [] Vision/perception   []Sensation     []Gross Motor Coordination  [] ROM  [] Delirium                   [] Motor Control     OT PLAN OF CARE   OT POC based on physician orders, patient diagnosis and results of clinical assessment    Frequency/Duration 1-3 days/wk for 2 weeks PRN   Specific OT Treatment Interventions to include:   * Instruction/training on adapted ADL techniques and AE recommendations to increase functional independence within precautions       * Training on energy conservation strategies, correct breathing pattern and techniques to improve independence/tolerance for self-care routine  * Functional transfer/mobility training/DME recommendations for increased independence, safety, and fall prevention  * Patient/Family education to increase follow through with safety techniques and functional independence  * Recommendation of environmental modifications for increased safety with functional transfers/mobility and ADLs  * Cognitive retraining/development of therapeutic activities to improve problem solving, judgement, memory, and attention for increased safety/participation in ADL/IADL tasks  * Therapeutic exercise to improve motor endurance, ROM, and functional strength for ADLs/functional transfers  * Therapeutic activities to facilitate/challenge dynamic balance, stand tolerance for increased safety and independence with ADLs  * Therapeutic activities to facilitate gross/fine motor skills for increased independence with ADLs  * Neuro-muscular re-education: facilitation of righting/equilibrium reactions, midline orientation, scapular stability/mobility, normalization of muscle tone, and facilitation of volitional active controled movement      Recommended Adaptive Equipment: TBD     Home Living: Pt is a questionable historian: reports she lives with  and daughter in a 1 story home with 4 ROXANNA and 1 hand rail. Bathroom setup: tub/shower combo    Equipment owned: w/w, w/c    Prior Level of Function:  Pt is a questionable historian: mod I with ADLs , mod I with IADLs; ambulated with w/w    Driving: yes   Occupation: na    Pain Level: Pt denies pain this session    Cognition: A&O: x3; Follows 1 step directions   + confusion noted.     Memory:  poor (0/3 item recall)   Sequencing:  P+   Problem solving:  P+   Judgement/safety:  P+     Functional Assessment:  AM-PAC Daily Activity Raw Score: 16/24   Initial Eval Status  Date: 9/1/22 Treatment Status  Date: STGs = LTGs  Time frame: 10-14 days   Feeding Independent      Grooming Minimal Assist     Standing   Modified [place]    UB Dressing Minimal Assist Modified [place]    LB Dressing Moderate Assist   Modified [place]    Bathing Moderate Assist  Modified [place]    Toileting Moderate Assist   Modified [place]    Bed Mobility  Supine to sit: Minimal Assist   Sit to supine: Minimal Assist   Supine to sit: Modified [place]   Sit to supine: Modified [place]    Functional Transfers Minimal Assist     Various surfaces  Modified [place]    Functional Mobility Minimal Assist     Ambulated in room including to/from bathroom with w/w : + unsteadiness  Modified [place]    Balance Sitting:     Static:  SBA    Dynamic: Min A  Standing: min A     Activity Tolerance F-  F+   Visual/  Perceptual wfl                  Hand Dominance right   Strength ROM Additional Info:    RUE   3+/5 wfl good  and wfl FMC/dexterity noted during ADL tasks     LUE 3+/5 wfl good  and wfl FMC/dexterity noted during ADL tasks     Hearing: wfl  Sensation:wfl  Tone: wfl  Edema:none noted     Comments: Upon arrival patient supine in bed and agreeable to OT session. Therapist educated pt on role of OT. At end of session, patient semi-supine in bed with call light and phone within reach (bed alarm), all lines and tubes intact. Overall patient demonstrated decreased independence and safety during completion of ADL/functional transfer/mobility tasks. Pt would benefit from continued skilled OT to increase safety and independence with completion of ADL/IADL tasks for functional independence and quality of life.     Treatment: OT treatment provided this date includes: Facilitation of bed mobility, sitting balance at EOB (impacting ADLs; addressing posture, weight shifting, dynamic reaching), functional transfers (various surfaces: bed, toilet), standing tolerance tasks at w/w (addressing posture, balance and activity tolerance; impacting ADLs and functional activity) and functional ambulation tasks with w/w (including to/ from bathroom and in preparation for item retrieval tasks; cuing on posture, w/w management and safety) - skilled cuing on sequencing, hand placement, posture, body mechanics, energy conservation techniques and safety. Therapist facilitated self-care retraining: UB/LB self-care tasks (gown, socks), toileting tasks (all aspects; mod A for toilet transfer) and standing grooming tasks while cuing on sequencing, modified techniques, posture, safety and energy conservation techniques. Skilled monitoring of HR, O2 sats and pts response to treatment. Rehab Potential: Good for established goals     Patient / Family Goal: return home      Patient and/or family were instructed on functional diagnosis, prognosis/goals and OT plan of care. Demonstrated fair understanding. Eval Complexity: Low    Time In: 1330  Time Out: 1410  Total Treatment Time: 25 minutes    Min Units   OT Eval Low 97165  x  1   OT Eval Medium 19548      OT Eval High 94441      OT Re-Eval [id number]       Therapeutic Ex 92147       Therapeutic Activities 08104  12  1   ADL/Self Care 79885  13  1   Orthotic Management 95164       Manual [id number]     Neuro Re-Ed 61352       Non-Billable Time          Evaluation Time additionally includes thorough review of current medical information, gathering information on past medical history/social history and prior level of function, interpretation of standardized testing/informal observation of tasks, assessment of data and development of plan of care and goals.           Aldo Escudero OTR/L #[id number]

## 2022-09-01 NOTE — PROGRESS NOTES
Progress Note  Date:2022       WOVL:[id number]  Patient Kojo Mars     Date of Birth:[birth year]     Age:82 y.o. Patient says she feels fair today. A little confused at this time. Subjective    Subjective:  Symptoms:  Stable. No shortness of breath or chest pain. Diet:  NPO. Activity level: Impaired due to pain. Pain:  She complains of pain that is mild. Review of Systems   Constitutional:  Negative for activity change and fever. HENT:  Negative for congestion. Respiratory:  Negative for shortness of breath. Cardiovascular:  Negative for chest pain. Gastrointestinal:  Positive for abdominal pain. Neurological:  Negative for dizziness. Objective         Vitals Last 24 Hours:  TEMPERATURE:  Temp  Av.2 °F (36.8 °C)  Min: 97.2 °F (36.2 °C)  Max: 98.8 °F (37.1 °C)  RESPIRATIONS RANGE: Resp  Av  Min: 18  Max: 18  PULSE OXIMETRY RANGE: SpO2  Av.4 %  Min: 91 %  Max: 100 %  PULSE RANGE: Pulse  Av.8  Min: 94  Max: 110  BLOOD PRESSURE RANGE: Systolic (96HYM), DSU:744 , Min:121 , QVE:848   ; Diastolic (48RGY), BKW:91, Min:53, Max:92    I/O (24Hr): Intake/Output Summary (Last 24 hours) at 2022 0658  Last data filed at 2022 1945  Gross per 24 hour   Intake 180 ml   Output 75 ml   Net 105 ml       Objective:  General Appearance:  Comfortable. Vital signs: (most recent): Blood pressure (!) 142/69, pulse 94, temperature 98.2 °F (36.8 °C), temperature source Oral, resp. rate 18, height 5' 9\" (1.753 m), weight 179 lb (81.2 kg), SpO2 92 %. No fever. Lungs:  Normal effort and normal respiratory rate. Breath sounds clear to auscultation. Heart: Normal rate. Regular rhythm. S1 normal and S2 normal.    Abdomen: There is generalized tenderness.      Labs/Imaging/Diagnostics    Labs:  CBC:  Recent Labs     22  0500 22  0504 22  0500   WBC 8.6 8.6 8.8   RBC 3.87 3.89 4.01   HGB 10.7* 10.5* 10.8*   HCT 35.3 34.0 34.4   MCV 91.2 87.4 85.8   RDW 14.8 14.6 14.4    142 158       CHEMISTRIES:  Recent Labs     08/30/22  0500 08/31/22  0504 09/01/22  0500    141 140   K 4.5 3.9 3.6    102 102   CO2 29 28 27   BUN 17 11 7   CREATININE 0.9 0.7 0.7   GLUCOSE 116* 127* 146*       PT/INR:No results for input(s): PROTIME, INR in the last 72 hours. APTT:No results for input(s): APTT in the last 72 hours. LIVER PROFILE:  No results for input(s): AST, ALT, BILIDIR, BILITOT, ALKPHOS in the last 72 hours. Imaging Last 24 Hours:  CT ABDOMEN PELVIS W IV CONTRAST Additional Contrast? None    Result Date: 8/29/2022  EXAMINATION: CT OF THE ABDOMEN AND PELVIS WITH CONTRAST8/29/2022 2:00 am TECHNIQUE: CT of the abdomen and pelvis was performed with the administration of intravenous contrast. Multiplanar reformatted images are provided for review. Automated exposure control, iterative reconstruction, and/or weight based adjustment of the mA/kV was utilized to reduce the radiation dose to as low as reasonably achievable. COMPARISON: None HISTORY: ORDERING SYSTEM PROVIDED HISTORY: abdominal pain TECHNOLOGIST PROVIDED HISTORY: Reason for exam:->abdominal pain Additional Contrast?->None Decision Support Exception - unselect if not a suspected or confirmed emergency medical condition->Emergency Medical Condition (MA) What reading provider will be dictating this exam?->CRC FINDINGS: THORACIC STRUCTURES: Unremarkable. LIVER:  The liver is normal in size, contour and attenuation. No focal mass. No intra or extrahepatic bile duct dilation. GALL BLADDER: Unremarkable. SPLEEN:  Unremarkable. PANCREAS:  Unremarkable. ADRENAL GLANDS:  Unremarkable. ESOPHAGUS AND STOMACH:  Unremarkable. BOWEL: Small bowel: There is distended loops of small bowel identified with collapse of the terminal ileum. Large bowel: The there is a left colostomy. The appendix is within normal limits. There is no intraperitoneal free air or fluid.  URINARY/GENITAL TRACT: Kidneys: The kidneys are unremarkable. .  No evidence of hydronephrosis, renal calcifications or solid renal mass. Ureters: The ureters are normal course and caliber. There is no evidence of ureter calculus/calculi. URINARY BLADDER:  The urinary bladder is well distended without wall thickening or focal mass. REPRODUCTIVE ORGANS:  Unremarkable. BLOOD VESSELS: Unremarkable given patients age. An IVC filter is visualized. LYMPH NODES:  No evidence of intraabdominal or intrapelvic lymphadenopathy. ABDOMINAL WALL & SOFT TISSUES:  There is diastasis of the rectus abdominus muscles. OSSEOUS STRUCTURES: No acute osseous lesion. Dilated small bowel loops concerning for small bowel obstruction. Consider small-bowel follow-through. Left-sided colostomy is unremarkable. XR CHEST PORTABLE    Result Date: 8/29/2022  EXAMINATION: ONE XRAY VIEW OF THE CHEST8/29/2022 TECHNIQUE: Frontal view submitted COMPARISON: None. HISTORY: ORDERING SYSTEM PROVIDED HISTORY: hypoxia TECHNOLOGIST PROVIDED HISTORY: Reason for exam:->hypoxia What reading provider will be dictating this exam?->CRC FINDINGS: The lungs demonstrate no large pleural effusion, or pneumothorax. Opacification at the left lung base is identified, consolidation/pneumonia cannot be excluded the cardiomediastinal silhouette is stable. Consolidation/pneumonia cannot be excluded at the left lung base. XR ABDOMEN FOR NG/OG/NE TUBE PLACEMENT    Result Date: 8/29/2022  EXAMINATION: ONE SUPINE XRAY VIEW(S) OF THE ABDOMEN 8/29/2022 9:53 am COMPARISON: None. HISTORY: ORDERING SYSTEM PROVIDED HISTORY: Confirmation of course of NG/OG/NE tube and location of tip of tube TECHNOLOGIST PROVIDED HISTORY: Reason for exam:->Confirmation of course of NG/OG/NE tube and location of tip of tube Portable? ->Yes What reading provider will be dictating this exam?->CRC FINDINGS: Single AP abdomen with lower chest demonstrates a orogastric tube below the GE junction with tip in the fundus of the stomach in satisfactory position. There is an umbrella device within the inferior vena cava. There is a a small left-sided pleural effusion. Satisfactory placement of orogastric tube. Assessment//Plan           Hospital Problems             Last Modified POA    * (Principal) Bowel obstruction ([address] 8/29/2022 Yes   Assessment:   (* (Principal) Bowel obstruction ([address] 8/29/2022 Yes  Hyperkalemia  Abdominal pain  DM  HTN  Hyperlipidemia     Plan  Surgery following  No NG tune. Pain control  Monitor labs and correct as needed. Continue rest of meds. She was up all night so will see how her mental status improves with some rest.  ).

## 2022-09-01 NOTE — PLAN OF CARE
Problem: Chronic Conditions and Co-morbidities  Goal: Patient's chronic conditions and co-morbidity symptoms are monitored and maintained or improved  8/31/2022 2223 by Brinda Champion RN  Outcome: Progressing  8/31/2022 1147 by Kenzie Oden RN  Outcome: Progressing     Problem: Discharge Planning  Goal: Discharge to home or other facility with appropriate resources  8/31/2022 2223 by Brinda Champion RN  Outcome: Progressing  8/31/2022 1147 by Kenzie Oden RN  Outcome: Progressing     Problem: Pain  Goal: Verbalizes/displays adequate comfort level or baseline comfort level  8/31/2022 2223 by Brinda Champion RN  Outcome: Progressing  8/31/2022 1147 by Kenzie Oden RN  Outcome: Progressing     Problem: Skin/Tissue Integrity  Goal: Absence of new skin breakdown  8/31/2022 2223 by Brinda Champion RN  Outcome: Progressing  8/31/2022 1147 by Kenzie Oden RN  Outcome: Progressing     Problem: Confusion  Goal: Confusion, delirium, dementia, or psychosis is improved or at baseline  8/31/2022 2223 by Brinda Champion RN  Outcome: Progressing  8/31/2022 1147 by Kenzie Oden RN  Outcome: Progressing     Problem: Safety - Adult  Goal: Free from fall injury  8/31/2022 2223 by Brinda Champion RN  Outcome: Progressing  8/31/2022 1147 by Kenzie Oden RN  Outcome: Progressing  Flowsheets (Taken 8/31/2022 1144)  Free From Fall Injury: Instruct family/caregiver on patient safety     Problem: ABCDS Injury Assessment  Goal: Absence of physical injury  8/31/2022 2223 by Brinda Champion RN  Outcome: Progressing  8/31/2022 1147 by Kenzie Oden RN  Outcome: Progressing  Flowsheets (Taken 8/31/2022 1144)  Absence of Physical Injury: Implement safety measures based on patient assessment

## 2022-09-02 LAB
METER GLUCOSE: 107 MG/DL (ref 74–99)
METER GLUCOSE: 114 MG/DL (ref 74–99)
METER GLUCOSE: 127 MG/DL (ref 74–99)
METER GLUCOSE: 138 MG/DL (ref 74–99)

## 2022-09-02 PROCEDURE — 97129 THER IVNTJ 1ST 15 MIN: CPT

## 2022-09-02 PROCEDURE — 6370000000 HC RX 637 (ALT 250 FOR IP): Performed by: FAMILY MEDICINE

## 2022-09-02 PROCEDURE — 82962 GLUCOSE BLOOD TEST: CPT

## 2022-09-02 PROCEDURE — 6360000002 HC RX W HCPCS: Performed by: FAMILY MEDICINE

## 2022-09-02 PROCEDURE — 1200000000 HC SEMI PRIVATE

## 2022-09-02 PROCEDURE — 97130 THER IVNTJ EA ADDL 15 MIN: CPT

## 2022-09-02 RX ADMIN — PREGABALIN 50 MG: 50 CAPSULE ORAL at 09:53

## 2022-09-02 RX ADMIN — ATORVASTATIN CALCIUM 40 MG: 40 TABLET, FILM COATED ORAL at 09:53

## 2022-09-02 RX ADMIN — ENALAPRIL MALEATE 20 MG: 5 TABLET ORAL at 09:53

## 2022-09-02 RX ADMIN — PREGABALIN 50 MG: 50 CAPSULE ORAL at 22:22

## 2022-09-02 RX ADMIN — MORPHINE SULFATE 2 MG: 2 INJECTION, SOLUTION INTRAMUSCULAR; INTRAVENOUS at 15:38

## 2022-09-02 RX ADMIN — PAROXETINE 40 MG: 20 TABLET, FILM COATED ORAL at 09:53

## 2022-09-02 RX ADMIN — PREGABALIN 50 MG: 50 CAPSULE ORAL at 14:00

## 2022-09-02 ASSESSMENT — ENCOUNTER SYMPTOMS
ABDOMINAL PAIN: 1
SHORTNESS OF BREATH: 0

## 2022-09-02 ASSESSMENT — PAIN SCALES - GENERAL: PAINLEVEL_OUTOF10: 7

## 2022-09-02 ASSESSMENT — PAIN DESCRIPTION - LOCATION: LOCATION: HEAD

## 2022-09-02 ASSESSMENT — PAIN DESCRIPTION - DESCRIPTORS: DESCRIPTORS: ACHING

## 2022-09-02 NOTE — PROGRESS NOTES
Progress Note  Date:2022       ITUD:[id number]-D  Patient Marge Trujillo     Date of Birth:[birth year]     Age:82 y.o. Patient says she feels fair today. Subjective    Subjective:  Symptoms:  Stable. No shortness of breath or chest pain. Diet:  NPO. Activity level: Impaired due to pain. Pain:  She complains of pain that is mild. Review of Systems   Constitutional:  Negative for activity change and fever. HENT:  Negative for congestion. Respiratory:  Negative for shortness of breath. Cardiovascular:  Negative for chest pain. Gastrointestinal:  Positive for abdominal pain. Neurological:  Negative for dizziness. Objective         Vitals Last 24 Hours:  TEMPERATURE:  Temp  Av.4 °F (36.9 °C)  Min: 98.3 °F (36.8 °C)  Max: 98.4 °F (36.9 °C)  RESPIRATIONS RANGE: Resp  Av.7  Min: 16  Max: 18  PULSE OXIMETRY RANGE: SpO2  Av.3 %  Min: 89 %  Max: 95 %  PULSE RANGE: Pulse  Av.5  Min: 104  Max: 193  BLOOD PRESSURE RANGE: Systolic (79FEM), LBN:693 , Min:145 , IFS:774   ; Diastolic (59WHG), GNB:58, Min:67, Max:98    I/O (24Hr): Intake/Output Summary (Last 24 hours) at 2022 0736  Last data filed at 2022 1434  Gross per 24 hour   Intake 240 ml   Output --   Net 240 ml       Objective:  General Appearance:  Comfortable. Vital signs: (most recent): Blood pressure (!) 154/91, pulse (!) 193, temperature 98.4 °F (36.9 °C), temperature source Axillary, resp. rate 18, height 5' 9\" (1.753 m), weight 179 lb (81.2 kg), SpO2 (!) 89 %. No fever. Lungs:  Normal effort and normal respiratory rate. Breath sounds clear to auscultation. Heart: Normal rate. Regular rhythm. S1 normal and S2 normal.    Abdomen: There is generalized tenderness.      Labs/Imaging/Diagnostics    Labs:  CBC:  Recent Labs     22  0504 22  0500   WBC 8.6 8.8   RBC 3.89 4.01   HGB 10.5* 10.8*   HCT 34.0 34.4   MCV 87.4 85.8   RDW 14.6 14.4    158       CHEMISTRIES:  Recent Labs     08/31/22  0504 09/01/22  0500    140   K 3.9 3.6    102   CO2 28 27   BUN 11 7   CREATININE 0.7 0.7   GLUCOSE 127* 146*       PT/INR:No results for input(s): PROTIME, INR in the last 72 hours. APTT:No results for input(s): APTT in the last 72 hours. LIVER PROFILE:  No results for input(s): AST, ALT, BILIDIR, BILITOT, ALKPHOS in the last 72 hours. Imaging Last 24 Hours:  CT ABDOMEN PELVIS W IV CONTRAST Additional Contrast? None    Result Date: 8/29/2022  EXAMINATION: CT OF THE ABDOMEN AND PELVIS WITH CONTRAST8/29/2022 2:00 am TECHNIQUE: CT of the abdomen and pelvis was performed with the administration of intravenous contrast. Multiplanar reformatted images are provided for review. Automated exposure control, iterative reconstruction, and/or weight based adjustment of the mA/kV was utilized to reduce the radiation dose to as low as reasonably achievable. COMPARISON: None HISTORY: ORDERING SYSTEM PROVIDED HISTORY: abdominal pain TECHNOLOGIST PROVIDED HISTORY: Reason for exam:->abdominal pain Additional Contrast?->None Decision Support Exception - unselect if not a suspected or confirmed emergency medical condition->Emergency Medical Condition (MA) What reading provider will be dictating this exam?->CRC FINDINGS: THORACIC STRUCTURES: Unremarkable. LIVER:  The liver is normal in size, contour and attenuation. No focal mass. No intra or extrahepatic bile duct dilation. GALL BLADDER: Unremarkable. SPLEEN:  Unremarkable. PANCREAS:  Unremarkable. ADRENAL GLANDS:  Unremarkable. ESOPHAGUS AND STOMACH:  Unremarkable. BOWEL: Small bowel: There is distended loops of small bowel identified with collapse of the terminal ileum. Large bowel: The there is a left colostomy. The appendix is within normal limits. There is no intraperitoneal free air or fluid. URINARY/GENITAL TRACT: Kidneys:  The kidneys are unremarkable. .  No evidence of hydronephrosis, renal calcifications or solid renal mass.  Ureters: The ureters are normal course and caliber. There is no evidence of ureter calculus/calculi. URINARY BLADDER:  The urinary bladder is well distended without wall thickening or focal mass. REPRODUCTIVE ORGANS:  Unremarkable. BLOOD VESSELS: Unremarkable given patients age. An IVC filter is visualized. LYMPH NODES:  No evidence of intraabdominal or intrapelvic lymphadenopathy. ABDOMINAL WALL & SOFT TISSUES:  There is diastasis of the rectus abdominus muscles. OSSEOUS STRUCTURES: No acute osseous lesion. Dilated small bowel loops concerning for small bowel obstruction. Consider small-bowel follow-through. Left-sided colostomy is unremarkable. XR CHEST PORTABLE    Result Date: 8/29/2022  EXAMINATION: ONE XRAY VIEW OF THE CHEST8/29/2022 TECHNIQUE: Frontal view submitted COMPARISON: None. HISTORY: ORDERING SYSTEM PROVIDED HISTORY: hypoxia TECHNOLOGIST PROVIDED HISTORY: Reason for exam:->hypoxia What reading provider will be dictating this exam?->CRC FINDINGS: The lungs demonstrate no large pleural effusion, or pneumothorax. Opacification at the left lung base is identified, consolidation/pneumonia cannot be excluded the cardiomediastinal silhouette is stable. Consolidation/pneumonia cannot be excluded at the left lung base. XR ABDOMEN FOR NG/OG/NE TUBE PLACEMENT    Result Date: 8/29/2022  EXAMINATION: ONE SUPINE XRAY VIEW(S) OF THE ABDOMEN 8/29/2022 9:53 am COMPARISON: None. HISTORY: ORDERING SYSTEM PROVIDED HISTORY: Confirmation of course of NG/OG/NE tube and location of tip of tube TECHNOLOGIST PROVIDED HISTORY: Reason for exam:->Confirmation of course of NG/OG/NE tube and location of tip of tube Portable? ->Yes What reading provider will be dictating this exam?->CRC FINDINGS: Single AP abdomen with lower chest demonstrates a orogastric tube below the GE junction with tip in the fundus of the stomach in satisfactory position. There is an umbrella device within the inferior vena cava.  There is a a small left-sided pleural effusion. Satisfactory placement of orogastric tube. Assessment//Plan           Hospital Problems             Last Modified POA    * (Principal) Bowel obstruction ([address] 8/29/2022 Yes   Assessment:   (* (Principal) Bowel obstruction ([address] 8/29/2022 Yes  Hyperkalemia  Abdominal pain  DM  HTN  Hyperlipidemia     Plan  Surgery following  Starting to tolerate diet. Need PT eval as possible placement as unable to stand without assistance  Discharge planning. ).

## 2022-09-02 NOTE — PLAN OF CARE
Problem: Chronic Conditions and Co-morbidities  Goal: Patient's chronic conditions and co-morbidity symptoms are monitored and maintained or improved  Outcome: Progressing     Problem: Discharge Planning  Goal: Discharge to home or other facility with appropriate resources  Outcome: Progressing     Problem: Pain  Goal: Verbalizes/displays adequate comfort level or baseline comfort level  Outcome: Progressing     Problem: Skin/Tissue Integrity  Goal: Absence of new skin breakdown  Description: 1. Monitor for areas of redness and/or skin breakdown  2. Assess vascular access sites hourly  3. Every 4-6 hours minimum:  Change oxygen saturation probe site  4. Every 4-6 hours:  If on nasal continuous positive airway pressure, respiratory therapy assess nares and determine need for appliance change or resting period. Outcome: Progressing     Problem: Confusion  Goal: Confusion, delirium, dementia, or psychosis is improved or at baseline  Description: INTERVENTIONS:  1. Assess for possible contributors to thought disturbance, including medications, impaired vision or hearing, underlying metabolic abnormalities, dehydration, psychiatric diagnoses, and notify attending LIP  2. [place] high risk fall precautions, as indicated  3. Provide frequent short contacts to provide reality reorientation, refocusing and direction  4. Decrease environmental stimuli, including noise as appropriate  5. Monitor and intervene to maintain adequate nutrition, hydration, elimination, sleep and activity  6. If unable to ensure safety without constant attention obtain sitter and review sitter guidelines with assigned personnel  7.  Initiate Psychosocial CNS and Spiritual Care consult, as indicated  Outcome: Progressing     Problem: Safety - Adult  Goal: Free from fall injury  Outcome: Progressing     Problem: ABCDS Injury Assessment  Goal: Absence of physical injury  Outcome: Progressing

## 2022-09-02 NOTE — PROGRESS NOTES
Comprehensive Nutrition Assessment    Type and Reason for Visit:  Initial (LOS)    Nutrition Recommendations/Plan:   Advance diet when medically appropriate. Will start nutritional supplementation pending swallow study. Malnutrition Assessment:  Malnutrition Status: At risk for malnutrition (Comment) (09/02/22 1414)    Context:  Acute Illness     Findings of the 6 clinical characteristics of malnutrition:  Energy Intake:  Mild decrease in energy intake (Comment) (x 4 days since admission)  Weight Loss:  Unable to assess (d/t lack of actual weight history)     Body Fat Loss:  Unable to assess     Muscle Mass Loss:  Unable to assess    Fluid Accumulation:  No significant fluid accumulation     Strength:  Not Performed    Nutrition Assessment:    Patient currently on full liquid diet ; adm w/ abd pain and N/V ; 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 ; hx of diverticulitis proctocolectomy w/ 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 Weight Source: Stated  Current BMI (kg/m2): 26.4  Usual Body Weight:  (UTO ; EMR shows past weights ranging between 165-188#)                       BMI Categories: Overweight (BMI 25.0-29. 9)    Estimated Daily Nutrient Needs:  Energy Requirements Based On: Formula  Weight Used for Energy Requirements: Current  Energy (kcal/day): 6081-5943 (REE 1328 x 1.1 SF)  Weight Used for Protein Requirements: Ideal  Protein (g/day): 80-95 (1.2-1.4g/kg IBW)  Method Used for Fluid Requirements: 1 ml/kcal  Fluid (ml/day): 4193-1786    Nutrition Diagnosis:   Inadequate oral intake related to altered GI function (adm w/ SBO) as evidenced by poor intake prior to admission, intake 0-25%, GI abnormality    Nutrition Interventions:   Food and/or Nutrient Delivery: Continue Current Diet  Nutrition Education/Counseling: Education not indicated  Coordination of Nutrition Care: Continue to monitor while inpatient, Speech Therapy, Swallow Evaluation       Goals:  Previous Goal Met: Progressing toward Goal(s)  Goals: PO intake 50% or greater, by next RD assessment       Nutrition Monitoring and Evaluation:   Behavioral-Environmental Outcomes: None Identified  Food/Nutrient Intake Outcomes: Food and Nutrient Intake, Supplement Intake  Physical Signs/Symptoms Outcomes: Biochemical Data, Chewing or Swallowing, GI Status, Fluid Status or Edema, Hemodynamic Status, Meal Time Behavior, Nutrition Focused Physical Findings, Skin, Weight    Discharge Planning:     Too soon to determine     Ora Pay, RD, LD  Contact: [phone]

## 2022-09-03 LAB
METER GLUCOSE: 109 MG/DL (ref 74–99)
METER GLUCOSE: 117 MG/DL (ref 74–99)
METER GLUCOSE: 160 MG/DL (ref 74–99)
METER GLUCOSE: 176 MG/DL (ref 74–99)

## 2022-09-03 PROCEDURE — 1200000000 HC SEMI PRIVATE

## 2022-09-03 PROCEDURE — 6370000000 HC RX 637 (ALT 250 FOR IP): Performed by: FAMILY MEDICINE

## 2022-09-03 PROCEDURE — 82962 GLUCOSE BLOOD TEST: CPT

## 2022-09-03 PROCEDURE — 2580000003 HC RX 258: Performed by: FAMILY MEDICINE

## 2022-09-03 RX ADMIN — ENALAPRIL MALEATE 20 MG: 5 TABLET ORAL at 08:58

## 2022-09-03 RX ADMIN — PREGABALIN 50 MG: 50 CAPSULE ORAL at 08:58

## 2022-09-03 RX ADMIN — ATORVASTATIN CALCIUM 40 MG: 40 TABLET, FILM COATED ORAL at 08:58

## 2022-09-03 RX ADMIN — PREGABALIN 50 MG: 50 CAPSULE ORAL at 15:00

## 2022-09-03 RX ADMIN — PREGABALIN 50 MG: 50 CAPSULE ORAL at 21:28

## 2022-09-03 RX ADMIN — SODIUM CHLORIDE: 9 INJECTION, SOLUTION INTRAVENOUS at 21:30

## 2022-09-03 RX ADMIN — PAROXETINE 40 MG: 20 TABLET, FILM COATED ORAL at 08:58

## 2022-09-03 ASSESSMENT — ENCOUNTER SYMPTOMS
ABDOMINAL PAIN: 0
SHORTNESS OF BREATH: 0

## 2022-09-03 ASSESSMENT — PAIN SCALES - GENERAL: PAINLEVEL_OUTOF10: 0

## 2022-09-03 NOTE — PLAN OF CARE
Problem: Chronic Conditions and Co-morbidities  Goal: Patient's chronic conditions and co-morbidity symptoms are monitored and maintained or improved  Outcome: Progressing     Problem: Discharge Planning  Goal: Discharge to home or other facility with appropriate resources  Outcome: Progressing     Problem: Pain  Goal: Verbalizes/displays adequate comfort level or baseline comfort level  Outcome: Progressing     Problem: Skin/Tissue Integrity  Goal: Absence of new skin breakdown  Description: 1. Monitor for areas of redness and/or skin breakdown  2. Assess vascular access sites hourly  3. Every 4-6 hours minimum:  Change oxygen saturation probe site  4. Every 4-6 hours:  If on nasal continuous positive airway pressure, respiratory therapy assess nares and determine need for appliance change or resting period. Outcome: Progressing     Problem: Confusion  Goal: Confusion, delirium, dementia, or psychosis is improved or at baseline  Description: INTERVENTIONS:  1. Assess for possible contributors to thought disturbance, including medications, impaired vision or hearing, underlying metabolic abnormalities, dehydration, psychiatric diagnoses, and notify attending LIP  2. [place] high risk fall precautions, as indicated  3. Provide frequent short contacts to provide reality reorientation, refocusing and direction  4. Decrease environmental stimuli, including noise as appropriate  5. Monitor and intervene to maintain adequate nutrition, hydration, elimination, sleep and activity  6. If unable to ensure safety without constant attention obtain sitter and review sitter guidelines with assigned personnel  7.  Initiate Psychosocial CNS and Spiritual Care consult, as indicated  Outcome: Progressing     Problem: Safety - Adult  Goal: Free from fall injury  Outcome: Progressing     Problem: ABCDS Injury Assessment  Goal: Absence of physical injury  Outcome: Progressing     Problem: Nutrition Deficit:  Goal: Optimize nutritional status  Outcome: Progressing

## 2022-09-03 NOTE — PROGRESS NOTES
Progress Note  Date:9/3/2022       [facility]:[id number]  Patient Purcell Meigs     Date of Birth:[birth year]     Age:82 y.o. Patient in deep sleep, hard to arouse. Subjective    Subjective:  Symptoms:  Stable. No shortness of breath or chest pain. Diet:  Adequate intake. Activity level: Impaired due to pain. Pain:  She complains of pain that is mild. Review of Systems   Constitutional:  Negative for activity change and fever. HENT:  Negative for congestion. Respiratory:  Negative for shortness of breath. Cardiovascular:  Negative for chest pain. Gastrointestinal:  Negative for abdominal pain. Neurological:  Negative for dizziness. Objective         Vitals Last 24 Hours:  TEMPERATURE:  Temp  Av.2 °F (36.8 °C)  Min: 97.6 °F (36.4 °C)  Max: 98.8 °F (37.1 °C)  RESPIRATIONS RANGE: Resp  Av.3  Min: 15  Max: 18  PULSE OXIMETRY RANGE: SpO2  Av %  Min: 90 %  Max: 95 %  PULSE RANGE: Pulse  Av.2  Min: 84  Max: 91  BLOOD PRESSURE RANGE: Systolic (02LBX), QZX:860 , Min:123 , YRT:086   ; Diastolic (72CEP), WKI:01, Min:67, Max:98    I/O (24Hr): Intake/Output Summary (Last 24 hours) at 9/3/2022 0903  Last data filed at 9/3/2022 0519  Gross per 24 hour   Intake 240 ml   Output 0 ml   Net 240 ml       Objective:  General Appearance:  Comfortable. Vital signs: (most recent): Blood pressure (!) 150/98, pulse 91, temperature 98.1 °F (36.7 °C), temperature source Oral, resp. rate 16, height 5' 9\" (1.753 m), weight 179 lb (81.2 kg), SpO2 93 %. No fever. Lungs:  Normal effort and normal respiratory rate. Breath sounds clear to auscultation. Heart: Normal rate. Regular rhythm. S1 normal and S2 normal.    Abdomen: There is no abdominal tenderness.      Labs/Imaging/Diagnostics    Labs:  CBC:  Recent Labs     22  0500   WBC 8.8   RBC 4.01   HGB 10.8*   HCT 34.4   MCV 85.8   RDW 14.4          CHEMISTRIES:  Recent Labs     22  0500      K 3.6    CO2 27   BUN 7   CREATININE 0.7   GLUCOSE 146*       PT/INR:No results for input(s): PROTIME, INR in the last 72 hours. APTT:No results for input(s): APTT in the last 72 hours. LIVER PROFILE:  No results for input(s): AST, ALT, BILIDIR, BILITOT, ALKPHOS in the last 72 hours. Imaging Last 24 Hours:  CT ABDOMEN PELVIS W IV CONTRAST Additional Contrast? None    Result Date: 8/29/2022  EXAMINATION: CT OF THE ABDOMEN AND PELVIS WITH CONTRAST8/29/2022 2:00 am TECHNIQUE: CT of the abdomen and pelvis was performed with the administration of intravenous contrast. Multiplanar reformatted images are provided for review. Automated exposure control, iterative reconstruction, and/or weight based adjustment of the mA/kV was utilized to reduce the radiation dose to as low as reasonably achievable. COMPARISON: None HISTORY: ORDERING SYSTEM PROVIDED HISTORY: abdominal pain TECHNOLOGIST PROVIDED HISTORY: Reason for exam:->abdominal pain Additional Contrast?->None Decision Support Exception - unselect if not a suspected or confirmed emergency medical condition->Emergency Medical Condition (MA) What reading provider will be dictating this exam?->CRC FINDINGS: THORACIC STRUCTURES: Unremarkable. LIVER:  The liver is normal in size, contour and attenuation. No focal mass. No intra or extrahepatic bile duct dilation. GALL BLADDER: Unremarkable. SPLEEN:  Unremarkable. PANCREAS:  Unremarkable. ADRENAL GLANDS:  Unremarkable. ESOPHAGUS AND STOMACH:  Unremarkable. BOWEL: Small bowel: There is distended loops of small bowel identified with collapse of the terminal ileum. Large bowel: The there is a left colostomy. The appendix is within normal limits. There is no intraperitoneal free air or fluid. URINARY/GENITAL TRACT: Kidneys:  The kidneys are unremarkable. .  No evidence of hydronephrosis, renal calcifications or solid renal mass. Ureters: The ureters are normal course and caliber.   There is no evidence of ureter calculus/calculi. URINARY BLADDER:  The urinary bladder is well distended without wall thickening or focal mass. REPRODUCTIVE ORGANS:  Unremarkable. BLOOD VESSELS: Unremarkable given patients age. An IVC filter is visualized. LYMPH NODES:  No evidence of intraabdominal or intrapelvic lymphadenopathy. ABDOMINAL WALL & SOFT TISSUES:  There is diastasis of the rectus abdominus muscles. OSSEOUS STRUCTURES: No acute osseous lesion. Dilated small bowel loops concerning for small bowel obstruction. Consider small-bowel follow-through. Left-sided colostomy is unremarkable. XR CHEST PORTABLE    Result Date: 8/29/2022  EXAMINATION: ONE XRAY VIEW OF THE CHEST8/29/2022 TECHNIQUE: Frontal view submitted COMPARISON: None. HISTORY: ORDERING SYSTEM PROVIDED HISTORY: hypoxia TECHNOLOGIST PROVIDED HISTORY: Reason for exam:->hypoxia What reading provider will be dictating this exam?->CRC FINDINGS: The lungs demonstrate no large pleural effusion, or pneumothorax. Opacification at the left lung base is identified, consolidation/pneumonia cannot be excluded the cardiomediastinal silhouette is stable. Consolidation/pneumonia cannot be excluded at the left lung base. XR ABDOMEN FOR NG/OG/NE TUBE PLACEMENT    Result Date: 8/29/2022  EXAMINATION: ONE SUPINE XRAY VIEW(S) OF THE ABDOMEN 8/29/2022 9:53 am COMPARISON: None. HISTORY: ORDERING SYSTEM PROVIDED HISTORY: Confirmation of course of NG/OG/NE tube and location of tip of tube TECHNOLOGIST PROVIDED HISTORY: Reason for exam:->Confirmation of course of NG/OG/NE tube and location of tip of tube Portable? ->Yes What reading provider will be dictating this exam?->CRC FINDINGS: Single AP abdomen with lower chest demonstrates a orogastric tube below the GE junction with tip in the fundus of the stomach in satisfactory position. There is an umbrella device within the inferior vena cava. There is a a small left-sided pleural effusion. Satisfactory placement of orogastric tube.

## 2022-09-03 NOTE — PLAN OF CARE
Problem: Chronic Conditions and Co-morbidities  Goal: Patient's chronic conditions and co-morbidity symptoms are monitored and maintained or improved  9/3/2022 1350 by Jono Porter RN  Outcome: Progressing  9/3/2022 1349 by Jono Porter RN  Outcome: Progressing  9/3/2022 1346 by Jono Porter RN  Outcome: Progressing     Problem: Pain  Goal: Verbalizes/displays adequate comfort level or baseline comfort level  9/3/2022 1350 by Jono Porter RN  Outcome: Progressing  9/3/2022 1349 by Jono Porter RN  Outcome: Progressing  9/3/2022 1346 by Jono Porter RN  Outcome: Progressing     Problem: Skin/Tissue Integrity  Goal: Absence of new skin breakdown  Description: 1. Monitor for areas of redness and/or skin breakdown  2. Assess vascular access sites hourly  3. Every 4-6 hours minimum:  Change oxygen saturation probe site  4. Every 4-6 hours:  If on nasal continuous positive airway pressure, respiratory therapy assess nares and determine need for appliance change or resting period. 9/3/2022 1350 by Jono Porter RN  Outcome: Progressing  9/3/2022 1349 by Jono Porter RN  Outcome: Progressing  9/3/2022 1346 by Jono Porter RN  Outcome: Progressing     Problem: Confusion  Goal: Confusion, delirium, dementia, or psychosis is improved or at baseline  Description: INTERVENTIONS:  1. Assess for possible contributors to thought disturbance, including medications, impaired vision or hearing, underlying metabolic abnormalities, dehydration, psychiatric diagnoses, and notify attending LIP  2. [place] high risk fall precautions, as indicated  3. Provide frequent short contacts to provide reality reorientation, refocusing and direction  4. Decrease environmental stimuli, including noise as appropriate  5. Monitor and intervene to maintain adequate nutrition, hydration, elimination, sleep and activity  6.  If unable to ensure safety without constant attention obtain sitter and review sitter guidelines with assigned personnel  7.  Initiate Psychosocial CNS and Spiritual Care consult, as indicated  9/3/2022 1350 by Domi Pena RN  Outcome: Progressing  9/3/2022 1349 by Domi Pena RN  Outcome: Progressing  9/3/2022 1346 by Domi Pena RN  Outcome: Progressing     Problem: Safety - Adult  Goal: Free from fall injury  9/3/2022 1350 by Domi Pena RN  Outcome: Progressing  9/3/2022 1349 by Domi Pena RN  Outcome: Progressing  9/3/2022 1346 by Domi Pena RN  Outcome: Progressing     Problem: ABCDS Injury Assessment  Goal: Absence of physical injury  9/3/2022 1350 by Domi Pena RN  Outcome: Progressing  9/3/2022 1349 by Domi ePna RN  Outcome: Progressing  9/3/2022 1346 by Domi Pena RN  Outcome: Progressing     Problem: Nutrition Deficit:  Goal: Optimize nutritional status  9/3/2022 1350 by Domi Pena RN  Outcome: Progressing  9/3/2022 1349 by Domi Pena RN  Outcome: Progressing  9/3/2022 1346 by Domi Pena RN  Outcome: Progressing

## 2022-09-04 LAB
METER GLUCOSE: 142 MG/DL (ref 74–99)
METER GLUCOSE: 142 MG/DL (ref 74–99)
METER GLUCOSE: 143 MG/DL (ref 74–99)
METER GLUCOSE: 160 MG/DL (ref 74–99)

## 2022-09-04 PROCEDURE — 6370000000 HC RX 637 (ALT 250 FOR IP): Performed by: FAMILY MEDICINE

## 2022-09-04 PROCEDURE — 2580000003 HC RX 258: Performed by: FAMILY MEDICINE

## 2022-09-04 PROCEDURE — 1200000000 HC SEMI PRIVATE

## 2022-09-04 PROCEDURE — 6360000002 HC RX W HCPCS: Performed by: FAMILY MEDICINE

## 2022-09-04 PROCEDURE — 82962 GLUCOSE BLOOD TEST: CPT

## 2022-09-04 RX ADMIN — ENALAPRIL MALEATE 20 MG: 5 TABLET ORAL at 08:55

## 2022-09-04 RX ADMIN — PREGABALIN 50 MG: 50 CAPSULE ORAL at 20:14

## 2022-09-04 RX ADMIN — PAROXETINE 40 MG: 20 TABLET, FILM COATED ORAL at 08:55

## 2022-09-04 RX ADMIN — SODIUM CHLORIDE: 9 INJECTION, SOLUTION INTRAVENOUS at 20:16

## 2022-09-04 RX ADMIN — ATORVASTATIN CALCIUM 40 MG: 40 TABLET, FILM COATED ORAL at 08:55

## 2022-09-04 RX ADMIN — PREGABALIN 50 MG: 50 CAPSULE ORAL at 14:08

## 2022-09-04 RX ADMIN — MORPHINE SULFATE 2 MG: 2 INJECTION, SOLUTION INTRAMUSCULAR; INTRAVENOUS at 20:19

## 2022-09-04 RX ADMIN — PREGABALIN 50 MG: 50 CAPSULE ORAL at 08:55

## 2022-09-04 ASSESSMENT — ENCOUNTER SYMPTOMS
ABDOMINAL PAIN: 0
SHORTNESS OF BREATH: 0

## 2022-09-04 ASSESSMENT — PAIN SCALES - GENERAL
PAINLEVEL_OUTOF10: 4
PAINLEVEL_OUTOF10: 0

## 2022-09-04 ASSESSMENT — PAIN DESCRIPTION - ORIENTATION: ORIENTATION: RIGHT;LEFT

## 2022-09-04 ASSESSMENT — PAIN DESCRIPTION - LOCATION: LOCATION: SHOULDER

## 2022-09-04 ASSESSMENT — PAIN DESCRIPTION - DESCRIPTORS: DESCRIPTORS: ACHING;SHARP;DISCOMFORT

## 2022-09-04 NOTE — PLAN OF CARE
Problem: Chronic Conditions and Co-morbidities  Goal: Patient's chronic conditions and co-morbidity symptoms are monitored and maintained or improved  9/3/2022 2240 by Yvonne Masters RN  Outcome: Progressing  9/3/2022 1350 by Prince Rodriguez RN  Outcome: Progressing  9/3/2022 1349 by Prince Rodriguez RN  Outcome: Progressing  9/3/2022 1346 by Prince Rodriguez RN  Outcome: Progressing     Problem: Discharge Planning  Goal: Discharge to home or other facility with appropriate resources  9/3/2022 2240 by Yvonne Masters RN  Outcome: Progressing  9/3/2022 1350 by Prince Rodriguez RN  Outcome: Progressing  9/3/2022 1349 by Prince Rodriguez RN  Outcome: Progressing  9/3/2022 1346 by rPince Rodriguez RN  Outcome: Progressing     Problem: Pain  Goal: Verbalizes/displays adequate comfort level or baseline comfort level  9/3/2022 1350 by Prince Rodriguez RN  Outcome: Progressing  9/3/2022 1349 by Prince Rodriguez RN  Outcome: Progressing  9/3/2022 1346 by Prince Rodriguez RN  Outcome: Progressing     Problem: Skin/Tissue Integrity  Goal: Absence of new skin breakdown  9/3/2022 1350 by Prince Rodriguez RN  Outcome: Progressing  9/3/2022 1349 by Prince Rodriguez RN  Outcome: Progressing  9/3/2022 1346 by Prince Rodriguez RN  Outcome: Progressing     Problem: Confusion  Goal: Confusion, delirium, dementia, or psychosis is improved or at baseline  9/3/2022 1350 by Prince Rodriguez RN  Outcome: Progressing  9/3/2022 1349 by Prince Rodriguez RN  Outcome: Progressing  9/3/2022 1346 by Prince Rodriguez RN  Outcome: Progressing     Problem: Safety - Adult  Goal: Free from fall injury  9/3/2022 2240 by Yvonne Masters RN  Outcome: Progressing  9/3/2022 1350 by Prince Rodriguez RN  Outcome: Progressing  9/3/2022 1349 by Prince Rodriguez RN  Outcome: Progressing  9/3/2022 1346 by Prince Rodriguez RN  Outcome: Progressing     Problem: ABCDS Injury Assessment  Goal: Absence of physical injury  9/3/2022 2240 by Michael Page Nando Tovar  Outcome: Progressing  9/3/2022 1350 by Sánchez Avilez RN  Outcome: Progressing  9/3/2022 1349 by Sánchez Avilez RN  Outcome: Progressing  9/3/2022 1346 by Sánchez Avilez RN  Outcome: Progressing     Problem: Nutrition Deficit:  Goal: Optimize nutritional status  9/3/2022 1350 by Sánchez Avilez RN  Outcome: Progressing  9/3/2022 1349 by Sánchez Avilez RN  Outcome: Progressing  9/3/2022 1346 by Sánchez Avilez RN  Outcome: Progressing

## 2022-09-04 NOTE — PROGRESS NOTES
Progress Note  Date:2022       [facility] [id number]  Patient Elisa Marte     Date of Birth:[birth year]     Age:82 y.o. Patient says she feels ok today. Subjective    Subjective:  Symptoms:  Stable. No shortness of breath or chest pain. Diet:  Adequate intake. Activity level: Impaired due to pain. Pain:  She complains of pain that is mild. Review of Systems   Constitutional:  Negative for activity change and fever. HENT:  Negative for congestion. Respiratory:  Negative for shortness of breath. Cardiovascular:  Negative for chest pain. Gastrointestinal:  Negative for abdominal pain. Neurological:  Negative for dizziness. Objective         Vitals Last 24 Hours:  TEMPERATURE:  Temp  Av.4 °F (36.9 °C)  Min: 98.1 °F (36.7 °C)  Max: 98.6 °F (37 °C)  RESPIRATIONS RANGE: Resp  Av  Min: 18  Max: 18  PULSE OXIMETRY RANGE: SpO2  Av %  Min: 93 %  Max: 99 %  PULSE RANGE: Pulse  Av  Min: 91  Max: 94  BLOOD PRESSURE RANGE: Systolic (06OPO), KWH:271 , Min:137 , OYJ:996   ; Diastolic (17SXQ), SDX:70, Min:70, Max:98    I/O (24Hr): Intake/Output Summary (Last 24 hours) at 2022 0723  Last data filed at 2022 0438  Gross per 24 hour   Intake 9953.41 ml   Output --   Net 9953.41 ml       Objective:  General Appearance:  Comfortable. Vital signs: (most recent): Blood pressure 137/70, pulse 94, temperature 98.4 °F (36.9 °C), temperature source Oral, resp. rate 18, height 5' 9\" (1.753 m), weight 179 lb (81.2 kg), SpO2 99 %. No fever. Lungs:  Normal effort and normal respiratory rate. Breath sounds clear to auscultation. Heart: Normal rate. Regular rhythm. S1 normal and S2 normal.    Abdomen: There is no abdominal tenderness. Labs/Imaging/Diagnostics    Labs:  CBC:  No results for input(s): WBC, RBC, HGB, HCT, MCV, RDW, PLT in the last 72 hours.     CHEMISTRIES:  No results for input(s): NA, K, CL, CO2, BUN, CREATININE, GLUCOSE, CA, PHOS, MG in the last 72 hours.    PT/INR:No results for input(s): PROTIME, INR in the last 72 hours. APTT:No results for input(s): APTT in the last 72 hours. LIVER PROFILE:  No results for input(s): AST, ALT, BILIDIR, BILITOT, ALKPHOS in the last 72 hours. Imaging Last 24 Hours:  CT ABDOMEN PELVIS W IV CONTRAST Additional Contrast? None    Result Date: 8/29/2022  EXAMINATION: CT OF THE ABDOMEN AND PELVIS WITH CONTRAST8/29/2022 2:00 am TECHNIQUE: CT of the abdomen and pelvis was performed with the administration of intravenous contrast. Multiplanar reformatted images are provided for review. Automated exposure control, iterative reconstruction, and/or weight based adjustment of the mA/kV was utilized to reduce the radiation dose to as low as reasonably achievable. COMPARISON: None HISTORY: ORDERING SYSTEM PROVIDED HISTORY: abdominal pain TECHNOLOGIST PROVIDED HISTORY: Reason for exam:->abdominal pain Additional Contrast?->None Decision Support Exception - unselect if not a suspected or confirmed emergency medical condition->Emergency Medical Condition (MA) What reading provider will be dictating this exam?->CRC FINDINGS: THORACIC STRUCTURES: Unremarkable. LIVER:  The liver is normal in size, contour and attenuation. No focal mass. No intra or extrahepatic bile duct dilation. GALL BLADDER: Unremarkable. SPLEEN:  Unremarkable. PANCREAS:  Unremarkable. ADRENAL GLANDS:  Unremarkable. ESOPHAGUS AND STOMACH:  Unremarkable. BOWEL: Small bowel: There is distended loops of small bowel identified with collapse of the terminal ileum. Large bowel: The there is a left colostomy. The appendix is within normal limits. There is no intraperitoneal free air or fluid. URINARY/GENITAL TRACT: Kidneys:  The kidneys are unremarkable. .  No evidence of hydronephrosis, renal calcifications or solid renal mass. Ureters: The ureters are normal course and caliber. There is no evidence of ureter calculus/calculi.  URINARY BLADDER:  The urinary bladder is well distended without wall thickening or focal mass. REPRODUCTIVE ORGANS:  Unremarkable. BLOOD VESSELS: Unremarkable given patients age. An IVC filter is visualized. LYMPH NODES:  No evidence of intraabdominal or intrapelvic lymphadenopathy. ABDOMINAL WALL & SOFT TISSUES:  There is diastasis of the rectus abdominus muscles. OSSEOUS STRUCTURES: No acute osseous lesion. Dilated small bowel loops concerning for small bowel obstruction. Consider small-bowel follow-through. Left-sided colostomy is unremarkable. XR CHEST PORTABLE    Result Date: 8/29/2022  EXAMINATION: ONE XRAY VIEW OF THE CHEST8/29/2022 TECHNIQUE: Frontal view submitted COMPARISON: None. HISTORY: ORDERING SYSTEM PROVIDED HISTORY: hypoxia TECHNOLOGIST PROVIDED HISTORY: Reason for exam:->hypoxia What reading provider will be dictating this exam?->CRC FINDINGS: The lungs demonstrate no large pleural effusion, or pneumothorax. Opacification at the left lung base is identified, consolidation/pneumonia cannot be excluded the cardiomediastinal silhouette is stable. Consolidation/pneumonia cannot be excluded at the left lung base. XR ABDOMEN FOR NG/OG/NE TUBE PLACEMENT    Result Date: 8/29/2022  EXAMINATION: ONE SUPINE XRAY VIEW(S) OF THE ABDOMEN 8/29/2022 9:53 am COMPARISON: None. HISTORY: ORDERING SYSTEM PROVIDED HISTORY: Confirmation of course of NG/OG/NE tube and location of tip of tube TECHNOLOGIST PROVIDED HISTORY: Reason for exam:->Confirmation of course of NG/OG/NE tube and location of tip of tube Portable? ->Yes What reading provider will be dictating this exam?->CRC FINDINGS: Single AP abdomen with lower chest demonstrates a orogastric tube below the GE junction with tip in the fundus of the stomach in satisfactory position. There is an umbrella device within the inferior vena cava. There is a a small left-sided pleural effusion. Satisfactory placement of orogastric tube.      Assessment//Plan           Hospital Problems Last Modified POA    * (Principal) Bowel obstruction ([address] 8/29/2022 Yes   Assessment:   (* (Principal) Bowel obstruction ([address] 8/29/2022 Yes  Hyperkalemia  Abdominal pain  DM  HTN  Hyperlipidemia     Plan  Surgery following  PT eval could not be done as patient exhausted. Will need to retry as she will likely need placement. ).

## 2022-09-05 LAB
METER GLUCOSE: 142 MG/DL (ref 74–99)
METER GLUCOSE: 157 MG/DL (ref 74–99)
METER GLUCOSE: 170 MG/DL (ref 74–99)
METER GLUCOSE: 263 MG/DL (ref 74–99)
METER GLUCOSE: 267 MG/DL (ref 74–99)

## 2022-09-05 PROCEDURE — 82962 GLUCOSE BLOOD TEST: CPT

## 2022-09-05 PROCEDURE — 6370000000 HC RX 637 (ALT 250 FOR IP): Performed by: FAMILY MEDICINE

## 2022-09-05 PROCEDURE — 2700000000 HC OXYGEN THERAPY PER DAY

## 2022-09-05 PROCEDURE — 2580000003 HC RX 258: Performed by: FAMILY MEDICINE

## 2022-09-05 PROCEDURE — 1200000000 HC SEMI PRIVATE

## 2022-09-05 PROCEDURE — 6360000002 HC RX W HCPCS: Performed by: FAMILY MEDICINE

## 2022-09-05 RX ORDER — SODIUM CHLORIDE 0.9 % (FLUSH) 0.9 %
5-40 SYRINGE (ML) INJECTION 2 TIMES DAILY
Status: DISCONTINUED | OUTPATIENT
Start: 2022-09-05 | End: 2022-09-08 | Stop reason: HOSPADM

## 2022-09-05 RX ORDER — SODIUM CHLORIDE 0.9 % (FLUSH) 0.9 %
5-40 SYRINGE (ML) INJECTION PRN
Status: DISCONTINUED | OUTPATIENT
Start: 2022-09-05 | End: 2022-09-08 | Stop reason: HOSPADM

## 2022-09-05 RX ADMIN — MORPHINE SULFATE 2 MG: 2 INJECTION, SOLUTION INTRAMUSCULAR; INTRAVENOUS at 16:18

## 2022-09-05 RX ADMIN — PREGABALIN 50 MG: 50 CAPSULE ORAL at 14:34

## 2022-09-05 RX ADMIN — PREGABALIN 50 MG: 50 CAPSULE ORAL at 08:38

## 2022-09-05 RX ADMIN — ENALAPRIL MALEATE 20 MG: 5 TABLET ORAL at 08:38

## 2022-09-05 RX ADMIN — PREGABALIN 50 MG: 50 CAPSULE ORAL at 20:33

## 2022-09-05 RX ADMIN — SODIUM CHLORIDE: 9 INJECTION, SOLUTION INTRAVENOUS at 14:36

## 2022-09-05 RX ADMIN — SODIUM CHLORIDE, PRESERVATIVE FREE 10 ML: 5 INJECTION INTRAVENOUS at 08:42

## 2022-09-05 RX ADMIN — MORPHINE SULFATE 2 MG: 2 INJECTION, SOLUTION INTRAMUSCULAR; INTRAVENOUS at 05:42

## 2022-09-05 RX ADMIN — SODIUM CHLORIDE: 9 INJECTION, SOLUTION INTRAVENOUS at 20:34

## 2022-09-05 RX ADMIN — ATORVASTATIN CALCIUM 40 MG: 40 TABLET, FILM COATED ORAL at 08:38

## 2022-09-05 RX ADMIN — PAROXETINE 40 MG: 20 TABLET, FILM COATED ORAL at 08:38

## 2022-09-05 RX ADMIN — SODIUM CHLORIDE: 9 INJECTION, SOLUTION INTRAVENOUS at 05:46

## 2022-09-05 ASSESSMENT — PAIN SCALES - GENERAL
PAINLEVEL_OUTOF10: 8
PAINLEVEL_OUTOF10: 0
PAINLEVEL_OUTOF10: 10
PAINLEVEL_OUTOF10: 3

## 2022-09-05 ASSESSMENT — ENCOUNTER SYMPTOMS
ABDOMINAL PAIN: 0
SHORTNESS OF BREATH: 0

## 2022-09-05 ASSESSMENT — PAIN DESCRIPTION - ORIENTATION
ORIENTATION: RIGHT;LEFT
ORIENTATION: RIGHT

## 2022-09-05 ASSESSMENT — PAIN DESCRIPTION - DESCRIPTORS
DESCRIPTORS: TENDER;DISCOMFORT;ACHING
DESCRIPTORS: ACHING;DISCOMFORT;SORE

## 2022-09-05 ASSESSMENT — PAIN DESCRIPTION - LOCATION
LOCATION: SHOULDER;NECK
LOCATION: NECK

## 2022-09-05 ASSESSMENT — PAIN - FUNCTIONAL ASSESSMENT
PAIN_FUNCTIONAL_ASSESSMENT: ACTIVITIES ARE NOT PREVENTED
PAIN_FUNCTIONAL_ASSESSMENT: PREVENTS OR INTERFERES SOME ACTIVE ACTIVITIES AND ADLS

## 2022-09-05 NOTE — PROGRESS NOTES
Progress Note  Date:2022       [facility]:[id number]  Patient Sherron Goldberg     Date of Birth:[birth year]     Age:82 y.o. Patient in deep sleep, hard to arouse. Subjective    Subjective:  Symptoms:  Stable. No shortness of breath or chest pain. Diet:  Adequate intake. Activity level: Impaired due to pain. Pain:  She complains of pain that is mild. Review of Systems   Constitutional:  Negative for activity change and fever. HENT:  Negative for congestion. Respiratory:  Negative for shortness of breath. Cardiovascular:  Negative for chest pain. Gastrointestinal:  Negative for abdominal pain. Neurological:  Negative for dizziness. Objective         Vitals Last 24 Hours:  TEMPERATURE:  Temp  Av.8 °F (37.1 °C)  Min: 98 °F (36.7 °C)  Max: 99.7 °F (37.6 °C)  RESPIRATIONS RANGE: Resp  Av  Min: 16  Max: 20  PULSE OXIMETRY RANGE: SpO2  Av.3 %  Min: 92 %  Max: 99 %  PULSE RANGE: Pulse  Av.3  Min: 96  Max: 102  BLOOD PRESSURE RANGE: Systolic (47OQE), IQF:053 , Min:137 , EQK:100   ; Diastolic (48FXN), YWW:69, Min:71, Max:85    I/O (24Hr): Intake/Output Summary (Last 24 hours) at 2022 0734  Last data filed at 2022 0547  Gross per 24 hour   Intake 3280.46 ml   Output 100 ml   Net 3180.46 ml       Objective:  General Appearance:  Comfortable. Vital signs: (most recent): Blood pressure (!) 162/71, pulse (!) 102, temperature 98 °F (36.7 °C), temperature source Temporal, resp. rate 16, height 5' 9\" (1.753 m), weight 179 lb (81.2 kg), SpO2 92 %. No fever. Lungs:  Normal effort and normal respiratory rate. Breath sounds clear to auscultation. Heart: Normal rate. Regular rhythm. S1 normal and S2 normal.    Abdomen: There is no abdominal tenderness. Labs/Imaging/Diagnostics    Labs:  CBC:  No results for input(s): WBC, RBC, HGB, HCT, MCV, RDW, PLT in the last 72 hours.     CHEMISTRIES:  No results for input(s): NA, K, CL, CO2, BUN, CREATININE, GLUCOSE, CA, PHOS, MG in the last 72 hours. PT/INR:No results for input(s): PROTIME, INR in the last 72 hours. APTT:No results for input(s): APTT in the last 72 hours. LIVER PROFILE:  No results for input(s): AST, ALT, BILIDIR, BILITOT, ALKPHOS in the last 72 hours. Imaging Last 24 Hours:  CT ABDOMEN PELVIS W IV CONTRAST Additional Contrast? None    Result Date: 8/29/2022  EXAMINATION: CT OF THE ABDOMEN AND PELVIS WITH CONTRAST8/29/2022 2:00 am TECHNIQUE: CT of the abdomen and pelvis was performed with the administration of intravenous contrast. Multiplanar reformatted images are provided for review. Automated exposure control, iterative reconstruction, and/or weight based adjustment of the mA/kV was utilized to reduce the radiation dose to as low as reasonably achievable. COMPARISON: None HISTORY: ORDERING SYSTEM PROVIDED HISTORY: abdominal pain TECHNOLOGIST PROVIDED HISTORY: Reason for exam:->abdominal pain Additional Contrast?->None Decision Support Exception - unselect if not a suspected or confirmed emergency medical condition->Emergency Medical Condition (MA) What reading provider will be dictating this exam?->CRC FINDINGS: THORACIC STRUCTURES: Unremarkable. LIVER:  The liver is normal in size, contour and attenuation. No focal mass. No intra or extrahepatic bile duct dilation. GALL BLADDER: Unremarkable. SPLEEN:  Unremarkable. PANCREAS:  Unremarkable. ADRENAL GLANDS:  Unremarkable. ESOPHAGUS AND STOMACH:  Unremarkable. BOWEL: Small bowel: There is distended loops of small bowel identified with collapse of the terminal ileum. Large bowel: The there is a left colostomy. The appendix is within normal limits. There is no intraperitoneal free air or fluid. URINARY/GENITAL TRACT: Kidneys:  The kidneys are unremarkable. .  No evidence of hydronephrosis, renal calcifications or solid renal mass. Ureters: The ureters are normal course and caliber. There is no evidence of ureter calculus/calculi.  URINARY BLADDER: The urinary bladder is well distended without wall thickening or focal mass. REPRODUCTIVE ORGANS:  Unremarkable. BLOOD VESSELS: Unremarkable given patients age. An IVC filter is visualized. LYMPH NODES:  No evidence of intraabdominal or intrapelvic lymphadenopathy. ABDOMINAL WALL & SOFT TISSUES:  There is diastasis of the rectus abdominus muscles. OSSEOUS STRUCTURES: No acute osseous lesion. Dilated small bowel loops concerning for small bowel obstruction. Consider small-bowel follow-through. Left-sided colostomy is unremarkable. XR CHEST PORTABLE    Result Date: 8/29/2022  EXAMINATION: ONE XRAY VIEW OF THE CHEST8/29/2022 TECHNIQUE: Frontal view submitted COMPARISON: None. HISTORY: ORDERING SYSTEM PROVIDED HISTORY: hypoxia TECHNOLOGIST PROVIDED HISTORY: Reason for exam:->hypoxia What reading provider will be dictating this exam?->CRC FINDINGS: The lungs demonstrate no large pleural effusion, or pneumothorax. Opacification at the left lung base is identified, consolidation/pneumonia cannot be excluded the cardiomediastinal silhouette is stable. Consolidation/pneumonia cannot be excluded at the left lung base. XR ABDOMEN FOR NG/OG/NE TUBE PLACEMENT    Result Date: 8/29/2022  EXAMINATION: ONE SUPINE XRAY VIEW(S) OF THE ABDOMEN 8/29/2022 9:53 am COMPARISON: None. HISTORY: ORDERING SYSTEM PROVIDED HISTORY: Confirmation of course of NG/OG/NE tube and location of tip of tube TECHNOLOGIST PROVIDED HISTORY: Reason for exam:->Confirmation of course of NG/OG/NE tube and location of tip of tube Portable? ->Yes What reading provider will be dictating this exam?->CRC FINDINGS: Single AP abdomen with lower chest demonstrates a orogastric tube below the GE junction with tip in the fundus of the stomach in satisfactory position. There is an umbrella device within the inferior vena cava. There is a a small left-sided pleural effusion. Satisfactory placement of orogastric tube.      Assessment//Plan Hospital Problems             Last Modified POA    * (Principal) Bowel obstruction ([address] 8/29/2022 Yes   Assessment:   (* (Principal) Bowel obstruction ([facility] [address] [facility] 8/29/2022 Yes  Hyperkalemia  Abdominal pain  DM  HTN  Hyperlipidemia     Plan  Advance diet  PT eval could not be done as patient exhausted. Will need to retry as she will likely need placement. ).

## 2022-09-06 LAB
ANION GAP SERPL CALCULATED.3IONS-SCNC: 10 MMOL/L (ref 7–16)
BUN BLDV-MCNC: 8 MG/DL (ref 6–23)
CALCIUM SERPL-MCNC: 8.2 MG/DL (ref 8.6–10.2)
CHLORIDE BLD-SCNC: 106 MMOL/L (ref 98–107)
CO2: 27 MMOL/L (ref 22–29)
CREAT SERPL-MCNC: 0.7 MG/DL (ref 0.5–1)
GFR AFRICAN AMERICAN: >60
GFR NON-AFRICAN AMERICAN: >60 ML/MIN/1.73
GLUCOSE BLD-MCNC: 157 MG/DL (ref 74–99)
HCT VFR BLD CALC: 31.6 % (ref 34–48)
HEMOGLOBIN: 10 G/DL (ref 11.5–15.5)
MCH RBC QN AUTO: 28 PG (ref 26–35)
MCHC RBC AUTO-ENTMCNC: 31.6 % (ref 32–34.5)
MCV RBC AUTO: 88.5 FL (ref 80–99.9)
METER GLUCOSE: 155 MG/DL (ref 74–99)
METER GLUCOSE: 159 MG/DL (ref 74–99)
METER GLUCOSE: 176 MG/DL (ref 74–99)
METER GLUCOSE: 195 MG/DL (ref 74–99)
PDW BLD-RTO: 15 FL (ref 11.5–15)
PLATELET # BLD: 216 E9/L (ref 130–450)
PMV BLD AUTO: 10.4 FL (ref 7–12)
POTASSIUM SERPL-SCNC: 2.7 MMOL/L (ref 3.5–5)
RBC # BLD: 3.57 E12/L (ref 3.5–5.5)
SODIUM BLD-SCNC: 143 MMOL/L (ref 132–146)
WBC # BLD: 11.9 E9/L (ref 4.5–11.5)

## 2022-09-06 PROCEDURE — 2580000003 HC RX 258: Performed by: FAMILY MEDICINE

## 2022-09-06 PROCEDURE — 97530 THERAPEUTIC ACTIVITIES: CPT

## 2022-09-06 PROCEDURE — 97161 PT EVAL LOW COMPLEX 20 MIN: CPT

## 2022-09-06 PROCEDURE — 97535 SELF CARE MNGMENT TRAINING: CPT

## 2022-09-06 PROCEDURE — 82962 GLUCOSE BLOOD TEST: CPT

## 2022-09-06 PROCEDURE — 36415 COLL VENOUS BLD VENIPUNCTURE: CPT

## 2022-09-06 PROCEDURE — 1200000000 HC SEMI PRIVATE

## 2022-09-06 PROCEDURE — 85027 COMPLETE CBC AUTOMATED: CPT

## 2022-09-06 PROCEDURE — 2700000000 HC OXYGEN THERAPY PER DAY

## 2022-09-06 PROCEDURE — 80048 BASIC METABOLIC PNL TOTAL CA: CPT

## 2022-09-06 PROCEDURE — 6370000000 HC RX 637 (ALT 250 FOR IP): Performed by: FAMILY MEDICINE

## 2022-09-06 PROCEDURE — 6360000002 HC RX W HCPCS: Performed by: FAMILY MEDICINE

## 2022-09-06 RX ORDER — POTASSIUM CHLORIDE 20 MEQ/1
40 TABLET, EXTENDED RELEASE ORAL ONCE
Status: COMPLETED | OUTPATIENT
Start: 2022-09-06 | End: 2022-09-06

## 2022-09-06 RX ADMIN — ATORVASTATIN CALCIUM 40 MG: 40 TABLET, FILM COATED ORAL at 09:00

## 2022-09-06 RX ADMIN — MORPHINE SULFATE 2 MG: 2 INJECTION, SOLUTION INTRAMUSCULAR; INTRAVENOUS at 09:02

## 2022-09-06 RX ADMIN — SODIUM CHLORIDE, PRESERVATIVE FREE 10 ML: 5 INJECTION INTRAVENOUS at 09:00

## 2022-09-06 RX ADMIN — ENALAPRIL MALEATE 20 MG: 5 TABLET ORAL at 09:00

## 2022-09-06 RX ADMIN — PREGABALIN 50 MG: 50 CAPSULE ORAL at 21:02

## 2022-09-06 RX ADMIN — SODIUM CHLORIDE: 9 INJECTION, SOLUTION INTRAVENOUS at 18:10

## 2022-09-06 RX ADMIN — PAROXETINE 40 MG: 20 TABLET, FILM COATED ORAL at 09:00

## 2022-09-06 RX ADMIN — PREGABALIN 50 MG: 50 CAPSULE ORAL at 09:00

## 2022-09-06 RX ADMIN — PREGABALIN 50 MG: 50 CAPSULE ORAL at 13:14

## 2022-09-06 RX ADMIN — SODIUM CHLORIDE: 9 INJECTION, SOLUTION INTRAVENOUS at 07:28

## 2022-09-06 RX ADMIN — POTASSIUM CHLORIDE 40 MEQ: 1500 TABLET, EXTENDED RELEASE ORAL at 11:18

## 2022-09-06 ASSESSMENT — ENCOUNTER SYMPTOMS
SHORTNESS OF BREATH: 0
ABDOMINAL PAIN: 0

## 2022-09-06 ASSESSMENT — PAIN SCALES - GENERAL
PAINLEVEL_OUTOF10: 3
PAINLEVEL_OUTOF10: 9
PAINLEVEL_OUTOF10: 3
PAINLEVEL_OUTOF10: 6

## 2022-09-06 ASSESSMENT — PAIN DESCRIPTION - ORIENTATION
ORIENTATION: MID
ORIENTATION: UPPER

## 2022-09-06 ASSESSMENT — PAIN SCALES - PAIN ASSESSMENT IN ADVANCED DEMENTIA (PAINAD)
CONSOLABILITY: 0
FACIALEXPRESSION: 0
BREATHING: 0
TOTALSCORE: 0
BODYLANGUAGE: 0
NEGVOCALIZATION: 0

## 2022-09-06 ASSESSMENT — PAIN DESCRIPTION - PAIN TYPE
TYPE: ACUTE PAIN
TYPE: CHRONIC PAIN

## 2022-09-06 ASSESSMENT — PAIN - FUNCTIONAL ASSESSMENT
PAIN_FUNCTIONAL_ASSESSMENT: PREVENTS OR INTERFERES SOME ACTIVE ACTIVITIES AND ADLS
PAIN_FUNCTIONAL_ASSESSMENT: PREVENTS OR INTERFERES SOME ACTIVE ACTIVITIES AND ADLS

## 2022-09-06 ASSESSMENT — PAIN DESCRIPTION - FREQUENCY
FREQUENCY: CONTINUOUS
FREQUENCY: INTERMITTENT

## 2022-09-06 ASSESSMENT — PAIN DESCRIPTION - LOCATION
LOCATION: BACK;NECK
LOCATION: BACK;ARM;NECK;SHOULDER

## 2022-09-06 ASSESSMENT — PAIN DESCRIPTION - DESCRIPTORS
DESCRIPTORS: DISCOMFORT;DULL;SORE
DESCRIPTORS: ACHING

## 2022-09-06 ASSESSMENT — PAIN DESCRIPTION - ONSET
ONSET: ON-GOING
ONSET: ON-GOING

## 2022-09-06 NOTE — CARE COORDINATION
PT evals requested. Updated OT evals requested. Will need to determine discharge plan.  Electronically signed by Luz Moreira RN on 9/6/2022 at 11:27 AM

## 2022-09-06 NOTE — PROGRESS NOTES
Occupational Therapy  OT BEDSIDE TREATMENT NOTE   [facility] [address] [facility] [place]  [address] [place] [address]      Date:2022  Patient Name: Ran Ewing  MRN: [id number]  : 1940  Room: [address] [place] [address]     Evaluating OT: Zachary Mason OTR/L #[id number]      Referring Provider: Celina Gómez MD  Specific Provider Orders/Date: OT eval and treat 22     Diagnosis: Hyperkalemia [E87.5]  Bowel obstruction ([address] [K56.609]  Abdominal pain, unspecified abdominal location [R10.9]  Intestinal obstruction, unspecified cause, unspecified whether partial or complete ([address] [K56.609]   Pt admitted to hospital with abdominal pain. + SBO and AMS      Pertinent Medical History:  has a past medical history of Arthritis, Colostomy in place [facility]), Diabetes mellitus ([address] Diverticulitis, GERD (gastroesophageal reflux disease), Hernia, History of blood transfusion, Hyperlipidemia, and Hypertension.          Precautions:  Fall Risk,  , bed alarm, cognition      Assessment of current deficits    [x] Functional mobility          [x]ADLs           [x] Strength                  [x]Cognition    [x] Functional transfers        [x] IADLs         [x] Safety Awareness   [x]Endurance    [] Fine Coordination                        [x] Balance      [] Vision/perception   []Sensation      []Gross Motor Coordination            [] ROM           [] Delirium                   [] Motor Control      OT PLAN OF CARE   OT POC based on physician orders, patient diagnosis and results of clinical assessment     Frequency/Duration 1-3 days/wk for 2 weeks PRN   Specific OT Treatment Interventions to include:   * Instruction/training on adapted ADL techniques and AE recommendations to increase functional independence within precautions       * Training on energy conservation strategies, correct breathing pattern and techniques to improve independence/tolerance for self-care routine  * Functional Minimal Assist      Standing   Min A  To wash hands standing at sink Modified [place]    UB Dressing Minimal Assist  Min A  To don/doff gown seated EOB  Modified [place]    LB Dressing Moderate Assist  Max A  To don/doff socks and underwear seated EOB and standing to pull over hips  Modified [place]    Bathing Moderate Assist Mod A  Seated EOB and standing for posterior  Modified [place]    Toileting Moderate Assist  Mod A- clothing management  Mod A- hygiene  Modified [place]    Bed Mobility  Supine to sit: Minimal Assist   Sit to supine: Minimal Assist  Mod A- supine>sit  Educated pt on technique to increase independence. Supine to sit: Modified [place]   Sit to supine: Modified [place]    Functional Transfers Minimal Assist      Various surfaces Mod A- sit<->stand  Cuing for hand placement  Mod A- toilet transfer using grab bar  Modified [place]    Functional Mobility Minimal Assist      Ambulated in room including to/from bathroom with w/w : + unsteadiness  Min A  To and from bathroom using w/w Modified [place]    Balance Sitting:     Static:  SBA    Dynamic: Min A  Standing: min A Sitting:     Static:  Ind    Dynamic: SBA  Standing: Min A     Activity Tolerance F- Fair  F+   Visual/  Perceptual wfl                        Comments: Upon arrival pt supine in bed. Pt educated on techniques to increase independence and safety during ADL's, bed mobility, and functional transfers. At end of session pt left seated EOB with PT, call light within reach. Pt has made fair progress towards set goals.      Continue with current plan of care    Treatment Time In: 11:29            Treatment Time Out: 11:55             Treatment Charges: Mins Units   Ther Ex  69516     Manual Therapy 90715     Thera Activities 18459 10 1   ADL/Home Mgt 15195 16 1   Neuro Re-ed 36576     Group Therapy      Orthotic manage/training  72526     Non-Billable Time     Total Timed Treatment 26 2 William Mckenzie, Shasha 86

## 2022-09-06 NOTE — PROGRESS NOTES
Progress Note  Date:2022       [facility]:[id number]  Patient Ayush Willard     Date of Birth:[birth year]     Age:82 y.o. Patient says she feels ok, says hard to walk with walker. Subjective    Subjective:  Symptoms:  Stable. She reports weakness. No shortness of breath or chest pain. Diet:  Adequate intake. Activity level: Impaired due to pain. Pain:  She complains of pain that is mild. Review of Systems   Constitutional:  Negative for activity change and fever. HENT:  Negative for congestion. Respiratory:  Negative for shortness of breath. Cardiovascular:  Negative for chest pain. Gastrointestinal:  Negative for abdominal pain. Neurological:  Positive for weakness. Negative for dizziness. Objective         Vitals Last 24 Hours:  TEMPERATURE:  Temp  Av.4 °F (36.9 °C)  Min: 97.5 °F (36.4 °C)  Max: 99.2 °F (37.3 °C)  RESPIRATIONS RANGE: Resp  Av.8  Min: 16  Max: 18  PULSE OXIMETRY RANGE: SpO2  Av %  Min: 85 %  Max: 100 %  PULSE RANGE: Pulse  Av.5  Min: 97  Max: 110  BLOOD PRESSURE RANGE: Systolic (98TXQ), JPW:967 , Min:141 , OG   ; Diastolic (32PZH), YKA:46, Min:78, Max:96    I/O (24Hr): Intake/Output Summary (Last 24 hours) at 2022 0657  Last data filed at 2022 0200  Gross per 24 hour   Intake 618.7 ml   Output 100 ml   Net 518.7 ml       Objective:  General Appearance:  Comfortable. Vital signs: (most recent): Blood pressure (!) 141/83, pulse 97, temperature 97.5 °F (36.4 °C), temperature source Temporal, resp. rate 16, height 5' 9\" (1.753 m), weight 179 lb (81.2 kg), SpO2 94 %. No fever. Lungs:  Normal effort and normal respiratory rate. Breath sounds clear to auscultation. Heart: Normal rate. Regular rhythm. S1 normal and S2 normal.    Abdomen: There is no abdominal tenderness. Labs/Imaging/Diagnostics    Labs:  CBC:  No results for input(s): WBC, RBC, HGB, HCT, MCV, RDW, PLT in the last 72 hours.     CHEMISTRIES:  No results for input(s): NA, K, CL, CO2, BUN, CREATININE, GLUCOSE, CA, PHOS, MG in the last 72 hours. PT/INR:No results for input(s): PROTIME, INR in the last 72 hours. APTT:No results for input(s): APTT in the last 72 hours. LIVER PROFILE:  No results for input(s): AST, ALT, BILIDIR, BILITOT, ALKPHOS in the last 72 hours. Imaging Last 24 Hours:  CT ABDOMEN PELVIS W IV CONTRAST Additional Contrast? None    Result Date: 8/29/2022  EXAMINATION: CT OF THE ABDOMEN AND PELVIS WITH CONTRAST8/29/2022 2:00 am TECHNIQUE: CT of the abdomen and pelvis was performed with the administration of intravenous contrast. Multiplanar reformatted images are provided for review. Automated exposure control, iterative reconstruction, and/or weight based adjustment of the mA/kV was utilized to reduce the radiation dose to as low as reasonably achievable. COMPARISON: None HISTORY: ORDERING SYSTEM PROVIDED HISTORY: abdominal pain TECHNOLOGIST PROVIDED HISTORY: Reason for exam:->abdominal pain Additional Contrast?->None Decision Support Exception - unselect if not a suspected or confirmed emergency medical condition->Emergency Medical Condition (MA) What reading provider will be dictating this exam?->CRC FINDINGS: THORACIC STRUCTURES: Unremarkable. LIVER:  The liver is normal in size, contour and attenuation. No focal mass. No intra or extrahepatic bile duct dilation. GALL BLADDER: Unremarkable. SPLEEN:  Unremarkable. PANCREAS:  Unremarkable. ADRENAL GLANDS:  Unremarkable. ESOPHAGUS AND STOMACH:  Unremarkable. BOWEL: Small bowel: There is distended loops of small bowel identified with collapse of the terminal ileum. Large bowel: The there is a left colostomy. The appendix is within normal limits. There is no intraperitoneal free air or fluid. URINARY/GENITAL TRACT: Kidneys:  The kidneys are unremarkable. .  No evidence of hydronephrosis, renal calcifications or solid renal mass. Ureters: The ureters are normal course and caliber. There is no evidence of ureter calculus/calculi. URINARY BLADDER:  The urinary bladder is well distended without wall thickening or focal mass. REPRODUCTIVE ORGANS:  Unremarkable. BLOOD VESSELS: Unremarkable given patients age. An IVC filter is visualized. LYMPH NODES:  No evidence of intraabdominal or intrapelvic lymphadenopathy. ABDOMINAL WALL & SOFT TISSUES:  There is diastasis of the rectus abdominus muscles. OSSEOUS STRUCTURES: No acute osseous lesion. Dilated small bowel loops concerning for small bowel obstruction. Consider small-bowel follow-through. Left-sided colostomy is unremarkable. XR CHEST PORTABLE    Result Date: 8/29/2022  EXAMINATION: ONE XRAY VIEW OF THE CHEST8/29/2022 TECHNIQUE: Frontal view submitted COMPARISON: None. HISTORY: ORDERING SYSTEM PROVIDED HISTORY: hypoxia TECHNOLOGIST PROVIDED HISTORY: Reason for exam:->hypoxia What reading provider will be dictating this exam?->CRC FINDINGS: The lungs demonstrate no large pleural effusion, or pneumothorax. Opacification at the left lung base is identified, consolidation/pneumonia cannot be excluded the cardiomediastinal silhouette is stable. Consolidation/pneumonia cannot be excluded at the left lung base. XR ABDOMEN FOR NG/OG/NE TUBE PLACEMENT    Result Date: 8/29/2022  EXAMINATION: ONE SUPINE XRAY VIEW(S) OF THE ABDOMEN 8/29/2022 9:53 am COMPARISON: None. HISTORY: ORDERING SYSTEM PROVIDED HISTORY: Confirmation of course of NG/OG/NE tube and location of tip of tube TECHNOLOGIST PROVIDED HISTORY: Reason for exam:->Confirmation of course of NG/OG/NE tube and location of tip of tube Portable? ->Yes What reading provider will be dictating this exam?->CRC FINDINGS: Single AP abdomen with lower chest demonstrates a orogastric tube below the GE junction with tip in the fundus of the stomach in satisfactory position. There is an umbrella device within the inferior vena cava. There is a a small left-sided pleural effusion.      Satisfactory placement of orogastric tube. Assessment//Plan           Hospital Problems             Last Modified POA    * (Principal) Bowel obstruction ([address] 8/29/2022 Yes   Assessment:   (* (Principal) Bowel obstruction ([address] 8/29/2022 Yes  Hyperkalemia  Abdominal pain  DM  HTN  Hyperlipidemia     Plan  Advance diet as tolerated. Waiting for PT eval for possible placement. Otherwise stable. ).

## 2022-09-06 NOTE — PROGRESS NOTES
Physical Therapy  Physical Therapy Initial Assessment    Name: Sanjay Kee  : 1940  MRN: [id number]      Date of Service: 2022    Evaluating PT:  Alexander Mac PT, DPT [id number]    Room #:  0406/4745-D  Diagnosis:  Hyperkalemia [E87.5]  Bowel obstruction ([facility] [K56.609]  Abdominal pain, unspecified abdominal location [R10.9]  Intestinal obstruction, unspecified cause, unspecified whether partial or complete ([facility] [K56.609]  PMHx/PSHx:  DM, HTN, colostomy, blood transfusion, GERD, partial thyroidectomy  Procedure/Surgery:  None  Precautions:  Falls, cognition, TSM  Equipment Needs:  TBD  Equipment Owned:  FWW, rollator, wheelchair    SUBJECTIVE:    Pt is questionable historian. Pt lives with  and daughter. Per chart, there are 4 stair(s) to enter home. Pt ambulated with FWW PTA. OBJECTIVE:   Initial Evaluation  Date: 2022 Treatment Short Term/ Long Term   Goals   AM-PAC 6 Clicks      Was pt agreeable to Eval/treatment? Yes     Does pt have pain? Moderate B wrist     Bed Mobility  Rolling: NT  Supine to sit: NT  Sit to supine: ModA  Scooting: NT  Rolling: Supervision  Supine to sit: SBA  Sit to supine: SBA  Scooting: Supervision   Transfers Sit to stand: ModA  Stand to sit: ModA  Stand pivot: NT  Sit to stand: SBA  Stand to sit: SBA  Stand pivot: SBA with AAD   Ambulation    5', 5', 30' with [employer] Choco  100 feet with AAD SBA   Stair negotiation: ascended and descended  NT  4 step(s) with 1 rail(s) SBA   ROM BUE:  See OT note  BLE:  WFL     Strength BUE:  See OT note  BLE:  Knee ext 4+/5, ankle dorsiflex 5/5     Balance Sitting EOB:  SBA  Dynamic Standing:  Choco with [employer]  Sitting EOB:  Supervision  Dynamic Standing:  SBA with AAD     Pt is A & O x 3 (self, place, year)  Sensation:  Pt reports B hand numbness/tingling  Edema:  Unremarkable    Vitals:   HR 76, SpO2 96% sitting EOB. HR 79, SpO2 96% with activity.     Patient education  Pt educated on role of PT, safety during functional mobility, use of call light for assistance. Patient response to education:   Pt verbalized understanding Pt demonstrated skill Pt requires further education in this area   Yes Yes Yes     ASSESSMENT:    Conditions Requiring Skilled Therapeutic Intervention:    [x]Decreased strength     []Decreased ROM  [x]Decreased functional mobility  [x]Decreased balance   [x]Decreased endurance   []Decreased posture  [x]Decreased sensation  []Decreased coordination   []Decreased vision  [x]Decreased safety awareness   [x]Increased pain       Comments:  Session cleared by nursing. Patient sitting EOB with OT present upon arrival; agreeable to PT evaluation with OT collaboration. Patient sat EOB for extended period of time. Required increased time, verbal cues related to positioning/sequencing to ensure safety during functional transfers. Upon initial stand, static/dynamic standing performed in order to don undergarment. Ambulated to/from bathroom, where care was transferred to OT. Tolerated static/dynamic standing activity at sink in bathroom with OT for extended period of time. Following seated rest break, completed ambulation bout of greater distance. Ambulated with significantly decreased speed, mild unsteadiness. Required increased time, assistance of trunk and BLE to perform bed mobility. Vitals monitored and noted. Patient left in semi-Mercedes's position with BLE elevated, heel protectors donned, call light in reach. Patient would benefit from continued skilled PT services to address functional deficits and prevent deconditioning.     Treatment:  Patient practiced and was instructed in the following treatment:    Static/dynamic sitting - performed to promote activity tolerance and balance maintenance  Functional transfers - verbal cues to facilitate proper positioning and sequencing, particularly related to hand/foot placement; physical assistance provided as needed during activity  Static/dynamic standing - performed to promote activity tolerance and balance maintenance  Ambulation - verbal cues to facilitate proper positioning, particularly related to walker approximation; physical assistance provided as needed during activity  Bed mobility - physical assistance provided as needed during activity  Skilled positioning - patient positioned optimally to protect skin/joint integrity and promote comfort    Pt's/ family goals   1. None stated    Prognosis is good for reaching above PT goals. Patient and or family understand(s) diagnosis, prognosis, and plan of care. Yes    PHYSICAL THERAPY PLAN OF CARE:    PT POC is established based on physician order and patient diagnosis     Referring provider/PT Order:    09/01/22 0945  PT eval and treat       Ordered by: James Dover RN    Diagnosis:  Hyperkalemia [E87.5]  Bowel obstruction ([facility] [address] [K56.609]  Abdominal pain, unspecified abdominal location [R10.9]  Intestinal obstruction, unspecified cause, unspecified whether partial or complete ([facility] [address] [facility] [K56.609]  Specific instructions for next treatment:  Continue to facilitate safe performance of functional mobility; progress as appropriate.     Current Treatment Recommendations:     [x] Strengthening to improve independence with functional mobility   [x] ROM to improve independence with functional mobility   [x] Balance Training to improve static/dynamic balance and to reduce fall risk  [x] Endurance Training to improve activity tolerance during functional mobility   [x] Transfer Training to improve safety and independence with all functional transfers   [x] Gait Training to improve gait mechanics, endurance and assess need for appropriate assistive device  [x] Stair Training in preparation for safe discharge home and/or into the community   [x] Positioning to prevent skin breakdown and contractures  [x] Safety and Education Training   [x] Patient/Caregiver Education   [] HEP  [] Other     PT long term treatment goals are located in above

## 2022-09-07 ENCOUNTER — APPOINTMENT (OUTPATIENT)
Dept: ULTRASOUND IMAGING | Age: 82
DRG: 390 | End: 2022-09-07
Payer: MEDICARE

## 2022-09-07 LAB
ANION GAP SERPL CALCULATED.3IONS-SCNC: 11 MMOL/L (ref 7–16)
BUN BLDV-MCNC: 9 MG/DL (ref 6–23)
CALCIUM SERPL-MCNC: 8.8 MG/DL (ref 8.6–10.2)
CHLORIDE BLD-SCNC: 102 MMOL/L (ref 98–107)
CO2: 30 MMOL/L (ref 22–29)
CREAT SERPL-MCNC: 0.8 MG/DL (ref 0.5–1)
GFR AFRICAN AMERICAN: >60
GFR NON-AFRICAN AMERICAN: >60 ML/MIN/1.73
GLUCOSE BLD-MCNC: 195 MG/DL (ref 74–99)
METER GLUCOSE: 144 MG/DL (ref 74–99)
METER GLUCOSE: 175 MG/DL (ref 74–99)
METER GLUCOSE: 184 MG/DL (ref 74–99)
METER GLUCOSE: 223 MG/DL (ref 74–99)
POTASSIUM SERPL-SCNC: 3 MMOL/L (ref 3.5–5)
SODIUM BLD-SCNC: 143 MMOL/L (ref 132–146)

## 2022-09-07 PROCEDURE — 36415 COLL VENOUS BLD VENIPUNCTURE: CPT

## 2022-09-07 PROCEDURE — 93971 EXTREMITY STUDY: CPT

## 2022-09-07 PROCEDURE — 6360000002 HC RX W HCPCS: Performed by: FAMILY MEDICINE

## 2022-09-07 PROCEDURE — 82962 GLUCOSE BLOOD TEST: CPT

## 2022-09-07 PROCEDURE — 80048 BASIC METABOLIC PNL TOTAL CA: CPT

## 2022-09-07 PROCEDURE — 6370000000 HC RX 637 (ALT 250 FOR IP): Performed by: FAMILY MEDICINE

## 2022-09-07 PROCEDURE — 2700000000 HC OXYGEN THERAPY PER DAY

## 2022-09-07 PROCEDURE — 2580000003 HC RX 258: Performed by: FAMILY MEDICINE

## 2022-09-07 PROCEDURE — 1200000000 HC SEMI PRIVATE

## 2022-09-07 RX ADMIN — PREGABALIN 50 MG: 50 CAPSULE ORAL at 19:59

## 2022-09-07 RX ADMIN — ATORVASTATIN CALCIUM 40 MG: 40 TABLET, FILM COATED ORAL at 08:26

## 2022-09-07 RX ADMIN — ENALAPRIL MALEATE 20 MG: 5 TABLET ORAL at 08:27

## 2022-09-07 RX ADMIN — SODIUM CHLORIDE, PRESERVATIVE FREE 10 ML: 5 INJECTION INTRAVENOUS at 15:36

## 2022-09-07 RX ADMIN — SODIUM CHLORIDE, PRESERVATIVE FREE 10 ML: 5 INJECTION INTRAVENOUS at 19:59

## 2022-09-07 RX ADMIN — PREGABALIN 50 MG: 50 CAPSULE ORAL at 13:57

## 2022-09-07 RX ADMIN — PREGABALIN 50 MG: 50 CAPSULE ORAL at 08:30

## 2022-09-07 RX ADMIN — PAROXETINE 40 MG: 20 TABLET, FILM COATED ORAL at 08:26

## 2022-09-07 RX ADMIN — MORPHINE SULFATE 2 MG: 2 INJECTION, SOLUTION INTRAMUSCULAR; INTRAVENOUS at 15:30

## 2022-09-07 RX ADMIN — SODIUM CHLORIDE, PRESERVATIVE FREE 10 ML: 5 INJECTION INTRAVENOUS at 15:30

## 2022-09-07 ASSESSMENT — PAIN SCALES - GENERAL
PAINLEVEL_OUTOF10: 3
PAINLEVEL_OUTOF10: 4
PAINLEVEL_OUTOF10: 6
PAINLEVEL_OUTOF10: 8
PAINLEVEL_OUTOF10: 10

## 2022-09-07 ASSESSMENT — PAIN DESCRIPTION - DESCRIPTORS
DESCRIPTORS: ACHING;BURNING;DISCOMFORT
DESCRIPTORS: THROBBING
DESCRIPTORS: ACHING;TIGHTNESS

## 2022-09-07 ASSESSMENT — PAIN - FUNCTIONAL ASSESSMENT: PAIN_FUNCTIONAL_ASSESSMENT: PREVENTS OR INTERFERES WITH ALL ACTIVE AND SOME PASSIVE ACTIVITIES

## 2022-09-07 ASSESSMENT — PAIN DESCRIPTION - FREQUENCY
FREQUENCY: CONTINUOUS
FREQUENCY: CONTINUOUS

## 2022-09-07 ASSESSMENT — PAIN DESCRIPTION - ONSET
ONSET: SUDDEN
ONSET: ON-GOING

## 2022-09-07 ASSESSMENT — PAIN DESCRIPTION - LOCATION
LOCATION: ARM
LOCATION: ARM;HAND
LOCATION: HAND;ARM

## 2022-09-07 ASSESSMENT — PAIN DESCRIPTION - PAIN TYPE
TYPE: ACUTE PAIN
TYPE: ACUTE PAIN

## 2022-09-07 ASSESSMENT — ENCOUNTER SYMPTOMS
ABDOMINAL PAIN: 0
SHORTNESS OF BREATH: 0

## 2022-09-07 ASSESSMENT — PAIN DESCRIPTION - ORIENTATION
ORIENTATION: RIGHT

## 2022-09-07 NOTE — CARE COORDINATION
Spoke to the family regarding transition of care. They would like for the pt to return home with home PT/OT. They do not have a preference to home care.  Electronically signed by Sunil Sebastian RN on 9/7/2022 at 11:10 AM

## 2022-09-07 NOTE — PLAN OF CARE
Problem: Chronic Conditions and Co-morbidities  Goal: Patient's chronic conditions and co-morbidity symptoms are monitored and maintained or improved  Outcome: Progressing     Problem: Discharge Planning  Goal: Discharge to home or other facility with appropriate resources  Outcome: Progressing     Problem: Pain  Goal: Verbalizes/displays adequate comfort level or baseline comfort level  Outcome: Progressing     Problem: Skin/Tissue Integrity  Goal: Absence of new skin breakdown  Description: 1. Monitor for areas of redness and/or skin breakdown  2. Assess vascular access sites hourly  3. Every 4-6 hours minimum:  Change oxygen saturation probe site  4. Every 4-6 hours:  If on nasal continuous positive airway pressure, respiratory therapy assess nares and determine need for appliance change or resting period. Outcome: Progressing     Problem: Confusion  Goal: Confusion, delirium, dementia, or psychosis is improved or at baseline  Description: INTERVENTIONS:  1. Assess for possible contributors to thought disturbance, including medications, impaired vision or hearing, underlying metabolic abnormalities, dehydration, psychiatric diagnoses, and notify attending LIP  2. Page high risk fall precautions, as indicated  3. Provide frequent short contacts to provide reality reorientation, refocusing and direction  4. Decrease environmental stimuli, including noise as appropriate  5. Monitor and intervene to maintain adequate nutrition, hydration, elimination, sleep and activity  6. If unable to ensure safety without constant attention obtain sitter and review sitter guidelines with assigned personnel  7.  Initiate Psychosocial CNS and Spiritual Care consult, as indicated  Outcome: Progressing     Problem: Safety - Adult  Goal: Free from fall injury  Outcome: Progressing

## 2022-09-07 NOTE — PLAN OF CARE
Problem: Chronic Conditions and Co-morbidities  Goal: Patient's chronic conditions and co-morbidity symptoms are monitored and maintained or improved  9/7/2022 1142 by Roger Sigala RN  Outcome: Progressing  9/6/2022 2310 by Luther Bennett RN  Outcome: Progressing     Problem: Discharge Planning  Goal: Discharge to home or other facility with appropriate resources  9/7/2022 1142 by Roger Sigala RN  Outcome: Progressing  9/6/2022 2310 by Luther Bennett RN  Outcome: Progressing     Problem: Pain  Goal: Verbalizes/displays adequate comfort level or baseline comfort level  9/7/2022 1142 by Roger Sigala RN  Outcome: Progressing  9/6/2022 2310 by Luther Bennett RN  Outcome: Progressing     Problem: Skin/Tissue Integrity  Goal: Absence of new skin breakdown  Description: 1. Monitor for areas of redness and/or skin breakdown  2. Assess vascular access sites hourly  3. Every 4-6 hours minimum:  Change oxygen saturation probe site  4. Every 4-6 hours:  If on nasal continuous positive airway pressure, respiratory therapy assess nares and determine need for appliance change or resting period. 9/7/2022 1142 by Roger Sigala RN  Outcome: Progressing  9/6/2022 2310 by Luther Bennett RN  Outcome: Progressing     Problem: Confusion  Goal: Confusion, delirium, dementia, or psychosis is improved or at baseline  Description: INTERVENTIONS:  1. Assess for possible contributors to thought disturbance, including medications, impaired vision or hearing, underlying metabolic abnormalities, dehydration, psychiatric diagnoses, and notify attending LIP  2. [place] high risk fall precautions, as indicated  3. Provide frequent short contacts to provide reality reorientation, refocusing and direction  4. Decrease environmental stimuli, including noise as appropriate  5. Monitor and intervene to maintain adequate nutrition, hydration, elimination, sleep and activity  6.  If unable to ensure safety without constant attention obtain sitter and review sitter guidelines with assigned personnel  7.  Initiate Psychosocial CNS and Spiritual Care consult, as indicated  9/7/2022 1142 by Sandra Jeronimo RN  Outcome: Progressing  9/6/2022 2310 by Cedric Soni RN  Outcome: Progressing     Problem: Safety - Adult  Goal: Free from fall injury  9/7/2022 1142 by Sandra Jeronimo RN  Outcome: Progressing  9/6/2022 2310 by Cedric Soni RN  Outcome: Progressing     Problem: ABCDS Injury Assessment  Goal: Absence of physical injury  9/7/2022 1142 by Sandra Jeronimo RN  Outcome: Progressing  9/6/2022 2310 by Cedric Soni RN  Outcome: Progressing     Problem: Nutrition Deficit:  Goal: Optimize nutritional status  9/7/2022 1142 by Sandra Jeronimo RN  Outcome: Progressing  9/6/2022 2310 by Cedric Soni RN  Outcome: Progressing

## 2022-09-07 NOTE — PROGRESS NOTES
Progress Note  Date:2022       NETL:[id number]  Patient Kalyn Crespo     Date of Birth:[birth year]     Age:82 y.o. Patient in deep sleep. Subjective    Subjective:  Symptoms:  Stable. She reports weakness. No shortness of breath or chest pain. Diet:  Adequate intake. Activity level: Impaired due to pain. Pain:  She complains of pain that is mild. Review of Systems   Constitutional:  Negative for activity change and fever. HENT:  Negative for congestion. Respiratory:  Negative for shortness of breath. Cardiovascular:  Negative for chest pain. Gastrointestinal:  Negative for abdominal pain. Neurological:  Positive for weakness. Negative for dizziness. Objective         Vitals Last 24 Hours:  TEMPERATURE:  Temp  Av.6 °F (37 °C)  Min: 97.9 °F (36.6 °C)  Max: 99.4 °F (37.4 °C)  RESPIRATIONS RANGE: Resp  Av.4  Min: 16  Max: 18  PULSE OXIMETRY RANGE: SpO2  Av.7 %  Min: 94 %  Max: 96 %  PULSE RANGE: Pulse  Av.3  Min: 97  Max: 115  BLOOD PRESSURE RANGE: Systolic (46WGB), IPF:592 , Min:132 , BHJ:597   ; Diastolic (67JEF), MYQ:72, Min:67, Max:100    I/O (24Hr): Intake/Output Summary (Last 24 hours) at 2022 0643  Last data filed at 2022 1900  Gross per 24 hour   Intake 2690.55 ml   Output --   Net 2690.55 ml       Objective:  General Appearance:  Comfortable. Vital signs: (most recent): Blood pressure 132/67, pulse (!) 111, temperature 98.8 °F (37.1 °C), temperature source Temporal, resp. rate 16, height 5' 9\" (1.753 m), weight 179 lb (81.2 kg), SpO2 94 %. No fever. Lungs:  Normal effort and normal respiratory rate. Breath sounds clear to auscultation. Heart: Normal rate. Regular rhythm. S1 normal and S2 normal.    Abdomen: There is no abdominal tenderness.      Labs/Imaging/Diagnostics    Labs:  CBC:  Recent Labs     22  0802   WBC 11.9*   RBC 3.57   HGB 10.0*   HCT 31.6*   MCV 88.5   RDW 15.0          CHEMISTRIES:  Recent Labs     09/06/22  0802      K 2.7*      CO2 27   BUN 8   CREATININE 0.7   GLUCOSE 157*       PT/INR:No results for input(s): PROTIME, INR in the last 72 hours. APTT:No results for input(s): APTT in the last 72 hours. LIVER PROFILE:  No results for input(s): AST, ALT, BILIDIR, BILITOT, ALKPHOS in the last 72 hours. Imaging Last 24 Hours:  CT ABDOMEN PELVIS W IV CONTRAST Additional Contrast? None    Result Date: 8/29/2022  EXAMINATION: CT OF THE ABDOMEN AND PELVIS WITH CONTRAST8/29/2022 2:00 am TECHNIQUE: CT of the abdomen and pelvis was performed with the administration of intravenous contrast. Multiplanar reformatted images are provided for review. Automated exposure control, iterative reconstruction, and/or weight based adjustment of the mA/kV was utilized to reduce the radiation dose to as low as reasonably achievable. COMPARISON: None HISTORY: ORDERING SYSTEM PROVIDED HISTORY: abdominal pain TECHNOLOGIST PROVIDED HISTORY: Reason for exam:->abdominal pain Additional Contrast?->None Decision Support Exception - unselect if not a suspected or confirmed emergency medical condition->Emergency Medical Condition (MA) What reading provider will be dictating this exam?->CRC FINDINGS: THORACIC STRUCTURES: Unremarkable. LIVER:  The liver is normal in size, contour and attenuation. No focal mass. No intra or extrahepatic bile duct dilation. GALL BLADDER: Unremarkable. SPLEEN:  Unremarkable. PANCREAS:  Unremarkable. ADRENAL GLANDS:  Unremarkable. ESOPHAGUS AND STOMACH:  Unremarkable. BOWEL: Small bowel: There is distended loops of small bowel identified with collapse of the terminal ileum. Large bowel: The there is a left colostomy. The appendix is within normal limits. There is no intraperitoneal free air or fluid. URINARY/GENITAL TRACT: Kidneys:  The kidneys are unremarkable. .  No evidence of hydronephrosis, renal calcifications or solid renal mass. Ureters:   The ureters are normal course and caliber. There is no evidence of ureter calculus/calculi. URINARY BLADDER:  The urinary bladder is well distended without wall thickening or focal mass. REPRODUCTIVE ORGANS:  Unremarkable. BLOOD VESSELS: Unremarkable given patients age. An IVC filter is visualized. LYMPH NODES:  No evidence of intraabdominal or intrapelvic lymphadenopathy. ABDOMINAL WALL & SOFT TISSUES:  There is diastasis of the rectus abdominus muscles. OSSEOUS STRUCTURES: No acute osseous lesion. Dilated small bowel loops concerning for small bowel obstruction. Consider small-bowel follow-through. Left-sided colostomy is unremarkable. XR CHEST PORTABLE    Result Date: 8/29/2022  EXAMINATION: ONE XRAY VIEW OF THE CHEST8/29/2022 TECHNIQUE: Frontal view submitted COMPARISON: None. HISTORY: ORDERING SYSTEM PROVIDED HISTORY: hypoxia TECHNOLOGIST PROVIDED HISTORY: Reason for exam:->hypoxia What reading provider will be dictating this exam?->CRC FINDINGS: The lungs demonstrate no large pleural effusion, or pneumothorax. Opacification at the left lung base is identified, consolidation/pneumonia cannot be excluded the cardiomediastinal silhouette is stable. Consolidation/pneumonia cannot be excluded at the left lung base. XR ABDOMEN FOR NG/OG/NE TUBE PLACEMENT    Result Date: 8/29/2022  EXAMINATION: ONE SUPINE XRAY VIEW(S) OF THE ABDOMEN 8/29/2022 9:53 am COMPARISON: None. HISTORY: ORDERING SYSTEM PROVIDED HISTORY: Confirmation of course of NG/OG/NE tube and location of tip of tube TECHNOLOGIST PROVIDED HISTORY: Reason for exam:->Confirmation of course of NG/OG/NE tube and location of tip of tube Portable? ->Yes What reading provider will be dictating this exam?->CRC FINDINGS: Single AP abdomen with lower chest demonstrates a orogastric tube below the GE junction with tip in the fundus of the stomach in satisfactory position. There is an umbrella device within the inferior vena cava. There is a a small left-sided pleural effusion. Satisfactory placement of orogastric tube. Assessment//Plan           Hospital Problems             Last Modified POA    * (Principal) Bowel obstruction ([address] 8/29/2022 Yes   Assessment:   (* (Principal) Bowel obstruction ([facility] [address] 8/29/2022 Yes  Hyperkalemia  Abdominal pain  DM  HTN  Hyperlipidemia     Plan  PT/OT evals done  Await discharge planning. ).

## 2022-09-08 VITALS
HEART RATE: 93 BPM | RESPIRATION RATE: 18 BRPM | WEIGHT: 179 LBS | HEIGHT: 69 IN | TEMPERATURE: 96.8 F | BODY MASS INDEX: 26.51 KG/M2 | SYSTOLIC BLOOD PRESSURE: 134 MMHG | DIASTOLIC BLOOD PRESSURE: 85 MMHG | OXYGEN SATURATION: 97 %

## 2022-09-08 LAB
METER GLUCOSE: 170 MG/DL (ref 74–99)
METER GLUCOSE: 206 MG/DL (ref 74–99)

## 2022-09-08 PROCEDURE — 82962 GLUCOSE BLOOD TEST: CPT

## 2022-09-08 PROCEDURE — 2700000000 HC OXYGEN THERAPY PER DAY

## 2022-09-08 PROCEDURE — 6370000000 HC RX 637 (ALT 250 FOR IP): Performed by: FAMILY MEDICINE

## 2022-09-08 PROCEDURE — 2580000003 HC RX 258: Performed by: FAMILY MEDICINE

## 2022-09-08 RX ORDER — POTASSIUM CHLORIDE 20 MEQ/1
40 TABLET, EXTENDED RELEASE ORAL DAILY
Qty: 60 TABLET | Refills: 3 | Status: ON HOLD | OUTPATIENT
Start: 2022-09-08 | End: 2022-10-07 | Stop reason: HOSPADM

## 2022-09-08 RX ORDER — POTASSIUM CHLORIDE 20 MEQ/1
40 TABLET, EXTENDED RELEASE ORAL DAILY
Status: DISCONTINUED | OUTPATIENT
Start: 2022-09-08 | End: 2022-09-08 | Stop reason: HOSPADM

## 2022-09-08 RX ADMIN — ATORVASTATIN CALCIUM 40 MG: 40 TABLET, FILM COATED ORAL at 08:59

## 2022-09-08 RX ADMIN — SODIUM CHLORIDE, PRESERVATIVE FREE 10 ML: 5 INJECTION INTRAVENOUS at 08:59

## 2022-09-08 RX ADMIN — PREGABALIN 50 MG: 50 CAPSULE ORAL at 08:59

## 2022-09-08 RX ADMIN — POTASSIUM CHLORIDE 40 MEQ: 1500 TABLET, EXTENDED RELEASE ORAL at 09:03

## 2022-09-08 RX ADMIN — INSULIN LISPRO 1 UNITS: 100 INJECTION, SOLUTION INTRAVENOUS; SUBCUTANEOUS at 12:54

## 2022-09-08 RX ADMIN — PAROXETINE 40 MG: 20 TABLET, FILM COATED ORAL at 08:59

## 2022-09-08 RX ADMIN — ENALAPRIL MALEATE 20 MG: 5 TABLET ORAL at 08:59

## 2022-09-08 ASSESSMENT — PAIN SCALES - GENERAL
PAINLEVEL_OUTOF10: 0
PAINLEVEL_OUTOF10: 0

## 2022-09-08 NOTE — DISCHARGE SUMMARY
Discharge Summary    Date: 9/8/2022  Patient Name: Elodia Jacome    YOB: 1940     Age: 78 y.o. Admit Date: 8/28/2022  Discharge Date: 9/8/2022  Discharge Condition: Stable    Admission Diagnosis  Hyperkalemia [E87.5]; Bowel obstruction ([facility] [address] [G75.348]; Abdominal pain, unspecified abdominal location [R10.9]; Intestinal obstruction, unspecified cause, unspecified whether partial or complete [facility]) [K56.609]      Discharge Diagnosis  Principal Problem: Bowel obstruction (HCC)  Resolved Problems:    * No resolved hospital problems. [facility] Stay  Narrative of Hospital Course:  Patient admitted for  (Principal) Bowel obstruction ([facility] [address]          8/29/2022        Yes  Hyperkalemia  Abdominal pain  DM  HTN  Hyperlipidemia     Had NG tube per surgery  Slow recovery as patient was also altered  Patient declined placement and will do PT with home health. Consultants:  IP CONSULT TO INTERNAL MEDICINE  IP CONSULT TO GENERAL SURGERY    Surgeries/procedures Performed:      Treatments:            Discharge Plan/Disposition:  Home    Hospital/Incidental Findings Requiring Follow Up:    Patient Instructions:    Diet: Diabetic Diet    Activity:Activity as Tolerated  For number of days (if applicable): Other Instructions:    Provider Follow-Up:   No follow-ups on file. Significant Diagnostic Studies:    Recent Labs:  Admission on 08/28/2022  No results displayed because visit has over 200 results. ------------    Radiology last 7 days:  US DUP UPPER EXTREMITY RIGHT VENOUS    Result Date: 9/7/2022  No evidence of DVT.  RECOMMENDATIONS: Unavailable        [unfilled]    Discharge Medications    Current Discharge Medication List    START taking these medications    potassium chloride (KLOR-CON M) 20 MEQ extended release tablet  Take 2 tablets by mouth daily  Qty: 60 tablet Refills: 3          Current Discharge Medication List        Current Discharge Medication List    CONTINUE these medications which have NOT CHANGED    blood glucose test strips (ASCENSIA AUTODISC VI;ONE TOUCH ULTRA TEST VI) strip  OneTouch Ultra Blue Test Strip    pantoprazole (PROTONIX) 40 MG tablet  Take 40 mg by mouth daily    atorvastatin (LIPITOR) 40 MG tablet  Take 40 mg by mouth daily    HYDROcodone-acetaminophen (NORCO) 5-325 MG per tablet  Take 1 tablet by mouth every 6 hours as needed for Pain. aspirin EC 81 MG EC tablet  Take 1 tablet by mouth 2 times daily for 28 days  Qty: 56 tablet Refills: 0    pregabalin (LYRICA) 50 MG capsule  Take 50 mg by mouth 3 times daily. acetaminophen (TYLENOL) 325 MG tablet  Take 650 mg by mouth every 6 hours as needed for Pain    Liraglutide (VICTOZA) 18 MG/3ML SOPN SC injection  Inject 1.8 mg into the skin daily     PARoxetine (PAXIL) 40 MG tablet  Take 40 mg by mouth daily     glyBURIDE (DIABETA) 5 MG tablet  Take 5 mg by mouth daily     enalapril (VASOTEC) 20 MG tablet  Take 20 mg by mouth daily           Current Discharge Medication List    STOP taking these medications    rosuvastatin (CRESTOR) 20 MG tablet  Comments:  Reason for Stopping:    lansoprazole (PREVACID) 30 MG capsule  Comments:  Reason for Stopping:    metformin (GLUCOPHAGE) 500 MG tablet  Comments:  Reason for Stopping:          Time Spent on Discharge:  15 minutes were spent in patient examination, evaluation, counseling as well as medication reconciliation, prescriptions for required medications, discharge plan, and follow up.     Electronically signed by Cruz Davalos MD on 9/8/22 at 6:58 AM EDT

## 2022-09-08 NOTE — PLAN OF CARE
Problem: Chronic Conditions and Co-morbidities  Goal: Patient's chronic conditions and co-morbidity symptoms are monitored and maintained or improved  9/7/2022 2109 by Mirta Samayoa RN  Outcome: Progressing  9/7/2022 1142 by Nuria Chambers RN  Outcome: Progressing     Problem: Discharge Planning  Goal: Discharge to home or other facility with appropriate resources  9/7/2022 2109 by Mirta Samayoa RN  Outcome: Progressing  9/7/2022 1142 by Nuria Chambers RN  Outcome: Progressing     Problem: Pain  Goal: Verbalizes/displays adequate comfort level or baseline comfort level  9/7/2022 2109 by Mirta Samayoa RN  Outcome: Progressing  9/7/2022 1142 by Nuria Chambers RN  Outcome: Progressing     Problem: Skin/Tissue Integrity  Goal: Absence of new skin breakdown  Description: 1. Monitor for areas of redness and/or skin breakdown  2. Assess vascular access sites hourly  3. Every 4-6 hours minimum:  Change oxygen saturation probe site  4. Every 4-6 hours:  If on nasal continuous positive airway pressure, respiratory therapy assess nares and determine need for appliance change or resting period.   9/7/2022 2109 by Mirta Samayoa RN  Outcome: Progressing  9/7/2022 1142 by Nuria Chambers RN  Outcome: Progressing

## 2022-09-08 NOTE — PLAN OF CARE
Problem: Chronic Conditions and Co-morbidities  Goal: Patient's chronic conditions and co-morbidity symptoms are monitored and maintained or improved  9/8/2022 0754 by Stephy Schultz  Outcome: Progressing  9/7/2022 2109 by Betty Patricio RN  Outcome: Progressing     Problem: Discharge Planning  Goal: Discharge to home or other facility with appropriate resources  9/8/2022 0754 by Stephy Schultz  Outcome: Progressing  9/7/2022 2109 by Betty Patricio RN  Outcome: Progressing     Problem: Pain  Goal: Verbalizes/displays adequate comfort level or baseline comfort level  9/8/2022 0754 by Stephy Schultz  Outcome: Progressing  9/7/2022 2109 by Betty Patricio RN  Outcome: Progressing     Problem: Skin/Tissue Integrity  Goal: Absence of new skin breakdown  9/8/2022 0754 by Stephy Schultz  Outcome: Progressing  9/7/2022 2109 by Betty Patricio RN  Outcome: Progressing     Problem: Confusion  Goal: Confusion, delirium, dementia, or psychosis is improved or at baseline  9/8/2022 0754 by Stephy Schultz  Outcome: Progressing  9/7/2022 2109 by Betty Patricio RN  Outcome: Progressing     Problem: Safety - Adult  Goal: Free from fall injury  9/8/2022 0754 by Stephy Schultz  Outcome: Progressing  9/7/2022 2109 by Betty Patricio RN  Outcome: Progressing

## 2022-09-08 NOTE — PROGRESS NOTES
Discharge paperwork reviewed with daughter and patient. All questions answered. IV removed without complication. All belongings with daughter.

## 2022-09-08 NOTE — CARE COORDINATION
Discharge plan is home with family. Referral made to [facility] for home health care (PT/OT).  Electronically signed by Agatha Gould RN on 9/8/2022 at 11:49 AM

## 2022-09-08 NOTE — PLAN OF CARE
Problem: Chronic Conditions and Co-morbidities  Goal: Patient's chronic conditions and co-morbidity symptoms are monitored and maintained or improved  9/8/2022 0917 by Sherry Zaman  Outcome: Progressing  9/8/2022 0754 by Sherry Zaman  Outcome: Progressing  9/7/2022 2109 by Precious Mohs, RN  Outcome: Progressing     Problem: Discharge Planning  Goal: Discharge to home or other facility with appropriate resources  9/8/2022 0917 by Sherry Zaman  Outcome: Progressing  9/8/2022 0754 by Sherry Zaman  Outcome: Progressing  9/7/2022 2109 by Precious Mohs, RN  Outcome: Progressing     Problem: Pain  Goal: Verbalizes/displays adequate comfort level or baseline comfort level  9/8/2022 0917 by Sherry Zaman  Outcome: Progressing  9/8/2022 0754 by Sherry Zaman  Outcome: Progressing  9/7/2022 2109 by Precious Mohs, RN  Outcome: Progressing     Problem: Skin/Tissue Integrity  Goal: Absence of new skin breakdown  9/8/2022 0917 by Sherry Zaman  Outcome: Progressing  9/8/2022 0754 by Sherry Zaman  Outcome: Progressing  9/7/2022 2109 by Precious Mohs, RN  Outcome: Progressing     Problem: Confusion  Goal: Confusion, delirium, dementia, or psychosis is improved or at baseline  9/8/2022 0917 by Sherry Zaman  Outcome: Progressing  9/8/2022 0754 by Sherry Zaman  Outcome: Progressing  9/7/2022 2109 by Precious Mohs, RN  Outcome: Progressing

## 2022-10-04 ENCOUNTER — APPOINTMENT (OUTPATIENT)
Dept: CT IMAGING | Age: 82
DRG: 641 | End: 2022-10-04
Payer: MEDICARE

## 2022-10-04 ENCOUNTER — APPOINTMENT (OUTPATIENT)
Dept: GENERAL RADIOLOGY | Age: 82
DRG: 641 | End: 2022-10-04
Payer: MEDICARE

## 2022-10-04 ENCOUNTER — HOSPITAL ENCOUNTER (INPATIENT)
Age: 82
LOS: 3 days | Discharge: ANOTHER HOSPITAL | DRG: 641 | End: 2022-10-07
Attending: STUDENT IN AN ORGANIZED HEALTH CARE EDUCATION/TRAINING PROGRAM | Admitting: FAMILY MEDICINE
Payer: MEDICARE

## 2022-10-04 DIAGNOSIS — E87.5 HYPERKALEMIA: Primary | ICD-10-CM

## 2022-10-04 DIAGNOSIS — N39.0 URINARY TRACT INFECTION IN ELDERLY PATIENT: ICD-10-CM

## 2022-10-04 DIAGNOSIS — N17.9 ACUTE KIDNEY INJURY (HCC): ICD-10-CM

## 2022-10-04 LAB
ALBUMIN SERPL-MCNC: 4 G/DL (ref 3.5–5.2)
ALP BLD-CCNC: 111 U/L (ref 35–104)
ALT SERPL-CCNC: 8 U/L (ref 0–32)
ANION GAP SERPL CALCULATED.3IONS-SCNC: 10 MMOL/L (ref 7–16)
ANION GAP SERPL CALCULATED.3IONS-SCNC: 13 MMOL/L (ref 7–16)
ANION GAP SERPL CALCULATED.3IONS-SCNC: 13 MMOL/L (ref 7–16)
AST SERPL-CCNC: 20 U/L (ref 0–31)
BACTERIA: ABNORMAL /HPF
BASOPHILS ABSOLUTE: 0.05 E9/L (ref 0–0.2)
BASOPHILS RELATIVE PERCENT: 0.5 % (ref 0–2)
BILIRUB SERPL-MCNC: 0.3 MG/DL (ref 0–1.2)
BILIRUBIN URINE: NEGATIVE
BLOOD, URINE: ABNORMAL
BUN BLDV-MCNC: 45 MG/DL (ref 6–23)
BUN BLDV-MCNC: 46 MG/DL (ref 6–23)
BUN BLDV-MCNC: 50 MG/DL (ref 6–23)
CALCIUM SERPL-MCNC: 10.5 MG/DL (ref 8.6–10.2)
CALCIUM SERPL-MCNC: 9.4 MG/DL (ref 8.6–10.2)
CALCIUM SERPL-MCNC: 9.8 MG/DL (ref 8.6–10.2)
CHLORIDE BLD-SCNC: 101 MMOL/L (ref 98–107)
CHLORIDE BLD-SCNC: 101 MMOL/L (ref 98–107)
CHLORIDE BLD-SCNC: 103 MMOL/L (ref 98–107)
CHP ED QC CHECK: YES
CLARITY: ABNORMAL
CO2: 25 MMOL/L (ref 22–29)
CO2: 26 MMOL/L (ref 22–29)
CO2: 28 MMOL/L (ref 22–29)
COLOR: YELLOW
CREAT SERPL-MCNC: 1.5 MG/DL (ref 0.5–1)
CREAT SERPL-MCNC: 1.6 MG/DL (ref 0.5–1)
CREAT SERPL-MCNC: 1.8 MG/DL (ref 0.5–1)
EOSINOPHILS ABSOLUTE: 0.49 E9/L (ref 0.05–0.5)
EOSINOPHILS RELATIVE PERCENT: 4.6 % (ref 0–6)
EPITHELIAL CELLS, UA: ABNORMAL /HPF
GFR AFRICAN AMERICAN: 32
GFR AFRICAN AMERICAN: 37
GFR AFRICAN AMERICAN: 40
GFR NON-AFRICAN AMERICAN: 27 ML/MIN/1.73
GFR NON-AFRICAN AMERICAN: 31 ML/MIN/1.73
GFR NON-AFRICAN AMERICAN: 33 ML/MIN/1.73
GLUCOSE BLD-MCNC: 102 MG/DL
GLUCOSE BLD-MCNC: 114 MG/DL
GLUCOSE BLD-MCNC: 128 MG/DL
GLUCOSE BLD-MCNC: 171 MG/DL (ref 74–99)
GLUCOSE BLD-MCNC: 200 MG/DL (ref 74–99)
GLUCOSE BLD-MCNC: 57 MG/DL
GLUCOSE BLD-MCNC: 73 MG/DL
GLUCOSE BLD-MCNC: 91 MG/DL (ref 74–99)
GLUCOSE URINE: NEGATIVE MG/DL
HCT VFR BLD CALC: 39.3 % (ref 34–48)
HEMOGLOBIN: 11.8 G/DL (ref 11.5–15.5)
IMMATURE GRANULOCYTES #: 0.06 E9/L
IMMATURE GRANULOCYTES %: 0.6 % (ref 0–5)
KETONES, URINE: NEGATIVE MG/DL
LACTIC ACID, SEPSIS: 3.3 MMOL/L (ref 0.5–1.9)
LEUKOCYTE ESTERASE, URINE: ABNORMAL
LYMPHOCYTES ABSOLUTE: 2.92 E9/L (ref 1.5–4)
LYMPHOCYTES RELATIVE PERCENT: 27.5 % (ref 20–42)
MCH RBC QN AUTO: 27.1 PG (ref 26–35)
MCHC RBC AUTO-ENTMCNC: 30 % (ref 32–34.5)
MCV RBC AUTO: 90.3 FL (ref 80–99.9)
METER GLUCOSE: 102 MG/DL (ref 74–99)
METER GLUCOSE: 114 MG/DL (ref 74–99)
METER GLUCOSE: 128 MG/DL (ref 74–99)
METER GLUCOSE: 57 MG/DL (ref 74–99)
METER GLUCOSE: 73 MG/DL (ref 74–99)
MONOCYTES ABSOLUTE: 0.92 E9/L (ref 0.1–0.95)
MONOCYTES RELATIVE PERCENT: 8.7 % (ref 2–12)
NEUTROPHILS ABSOLUTE: 6.18 E9/L (ref 1.8–7.3)
NEUTROPHILS RELATIVE PERCENT: 58.1 % (ref 43–80)
NITRITE, URINE: NEGATIVE
PDW BLD-RTO: 15.9 FL (ref 11.5–15)
PH UA: 5 (ref 5–9)
PLATELET # BLD: 186 E9/L (ref 130–450)
PMV BLD AUTO: 11 FL (ref 7–12)
POTASSIUM REFLEX MAGNESIUM: 4.9 MMOL/L (ref 3.5–5)
POTASSIUM REFLEX MAGNESIUM: 6.1 MMOL/L (ref 3.5–5)
POTASSIUM REFLEX MAGNESIUM: 6.5 MMOL/L (ref 3.5–5)
POTASSIUM SERPL-SCNC: 6.1 MMOL/L (ref 3.5–5)
PRO-BNP: 107 PG/ML (ref 0–450)
PROTEIN UA: NEGATIVE MG/DL
RBC # BLD: 4.35 E12/L (ref 3.5–5.5)
RBC UA: ABNORMAL /HPF (ref 0–2)
SARS-COV-2, NAAT: NOT DETECTED
SODIUM BLD-SCNC: 139 MMOL/L (ref 132–146)
SODIUM BLD-SCNC: 140 MMOL/L (ref 132–146)
SODIUM BLD-SCNC: 141 MMOL/L (ref 132–146)
SPECIFIC GRAVITY UA: 1.02 (ref 1–1.03)
TOTAL CK: 66 U/L (ref 20–180)
TOTAL PROTEIN: 7.4 G/DL (ref 6.4–8.3)
TROPONIN, HIGH SENSITIVITY: 6 NG/L (ref 0–9)
UROBILINOGEN, URINE: 0.2 E.U./DL
WBC # BLD: 10.6 E9/L (ref 4.5–11.5)
WBC UA: ABNORMAL /HPF (ref 0–5)

## 2022-10-04 PROCEDURE — 84132 ASSAY OF SERUM POTASSIUM: CPT

## 2022-10-04 PROCEDURE — 74176 CT ABD & PELVIS W/O CONTRAST: CPT

## 2022-10-04 PROCEDURE — 84484 ASSAY OF TROPONIN QUANT: CPT

## 2022-10-04 PROCEDURE — 6360000002 HC RX W HCPCS

## 2022-10-04 PROCEDURE — 73610 X-RAY EXAM OF ANKLE: CPT

## 2022-10-04 PROCEDURE — 80048 BASIC METABOLIC PNL TOTAL CA: CPT

## 2022-10-04 PROCEDURE — 99285 EMERGENCY DEPT VISIT HI MDM: CPT

## 2022-10-04 PROCEDURE — 73502 X-RAY EXAM HIP UNI 2-3 VIEWS: CPT

## 2022-10-04 PROCEDURE — 82962 GLUCOSE BLOOD TEST: CPT

## 2022-10-04 PROCEDURE — 87186 SC STD MICRODIL/AGAR DIL: CPT

## 2022-10-04 PROCEDURE — 85025 COMPLETE CBC W/AUTO DIFF WBC: CPT

## 2022-10-04 PROCEDURE — 71045 X-RAY EXAM CHEST 1 VIEW: CPT

## 2022-10-04 PROCEDURE — 73562 X-RAY EXAM OF KNEE 3: CPT

## 2022-10-04 PROCEDURE — 81001 URINALYSIS AUTO W/SCOPE: CPT

## 2022-10-04 PROCEDURE — 96365 THER/PROPH/DIAG IV INF INIT: CPT

## 2022-10-04 PROCEDURE — 82550 ASSAY OF CK (CPK): CPT

## 2022-10-04 PROCEDURE — 70450 CT HEAD/BRAIN W/O DYE: CPT

## 2022-10-04 PROCEDURE — 72125 CT NECK SPINE W/O DYE: CPT

## 2022-10-04 PROCEDURE — 2580000003 HC RX 258: Performed by: STUDENT IN AN ORGANIZED HEALTH CARE EDUCATION/TRAINING PROGRAM

## 2022-10-04 PROCEDURE — 6370000000 HC RX 637 (ALT 250 FOR IP)

## 2022-10-04 PROCEDURE — 96375 TX/PRO/DX INJ NEW DRUG ADDON: CPT

## 2022-10-04 PROCEDURE — 94664 DEMO&/EVAL PT USE INHALER: CPT

## 2022-10-04 PROCEDURE — 87088 URINE BACTERIA CULTURE: CPT

## 2022-10-04 PROCEDURE — 94640 AIRWAY INHALATION TREATMENT: CPT

## 2022-10-04 PROCEDURE — 93005 ELECTROCARDIOGRAM TRACING: CPT

## 2022-10-04 PROCEDURE — 87077 CULTURE AEROBIC IDENTIFY: CPT

## 2022-10-04 PROCEDURE — 96367 TX/PROPH/DG ADDL SEQ IV INF: CPT

## 2022-10-04 PROCEDURE — 83605 ASSAY OF LACTIC ACID: CPT

## 2022-10-04 PROCEDURE — 2140000000 HC CCU INTERMEDIATE R&B

## 2022-10-04 PROCEDURE — 2580000003 HC RX 258

## 2022-10-04 PROCEDURE — 83880 ASSAY OF NATRIURETIC PEPTIDE: CPT

## 2022-10-04 PROCEDURE — 87635 SARS-COV-2 COVID-19 AMP PRB: CPT

## 2022-10-04 PROCEDURE — 2500000003 HC RX 250 WO HCPCS: Performed by: STUDENT IN AN ORGANIZED HEALTH CARE EDUCATION/TRAINING PROGRAM

## 2022-10-04 PROCEDURE — 2500000003 HC RX 250 WO HCPCS

## 2022-10-04 PROCEDURE — 80053 COMPREHEN METABOLIC PANEL: CPT

## 2022-10-04 PROCEDURE — 51702 INSERT TEMP BLADDER CATH: CPT

## 2022-10-04 RX ORDER — DEXTROSE MONOHYDRATE 25 G/50ML
25 INJECTION, SOLUTION INTRAVENOUS ONCE
Status: COMPLETED | OUTPATIENT
Start: 2022-10-04 | End: 2022-10-04

## 2022-10-04 RX ORDER — 0.9 % SODIUM CHLORIDE 0.9 %
1000 INTRAVENOUS SOLUTION INTRAVENOUS ONCE
Status: COMPLETED | OUTPATIENT
Start: 2022-10-04 | End: 2022-10-04

## 2022-10-04 RX ORDER — 0.9 % SODIUM CHLORIDE 0.9 %
500 INTRAVENOUS SOLUTION INTRAVENOUS ONCE
Status: COMPLETED | OUTPATIENT
Start: 2022-10-04 | End: 2022-10-05

## 2022-10-04 RX ORDER — ALBUTEROL SULFATE 2.5 MG/3ML
10 SOLUTION RESPIRATORY (INHALATION) ONCE
Status: COMPLETED | OUTPATIENT
Start: 2022-10-04 | End: 2022-10-04

## 2022-10-04 RX ORDER — DEXTROSE MONOHYDRATE 100 MG/ML
INJECTION, SOLUTION INTRAVENOUS CONTINUOUS PRN
Status: DISCONTINUED | OUTPATIENT
Start: 2022-10-04 | End: 2022-10-07 | Stop reason: HOSPADM

## 2022-10-04 RX ADMIN — SODIUM CHLORIDE 500 ML: 9 INJECTION, SOLUTION INTRAVENOUS at 23:51

## 2022-10-04 RX ADMIN — SODIUM BICARBONATE 50 MEQ: 84 INJECTION INTRAVENOUS at 18:06

## 2022-10-04 RX ADMIN — SODIUM CHLORIDE 1000 ML: 9 INJECTION, SOLUTION INTRAVENOUS at 21:47

## 2022-10-04 RX ADMIN — ALBUTEROL SULFATE 10 MG: 2.5 SOLUTION RESPIRATORY (INHALATION) at 18:41

## 2022-10-04 RX ADMIN — Medication 1000 MG: at 17:53

## 2022-10-04 RX ADMIN — Medication 10 UNITS: at 20:40

## 2022-10-04 RX ADMIN — DEXTROSE MONOHYDRATE 250 ML: 100 INJECTION, SOLUTION INTRAVENOUS at 18:19

## 2022-10-04 RX ADMIN — CEFTRIAXONE 1000 MG: 1 INJECTION, POWDER, FOR SOLUTION INTRAMUSCULAR; INTRAVENOUS at 19:53

## 2022-10-04 RX ADMIN — SODIUM ZIRCONIUM CYCLOSILICATE 10 G: 10 POWDER, FOR SUSPENSION ORAL at 18:24

## 2022-10-04 RX ADMIN — DEXTROSE MONOHYDRATE 25 G: 25 INJECTION, SOLUTION INTRAVENOUS at 20:18

## 2022-10-04 ASSESSMENT — ENCOUNTER SYMPTOMS
SINUS PRESSURE: 0
NAUSEA: 0
ABDOMINAL DISTENTION: 0
ABDOMINAL PAIN: 0
SORE THROAT: 0
VOMITING: 0
BLOOD IN STOOL: 0
PHOTOPHOBIA: 0
CONSTIPATION: 0
SHORTNESS OF BREATH: 0
COUGH: 0
WHEEZING: 0
BACK PAIN: 0
RHINORRHEA: 0
EYE DISCHARGE: 0
DIARRHEA: 0
EYE REDNESS: 0

## 2022-10-04 NOTE — LETTER
[address] Medicaid  CERTIFICATION OF NECESSITY  FOR TRANSPORTATION   BY WHEELCHAIR VAN     Individual Information   1. Name: Adam Crabtree 2. [place] Medicaid Billing Number: ***   3. Address: Jaycee Beard [address] [place] [address]      Transportation Provider Information   4. Provider Name: ***   5. [place] Medicaid Provider Number: *** National Provider Identifier (NPI): ***     Certification  7. Criteria:  By signing this document, the practitioner certifies that two statements are true:  A. This individual must be accompanied by a mobility-related assistive device from the point of pick-up to the point of drop-off. B. Transport of this individual by standard passenger vehicle or common carrier is precluded or contraindicated. 8. Period Beginning Date:    5. Length  [x] Not more than 1 day(s)  [] One Year     Additional Information Relevant to Certification   10. Comments or Explanations, If Necessary or Appropriate   Hyperkalemia [place]       Certifying Practitioner Information   11. Name of Luis A Martinez MD   12. [place] Medicaid Provider Number, If Applicable: *** 13. National Provider Identifier (NPI): ***     Signature Information   14. Date of Signature:  13. Name of Person Signin.  Signature and Professional Designation:      [facility] [id number]  Rev. 2015

## 2022-10-04 NOTE — ED PROVIDER NOTES
[facility]  Department of Emergency Medicine     Written by: Jose Brizuela MD  Patient Name: Mary Barnes  Attending Provider: Sudhir Goodman MD  Admit Date: 10/4/2022  3:10 PM  MRN: [id number]                   : 1940        Chief Complaint   Patient presents with    Fatigue     Weakness for the last 2 days, had 2 falls yesterday. C/O right knee pain and left foot pain. - Chief complaint    Patient is an 49-year-old female with past medical history of diabetes on metformin and history of a colostomy for diverticulitis, and hypertension presenting after a fall she sustained at home by EMS. Patient states that she has been having weakness and dizziness for the last 2 days and sustained multiple falls. Patient describes feeling lightheaded and dizzy prior to her fall. Patient states that she noticed vision changes and believes the room was spinning however is not certain. She was found by her home nurse after an unknown amount of time on the ground. Patient states that she has hit her head and is unsure whether she lost consciousness. Patient explains resolution of her dizziness at this time. Since the fall the patient is describing left ankle pain and discomfort in the right knee and right hip. Patient is also endorsing increased urinary frequency and sore throat for several days. Patient denies headache, numbness, tingling, loss of sensation, difficulty speaking, difficulty swallowing, chest pain, shortness of breath, nausea, vomiting, abdominal pain, hematuria, dysuria, decreasing urinary frequency, blood in the stool, black or tarry stool, problems with colostomy, recent travel, recent illness, recent surgery, or sick contacts. Review of Systems   Constitutional:  Negative for activity change, chills, fatigue and fever. HENT:  Negative for congestion, postnasal drip, rhinorrhea, sinus pressure and sore throat.     Eyes:  Negative for photophobia, discharge, redness and visual disturbance. Respiratory:  Negative for cough, shortness of breath and wheezing. Cardiovascular:  Negative for chest pain, palpitations and leg swelling. Gastrointestinal:  Negative for abdominal distention, abdominal pain, blood in stool, constipation, diarrhea, nausea and vomiting. Endocrine: Negative for cold intolerance and heat intolerance. Genitourinary:  Negative for decreased urine volume, difficulty urinating, dysuria, flank pain, frequency, hematuria and urgency. Musculoskeletal:  Negative for arthralgias, back pain, joint swelling and myalgias. Skin:  Negative for rash and wound. Allergic/Immunologic: Negative for immunocompromised state. Neurological:  Positive for dizziness, weakness and light-headedness. Negative for syncope, facial asymmetry, numbness and headaches. Hematological:  Does not bruise/bleed easily. Psychiatric/Behavioral:  Negative for behavioral problems and hallucinations. Physical Exam  Constitutional:       General: She is not in acute distress. Appearance: Normal appearance. She is not ill-appearing, toxic-appearing or diaphoretic. HENT:      Head: Normocephalic and atraumatic. No raccoon eyes, Morse's sign, abrasion, contusion, masses, right periorbital erythema, left periorbital erythema or laceration. Hair is normal.      Right Ear: Hearing and tympanic membrane normal.      Left Ear: Hearing and tympanic membrane normal.      Nose: Nose normal. No nasal deformity or septal deviation. Right Nostril: No epistaxis or septal hematoma. Left Nostril: No epistaxis or septal hematoma. Mouth/Throat:      Lips: Pink. Mouth: Mucous membranes are moist.      Pharynx: Oropharynx is clear. Eyes:      Extraocular Movements: Extraocular movements intact. Conjunctiva/sclera: Conjunctivae normal.      Pupils: Pupils are equal, round, and reactive to light.    Cardiovascular:      Rate and Rhythm: Normal rate and regular rhythm. Pulses: Normal pulses. Radial pulses are 2+ on the right side and 2+ on the left side. Posterior tibial pulses are 2+ on the right side and 2+ on the left side. Heart sounds: S1 normal and S2 normal.   Pulmonary:      Effort: Pulmonary effort is normal. No tachypnea, accessory muscle usage or respiratory distress. Breath sounds: Normal breath sounds and air entry. No decreased breath sounds, wheezing, rhonchi or rales. Abdominal:      General: Abdomen is flat. Bowel sounds are normal. There is no distension. Palpations: Abdomen is soft. There is no fluid wave or mass. Tenderness: There is no abdominal tenderness. There is no right CVA tenderness, left CVA tenderness or guarding. Musculoskeletal:         General: No swelling. Cervical back: Normal, normal range of motion and neck supple. No swelling, edema, deformity, signs of trauma, lacerations, rigidity, spasms, tenderness or bony tenderness. No pain with movement. Normal range of motion. Thoracic back: Normal. No swelling, edema, deformity, signs of trauma, lacerations, tenderness or bony tenderness. Normal range of motion. Lumbar back: Normal. No swelling, edema, deformity, signs of trauma, lacerations, spasms, tenderness or bony tenderness. Normal range of motion. Negative right straight leg raise test and negative left straight leg raise test.      Right hip: No deformity, lacerations, tenderness, bony tenderness or crepitus. Normal range of motion. Left hip: No deformity, lacerations, tenderness, bony tenderness or crepitus. Normal range of motion. Right knee: No swelling, deformity, effusion, erythema, ecchymosis, lacerations, bony tenderness or crepitus. Decreased range of motion. Tenderness present. Left knee: Normal.      Right lower leg: No swelling, deformity, lacerations or bony tenderness. No edema. Left lower leg: Normal. No edema.       Right ankle: Normal.      Left ankle: Swelling present. No deformity, ecchymosis or lacerations. Tenderness present. Decreased range of motion. Comments: Patient has notable swelling to the left ankle. There is no concern for ischemic limb or flexor tenosynovitis. Range of motion is limited due to pain however no obvious bony step-offs or tenting of the skin. Lymphadenopathy:      Cervical: No cervical adenopathy. Skin:     General: Skin is warm and dry. Capillary Refill: Capillary refill takes less than 2 seconds. Findings: No bruising, lesion or rash. Neurological:      General: No focal deficit present. Mental Status: She is alert and oriented to person, place, and time. Mental status is at baseline. Motor: No weakness. Psychiatric:         Attention and Perception: Attention normal.         Mood and Affect: Mood and affect normal.         Behavior: Behavior is cooperative. EKG Interpretation    Interpreted by emergency department physician    Rhythm: normal sinus   Rate: normal  Axis: normal  Ectopy: premature atrial contraction  Conduction: normal  ST Segments: no acute change  T Waves: non specific changes and inversion in  III  Q Waves: none    Clinical Impression: non-specific EKG when compared with prior on 8/28/22    Procedures       MDM  Number of Diagnoses or Management Options  Acute kidney injury ([facility] [address]  Hyperkalemia  Urinary tract infection in elderly patient  Diagnosis management comments: Patient is an 66-year-old female with past medical history of diabetes on metformin and history of a colostomy for diverticulitis, and hypertension presenting after a fall she sustained at home by EMS. At the time of initial examination the patient is hemodynamically stable. EKG as above. Plain films of the hip, ankle, and knee showed no acute abnormality such as fracture or dislocation. Chest x-ray shows diminishing atelectasis/infiltrate at both lower lungs.   CT head shows no acute MD       --------------------------------------------- PAST HISTORY ---------------------------------------------  Past Medical History:  has a past medical history of Arthritis, Colostomy in place [facility]), Diabetes mellitus ([facility] [address] Diverticulitis, GERD (gastroesophageal reflux disease), Hernia, History of blood transfusion, Hyperlipidemia, and Hypertension. Past Surgical History:  has a past surgical history that includes Hysterectomy; Cholecystectomy; hernia repair; Abdomen surgery; colostomy; Appendectomy; Thyroid surgery; Jejunostomy; and Ankle fracture surgery (Left, 5/1/2021). Social History:  reports that she has never smoked. She has quit using smokeless tobacco. She reports current alcohol use. She reports that she does not use drugs. Family History: family history is not on file. The patients home medications have been reviewed.     Allergies: Daptomycin, Vancomycin, and Adhesive tape    -------------------------------------------------- RESULTS -------------------------------------------------    LABS:  Results for orders placed or performed during the hospital encounter of 10/04/22   COVID-19, Rapid    Specimen: Nasopharyngeal Swab   Result Value Ref Range    SARS-CoV-2, NAAT Not Detected Not Detected   CBC with Auto Differential   Result Value Ref Range    WBC 10.6 4.5 - 11.5 E9/L    RBC 4.35 3.50 - 5.50 E12/L    Hemoglobin 11.8 11.5 - 15.5 g/dL    Hematocrit 39.3 34.0 - 48.0 %    MCV 90.3 80.0 - 99.9 fL    MCH 27.1 26.0 - 35.0 pg    MCHC 30.0 (L) 32.0 - 34.5 %    RDW 15.9 (H) 11.5 - 15.0 fL    Platelets 674 553 - 587 E9/L    MPV 11.0 7.0 - 12.0 fL    Neutrophils % 58.1 43.0 - 80.0 %    Immature Granulocytes % 0.6 0.0 - 5.0 %    Lymphocytes % 27.5 20.0 - 42.0 %    Monocytes % 8.7 2.0 - 12.0 %    Eosinophils % 4.6 0.0 - 6.0 %    Basophils % 0.5 0.0 - 2.0 %    Neutrophils Absolute 6.18 1.80 - 7.30 E9/L    Immature Granulocytes # 0.06 E9/L    Lymphocytes Absolute 2.92 1.50 - 4.00 E9/L Monocytes Absolute 0.92 0.10 - 0.95 E9/L    Eosinophils Absolute 0.49 0.05 - 0.50 E9/L    Basophils Absolute 0.05 0.00 - 0.20 E9/L   Comprehensive Metabolic Panel w/ Reflex to MG   Result Value Ref Range    Sodium 141 132 - 146 mmol/L    Potassium reflex Magnesium 6.5 (H) 3.5 - 5.0 mmol/L    Chloride 103 98 - 107 mmol/L    CO2 25 22 - 29 mmol/L    Anion Gap 13 7 - 16 mmol/L    Glucose 91 74 - 99 mg/dL    BUN 50 (H) 6 - 23 mg/dL    Creatinine 1.8 (H) 0.5 - 1.0 mg/dL    GFR Non-African American 27 >=60 mL/min/1.73    GFR African American 32     Calcium 10.5 (H) 8.6 - 10.2 mg/dL    Total Protein 7.4 6.4 - 8.3 g/dL    Albumin 4.0 3.5 - 5.2 g/dL    Total Bilirubin 0.3 0.0 - 1.2 mg/dL    Alkaline Phosphatase 111 (H) 35 - 104 U/L    ALT 8 0 - 32 U/L    AST 20 0 - 31 U/L   Troponin   Result Value Ref Range    Troponin, High Sensitivity 6 0 - 9 ng/L   Brain Natriuretic Peptide   Result Value Ref Range    Pro- 0 - 450 pg/mL   Urinalysis with Microscopic   Result Value Ref Range    Color, UA Yellow Straw/Yellow    Clarity, UA CLOUDY (A) Clear    Glucose, Ur Negative Negative mg/dL    Bilirubin Urine Negative Negative    Ketones, Urine Negative Negative mg/dL    Specific Gravity, UA 1.020 1.005 - 1.030    Blood, Urine TRACE-LYSED Negative    pH, UA 5.0 5.0 - 9.0    Protein, UA Negative Negative mg/dL    Urobilinogen, Urine 0.2 <2.0 E.U./dL    Nitrite, Urine Negative Negative    Leukocyte Esterase, Urine LARGE (A) Negative    WBC, UA 10-20 (A) 0 - 5 /HPF    RBC, UA 0-1 0 - 2 /HPF    Epithelial Cells, UA RARE /HPF    Bacteria, UA MODERATE (A) None Seen /HPF   CK   Result Value Ref Range    Total CK 66 20 - 180 U/L   Potassium   Result Value Ref Range    Potassium 6.1 (H) 3.5 - 5.0 mmol/L   Basic Metabolic Panel w/ Reflex to MG   Result Value Ref Range    Sodium 139 132 - 146 mmol/L    Potassium reflex Magnesium 6.1 (H) 3.5 - 5.0 mmol/L    Chloride 101 98 - 107 mmol/L    CO2 28 22 - 29 mmol/L    Anion Gap 10 7 - 16 mmol/L Glucose 171 (H) 74 - 99 mg/dL    BUN 46 (H) 6 - 23 mg/dL    Creatinine 1.5 (H) 0.5 - 1.0 mg/dL    GFR Non-African American 33 >=60 mL/min/1.73    GFR African American 40     Calcium 9.4 8.6 - 10.2 mg/dL   Lactate, Sepsis   Result Value Ref Range    Lactic Acid, Sepsis 3.3 (H) 0.5 - 1.9 mmol/L   Basic Metabolic Panel w/ Reflex to MG   Result Value Ref Range    Sodium 140 132 - 146 mmol/L    Potassium reflex Magnesium 4.9 3.5 - 5.0 mmol/L    Chloride 101 98 - 107 mmol/L    CO2 26 22 - 29 mmol/L    Anion Gap 13 7 - 16 mmol/L    Glucose 200 (H) 74 - 99 mg/dL    BUN 45 (H) 6 - 23 mg/dL    Creatinine 1.6 (H) 0.5 - 1.0 mg/dL    GFR Non-African American 31 >=60 mL/min/1.73    GFR African American 37     Calcium 9.8 8.6 - 10.2 mg/dL   Lactate, Sepsis   Result Value Ref Range    Lactic Acid, Sepsis 1.5 0.5 - 1.9 mmol/L   Lactate, Sepsis   Result Value Ref Range    Lactic Acid, Sepsis 1.5 0.5 - 1.9 mmol/L   CBC   Result Value Ref Range    WBC 7.1 4.5 - 11.5 E9/L    RBC 3.79 3.50 - 5.50 E12/L    Hemoglobin 10.1 (L) 11.5 - 15.5 g/dL    Hematocrit 35.0 34.0 - 48.0 %    MCV 92.3 80.0 - 99.9 fL    MCH 26.6 26.0 - 35.0 pg    MCHC 28.9 (L) 32.0 - 34.5 %    RDW 16.0 (H) 11.5 - 15.0 fL    Platelets 942 652 - 468 E9/L    MPV 11.1 7.0 - 12.0 fL   Basic Metabolic Panel   Result Value Ref Range    Sodium 139 132 - 146 mmol/L    Potassium 5.9 (H) 3.5 - 5.0 mmol/L    Chloride 104 98 - 107 mmol/L    CO2 24 22 - 29 mmol/L    Anion Gap 11 7 - 16 mmol/L    Glucose 67 (L) 74 - 99 mg/dL    BUN 39 (H) 6 - 23 mg/dL    Creatinine 1.4 (H) 0.5 - 1.0 mg/dL    GFR Non-African American 36 >=60 mL/min/1.73    GFR African American 43     Calcium 9.3 8.6 - 10.2 mg/dL   POCT Glucose   Result Value Ref Range    Glucose 57 mg/dL    QC OK? yes    POCT Glucose   Result Value Ref Range    Glucose 73 mg/dL    QC OK? yes    POCT Glucose   Result Value Ref Range    Glucose 114 mg/dL    QC OK?  yes    POCT Glucose   Result Value Ref Range    Glucose 102 mg/dL   POCT Glucose   Result Value Ref Range    Glucose 128 mg/dL   POCT Glucose   Result Value Ref Range    Meter Glucose 57 (L) 74 - 99 mg/dL   POCT Glucose   Result Value Ref Range    Meter Glucose 73 (L) 74 - 99 mg/dL   POCT Glucose   Result Value Ref Range    Meter Glucose 114 (H) 74 - 99 mg/dL   POCT Glucose   Result Value Ref Range    Meter Glucose 102 (H) 74 - 99 mg/dL   POCT Glucose   Result Value Ref Range    Meter Glucose 128 (H) 74 - 99 mg/dL   POCT Glucose   Result Value Ref Range    Meter Glucose 85 74 - 99 mg/dL   POCT Glucose   Result Value Ref Range    Meter Glucose 72 (L) 74 - 99 mg/dL   EKG 12 Lead   Result Value Ref Range    Ventricular Rate 70 BPM    Atrial Rate 70 BPM    P-R Interval 128 ms    QRS Duration 78 ms    Q-T Interval 356 ms    QTc Calculation (Bazett) 384 ms    P Axis 20 degrees    R Axis 8 degrees    T Axis 19 degrees       RADIOLOGY:  CT ABDOMEN PELVIS WO CONTRAST Additional Contrast? None   Final Result   No acute abdominopelvic abnormality. CT Head WO Contrast   Final Result   No acute intracranial abnormality. CT Cervical Spine WO Contrast   Final Result   No acute abnormality of the cervical spine. XR CHEST PORTABLE   Final Result   Right knee: Unremarkable. Pelvis and right hip: No active process. Please note that a skin fold mimics   a fracture as noted above. Chest: Diminishing atelectasis and or infiltrate at both lower lungs. XR HIP 2-3 VW W PELVIS RIGHT   Final Result   Right knee: Unremarkable. Pelvis and right hip: No active process. Please note that a skin fold mimics   a fracture as noted above. Chest: Diminishing atelectasis and or infiltrate at both lower lungs. XR ANKLE LEFT (MIN 3 VIEWS)   Final Result   No acute abnormality of the ankle. XR KNEE RIGHT (3 VIEWS)   Final Result   Right knee: Unremarkable. Pelvis and right hip: No active process.   Please note that a skin fold mimics   a fracture as noted above.      Chest: Diminishing atelectasis and or infiltrate at both lower lungs. ------------------------- NURSING NOTES AND VITALS REVIEWED ---------------------------  Date / Time Roomed:  10/4/2022  3:10 PM  ED Bed Assignment:  [id number]    The nursing notes within the ED encounter and vital signs as below have been reviewed. Patient Vitals for the past 24 hrs:   BP Temp Temp src Pulse Resp SpO2 Height Weight   10/05/22 0759 (!) 113/55 97.6 °F (36.4 °C) Oral 57 18 97 % -- --   10/05/22 0230 102/60 97.3 °F (36.3 °C) Oral 86 16 -- -- --   10/05/22 0000 (!) 106/56 -- -- 88 15 94 % -- --   10/04/22 2317 113/60 -- -- 85 16 94 % -- --   10/04/22 2152 103/79 -- -- 69 16 93 % -- --   10/04/22 2130 (!) 77/41 -- -- 82 16 93 % -- --   10/04/22 1955 101/66 -- -- 85 14 94 % -- --   10/04/22 1906 127/63 -- -- 85 13 100 % -- --   10/04/22 1900 -- -- -- 72 12 98 % -- --   10/04/22 1851 -- -- -- 80 16 100 % -- --   10/04/22 1804 (!) 129/42 -- -- 88 16 93 % -- --   10/04/22 1747 (!) 125/57 -- -- 92 17 90 % -- --   10/04/22 1521 107/63 98.3 °F (36.8 °C) Oral 95 18 95 % -- --   10/04/22 1514 108/60 98.2 °F (36.8 °C) -- 94 16 95 % 5' 6\" (1.676 m) 162 lb (73.5 kg)       Oxygen Saturation Interpretation: Normal    ------------------------------------------ PROGRESS NOTES ------------------------------------------  Re-evaluation(s):  Time: Multiple  Patients symptoms show no change  Repeat physical examination is not changed    Counseling:  I have spoken with the patient and discussed todays results, in addition to providing specific details for the plan of care and counseling regarding the diagnosis and prognosis. Their questions are answered at this time and they are agreeable with the plan of admission.    --------------------------------- ADDITIONAL PROVIDER NOTES ---------------------------------  Consultations:  Time: 1843. Spoke with Dr. Primitivo Yu, nephrologist, who agrees with current management. Time: 2031. Spoke with Dr. Cristhian Oliveira. Discussed case. They will admit the patient. This patient's ED course included: a personal history and physicial examination, re-evaluation prior to disposition, multiple bedside re-evaluations, IV medications, cardiac monitoring, and continuous pulse oximetry    This patient has remained hemodynamically stable during their ED course. Diagnosis:  1. Hyperkalemia    2. Acute kidney injury ([address]    3. Urinary tract infection in elderly patient        Disposition:  Patient's disposition: Admit to telemetry  Patient's condition is stable. Patient was seen and evaluated by myself and my attending Patty Zavala MD. Assessment and Plan discussed with attending provider, please see attestation for final plan of care.      MD Dov Stanley MD  Resident  10/05/22 [phone]

## 2022-10-04 NOTE — ED NOTES
Dr Malou Moss aware of patient's 66 BS. Dr would like to wait until BS comes up to give insulin for hyperkalemia.      Nidia Jackson RN  10/04/22 9309

## 2022-10-05 LAB
ANION GAP SERPL CALCULATED.3IONS-SCNC: 11 MMOL/L (ref 7–16)
ANION GAP SERPL CALCULATED.3IONS-SCNC: 9 MMOL/L (ref 7–16)
BUN BLDV-MCNC: 30 MG/DL (ref 6–23)
BUN BLDV-MCNC: 39 MG/DL (ref 6–23)
CALCIUM SERPL-MCNC: 9.3 MG/DL (ref 8.6–10.2)
CALCIUM SERPL-MCNC: 9.3 MG/DL (ref 8.6–10.2)
CHLORIDE BLD-SCNC: 104 MMOL/L (ref 98–107)
CHLORIDE BLD-SCNC: 104 MMOL/L (ref 98–107)
CO2: 24 MMOL/L (ref 22–29)
CO2: 24 MMOL/L (ref 22–29)
CREAT SERPL-MCNC: 1.2 MG/DL (ref 0.5–1)
CREAT SERPL-MCNC: 1.4 MG/DL (ref 0.5–1)
CREATININE URINE: 30 MG/DL (ref 29–226)
EKG ATRIAL RATE: 70 BPM
EKG P AXIS: 20 DEGREES
EKG P-R INTERVAL: 128 MS
EKG Q-T INTERVAL: 356 MS
EKG QRS DURATION: 78 MS
EKG QTC CALCULATION (BAZETT): 384 MS
EKG R AXIS: 8 DEGREES
EKG T AXIS: 19 DEGREES
EKG VENTRICULAR RATE: 70 BPM
GFR AFRICAN AMERICAN: 43
GFR AFRICAN AMERICAN: 52
GFR NON-AFRICAN AMERICAN: 36 ML/MIN/1.73
GFR NON-AFRICAN AMERICAN: 43 ML/MIN/1.73
GLUCOSE BLD-MCNC: 67 MG/DL (ref 74–99)
GLUCOSE BLD-MCNC: 68 MG/DL (ref 74–99)
HCT VFR BLD CALC: 35 % (ref 34–48)
HEMOGLOBIN: 10.1 G/DL (ref 11.5–15.5)
LACTIC ACID, SEPSIS: 1.5 MMOL/L (ref 0.5–1.9)
LACTIC ACID, SEPSIS: 1.5 MMOL/L (ref 0.5–1.9)
MCH RBC QN AUTO: 26.6 PG (ref 26–35)
MCHC RBC AUTO-ENTMCNC: 28.9 % (ref 32–34.5)
MCV RBC AUTO: 92.3 FL (ref 80–99.9)
METER GLUCOSE: 118 MG/DL (ref 74–99)
METER GLUCOSE: 72 MG/DL (ref 74–99)
METER GLUCOSE: 76 MG/DL (ref 74–99)
METER GLUCOSE: 85 MG/DL (ref 74–99)
PDW BLD-RTO: 16 FL (ref 11.5–15)
PLATELET # BLD: 149 E9/L (ref 130–450)
PMV BLD AUTO: 11.1 FL (ref 7–12)
POTASSIUM SERPL-SCNC: 5.3 MMOL/L (ref 3.5–5)
POTASSIUM SERPL-SCNC: 5.9 MMOL/L (ref 3.5–5)
RBC # BLD: 3.79 E12/L (ref 3.5–5.5)
SODIUM BLD-SCNC: 137 MMOL/L (ref 132–146)
SODIUM BLD-SCNC: 139 MMOL/L (ref 132–146)
WBC # BLD: 7.1 E9/L (ref 4.5–11.5)

## 2022-10-05 PROCEDURE — 97165 OT EVAL LOW COMPLEX 30 MIN: CPT

## 2022-10-05 PROCEDURE — 36415 COLL VENOUS BLD VENIPUNCTURE: CPT

## 2022-10-05 PROCEDURE — 2580000003 HC RX 258: Performed by: FAMILY MEDICINE

## 2022-10-05 PROCEDURE — 97161 PT EVAL LOW COMPLEX 20 MIN: CPT

## 2022-10-05 PROCEDURE — 6370000000 HC RX 637 (ALT 250 FOR IP): Performed by: FAMILY MEDICINE

## 2022-10-05 PROCEDURE — 87186 SC STD MICRODIL/AGAR DIL: CPT

## 2022-10-05 PROCEDURE — 87040 BLOOD CULTURE FOR BACTERIA: CPT

## 2022-10-05 PROCEDURE — 6370000000 HC RX 637 (ALT 250 FOR IP)

## 2022-10-05 PROCEDURE — 2140000000 HC CCU INTERMEDIATE R&B

## 2022-10-05 PROCEDURE — 85027 COMPLETE CBC AUTOMATED: CPT

## 2022-10-05 PROCEDURE — 80048 BASIC METABOLIC PNL TOTAL CA: CPT

## 2022-10-05 PROCEDURE — 97530 THERAPEUTIC ACTIVITIES: CPT

## 2022-10-05 PROCEDURE — 82570 ASSAY OF URINE CREATININE: CPT

## 2022-10-05 PROCEDURE — 97535 SELF CARE MNGMENT TRAINING: CPT

## 2022-10-05 PROCEDURE — 87150 DNA/RNA AMPLIFIED PROBE: CPT

## 2022-10-05 PROCEDURE — 83605 ASSAY OF LACTIC ACID: CPT

## 2022-10-05 PROCEDURE — 87077 CULTURE AEROBIC IDENTIFY: CPT

## 2022-10-05 PROCEDURE — 82962 GLUCOSE BLOOD TEST: CPT

## 2022-10-05 RX ORDER — POTASSIUM CHLORIDE 20 MEQ/1
40 TABLET, EXTENDED RELEASE ORAL DAILY
Status: DISCONTINUED | OUTPATIENT
Start: 2022-10-05 | End: 2022-10-05

## 2022-10-05 RX ORDER — PAROXETINE HYDROCHLORIDE 20 MG/1
40 TABLET, FILM COATED ORAL DAILY
Status: DISCONTINUED | OUTPATIENT
Start: 2022-10-05 | End: 2022-10-07 | Stop reason: HOSPADM

## 2022-10-05 RX ORDER — PREGABALIN 50 MG/1
50 CAPSULE ORAL 3 TIMES DAILY
Status: DISCONTINUED | OUTPATIENT
Start: 2022-10-05 | End: 2022-10-07 | Stop reason: HOSPADM

## 2022-10-05 RX ORDER — PANTOPRAZOLE SODIUM 40 MG/1
40 TABLET, DELAYED RELEASE ORAL DAILY
Status: DISCONTINUED | OUTPATIENT
Start: 2022-10-05 | End: 2022-10-07 | Stop reason: HOSPADM

## 2022-10-05 RX ORDER — GLIPIZIDE 5 MG/1
5 TABLET ORAL
Status: DISCONTINUED | OUTPATIENT
Start: 2022-10-05 | End: 2022-10-07 | Stop reason: HOSPADM

## 2022-10-05 RX ORDER — LANSOPRAZOLE 30 MG/1
30 CAPSULE, DELAYED RELEASE ORAL DAILY
COMMUNITY
Start: 2022-09-17

## 2022-10-05 RX ORDER — SODIUM CHLORIDE 9 MG/ML
INJECTION, SOLUTION INTRAVENOUS CONTINUOUS
Status: DISCONTINUED | OUTPATIENT
Start: 2022-10-05 | End: 2022-10-07 | Stop reason: HOSPADM

## 2022-10-05 RX ORDER — ATORVASTATIN CALCIUM 40 MG/1
40 TABLET, FILM COATED ORAL DAILY
Status: DISCONTINUED | OUTPATIENT
Start: 2022-10-05 | End: 2022-10-07 | Stop reason: HOSPADM

## 2022-10-05 RX ORDER — GLYBURIDE 5 MG/1
5 TABLET ORAL DAILY
Status: DISCONTINUED | OUTPATIENT
Start: 2022-10-05 | End: 2022-10-05 | Stop reason: SDUPTHER

## 2022-10-05 RX ORDER — INSULIN LISPRO 100 [IU]/ML
0-4 INJECTION, SOLUTION INTRAVENOUS; SUBCUTANEOUS NIGHTLY
Status: DISCONTINUED | OUTPATIENT
Start: 2022-10-05 | End: 2022-10-07 | Stop reason: HOSPADM

## 2022-10-05 RX ORDER — PREGABALIN 75 MG/1
75 CAPSULE ORAL 3 TIMES DAILY
COMMUNITY

## 2022-10-05 RX ORDER — INSULIN LISPRO 100 [IU]/ML
0-4 INJECTION, SOLUTION INTRAVENOUS; SUBCUTANEOUS
Status: DISCONTINUED | OUTPATIENT
Start: 2022-10-05 | End: 2022-10-07 | Stop reason: HOSPADM

## 2022-10-05 RX ORDER — DOCUSATE SODIUM 100 MG/1
100 CAPSULE, LIQUID FILLED ORAL DAILY
COMMUNITY

## 2022-10-05 RX ORDER — ACETAMINOPHEN 325 MG/1
650 TABLET ORAL EVERY 6 HOURS PRN
Status: DISCONTINUED | OUTPATIENT
Start: 2022-10-05 | End: 2022-10-07 | Stop reason: HOSPADM

## 2022-10-05 RX ORDER — MECLIZINE HYDROCHLORIDE 25 MG/1
25 TABLET ORAL 3 TIMES DAILY PRN
Status: ON HOLD | COMMUNITY
End: 2022-10-07 | Stop reason: HOSPADM

## 2022-10-05 RX ORDER — FUROSEMIDE 20 MG/1
20 TABLET ORAL DAILY
Status: ON HOLD | COMMUNITY
End: 2022-10-07 | Stop reason: HOSPADM

## 2022-10-05 RX ADMIN — PANTOPRAZOLE SODIUM 40 MG: 40 TABLET, DELAYED RELEASE ORAL at 09:35

## 2022-10-05 RX ADMIN — GLIPIZIDE 5 MG: 5 TABLET ORAL at 09:35

## 2022-10-05 RX ADMIN — PREGABALIN 50 MG: 50 CAPSULE ORAL at 21:47

## 2022-10-05 RX ADMIN — POTASSIUM CHLORIDE 40 MEQ: 1500 TABLET, EXTENDED RELEASE ORAL at 09:34

## 2022-10-05 RX ADMIN — ATORVASTATIN CALCIUM 40 MG: 40 TABLET, FILM COATED ORAL at 09:35

## 2022-10-05 RX ADMIN — PREGABALIN 50 MG: 50 CAPSULE ORAL at 09:35

## 2022-10-05 RX ADMIN — PAROXETINE HYDROCHLORIDE 40 MG: 20 TABLET, FILM COATED ORAL at 12:13

## 2022-10-05 RX ADMIN — PREGABALIN 50 MG: 50 CAPSULE ORAL at 13:55

## 2022-10-05 RX ADMIN — SODIUM ZIRCONIUM CYCLOSILICATE 10 G: 10 POWDER, FOR SUSPENSION ORAL at 13:55

## 2022-10-05 RX ADMIN — SODIUM CHLORIDE: 9 INJECTION, SOLUTION INTRAVENOUS at 09:49

## 2022-10-05 RX ADMIN — ACETAMINOPHEN 650 MG: 325 TABLET ORAL at 17:05

## 2022-10-05 ASSESSMENT — ENCOUNTER SYMPTOMS
ABDOMINAL PAIN: 0
VOMITING: 0
NAUSEA: 0
SHORTNESS OF BREATH: 0

## 2022-10-05 ASSESSMENT — PAIN SCALES - GENERAL
PAINLEVEL_OUTOF10: 3
PAINLEVEL_OUTOF10: 0

## 2022-10-05 ASSESSMENT — PAIN DESCRIPTION - LOCATION: LOCATION: HEAD

## 2022-10-05 ASSESSMENT — PAIN SCALES - PAIN ASSESSMENT IN ADVANCED DEMENTIA (PAINAD)
TOTALSCORE: 0
NEGVOCALIZATION: 0
BREATHING: 0
FACIALEXPRESSION: 0
BODYLANGUAGE: 0
CONSOLABILITY: 0

## 2022-10-05 ASSESSMENT — PAIN DESCRIPTION - DESCRIPTORS: DESCRIPTORS: ACHING;DISCOMFORT;POUNDING

## 2022-10-05 NOTE — PROGRESS NOTES
Occupational Therapy  OCCUPATIONAL THERAPY INITIAL EVALUATION    [address] [address] [address]      Date:10/5/2022                                                Patient Name: Shawna Kerr  MRN: [id number]  : 1940  Room: [address] [place] [address]    Evaluating OT: Radha Schultz OTR/L #[id number]     Referring Provider: Sumit De León MD  Specific Provider Orders/Date: OT eval and treat 10/5/22    Diagnosis: Hyperkalemia [E87.5]  Acute kidney injury ([facility] [address] [N17.9]  Urinary tract infection in elderly patient [N39.0]   Pt admitted to hospital with weakness, fatigue and falls x2     Pertinent Medical History:  has a past medical history of Arthritis, Colostomy in place [facility]), Diabetes mellitus ([facility] [address] Diverticulitis, GERD (gastroesophageal reflux disease), Hernia, History of blood transfusion, Hyperlipidemia, and Hypertension.        Precautions:  Fall Risk, R knee buckling, bed alarm    Assessment of current deficits    [x] Functional mobility  [x]ADLs  [x] Strength               []Cognition    [x] Functional transfers   [x] IADLs         [x] Safety Awareness   [x]Endurance    [] Fine Coordination              [x] Balance      [] Vision/perception   []Sensation     []Gross Motor Coordination  [] ROM  [] Delirium                   [] Motor Control     OT PLAN OF CARE   OT POC based on physician orders, patient diagnosis and results of clinical assessment    Frequency/Duration 1-3 days/wk for 2 weeks PRN   Specific OT Treatment Interventions to include:   * Instruction/training on adapted ADL techniques and AE recommendations to increase functional independence within precautions       * Training on energy conservation strategies, correct breathing pattern and techniques to improve independence/tolerance for self-care routine  * Functional transfer/mobility training/DME recommendations for increased independence, safety, and fall prevention  * Patient/Family education to increase follow through with safety techniques and functional independence  * Recommendation of environmental modifications for increased safety with functional transfers/mobility and ADLs  * Cognitive retraining/development of therapeutic activities to improve problem solving, judgement, memory, and attention for increased safety/participation in ADL/IADL tasks  * Therapeutic exercise to improve motor endurance, ROM, and functional strength for ADLs/functional transfers  * Therapeutic activities to facilitate/challenge dynamic balance, stand tolerance for increased safety and independence with ADLs  * Therapeutic activities to facilitate gross/fine motor skills for increased independence with ADLs  * Neuro-muscular re-education: facilitation of righting/equilibrium reactions, midline orientation, scapular stability/mobility, normalization of muscle tone, and facilitation of volitional active controled movement  * Positioning to improve skin integrity, interaction with environment and functional independence    Recommended Adaptive Equipment:  TBD     Home Living: Pt lives with  and daughter in a 1 story home with 4 ROXANNA and 1 hand rail. Bathroom setup: walk-in shwoer with shower chair    Equipment owned: w/w    Prior Level of Function: mod I with ADLs , assist prn with IADLs; ambulated with w/w   Driving: no   Occupation: na    Pain Level: Pt reports 4/10 abdominal pain this session;  Therapist facilitated repositioning to address pain      Cognition: A&O: 4/4; Follows 2 step directions   Memory:  good   Sequencing:  good   Problem solving:  fair   Judgement/safety:  fair     Functional Assessment:  AM-PAC Daily Activity Raw Score: 14/24   Initial Eval Status  Date: 10/5/22 Treatment Status  Date: STGs = LTGs  Time frame: 10-14 days   Feeding Independent      Grooming Minimal Assist   Modified [employer]    UB Dressing Minimal Assist   Modified [employer]    LB Dressing Dependent   Moderate Assist    Bathing Maximal Assist  Moderate Assist    Toileting Dependent   Minimal Assist    Bed Mobility  Supine to sit: Minimal Assist   Sit to supine: Minimal Assist   Supine to sit: Stand by Assist   Sit to supine: Stand by Assist    Functional Transfers Moderate Assist     Sit to stands: + R knee buckling   Stand by Assist    Functional Mobility Moderate Assist     Side steps to [facility] with w/w; R knee buckling    Stand by Assist    Balance Sitting:     Static:  SBA    Dynamic:SBA  Standing: mod A at w/w      Activity Tolerance F-  F+   Visual/  Perceptual wfl                  Hand Dominance right   Strength ROM Additional Info:    RUE   4/5 wfl good  and wfl FMC/dexterity noted during ADL tasks     LUE 4/5 wfl good  and wfl FMC/dexterity noted during ADL tasks     Hearing: wfl  Sensation:wfl  Tone: wfl  Edema:none noted     Comments: Upon arrival patient supine in bed and agreeable to OT Session. Therapist educated pt on role of OT. At end of session, patient semi-supine in bed with call light and phone within reach, all lines and tubes intact. Overall patient demonstrate decreased independence and safety during completion of ADL/functional transfer/mobility tasks. Pt would benefit from continued skilled OT to increase safety and independence with completion of ADL/IADL tasks for functional independence and quality of life. Treatment: OT treatment provided this date includes: Facilitation of bed mobility, unsupported sitting balance at EOB (impacting ADLs; addressing posture, weight shifting, dynamic reaching), functional sit to stand transfers, standing tolerance tasks at w/w (R knee buckling; addressing posture, balance and activity tolerance) and side steps to to [facility] with ww (+ R knee buckling; cuing on posture, w/w management and safety) - skilled cuing on hand placement, posture, body mechanics, energy conservation techniques and safety.   Therapist facilitated self-care retraining: UB/LB self-care tasks (robe, partial bathing task, socks), toileting hygiene task and seated grooming tasks while educating pt on modified techniques, posture, safety and energy conservation techniques. Skilled monitoring of HR, O2 sats and pts response to treatment. Rehab Potential: Good  for established goals     Patient / Family Goal: return home      Patient and/or family were instructed on functional diagnosis, prognosis/goals and OT plan of care. Demonstrated fair understanding. Eval Complexity: Low    Time In: 805  Time Out: 845  Total Treatment Time: 25 minutes    Min Units   OT Eval Low 97165  x  1   OT Eval Medium 54103      OT Eval High 21607      OT Re-Eval [id number]       Therapeutic Ex 97504       Therapeutic Activities 93396  12  1   ADL/Self Care 85693  13  1   Orthotic Management 37989       Manual [id number]     Neuro Re-Ed 20218       Non-Billable Time          Evaluation Time additionally includes thorough review of current medical information, gathering information on past medical history/social history and prior level of function, interpretation of standardized testing/informal observation of tasks, assessment of data and development of plan of care and goals.           Meggan Garcias OTR/L #[id number]

## 2022-10-05 NOTE — CARE COORDINATION
10/5 Care Coordination: Patient states that she has been having weakness and dizziness for the last 2 days and sustained multiple falls. Pt was just discharged on 9/8. Failed discharge plan. Pt lives with  and daughter So back in hospital, will need Placement. Talked with pt and wants to go back to  [facility] [address] [facility]. Referral called to Dianne De Luna she will review. Will need to do Hens and Envelope if accepted. CM/SW will continue to follow for discharge planning.    Krysta SHETHN,RN--BC  [phone]

## 2022-10-05 NOTE — H&P
HISTORY AND PHYSICAL             Date: 10/5/2022        Patient Name: Jose Ibarra     YOB: 1940      Age:  78 y.o. Chief Complaint     Chief Complaint   Patient presents with    Fatigue     Weakness for the last 2 days, had 2 falls yesterday. C/O right knee pain and left foot pain. History Obtained From   patient    History of Present Illness   Patient presented to the ER  after a few falls and was noted to be hyperkalemic. Past Medical History     Past Medical History:   Diagnosis Date    Arthritis     Colostomy in place [facility])     Diabetes mellitus ([address]     Diverticulitis     GERD (gastroesophageal reflux disease)     Hernia     History of blood transfusion     Hyperlipidemia     Hypertension         Past Surgical History     Past Surgical History:   Procedure Laterality Date    ABDOMEN SURGERY      DIVERTICULITIS    ANKLE FRACTURE SURGERY Left 5/1/2021    LEFT ANKLE OPEN REDUCTION INTERNAL FIXATION--SYNTHES performed by Oziel Mckeon MD at [facility] 2012    HYSTERECTOMY (CERVIX STATUS UNKNOWN)      ILEOSTOMY OR JEJUNOSTOMY      done and reversed    THYROID SURGERY      partial thyroidectomy        Medications Prior to Admission     Prior to Admission medications    Medication Sig Start Date End Date Taking? Authorizing Provider   potassium chloride (KLOR-CON M) 20 MEQ extended release tablet Take 2 tablets by mouth daily 9/8/22   Zeus Trammell MD   blood glucose test strips (ASCENSIA AUTODISC VI;ONE TOUCH ULTRA TEST VI) strip OneTouch Ultra Blue Test Strip    Historical Provider, MD   pantoprazole (PROTONIX) 40 MG tablet Take 40 mg by mouth daily    Historical Provider, MD   atorvastatin (LIPITOR) 40 MG tablet Take 40 mg by mouth daily    Historical Provider, MD   HYDROcodone-acetaminophen (NORCO) 5-325 MG per tablet Take 1 tablet by mouth every 6 hours as needed for Pain.     Historical Provider, MD   aspirin EC 81 MG EC tablet Take 1 tablet by mouth 2 times daily for 28 days 5/1/21 5/29/21  Keila Courser,    pregabalin (LYRICA) 50 MG capsule Take 50 mg by mouth 3 times daily. Historical Provider, MD   acetaminophen (TYLENOL) 325 MG tablet Take 650 mg by mouth every 6 hours as needed for Pain    Historical Provider, MD   Liraglutide (VICTOZA) 18 MG/3ML SOPN SC injection Inject 1.8 mg into the skin daily     Historical Provider, MD   PARoxetine (PAXIL) 40 MG tablet Take 40 mg by mouth daily     Historical Provider, MD   glyBURIDE (DIABETA) 5 MG tablet Take 5 mg by mouth daily     Historical Provider, MD   enalapril (VASOTEC) 20 MG tablet Take 20 mg by mouth daily     Historical Provider, MD   metformin (GLUCOPHAGE) 500 MG tablet Take 1,000 mg by mouth 2 times daily (with meals). 9/8/22  Historical Provider, MD        Allergies   Daptomycin, Vancomycin, and Adhesive tape    Social History     Social History       Tobacco History       Smoking Status  Never      Smokeless Tobacco Use  Former              Alcohol History       Alcohol Use Status  Yes Comment  very seldom              Drug Use       Drug Use Status  No              Sexual Activity       Sexually Active  Not Asked                    Family History   History reviewed. No pertinent family history. Review of Systems   Review of Systems   Constitutional:  Positive for activity change. HENT:  Negative for congestion. Respiratory:  Negative for shortness of breath. Cardiovascular:  Negative for chest pain. Gastrointestinal:  Negative for abdominal pain, nausea and vomiting. Neurological:  Negative for dizziness and weakness. Physical Exam   /60   Pulse 86   Temp 97.3 °F (36.3 °C) (Oral)   Resp 16   Ht 5' 6\" (1.676 m)   Wt 162 lb (73.5 kg)   SpO2 94%   BMI 26.15 kg/m²     Physical Exam  Vitals reviewed. HENT:      Head: Normocephalic.    Eyes:      Pupils: Pupils are equal, round, and reactive to light. Cardiovascular:      Rate and Rhythm: Normal rate and regular rhythm. Pulses: Normal pulses. Heart sounds: Normal heart sounds. Pulmonary:      Effort: Pulmonary effort is normal. No respiratory distress. Breath sounds: Normal breath sounds. No wheezing. Abdominal:      General: There is no distension. Palpations: Abdomen is soft. Tenderness: There is no abdominal tenderness. Skin:     General: Skin is warm and dry. Capillary Refill: Capillary refill takes less than 2 seconds. Neurological:      General: No focal deficit present. Mental Status: She is alert.    Psychiatric:         Mood and Affect: Mood normal.       Labs      Recent Results (from the past 24 hour(s))   EKG 12 Lead    Collection Time: 10/04/22  3:44 PM   Result Value Ref Range    Ventricular Rate 70 BPM    Atrial Rate 70 BPM    P-R Interval 128 ms    QRS Duration 78 ms    Q-T Interval 356 ms    QTc Calculation (Bazett) 384 ms    P Axis 20 degrees    R Axis 8 degrees    T Axis 19 degrees   CBC with Auto Differential    Collection Time: 10/04/22  4:13 PM   Result Value Ref Range    WBC 10.6 4.5 - 11.5 E9/L    RBC 4.35 3.50 - 5.50 E12/L    Hemoglobin 11.8 11.5 - 15.5 g/dL    Hematocrit 39.3 34.0 - 48.0 %    MCV 90.3 80.0 - 99.9 fL    MCH 27.1 26.0 - 35.0 pg    MCHC 30.0 (L) 32.0 - 34.5 %    RDW 15.9 (H) 11.5 - 15.0 fL    Platelets 981 403 - 572 E9/L    MPV 11.0 7.0 - 12.0 fL    Neutrophils % 58.1 43.0 - 80.0 %    Immature Granulocytes % 0.6 0.0 - 5.0 %    Lymphocytes % 27.5 20.0 - 42.0 %    Monocytes % 8.7 2.0 - 12.0 %    Eosinophils % 4.6 0.0 - 6.0 %    Basophils % 0.5 0.0 - 2.0 %    Neutrophils Absolute 6.18 1.80 - 7.30 E9/L    Immature Granulocytes # 0.06 E9/L    Lymphocytes Absolute 2.92 1.50 - 4.00 E9/L    Monocytes Absolute 0.92 0.10 - 0.95 E9/L    Eosinophils Absolute 0.49 0.05 - 0.50 E9/L    Basophils Absolute 0.05 0.00 - 0.20 E9/L   Comprehensive Metabolic Panel w/ Reflex to MG    Collection Time: 10/04/22  4:13 PM   Result Value Ref Range    Sodium 141 132 - 146 mmol/L    Potassium reflex Magnesium 6.5 (H) 3.5 - 5.0 mmol/L    Chloride 103 98 - 107 mmol/L    CO2 25 22 - 29 mmol/L    Anion Gap 13 7 - 16 mmol/L    Glucose 91 74 - 99 mg/dL    BUN 50 (H) 6 - 23 mg/dL    Creatinine 1.8 (H) 0.5 - 1.0 mg/dL    GFR Non-African American 27 >=60 mL/min/1.73    GFR African American 32     Calcium 10.5 (H) 8.6 - 10.2 mg/dL    Total Protein 7.4 6.4 - 8.3 g/dL    Albumin 4.0 3.5 - 5.2 g/dL    Total Bilirubin 0.3 0.0 - 1.2 mg/dL    Alkaline Phosphatase 111 (H) 35 - 104 U/L    ALT 8 0 - 32 U/L    AST 20 0 - 31 U/L   Troponin    Collection Time: 10/04/22  4:13 PM   Result Value Ref Range    Troponin, High Sensitivity 6 0 - 9 ng/L   Brain Natriuretic Peptide    Collection Time: 10/04/22  4:13 PM   Result Value Ref Range    Pro- 0 - 450 pg/mL   CK    Collection Time: 10/04/22  4:13 PM   Result Value Ref Range    Total CK 66 20 - 180 U/L   POCT Glucose    Collection Time: 10/04/22  5:55 PM   Result Value Ref Range    Meter Glucose 57 (L) 74 - 99 mg/dL   POCT Glucose    Collection Time: 10/04/22  5:55 PM   Result Value Ref Range    Meter Glucose 73 (L) 74 - 99 mg/dL   POCT Glucose    Collection Time: 10/04/22  5:57 PM   Result Value Ref Range    Glucose 57 mg/dL    QC OK? yes    POCT Glucose    Collection Time: 10/04/22  5:57 PM   Result Value Ref Range    Glucose 73 mg/dL    QC OK?  yes    Urinalysis with Microscopic    Collection Time: 10/04/22  6:19 PM   Result Value Ref Range    Color, UA Yellow Straw/Yellow    Clarity, UA CLOUDY (A) Clear    Glucose, Ur Negative Negative mg/dL    Bilirubin Urine Negative Negative    Ketones, Urine Negative Negative mg/dL    Specific Gravity, UA 1.020 1.005 - 1.030    Blood, Urine TRACE-LYSED Negative    pH, UA 5.0 5.0 - 9.0    Protein, UA Negative Negative mg/dL    Urobilinogen, Urine 0.2 <2.0 E.U./dL    Nitrite, Urine Negative Negative    Leukocyte Esterase, Urine LARGE (A) Negative    WBC, UA 10-20 (A) 0 - 5 /HPF    RBC, UA 0-1 0 - 2 /HPF    Epithelial Cells, UA RARE /HPF    Bacteria, UA MODERATE (A) None Seen /HPF   COVID-19, Rapid    Collection Time: 10/04/22  6:19 PM    Specimen: Nasopharyngeal Swab   Result Value Ref Range    SARS-CoV-2, NAAT Not Detected Not Detected   Potassium    Collection Time: 10/04/22  6:57 PM   Result Value Ref Range    Potassium 6.1 (H) 3.5 - 5.0 mmol/L   Basic Metabolic Panel w/ Reflex to MG    Collection Time: 10/04/22  6:57 PM   Result Value Ref Range    Sodium 139 132 - 146 mmol/L    Potassium reflex Magnesium 6.1 (H) 3.5 - 5.0 mmol/L    Chloride 101 98 - 107 mmol/L    CO2 28 22 - 29 mmol/L    Anion Gap 10 7 - 16 mmol/L    Glucose 171 (H) 74 - 99 mg/dL    BUN 46 (H) 6 - 23 mg/dL    Creatinine 1.5 (H) 0.5 - 1.0 mg/dL    GFR Non-African American 33 >=60 mL/min/1.73    GFR African American 40     Calcium 9.4 8.6 - 10.2 mg/dL   POCT Glucose    Collection Time: 10/04/22  7:00 PM   Result Value Ref Range    Meter Glucose 114 (H) 74 - 99 mg/dL   POCT Glucose    Collection Time: 10/04/22  7:22 PM   Result Value Ref Range    Glucose 114 mg/dL    QC OK?  yes    POCT Glucose    Collection Time: 10/04/22  8:04 PM   Result Value Ref Range    Meter Glucose 102 (H) 74 - 99 mg/dL   POCT Glucose    Collection Time: 10/04/22  8:05 PM   Result Value Ref Range    Glucose 102 mg/dL   POCT Glucose    Collection Time: 10/04/22  9:50 PM   Result Value Ref Range    Meter Glucose 128 (H) 74 - 99 mg/dL   POCT Glucose    Collection Time: 10/04/22  9:53 PM   Result Value Ref Range    Glucose 128 mg/dL   Lactate, Sepsis    Collection Time: 10/04/22  9:59 PM   Result Value Ref Range    Lactic Acid, Sepsis 3.3 (H) 0.5 - 1.9 mmol/L   Basic Metabolic Panel w/ Reflex to MG    Collection Time: 10/04/22  9:59 PM   Result Value Ref Range    Sodium 140 132 - 146 mmol/L    Potassium reflex Magnesium 4.9 3.5 - 5.0 mmol/L    Chloride 101 98 - 107 mmol/L    CO2 26 22 - 29 mmol/L    Anion Gap 13 7 - 16 mmol/L    Glucose 200 (H) 74 - 99 mg/dL    BUN 45 (H) 6 - 23 mg/dL    Creatinine 1.6 (H) 0.5 - 1.0 mg/dL    GFR Non-African American 31 >=60 mL/min/1.73    GFR African American 37     Calcium 9.8 8.6 - 10.2 mg/dL   Lactate, Sepsis    Collection Time: 10/05/22  2:22 AM   Result Value Ref Range    Lactic Acid, Sepsis 1.5 0.5 - 1.9 mmol/L   Lactate, Sepsis    Collection Time: 10/05/22  5:06 AM   Result Value Ref Range    Lactic Acid, Sepsis 1.5 0.5 - 1.9 mmol/L   POCT Glucose    Collection Time: 10/05/22  5:49 AM   Result Value Ref Range    Meter Glucose 85 74 - 99 mg/dL        Imaging/Diagnostics Last 24 Hours   CT ABDOMEN PELVIS W IV CONTRAST Additional Contrast? None    Result Date: 8/29/2022  EXAMINATION: CT OF THE ABDOMEN AND PELVIS WITH CONTRAST8/29/2022 2:00 am TECHNIQUE: CT of the abdomen and pelvis was performed with the administration of intravenous contrast. Multiplanar reformatted images are provided for review. Automated exposure control, iterative reconstruction, and/or weight based adjustment of the mA/kV was utilized to reduce the radiation dose to as low as reasonably achievable. COMPARISON: None HISTORY: ORDERING SYSTEM PROVIDED HISTORY: abdominal pain TECHNOLOGIST PROVIDED HISTORY: Reason for exam:->abdominal pain Additional Contrast?->None Decision Support Exception - unselect if not a suspected or confirmed emergency medical condition->Emergency Medical Condition (MA) What reading provider will be dictating this exam?->CRC FINDINGS: THORACIC STRUCTURES: Unremarkable. LIVER:  The liver is normal in size, contour and attenuation. No focal mass. No intra or extrahepatic bile duct dilation. GALL BLADDER: Unremarkable. SPLEEN:  Unremarkable. PANCREAS:  Unremarkable. ADRENAL GLANDS:  Unremarkable. ESOPHAGUS AND STOMACH:  Unremarkable. BOWEL: Small bowel: There is distended loops of small bowel identified with collapse of the terminal ileum. Large bowel: The there is a left colostomy.   The appendix is within normal limits. There is no intraperitoneal free air or fluid. URINARY/GENITAL TRACT: Kidneys:  The kidneys are unremarkable. .  No evidence of hydronephrosis, renal calcifications or solid renal mass. Ureters: The ureters are normal course and caliber. There is no evidence of ureter calculus/calculi. URINARY BLADDER:  The urinary bladder is well distended without wall thickening or focal mass. REPRODUCTIVE ORGANS:  Unremarkable. BLOOD VESSELS: Unremarkable given patients age. An IVC filter is visualized. LYMPH NODES:  No evidence of intraabdominal or intrapelvic lymphadenopathy. ABDOMINAL WALL & SOFT TISSUES:  There is diastasis of the rectus abdominus muscles. OSSEOUS STRUCTURES: No acute osseous lesion. Dilated small bowel loops concerning for small bowel obstruction. Consider small-bowel follow-through. Left-sided colostomy is unremarkable. XR CHEST PORTABLE    Result Date: 8/29/2022  EXAMINATION: ONE XRAY VIEW OF THE CHEST8/29/2022 TECHNIQUE: Frontal view submitted COMPARISON: None. HISTORY: ORDERING SYSTEM PROVIDED HISTORY: hypoxia TECHNOLOGIST PROVIDED HISTORY: Reason for exam:->hypoxia What reading provider will be dictating this exam?->CRC FINDINGS: The lungs demonstrate no large pleural effusion, or pneumothorax. Opacification at the left lung base is identified, consolidation/pneumonia cannot be excluded the cardiomediastinal silhouette is stable. Consolidation/pneumonia cannot be excluded at the left lung base. Assessment      Hospital Problems             Last Modified POA    * (Principal) Hyperkalemia 10/4/2022 Yes   Hyperkalemia  BETTINA  Falls  DM  HTN  Hyperlipidemia    Plan   IV fluids  Await AM labs. Renal consulted. PT/OT  Continue rest of meds. Placement.     Consultations Ordered:  IP CONSULT TO NEPHROLOGY  IP CONSULT TO INTERNAL MEDICINE  IP CONSULT TO CASE MANAGEMENT    Electronically signed by Adrienne Terry MD on 8/29/22 at 6:53 AM EDT

## 2022-10-05 NOTE — ED NOTES
Pts pressure noted to be 77/41, Provider notified and given 1L NS.      Carmen Echavarria RN  10/04/22 [id number]

## 2022-10-05 NOTE — CONSULTS
[facility]  Nephrology Consult Note    Patient's Name: Miryam Davila    Reason for Consult: BETTINA with hyperkalemia    Chief Complaint: Fatigue  History Obtained From:  patient, past medical records, and EMR    History of Present Illness:    Miryam Davila is a 78 y.o. female with a past medical history of GERD, hypertension, hyperlipidemia, and diabetes mellitus. She presented to the ED on 10/4 with complaints of fatigue. Vital signs at presentation to the ED include temperature 98.2, respirations 16, pulse 94, /60, and she was 95% on room air. Lab data at presentation to the ED include potassium 6.5, BUN 50, creatinine 1.8, calcium 10.5. She had a CT of the head on 10/4 which showed no acute intracranial abnormality. CT abdomen pelvis 10/4 showed no acute abnormality, right kidney unremarkable, 2.9 cm cyst left upper renal pole. Patient has a history of small bowel obstruction during previous admission 8/28-9/8. We were consulted to see the patient for BETTINA with hyperkalemia. Potassium noted at 6.5 and creatinine noted at 1.8 on 10/4. Patient has a baseline creatinine of 0.7-1. At present, patient was seen and examined. She reports that she had a fall at home. She also explained that she had been experiencing dizziness at home. She reports that her appetite has been decreased for approximately the last week. She denies any urinary symptoms prior to admission including urgency, frequency, or dysuria. She denies use of NSAIDs. She denies any current chest pain or shortness of breath. She denies any abdominal pain, nausea, or vomiting.     PMH:    Past Medical History:   Diagnosis Date    Arthritis     Colostomy in place [facility])     Diabetes mellitus ([facility] [address]     Diverticulitis     GERD (gastroesophageal reflux disease)     Hernia     History of blood transfusion     Hyperlipidemia     Hypertension        Patient Active Problem List   Diagnosis    Pelvic abscess in female    Ankle fracture, bimalleolar, closed, left, initial encounter    Closed fracture of left ankle    Bowel obstruction (HCC)    Hyperkalemia       Meds:     atorvastatin  40 mg Oral Daily    Liraglutide  1.8 mg SubCUTAneous Daily    pantoprazole  40 mg Oral Daily    PARoxetine  40 mg Oral Daily    pregabalin  50 mg Oral TID    insulin lispro  0-4 Units SubCUTAneous TID WC    insulin lispro  0-4 Units SubCUTAneous Nightly    glipiZIDE  5 mg Oral QAM AC        sodium chloride 100 mL/hr at 10/05/22 0949    dextrose         Meds prn:     acetaminophen, glucose, dextrose bolus **OR** dextrose bolus, glucagon (rDNA), dextrose    Meds prior to admission:     No current facility-administered medications on file prior to encounter. Current Outpatient Medications on File Prior to Encounter   Medication Sig Dispense Refill    potassium chloride (KLOR-CON M) 20 MEQ extended release tablet Take 2 tablets by mouth daily 60 tablet 3    blood glucose test strips (ASCENSIA AUTODISC VI;ONE TOUCH ULTRA TEST VI) strip OneTouch Ultra Blue Test Strip      pantoprazole (PROTONIX) 40 MG tablet Take 40 mg by mouth daily      atorvastatin (LIPITOR) 40 MG tablet Take 40 mg by mouth daily      HYDROcodone-acetaminophen (NORCO) 5-325 MG per tablet Take 1 tablet by mouth every 6 hours as needed for Pain. aspirin EC 81 MG EC tablet Take 1 tablet by mouth 2 times daily for 28 days 56 tablet 0    pregabalin (LYRICA) 50 MG capsule Take 50 mg by mouth 3 times daily. acetaminophen (TYLENOL) 325 MG tablet Take 650 mg by mouth every 6 hours as needed for Pain      Liraglutide (VICTOZA) 18 MG/3ML SOPN SC injection Inject 1.8 mg into the skin daily       PARoxetine (PAXIL) 40 MG tablet Take 40 mg by mouth daily       glyBURIDE (DIABETA) 5 MG tablet Take 5 mg by mouth daily       enalapril (VASOTEC) 20 MG tablet Take 20 mg by mouth daily       [DISCONTINUED] metformin (GLUCOPHAGE) 500 MG tablet Take 1,000 mg by mouth 2 times daily (with meals). Allergies:    Daptomycin, Vancomycin, and Adhesive tape    Social History:     reports that she has never smoked. She has quit using smokeless tobacco. She reports current alcohol use. She reports that she does not use drugs. Family History:     History reviewed. No pertinent family history. Review of Systems:   Pertinent items are noted in HPI. Physical Exam:      Patient Vitals for the past 24 hrs:   BP Temp Temp src Pulse Resp SpO2 Height Weight   10/05/22 0759 (!) 113/55 97.6 °F (36.4 °C) Oral 57 18 97 % -- --   10/05/22 0230 102/60 97.3 °F (36.3 °C) Oral 86 16 -- -- --   10/05/22 0000 (!) 106/56 -- -- 88 15 94 % -- --   10/04/22 2317 113/60 -- -- 85 16 94 % -- --   10/04/22 2152 103/79 -- -- 69 16 93 % -- --   10/04/22 2130 (!) 77/41 -- -- 82 16 93 % -- --   10/04/22 1955 101/66 -- -- 85 14 94 % -- --   10/04/22 1906 127/63 -- -- 85 13 100 % -- --   10/04/22 1900 -- -- -- 72 12 98 % -- --   10/04/22 1851 -- -- -- 80 16 100 % -- --   10/04/22 1804 (!) 129/42 -- -- 88 16 93 % -- --   10/04/22 1747 (!) 125/57 -- -- 92 17 90 % -- --   10/04/22 1521 107/63 98.3 °F (36.8 °C) Oral 95 18 95 % -- --   10/04/22 1514 108/60 98.2 °F (36.8 °C) -- 94 16 95 % 5' 6\" (1.676 m) 162 lb (73.5 kg)         Intake/Output Summary (Last 24 hours) at 10/5/2022 1021  Last data filed at 10/5/2022 0606  Gross per 24 hour   Intake --   Output 1150 ml   Net -1150 ml       General: Awake, alert, no acute distress  Neck: No JVD noted  Lungs: Clear bilaterally upper, diminished to the bases bilaterally. Unlabored  CV: Regular rate and rhythm. No rub  Abd: Soft, nontender, nondistended. Active bowel sounds  Skin: Warm and dry.   No rash on exposed extremities  Ext: No edema   Neuro: Awake, answers questions appropriately    Data:    Recent Labs     10/04/22  1613 10/05/22  0709   WBC 10.6 7.1   HGB 11.8 10.1*   HCT 39.3 35.0   MCV 90.3 92.3    149       Recent Labs     10/04/22  1857 10/04/22  1922 10/04/22  2153 10/04/22  2159 10/05/22  0709     --   --  140 139   K 6.1*  6.1*  --   --  4.9 5.9*     --   --  101 104   CO2 28  --   --  26 24   CREATININE 1.5*  --   --  1.6* 1.4*   BUN 46*  --   --  45* 39*   LABGLOM 33  --   --  31 36   GLUCOSE 171*   < > 128 200* 67*   CALCIUM 9.4  --   --  9.8 9.3    < > = values in this interval not displayed. No results found for: VITD25    No results found for: PTH    Recent Labs     10/04/22  1613   ALT 8   AST 20   ALKPHOS 111*   BILITOT 0.3       Recent Labs     10/04/22  1613   LABALBU 4.0       No results found for: FERRITIN, IRON, TIBC    No results found for: [id number]    No results found for: FOLATE    Lab Results   Component Value Date/Time    COLORU Yellow 10/04/2022 06:19 PM    NITRU Negative 10/04/2022 06:19 PM    GLUCOSEU Negative 10/04/2022 06:19 PM    GLUCOSEU NEGATIVE 03/31/2012 06:34 PM    KETUA Negative 10/04/2022 06:19 PM    UROBILINOGEN 0.2 10/04/2022 06:19 PM    BILIRUBINUR Negative 10/04/2022 06:19 PM    BILIRUBINUR NEGATIVE 03/31/2012 06:34 PM       No results found for: JYOTHI, CREURRAN, MACREATRATIO, OSMOU    No components found for: URIC    No results found for: LIPIDPAN    Assessment and Plans:    BETTINA stage II  Likely prerenal in the setting of decreased effective renal perfusion -patient with poor oral intake for approximately 1 week; on enalapril prior to admission  Hypotension noted on 10/4- BP 77/41  CT abd pelvis 10/4 no acute abnormality, right kidney unremarkable, 2.9 cm cyst left upper renal pole  UA spec grav 1.02, large leuk est, mod bacteria, negative protein/bili/ketones/glucose  baseline creatinine of 0.7-1  Creatinine peaked at 1.8 on 10/4--> 1.4 today  Hold enalapril  Check urine studies  Avoid nephrotoxins/NSAIDs-adjust meds for level of renal function  Strict I&O, daily weights  On IVF NS at 100 ml/hour  Monitor labs    2.   Hyperkalemia  Likely in setting of BETTINA, enalapril, potassium supplementation  S/p medical management in the ED  K+ 6.5 on 10/4--> 5.9 today  Hold enalapril and potassium supplement  Low potassium diet  Dose Lokelma 10 g oral once today  Repeat BMP this afternoon-notify renal of results  Monitor labs    3.   Diabetes mellitus  On insulin lispro, Victoza, glipizide  Management per primary service    Thank you for allowing us to participate in the care of [id number], APRN - CNP  Pt seen and examined agree with above  Hold acei  Continue ivf  Stop k supplement  Miriam Oden MD

## 2022-10-05 NOTE — PROGRESS NOTES
Rex Grider was ordered Liraglutide (Victoza) which is a nonformulary medication. This medication will need to be supplied by the patient as the pharmacy does not carry this non-formulary medication. If the medication has not been administered by 1400 on the following day from the time the order was placed, a pharmacist will follow-up with the nurse of the patient to assess the capability of the patient to bring in the medication. If it is determined that the patient cannot supply the medication and it is not available to be dispensed from the pharmacy, the provider will be notified.

## 2022-10-05 NOTE — PROGRESS NOTES
Physical Therapy  Physical Therapy Initial Evaluation    Name: Keily Martinez  : 1940  MRN: [id number]      Date of Service: 10/5/2022    Evaluating PT:  Kevin Reynolds, PT [id number]    Referring provider/PT Order:  PT Eval and Treat   10/05/22 0700  PT eval and treat        Carolin Blum MD     Room #:  1242/9602-X  Diagnosis:  Hyperkalemia [E87.5]  Acute kidney injury ([facility] [address] [N17.9]  Urinary tract infection in elderly patient [N39.0]  PMHx/PSHx:     has a past medical history of Arthritis, Colostomy in place [facility]), Diabetes mellitus ([facility] [address] Diverticulitis, GERD (gastroesophageal reflux disease), Hernia, History of blood transfusion, Hyperlipidemia, and Hypertension. has a past surgical history that includes Hysterectomy; Cholecystectomy; hernia repair; Abdomen surgery; colostomy; Appendectomy; Thyroid surgery; Jejunostomy; and Ankle fracture surgery (Left, 2021). Procedure/Surgery:  none  Precautions:  Falls, R knee instability , FWB (full weight bearing) ,   Equipment Needs: Patient has needed equipment ,  Equipment Owned:  FWW, rollator, wheelchair     SUBJECTIVE:     Pt is questionable historian. Pt lives with  and daughter. Per chart, there are 4 stair(s) to enter home. Pt ambulated with FWW PTA. OBJECTIVE:   Initial Evaluation  Date: 10/5/22 Treatment Short Term/ Long Term   Goals   AM-PAC 6 Clicks 51/27     Was pt agreeable to Eval/treatment? yes     Does pt have pain? none     Bed Mobility  Rolling: Min  Supine to sit:   Min   Sit to supine: Min   Scooting: Min   Rolling: Ind  Supine to sit: Ind  Sit to supine: Ind  Scooting: Ind   Transfers Sit to stand: Min to fww  Stand to sit: Min  Stand pivot: Mod  with fww  Sit to stand: Mod Ind  to fww  Stand to sit: Mod Ind    Stand pivot: Mod Ind  with fww   Ambulation    Side steps only. Upon standing 1st attempt R knee gave away while stepping.      100 feet with Mod Ind  fww   Stair negotiation: ascended and descended  NT  4 step(s) with 1 rail(s) SBA     Strength/ROM:     RLE grossly 3+/5  LLE grossly 4-/5  RLE AROM WFL  LLE AROM WFL    Balance:   Static Sitting: Ind  Dynamic Sitting: Ind  Static Standing: Min with fww  Dynamic Standing: Mod  with fww      Patient is Alert & Oriented x person, place, time, and situation and follows directions   Sensation:  Pt denies numbness and tingling to extremities  Edema:  none    Vitals: Automatic cuff  Supine Seated Immediately following standing. Blood Pressure at rest 131/58 Blood Pressure at rest 124/71 Blood Pressure at rest 109/71   Heart Rate at rest 57 bpm Heart Rate at rest 99 bpm Heart Rate at rest 106 bpm   SPO2 at rest 96% RA SPO2 at rest -% -L SPO2 at rest -% -L     Therapeutic Exercises:  Functional activity as stated above. Patient education  Pt educated on role of Physical Therapy, risks of immobility, safety and plan of care,  importance of mobility while in hospital , and safety  and use of call light for safety. Patient response to education:   Pt verbalized understanding Pt demonstrated skill Pt requires further education in this area   yes yes Reminders     ASSESSMENT:    Conditions Requiring Skilled Therapeutic Intervention:    [x]Decreased strength     [x]Decreased ROM  [x]Decreased functional mobility  [x]Decreased balance   [x]Decreased endurance   [x]Decreased posture  []Decreased sensation  []Decreased coordination   []Decreased vision  [x]Decreased safety awareness   []Increased pain       Comments:    RN cleared patient for participation in therapy session. Patient was seen this date for PT evaluation. . Patient was agreeable to intervention. Results of the functional assessment are noted above. Upon entering the room patient was found supine in bed. Assisted to EOB with minimal assist. . Sat EOB x 10 minutes to increase dynamic sitting balance and activity tolerance. STS attempted 3x. When standing EOB 1st attempt patient R knee gave away.  2nd and 3rd attempt slight improvement noted. Only completed side stepping. States she has been falling at home. At end of session, patient in bed with [facility] elevated call light and phone within reach,  all lines and tubes intact, nursing notified. Bed alarm activated  This patient can benefit from the continuation of skilled PT possibly in a rehab setting to maximize functional level and return to PLOF. Treatment:  Patient completed and was instructed in the following treatment:      Bed mobility training - pt given verbal and tactile cues to facilitate proper sequencing and safety during rolling and supine>sit as well as provided with physical assistance to complete task    Assistive device training - pt educated on using [employer] during gait, [employer] approximation/negotiation, and hand placement during sit<>stand to [employer]  STS and transfer training - educated on hand/foot placement, safety, and sequencing during STS and pivot transfers using assistive device  Gait training - side stepping only this date. Education in use of call light for safety    Pt's/ family goals      1. Home when able. Prognosis is good  for reaching above PT goals.     Patient and or family understand(s) diagnosis, prognosis, and plan of care.  yes,     PHYSICAL THERAPY PLAN OF CARE:    PT POC is established based on physician order and patient diagnosis     Diagnosis:  Hyperkalemia [E87.5]  Acute kidney injury ([facility] [address] [N17.9]  Urinary tract infection in elderly patient [N39.0]  Specific instructions for next treatment:  Transfer to bedside chair, Increase ambulation distance, and BLE therapeutic exercise  Current Treatment Recommendations:     [x] Strengthening to improve independence with functional mobility   [x] ROM to improve independence with functional mobility   [x] Balance Training to improve static/dynamic balance and to reduce fall risk  [x] Endurance Training to improve activity tolerance during functional mobility   [x] Transfer Training to improve safety and independence with all functional transfers   [x] Gait Training to improve gait mechanics, endurance and asses need for appropriate assistive device  [x] Stair Training in preparation for safe discharge home and/or into the community when appropriate  [] Positioning to prevent skin breakdown and contractures  [x] Safety and Education Training   [] Patient/Caregiver Education   [] HEP  [] Gait Team to be added to POC  [] Other     PT long term treatment goals are located in above grid    Frequency of treatments: 2-5x/week x 1-2 weeks. Time in  0810  Time out  850    Total Treatment Time  25 minutes     Evaluation Time includes thorough review of current medical information, gathering information on past medical history/social history and prior level of function, completion of standardized testing/informal observation of tasks, assessment of data and education on plan of care and goals.     CPT codes:  [x] Low Complexity PT evaluation 19281  [] Moderate Complexity PT evaluation 46999  [] High Complexity PT evaluation 95789  [] PT Re-evaluation 66694  [] Gait training 13578 - minutes  [] Manual therapy 19763  minutes  [x] Therapeutic activities 90468 -25 minutes  [] Therapeutic exercises 51601 - minutes  [] Neuromuscular reeducation [address] minutes     Trish Cintron, [id number] [facility] [place]

## 2022-10-06 LAB
ACINETOBACTER CALCOAC BAUMANNII COMPLEX BY PCR: NOT DETECTED
ANION GAP SERPL CALCULATED.3IONS-SCNC: 8 MMOL/L (ref 7–16)
BACTEROIDES FRAGILIS BY PCR: NOT DETECTED
BOTTLE TYPE: ABNORMAL
BUN BLDV-MCNC: 22 MG/DL (ref 6–23)
CALCIUM SERPL-MCNC: 9 MG/DL (ref 8.6–10.2)
CANDIDA ALBICANS BY PCR: NOT DETECTED
CANDIDA AURIS BY PCR: NOT DETECTED
CANDIDA GLABRATA BY PCR: NOT DETECTED
CANDIDA KRUSEI BY PCR: NOT DETECTED
CANDIDA PARAPSILOSIS BY PCR: NOT DETECTED
CANDIDA TROPICALIS BY PCR: NOT DETECTED
CHLORIDE BLD-SCNC: 101 MMOL/L (ref 98–107)
CO2: 24 MMOL/L (ref 22–29)
CREAT SERPL-MCNC: 1.1 MG/DL (ref 0.5–1)
CRYPTOCOCCUS NEOFORMANS/GATTII BY PCR: NOT DETECTED
ENTEROBACTER CLOACAE COMPLEX BY PCR: NOT DETECTED
ENTEROBACTERALES BY PCR: NOT DETECTED
ENTEROCOCCUS FAECALIS BY PCR: NOT DETECTED
ENTEROCOCCUS FAECIUM BY PCR: NOT DETECTED
ESCHERICHIA COLI BY PCR: NOT DETECTED
GFR AFRICAN AMERICAN: 57
GFR NON-AFRICAN AMERICAN: 47 ML/MIN/1.73
GLUCOSE BLD-MCNC: 124 MG/DL (ref 74–99)
HAEMOPHILUS INFLUENZAE BY PCR: NOT DETECTED
HCT VFR BLD CALC: 38.4 % (ref 34–48)
HEMOGLOBIN: 11.3 G/DL (ref 11.5–15.5)
KLEBSIELLA AEROGENES BY PCR: NOT DETECTED
KLEBSIELLA OXYTOCA BY PCR: NOT DETECTED
KLEBSIELLA PNEUMONIAE GROUP BY PCR: NOT DETECTED
LISTERIA MONOCYTOGENES BY PCR: NOT DETECTED
MCH RBC QN AUTO: 27.6 PG (ref 26–35)
MCHC RBC AUTO-ENTMCNC: 29.4 % (ref 32–34.5)
MCV RBC AUTO: 93.9 FL (ref 80–99.9)
METER GLUCOSE: 108 MG/DL (ref 74–99)
METER GLUCOSE: 115 MG/DL (ref 74–99)
METER GLUCOSE: 120 MG/DL (ref 74–99)
METER GLUCOSE: 144 MG/DL (ref 74–99)
METHICILLIN RESISTANCE MECA/C AND MREJ BY PCR: DETECTED
NEISSERIA MENINGITIDIS BY PCR: NOT DETECTED
ORDER NUMBER: ABNORMAL
PDW BLD-RTO: 15.8 FL (ref 11.5–15)
PLATELET # BLD: 143 E9/L (ref 130–450)
PMV BLD AUTO: 11.3 FL (ref 7–12)
POTASSIUM SERPL-SCNC: 4.9 MMOL/L (ref 3.5–5)
PROTEUS SPECIES BY PCR: NOT DETECTED
PSEUDOMONAS AERUGINOSA BY PCR: NOT DETECTED
RBC # BLD: 4.09 E12/L (ref 3.5–5.5)
SALMONELLA SPECIES BY PCR: NOT DETECTED
SERRATIA MARCESCENS BY PCR: NOT DETECTED
SODIUM BLD-SCNC: 133 MMOL/L (ref 132–146)
SOURCE OF BLOOD CULTURE: ABNORMAL
STAPHYLOCOCCUS AUREUS BY PCR: DETECTED
STAPHYLOCOCCUS EPIDERMIDIS BY PCR: NOT DETECTED
STAPHYLOCOCCUS LUGDUNENSIS BY PCR: NOT DETECTED
STAPHYLOCOCCUS SPECIES BY PCR: DETECTED
STENOTROPHOMONAS MALTOPHILIA BY PCR: NOT DETECTED
STREPTOCOCCUS AGALACTIAE BY PCR: NOT DETECTED
STREPTOCOCCUS PNEUMONIAE BY PCR: NOT DETECTED
STREPTOCOCCUS PYOGENES  BY PCR: NOT DETECTED
STREPTOCOCCUS SPECIES BY PCR: NOT DETECTED
WBC # BLD: 5.8 E9/L (ref 4.5–11.5)

## 2022-10-06 PROCEDURE — 85027 COMPLETE CBC AUTOMATED: CPT

## 2022-10-06 PROCEDURE — 36415 COLL VENOUS BLD VENIPUNCTURE: CPT

## 2022-10-06 PROCEDURE — 80048 BASIC METABOLIC PNL TOTAL CA: CPT

## 2022-10-06 PROCEDURE — 2140000000 HC CCU INTERMEDIATE R&B

## 2022-10-06 PROCEDURE — 82962 GLUCOSE BLOOD TEST: CPT

## 2022-10-06 PROCEDURE — 6370000000 HC RX 637 (ALT 250 FOR IP): Performed by: FAMILY MEDICINE

## 2022-10-06 RX ADMIN — PREGABALIN 50 MG: 50 CAPSULE ORAL at 11:09

## 2022-10-06 RX ADMIN — PAROXETINE HYDROCHLORIDE 40 MG: 20 TABLET, FILM COATED ORAL at 12:19

## 2022-10-06 RX ADMIN — ACETAMINOPHEN 650 MG: 325 TABLET ORAL at 22:51

## 2022-10-06 RX ADMIN — PREGABALIN 50 MG: 50 CAPSULE ORAL at 22:52

## 2022-10-06 RX ADMIN — ATORVASTATIN CALCIUM 40 MG: 40 TABLET, FILM COATED ORAL at 11:09

## 2022-10-06 RX ADMIN — PREGABALIN 50 MG: 50 CAPSULE ORAL at 14:02

## 2022-10-06 RX ADMIN — PANTOPRAZOLE SODIUM 40 MG: 40 TABLET, DELAYED RELEASE ORAL at 11:09

## 2022-10-06 RX ADMIN — GLIPIZIDE 5 MG: 5 TABLET ORAL at 06:27

## 2022-10-06 ASSESSMENT — PAIN DESCRIPTION - LOCATION: LOCATION: EAR

## 2022-10-06 ASSESSMENT — PAIN - FUNCTIONAL ASSESSMENT: PAIN_FUNCTIONAL_ASSESSMENT: ACTIVITIES ARE NOT PREVENTED

## 2022-10-06 ASSESSMENT — ENCOUNTER SYMPTOMS
ABDOMINAL PAIN: 0
SHORTNESS OF BREATH: 0

## 2022-10-06 ASSESSMENT — PAIN SCALES - GENERAL
PAINLEVEL_OUTOF10: 0
PAINLEVEL_OUTOF10: 3

## 2022-10-06 ASSESSMENT — PAIN DESCRIPTION - DESCRIPTORS: DESCRIPTORS: ACHING;SORE;DISCOMFORT

## 2022-10-06 ASSESSMENT — PAIN DESCRIPTION - ORIENTATION: ORIENTATION: RIGHT

## 2022-10-06 NOTE — CARE COORDINATION
Care coordination: Received call back from [facility] at [facility]. Pt accepted, will need negative covid day of discharge.  No precert as medicare    Aristides Peterson

## 2022-10-06 NOTE — PROGRESS NOTES
Progress Note  Date:10/6/2022       TPXS:[id number]  Patient Omaira Breaux     Date of Birth:[birth year]     Age:82 y.o. Patient in deep sleep, no issues overnight. Subjective    Subjective:  Symptoms:  Stable. No shortness of breath or chest pain. Diet:  Adequate intake. Activity level: Impaired due to weakness. Pain:  She reports no pain. Review of Systems   Constitutional:  Negative for activity change and fever. HENT:  Negative for congestion. Respiratory:  Negative for shortness of breath. Cardiovascular:  Negative for chest pain. Gastrointestinal:  Negative for abdominal pain. Neurological:  Negative for dizziness. Objective         Vitals Last 24 Hours:  TEMPERATURE:  Temp  Av.7 °F (36.5 °C)  Min: 97.6 °F (36.4 °C)  Max: 97.9 °F (36.6 °C)  RESPIRATIONS RANGE: Resp  Av.3  Min: 18  Max: 20  PULSE OXIMETRY RANGE: SpO2  Av.7 %  Min: 94 %  Max: 97 %  PULSE RANGE: Pulse  Av.3  Min: 57  Max: 97  BLOOD PRESSURE RANGE: Systolic (44LWM), BTS:273 , Min:113 , EHW:886   ; Diastolic (79JFI), SLJ:41, Min:55, Max:63    I/O (24Hr): Intake/Output Summary (Last 24 hours) at 10/6/2022 0707  Last data filed at 10/6/2022 0631  Gross per 24 hour   Intake --   Output 2900 ml   Net -2900 ml     Objective:  General Appearance:  Comfortable. Vital signs: (most recent): Blood pressure (!) 122/56, pulse 60, temperature 97.9 °F (36.6 °C), temperature source Oral, resp. rate 20, height 5' 6\" (1.676 m), weight 162 lb (73.5 kg), SpO2 94 %. No fever. Lungs:  Normal effort and normal respiratory rate. Breath sounds clear to auscultation. Heart: Normal rate. Regular rhythm.   S1 normal and S2 normal.    Labs/Imaging/Diagnostics    Labs:  CBC:  Recent Labs     10/04/22  1613 10/05/22  0709 10/06/22  0443   WBC 10.6 7.1 5.8   RBC 4.35 3.79 4.09   HGB 11.8 10.1* 11.3*   HCT 39.3 35.0 38.4   MCV 90.3 92.3 93.9   RDW 15.9* 16.0* 15.8*    149 143     CHEMISTRIES:  Recent Labs     10/05/22  0709 10/05/22  1602 10/06/22  0443    137 133   K 5.9* 5.3* 4.9    104 101   CO2 24 24 24   BUN 39* 30* 22   CREATININE 1.4* 1.2* 1.1*   GLUCOSE 67* 68* 124*     PT/INR:No results for input(s): PROTIME, INR in the last 72 hours. APTT:No results for input(s): APTT in the last 72 hours. LIVER PROFILE:  Recent Labs     10/04/22  1613   AST 20   ALT 8   BILITOT 0.3   ALKPHOS 111*       Imaging Last 24 Hours:  CT ABDOMEN PELVIS WO CONTRAST Additional Contrast? None    Result Date: 10/4/2022  EXAMINATION: CT OF THE ABDOMEN AND PELVIS WITHOUT CONTRAST 10/4/2022 10:21 pm TECHNIQUE: CT of the abdomen and pelvis was performed without the administration of intravenous contrast. Multiplanar reformatted images are provided for review. Automated exposure control, iterative reconstruction, and/or weight based adjustment of the mA/kV was utilized to reduce the radiation dose to as low as reasonably achievable. COMPARISON: 08/29/2022 HISTORY: ORDERING SYSTEM PROVIDED HISTORY: sepsis hypotension TECHNOLOGIST PROVIDED HISTORY: Reason for exam:->sepsis hypotension Additional Contrast?->None Decision Support Exception - unselect if not a suspected or confirmed emergency medical condition->Emergency Medical Condition (MA) What reading provider will be dictating this exam?->CRC FINDINGS: Lower Chest:  Visualized portion of the lower chest demonstrates no acute abnormality. Organs: The liver, spleen, pancreas, adrenals, and right kidney are unremarkable. The gallbladder is surgically absent. There is a 2.9 cm left upper renal pole exophytic cyst. GI/Bowel: There is a left lower quadrant colostomy. No evidence of obstruction or inflammation. Pelvis: The urinary bladder is collapsed around a Galindo catheter. The uterus is absent. Peritoneum/Retroperitoneum: No evidence of ascites or free air. No evidence of lymphadenopathy. Aorta is normal in caliber. IVC filter is noted.  Bones/Soft Tissues:  No acute abnormality of the visualized osseous structures. No acute abdominopelvic abnormality. XR KNEE RIGHT (3 VIEWS)    Result Date: 10/4/2022  EXAMINATION: THREE XRAY VIEWS OF THE RIGHT KNEE; ONE XRAY VIEW OF THE CHEST; ONE XRAY VIEW OF THE PELVIS AND TWO XRAY VIEWS RIGHT HIP 10/4/2022 4:05 pm; 10/4/2022 4:06 pm COMPARISON: Chest 29 August 2022 HISTORY: ORDERING SYSTEM PROVIDED HISTORY: fall; pain at right knee TECHNOLOGIST PROVIDED HISTORY: Reason for exam:->fall; pain at right knee; ORDERING SYSTEM PROVIDED HISTORY: fall TECHNOLOGIST PROVIDED HISTORY: Reason for exam:->fall What reading provider will be dictating this exam?->CRC FINDINGS: Right knee: No evident fracture or dislocation. No significant arthropathy. Normal soft tissues. Please note that the lateral view of the right knee is contained in the image set with the ankle. Pelvis and right hip: A skin fold mimics a subtrochanteric fracture at the right femur. No significant arthropathy. Normal soft tissues. Chest: Stable cardiomediastinal silhouette. Normal pulmonary vascularity. Diminishing atelectasis and or infiltrate in both lower lungs. Right knee: Unremarkable. Pelvis and right hip: No active process. Please note that a skin fold mimics a fracture as noted above. Chest: Diminishing atelectasis and or infiltrate at both lower lungs. XR ANKLE LEFT (MIN 3 VIEWS)    Result Date: 10/4/2022  EXAMINATION: THREE XRAY VIEWS OF THE LEFT ANKLE 10/4/2022 5:05 pm COMPARISON: 07/28/2021 HISTORY: ORDERING SYSTEM PROVIDED HISTORY: fall; pain and swelling at ankle TECHNOLOGIST PROVIDED HISTORY: Reason for exam:->fall; pain and swelling at ankle What reading provider will be dictating this exam?->CRC FINDINGS: No evidence of acute fracture or dislocation. Normal alignment of the ankle mortise. Large dorsal and plantar calcaneal spurs. No evidence of joint effusion. No focal soft tissue abnormality. No acute abnormality of the ankle.      CT Head WO Contrast    Result Date: 10/4/2022  EXAMINATION: CT OF THE HEAD WITHOUT CONTRAST  10/4/2022 5:07 pm TECHNIQUE: CT of the head was performed without the administration of intravenous contrast. Automated exposure control, iterative reconstruction, and/or weight based adjustment of the mA/kV was utilized to reduce the radiation dose to as low as reasonably achievable. COMPARISON: None. HISTORY: ORDERING SYSTEM PROVIDED HISTORY: fall TECHNOLOGIST PROVIDED HISTORY: Reason for exam:->fall Has a \"code stroke\" or \"stroke alert\" been called? ->No Decision Support Exception - unselect if not a suspected or confirmed emergency medical condition->Emergency Medical Condition (MA) What reading provider will be dictating this exam?->CRC FINDINGS: BRAIN/VENTRICLES: There is no acute intracranial hemorrhage, mass effect or midline shift. No abnormal extra-axial fluid collection. The gray-white differentiation is maintained without evidence of an acute infarct. There is no evidence of hydrocephalus. Periventricular white matter changes consistent chronic microvascular disease. ORBITS: The visualized portion of the orbits demonstrate no acute abnormality. SINUSES: The visualized paranasal sinuses and mastoid air cells demonstrate no acute abnormality. SOFT TISSUES/SKULL:  No acute abnormality of the visualized skull or soft tissues. No acute intracranial abnormality. CT Cervical Spine WO Contrast    Result Date: 10/4/2022  EXAMINATION: CT OF THE CERVICAL SPINE WITHOUT CONTRAST 10/4/2022 5:07 pm TECHNIQUE: CT of the cervical spine was performed without the administration of intravenous contrast. Multiplanar reformatted images are provided for review. Automated exposure control, iterative reconstruction, and/or weight based adjustment of the mA/kV was utilized to reduce the radiation dose to as low as reasonably achievable.  COMPARISON: April 26, 2021 HISTORY: ORDERING SYSTEM PROVIDED HISTORY: fall; neck stiffness 2-3 VW W PELVIS RIGHT    Result Date: 10/4/2022  EXAMINATION: THREE XRAY VIEWS OF THE RIGHT KNEE; ONE XRAY VIEW OF THE CHEST; ONE XRAY VIEW OF THE PELVIS AND TWO XRAY VIEWS RIGHT HIP 10/4/2022 4:05 pm; 10/4/2022 4:06 pm COMPARISON: Chest 29 August 2022 HISTORY: ORDERING SYSTEM PROVIDED HISTORY: fall; pain at right knee TECHNOLOGIST PROVIDED HISTORY: Reason for exam:->fall; pain at right knee; ORDERING SYSTEM PROVIDED HISTORY: fall TECHNOLOGIST PROVIDED HISTORY: Reason for exam:->fall What reading provider will be dictating this exam?->CRC FINDINGS: Right knee: No evident fracture or dislocation. No significant arthropathy. Normal soft tissues. Please note that the lateral view of the right knee is contained in the image set with the ankle. Pelvis and right hip: A skin fold mimics a subtrochanteric fracture at the right femur. No significant arthropathy. Normal soft tissues. Chest: Stable cardiomediastinal silhouette. Normal pulmonary vascularity. Diminishing atelectasis and or infiltrate in both lower lungs. Right knee: Unremarkable. Pelvis and right hip: No active process. Please note that a skin fold mimics a fracture as noted above. Chest: Diminishing atelectasis and or infiltrate at both lower lungs. Assessment//Plan           Hospital Problems             Last Modified POA    * (Principal) Hyperkalemia 10/4/2022 Yes     Assessment:   (Hyperkalemia  BETTINA  Falls  DM  HTN  Hyperlipidemia     Plan  IV fluids  Await AM labs. Renal following. PT/OT  Continue rest of meds. Placement. ).      Electronically signed by Noemí Fay MD on 10/6/22 at 7:07 AM EDT

## 2022-10-06 NOTE — PROGRESS NOTES
[facility]  Nephrology Progress Note    Patient's Name: Ashley Colindres    History of Present Illness from 10/5 consult note:  Erickson De La Cruz is a 78 y.o. female with a past medical history of GERD, hypertension, hyperlipidemia, and diabetes mellitus. She presented to the ED on 10/4 with complaints of fatigue. Vital signs at presentation to the ED include temperature 98.2, respirations 16, pulse 94, /60, and she was 95% on room air. Lab data at presentation to the ED include potassium 6.5, BUN 50, creatinine 1.8, calcium 10.5. She had a CT of the head on 10/4 which showed no acute intracranial abnormality. CT abdomen pelvis 10/4 showed no acute abnormality, right kidney unremarkable, 2.9 cm cyst left upper renal pole. Patient has a history of small bowel obstruction during previous admission 8/28-9/8. We were consulted to see the patient for BETTINA with hyperkalemia. Potassium noted at 6.5 and creatinine noted at 1.8 on 10/4. Patient has a baseline creatinine of 0.7-1. At present, patient was seen and examined. She reports that she had a fall at home. She also explained that she had been experiencing dizziness at home. She reports that her appetite has been decreased for approximately the last week. She denies any urinary symptoms prior to admission including urgency, frequency, or dysuria. She denies use of NSAIDs. She denies any current chest pain or shortness of breath. She denies any abdominal pain, nausea, or vomiting. \"    Subjective:    10/6: Patient was seen and examined. She reports that she feels okay. She denies any issues overnight. She denies any chest pain or shortness of breath.   She denies any abdominal pain or nausea    PMH:    Past Medical History:   Diagnosis Date    Arthritis     Colostomy in place [facility])     Diabetes mellitus ([facility] [address]     Diverticulitis     GERD (gastroesophageal reflux disease)     Hernia     History of blood transfusion     Hyperlipidemia Hypertension        Patient Active Problem List   Diagnosis    Pelvic abscess in female    Ankle fracture, bimalleolar, closed, left, initial encounter    Closed fracture of left ankle    Bowel obstruction ([facility])    Hyperkalemia       Meds:     atorvastatin  40 mg Oral Daily    Liraglutide  1.8 mg SubCUTAneous Daily    pantoprazole  40 mg Oral Daily    PARoxetine  40 mg Oral Daily    pregabalin  50 mg Oral TID    insulin lispro  0-4 Units SubCUTAneous TID WC    insulin lispro  0-4 Units SubCUTAneous Nightly    glipiZIDE  5 mg Oral QAM AC        sodium chloride 100 mL/hr at 10/05/22 0949    dextrose         Meds prn:     acetaminophen, glucose, dextrose bolus **OR** dextrose bolus, glucagon (rDNA), dextrose    Meds prior to admission:     No current facility-administered medications on file prior to encounter. Current Outpatient Medications on File Prior to Encounter   Medication Sig Dispense Refill    pregabalin (LYRICA) 75 MG capsule Take 75 mg by mouth 3 times daily.       docusate sodium (COLACE) 100 MG capsule Take 100 mg by mouth daily      furosemide (LASIX) 20 MG tablet Take 20 mg by mouth daily      meclizine (ANTIVERT) 25 MG tablet Take 25 mg by mouth 3 times daily as needed      lansoprazole (PREVACID) 30 MG delayed release capsule Take 30 mg by mouth daily      potassium chloride (KLOR-CON M) 20 MEQ extended release tablet Take 2 tablets by mouth daily 60 tablet 3    atorvastatin (LIPITOR) 40 MG tablet Take 40 mg by mouth daily      aspirin EC 81 MG EC tablet Take 1 tablet by mouth 2 times daily for 28 days 56 tablet 0    acetaminophen (TYLENOL) 325 MG tablet Take 650 mg by mouth every 6 hours as needed for Pain      Liraglutide (VICTOZA) 18 MG/3ML SOPN SC injection Inject 1.8 mg into the skin daily       PARoxetine (PAXIL) 40 MG tablet Take 40 mg by mouth daily       glyBURIDE (DIABETA) 5 MG tablet Take 5 mg by mouth daily       enalapril (VASOTEC) 20 MG tablet Take 20 mg by mouth daily [DISCONTINUED] metformin (GLUCOPHAGE) 500 MG tablet Take 1,000 mg by mouth 2 times daily (with meals). Allergies:    Daptomycin, Vancomycin, and Adhesive tape    Social History:     reports that she has never smoked. She has quit using smokeless tobacco. She reports current alcohol use. She reports that she does not use drugs. Family History:     History reviewed. No pertinent family history. Physical Exam:      Patient Vitals for the past 24 hrs:   BP Temp Temp src Pulse Resp SpO2   10/05/22 2152 (!) 122/56 97.9 °F (36.6 °C) Oral 60 20 94 %   10/05/22 1452 126/63 97.6 °F (36.4 °C) Oral 97 20 96 %           Intake/Output Summary (Last 24 hours) at 10/6/2022 0845  Last data filed at 10/6/2022 0631  Gross per 24 hour   Intake --   Output 2900 ml   Net -2900 ml       General: Awake, alert, no acute distress  Neck: No JVD noted  Lungs: Clear bilaterally upper, diminished to the bases bilaterally. Unlabored  CV: Regular rate and rhythm. No rub  Abd: Soft, nontender, nondistended. Active bowel sounds  Skin: Warm and dry.   No rash on exposed extremities  Ext: No edema   Neuro: Awake, answers questions appropriately    Data:    Recent Labs     10/04/22  1613 10/05/22  0709 10/06/22  0443   WBC 10.6 7.1 5.8   HGB 11.8 10.1* 11.3*   HCT 39.3 35.0 38.4   MCV 90.3 92.3 93.9    149 143         Recent Labs     10/05/22  0709 10/05/22  1602 10/06/22  0443    137 133   K 5.9* 5.3* 4.9    104 101   CO2 24 24 24   CREATININE 1.4* 1.2* 1.1*   BUN 39* 30* 22   LABGLOM 36 43 47   GLUCOSE 67* 68* 124*   CALCIUM 9.3 9.3 9.0         No results found for: VITD25    No results found for: PTH    Recent Labs     10/04/22  1613   ALT 8   AST 20   ALKPHOS 111*   BILITOT 0.3         Recent Labs     10/04/22  1613   LABALBU 4.0         No results found for: FERRITIN, IRON, TIBC    No results found for: [id number]    No results found for: FOLATE    Lab Results   Component Value Date/Time    COLORU Yellow 10/04/2022 06:19 PM    NITRU Negative 10/04/2022 06:19 PM    GLUCOSEU Negative 10/04/2022 06:19 PM    GLUCOSEU NEGATIVE 03/31/2012 06:34 PM    KETUA Negative 10/04/2022 06:19 PM    UROBILINOGEN 0.2 10/04/2022 06:19 PM    BILIRUBINUR Negative 10/04/2022 06:19 PM    BILIRUBINUR NEGATIVE 03/31/2012 06:34 PM       No results found for: JYOTHI, CREURRAN, MACREATRATIO, OSMOU    No components found for: URIC    No results found for: LIPIDPAN    Assessment and Plans:    BETTINA stage II  Likely prerenal in the setting of decreased effective renal perfusion -patient with poor oral intake for approximately 1 week; on enalapril prior to admission  Hypotension noted on 10/4- BP 77/41  CT abd pelvis 10/4 no acute abnormality, right kidney unremarkable, 2.9 cm cyst left upper renal pole  UA spec grav 1.02, large leuk est, mod bacteria, negative protein/bili/ketones/glucose  baseline creatinine of 0.7-1  Creatinine peaked at 1.8 on 10/4--> 1.1 today  Hold enalapril  await urine studies  Avoid nephrotoxins/NSAIDs-adjust meds for level of renal function  Strict I&O, daily weights  On IVF NS at 100 ml/hour  Monitor labs    2. Hyperkalemia  Likely in setting of BETTINA, enalapril, potassium supplementation  S/p medical management in the ED  K+ 6.5 on 10/4--> 4.9 today  Hold enalapril and potassium supplement  Low potassium diet  Monitor labs    3.   Diabetes mellitus  On insulin lispro, Victoza, glipizide  Management per primary service    Rosemarie Mattson, APRN - CNP  Pt seen and examined agree with above  Cr improved to 1.1  Holding acei and k supplement  Linda Rsusell MD

## 2022-10-06 NOTE — PLAN OF CARE
Problem: Chronic Conditions and Co-morbidities  Goal: Patient's chronic conditions and co-morbidity symptoms are monitored and maintained or improved  Outcome: Progressing  Flowsheets (Taken 10/6/2022 6927)  Care Plan - Patient's Chronic Conditions and Co-Morbidity Symptoms are Monitored and Maintained or Improved: Monitor and assess patient's chronic conditions and comorbid symptoms for stability, deterioration, or improvement     Problem: Discharge Planning  Goal: Discharge to home or other facility with appropriate resources  Outcome: Progressing  Flowsheets (Taken 10/6/2022 8656)  Discharge to home or other facility with appropriate resources:   Identify barriers to discharge with patient and caregiver   Identify discharge learning needs (meds, wound care, etc)     Problem: Pain  Goal: Verbalizes/displays adequate comfort level or baseline comfort level  Outcome: Progressing     Problem: Safety - Adult  Goal: Free from fall injury  Outcome: Progressing     Problem: Skin/Tissue Integrity  Goal: Absence of new skin breakdown  Description: 1. Monitor for areas of redness and/or skin breakdown  2. Assess vascular access sites hourly  3. Every 4-6 hours minimum:  Change oxygen saturation probe site  4. Every 4-6 hours:  If on nasal continuous positive airway pressure, respiratory therapy assess nares and determine need for appliance change or resting period.   Outcome: Progressing

## 2022-10-07 VITALS
BODY MASS INDEX: 26.03 KG/M2 | WEIGHT: 162 LBS | RESPIRATION RATE: 18 BRPM | OXYGEN SATURATION: 92 % | HEIGHT: 66 IN | TEMPERATURE: 98.9 F | HEART RATE: 94 BPM | DIASTOLIC BLOOD PRESSURE: 81 MMHG | SYSTOLIC BLOOD PRESSURE: 133 MMHG

## 2022-10-07 LAB
ANION GAP SERPL CALCULATED.3IONS-SCNC: 13 MMOL/L (ref 7–16)
BUN BLDV-MCNC: 12 MG/DL (ref 6–23)
CALCIUM SERPL-MCNC: 8.7 MG/DL (ref 8.6–10.2)
CHLORIDE BLD-SCNC: 110 MMOL/L (ref 98–107)
CO2: 21 MMOL/L (ref 22–29)
CREAT SERPL-MCNC: 0.9 MG/DL (ref 0.5–1)
GFR AFRICAN AMERICAN: >60
GFR NON-AFRICAN AMERICAN: 60 ML/MIN/1.73
GLUCOSE BLD-MCNC: 121 MG/DL (ref 74–99)
HCT VFR BLD CALC: 33.9 % (ref 34–48)
HEMOGLOBIN: 10.5 G/DL (ref 11.5–15.5)
MCH RBC QN AUTO: 27.9 PG (ref 26–35)
MCHC RBC AUTO-ENTMCNC: 31 % (ref 32–34.5)
MCV RBC AUTO: 90.2 FL (ref 80–99.9)
METER GLUCOSE: 119 MG/DL (ref 74–99)
METER GLUCOSE: 94 MG/DL (ref 74–99)
PDW BLD-RTO: 15.1 FL (ref 11.5–15)
PLATELET # BLD: 136 E9/L (ref 130–450)
PMV BLD AUTO: 10.8 FL (ref 7–12)
POTASSIUM SERPL-SCNC: 3.8 MMOL/L (ref 3.5–5)
RBC # BLD: 3.76 E12/L (ref 3.5–5.5)
SARS-COV-2, NAAT: NOT DETECTED
SODIUM BLD-SCNC: 144 MMOL/L (ref 132–146)
WBC # BLD: 5.5 E9/L (ref 4.5–11.5)

## 2022-10-07 PROCEDURE — 36415 COLL VENOUS BLD VENIPUNCTURE: CPT

## 2022-10-07 PROCEDURE — 80048 BASIC METABOLIC PNL TOTAL CA: CPT

## 2022-10-07 PROCEDURE — 97530 THERAPEUTIC ACTIVITIES: CPT

## 2022-10-07 PROCEDURE — 87635 SARS-COV-2 COVID-19 AMP PRB: CPT

## 2022-10-07 PROCEDURE — 85027 COMPLETE CBC AUTOMATED: CPT

## 2022-10-07 PROCEDURE — 97535 SELF CARE MNGMENT TRAINING: CPT

## 2022-10-07 PROCEDURE — 6370000000 HC RX 637 (ALT 250 FOR IP): Performed by: FAMILY MEDICINE

## 2022-10-07 PROCEDURE — 82962 GLUCOSE BLOOD TEST: CPT

## 2022-10-07 RX ORDER — PANTOPRAZOLE SODIUM 40 MG/1
40 TABLET, DELAYED RELEASE ORAL DAILY
Qty: 30 TABLET | Refills: 3 | Status: SHIPPED | OUTPATIENT
Start: 2022-10-08

## 2022-10-07 RX ADMIN — ATORVASTATIN CALCIUM 40 MG: 40 TABLET, FILM COATED ORAL at 08:50

## 2022-10-07 RX ADMIN — PREGABALIN 50 MG: 50 CAPSULE ORAL at 08:50

## 2022-10-07 RX ADMIN — GLIPIZIDE 5 MG: 5 TABLET ORAL at 06:27

## 2022-10-07 RX ADMIN — PAROXETINE HYDROCHLORIDE 40 MG: 20 TABLET, FILM COATED ORAL at 08:50

## 2022-10-07 RX ADMIN — PREGABALIN 50 MG: 50 CAPSULE ORAL at 14:19

## 2022-10-07 RX ADMIN — PANTOPRAZOLE SODIUM 40 MG: 40 TABLET, DELAYED RELEASE ORAL at 08:50

## 2022-10-07 ASSESSMENT — ENCOUNTER SYMPTOMS
SHORTNESS OF BREATH: 0
ABDOMINAL PAIN: 0

## 2022-10-07 NOTE — PROGRESS NOTES
OCCUPATIONAL THERAPY TREATMENT NOTE    DESHAWN [address] [address] [facility]   [facility]     Date:10/7/2022  Patient Name: Adam Crabtree  MRN: [id number]  : 1940  Room: [address] [place] [address]       Evaluating OT: Josias Painting OTR/L #[id number]      Referring Provider: Edgar Ann MD  Specific Provider Orders/Date: OT eval and treat 10/5/22     Diagnosis: Hyperkalemia [E87.5]  Acute kidney injury ([facility] [address] [N17.9]  Urinary tract infection in elderly patient [N39.0]   Pt admitted to hospital with weakness, fatigue and falls x2      Pertinent Medical History:  has a past medical history of Arthritis, Colostomy in place [facility]), Diabetes mellitus ([facility] [address] Diverticulitis, GERD (gastroesophageal reflux disease), Hernia, History of blood transfusion, Hyperlipidemia, and Hypertension.          Precautions:  Fall Risk, R knee buckling, bed alarm     Assessment of current deficits    [x] Functional mobility          [x]ADLs           [x] Strength                  []Cognition    [x] Functional transfers        [x] IADLs         [x] Safety Awareness   [x]Endurance    [] Fine Coordination                        [x] Balance      [] Vision/perception   []Sensation      []Gross Motor Coordination            [] ROM           [] Delirium                   [] Motor Control      OT PLAN OF CARE   OT POC based on physician orders, patient diagnosis and results of clinical assessment     Frequency/Duration 1-3 days/wk for 2 weeks PRN   Specific OT Treatment Interventions to include:   * Instruction/training on adapted ADL techniques and AE recommendations to increase functional independence within precautions       * Training on energy conservation strategies, correct breathing pattern and techniques to improve independence/tolerance for self-care routine  * Functional transfer/mobility training/DME recommendations for increased independence, safety, and fall prevention  * Patient/Family education to increase follow through with safety techniques and functional independence  * Recommendation of environmental modifications for increased safety with functional transfers/mobility and ADLs  * Cognitive retraining/development of therapeutic activities to improve problem solving, judgement, memory, and attention for increased safety/participation in ADL/IADL tasks  * Therapeutic exercise to improve motor endurance, ROM, and functional strength for ADLs/functional transfers  * Therapeutic activities to facilitate/challenge dynamic balance, stand tolerance for increased safety and independence with ADLs  * Therapeutic activities to facilitate gross/fine motor skills for increased independence with ADLs  * Neuro-muscular re-education: facilitation of righting/equilibrium reactions, midline orientation, scapular stability/mobility, normalization of muscle tone, and facilitation of volitional active controled movement  * Positioning to improve skin integrity, interaction with environment and functional independence     Recommended Adaptive Equipment:  TBD      Home Living: Pt lives with  and daughter in a 1 story home with 4 ROXANNA and 1 hand rail. Bathroom setup: walk-in shwoer with shower chair    Equipment owned: w/w     Prior Level of Function: mod I with ADLs , assist prn with IADLs; ambulated with w/w   Driving: no   Occupation: na     Pain Level: Pt reports 4/10 abdominal pain this session;  Therapist facilitated repositioning to address pain       Cognition: A&O: 4/4; Follows 2 step directions             Memory:  good             Sequencing:  good             Problem solving:  fair             Judgement/safety:  fair               Functional Assessment:  AM-PAC Daily Activity Raw Score: 14/24    Initial Eval Status  Date: 10/5/22 Treatment Status  Date: 10/7/22 STGs = LTGs  Time frame: 10-14 days   Feeding Independent  N/t      Grooming Minimal Assist  N/t Modified [place]    UB Dressing Minimal Assist  MIN A  Modified [place]    LB Dressing Dependent  Dep to ana/doff sock seated EOB v/c's for technique  Moderate Assist    Bathing Maximal Assist N/t pt educated with regards to DME, bathing AE and safety   Moderate Assist    Toileting Dependent  N/t  Minimal Assist    Bed Mobility  Supine to sit: Minimal Assist   Sit to supine: Minimal Assist  Supine<>sit MIN A pt requiring assist with trunk control transferring to EOB Supine to sit: Stand by Assist   Sit to supine: Stand by Assist    Functional Transfers Moderate Assist      Sit to stands: + R knee buckling  MOD A sit<>stand with w/w   Stand by Assist    Functional Mobility Moderate Assist      Side steps to [facility] with w/w; R knee buckling   MOD A with w/w short house hold distances v/c's for safety noting R LE knee buckling slightly pt able to support herself with UE hand support using w/w  Stand by Assist    Balance Sitting:     Static:  SBA    Dynamic:SBA  Standing: mod A at w/w  Sitting:  Static: SBA   Dynamic: SBA   Standing: MOD A with w/w      Activity Tolerance F- Fair-   F+   Visual/  Perceptual wfl                         Comments: Upon arrival pt supine in bed, agreeable to therapy session. Pt educated with regards to bed mobility, functional transfers, functional mobility, hand placement, walker safety, LE dressing, energy conservation techniques, DME, bathing AE, importance of increased activity. At end of session pt supine in bed,  all lines and tubes intact, call light within reach. Pt has made fair- progress towards set goals.    Continue with current plan of care      Treatment Time In:1330            Treatment Time Out: 9443                Treatment Charges: Mins Units   Ther Ex  78313     Manual Therapy 00330     Thera Activities 56623 14 1   ADL/Home Mgt 99851 10 1   Neuro Re-ed 05441     Group Therapy      Orthotic manage/training  03366     Non-Billable Time     Total Timed Treatment 24 2         Rubén Moran IBANEZ/L [id number]

## 2022-10-07 NOTE — PROGRESS NOTES
[facility]  Nephrology Progress Note    Patient's Name: Jose Ibarra    History of Present Illness from 10/5 consult note:  Bandar Ramírez is a 78 y.o. female with a past medical history of GERD, hypertension, hyperlipidemia, and diabetes mellitus. She presented to the ED on 10/4 with complaints of fatigue. Vital signs at presentation to the ED include temperature 98.2, respirations 16, pulse 94, /60, and she was 95% on room air. Lab data at presentation to the ED include potassium 6.5, BUN 50, creatinine 1.8, calcium 10.5. She had a CT of the head on 10/4 which showed no acute intracranial abnormality. CT abdomen pelvis 10/4 showed no acute abnormality, right kidney unremarkable, 2.9 cm cyst left upper renal pole. Patient has a history of small bowel obstruction during previous admission 8/28-9/8. We were consulted to see the patient for BETTINA with hyperkalemia. Potassium noted at 6.5 and creatinine noted at 1.8 on 10/4. Patient has a baseline creatinine of 0.7-1. At present, patient was seen and examined. She reports that she had a fall at home. She also explained that she had been experiencing dizziness at home. She reports that her appetite has been decreased for approximately the last week. She denies any urinary symptoms prior to admission including urgency, frequency, or dysuria. She denies use of NSAIDs. She denies any current chest pain or shortness of breath. She denies any abdominal pain, nausea, or vomiting. \"    Subjective:    10/7: Patient was seen and examined. She denies any chest pain or shortness of breath. She denies any abdominal pain or nausea.       PMH:    Past Medical History:   Diagnosis Date    Arthritis     Colostomy in place [facility])     Diabetes mellitus ([facility] [address]     Diverticulitis     GERD (gastroesophageal reflux disease)     Hernia     History of blood transfusion     Hyperlipidemia     Hypertension        Patient Active Problem List   Diagnosis    Pelvic abscess in female    Ankle fracture, bimalleolar, closed, left, initial encounter    Closed fracture of left ankle    Bowel obstruction (H[facility])    Hyperkalemia       Meds:     atorvastatin  40 mg Oral Daily    Liraglutide  1.8 mg SubCUTAneous Daily    pantoprazole  40 mg Oral Daily    PARoxetine  40 mg Oral Daily    pregabalin  50 mg Oral TID    insulin lispro  0-4 Units SubCUTAneous TID WC    insulin lispro  0-4 Units SubCUTAneous Nightly    glipiZIDE  5 mg Oral QAM AC        sodium chloride 100 mL/hr at 10/05/22 0949    dextrose         Meds prn:     acetaminophen, glucose, dextrose bolus **OR** dextrose bolus, glucagon (rDNA), dextrose    Meds prior to admission:     No current facility-administered medications on file prior to encounter. Current Outpatient Medications on File Prior to Encounter   Medication Sig Dispense Refill    pregabalin (LYRICA) 75 MG capsule Take 75 mg by mouth 3 times daily.       docusate sodium (COLACE) 100 MG capsule Take 100 mg by mouth daily      furosemide (LASIX) 20 MG tablet Take 20 mg by mouth daily      meclizine (ANTIVERT) 25 MG tablet Take 25 mg by mouth 3 times daily as needed      lansoprazole (PREVACID) 30 MG delayed release capsule Take 30 mg by mouth daily      potassium chloride (KLOR-CON M) 20 MEQ extended release tablet Take 2 tablets by mouth daily 60 tablet 3    atorvastatin (LIPITOR) 40 MG tablet Take 40 mg by mouth daily      aspirin EC 81 MG EC tablet Take 1 tablet by mouth 2 times daily for 28 days 56 tablet 0    acetaminophen (TYLENOL) 325 MG tablet Take 650 mg by mouth every 6 hours as needed for Pain      Liraglutide (VICTOZA) 18 MG/3ML SOPN SC injection Inject 1.8 mg into the skin daily       PARoxetine (PAXIL) 40 MG tablet Take 40 mg by mouth daily       glyBURIDE (DIABETA) 5 MG tablet Take 5 mg by mouth daily       enalapril (VASOTEC) 20 MG tablet Take 20 mg by mouth daily       [DISCONTINUED] metformin (GLUCOPHAGE) 500 MG tablet Take 1,000 mg by mouth 2 times daily (with meals). Allergies:    Daptomycin, Vancomycin, and Adhesive tape    Social History:     reports that she has never smoked. She has quit using smokeless tobacco. She reports current alcohol use. She reports that she does not use drugs. Family History:     History reviewed. No pertinent family history. Physical Exam:      Patient Vitals for the past 24 hrs:   BP Temp Temp src Pulse Resp SpO2   10/07/22 0746 133/81 98.9 °F (37.2 °C) -- 100 18 92 %   10/06/22 2124 138/68 98.2 °F (36.8 °C) Oral 97 18 98 %   10/06/22 1530 125/86 98.1 °F (36.7 °C) Oral 99 20 96 %   10/06/22 0928 (!) 140/81 98.2 °F (36.8 °C) Oral 69 20 94 %           Intake/Output Summary (Last 24 hours) at 10/7/2022 0853  Last data filed at 10/7/2022 0050  Gross per 24 hour   Intake --   Output 3075 ml   Net -3075 ml       General: Awake, alert, no acute distress  Neck: No JVD noted  Lungs: Clear bilaterally upper, diminished to the bases bilaterally. Unlabored  CV: Regular rate and rhythm. No rub  Abd: Soft, nontender, nondistended. Active bowel sounds  Skin: Warm and dry.   No rash on exposed extremities  Ext: No edema   Neuro: Awake, answers questions appropriately    Data:    Recent Labs     10/05/22  0709 10/06/22  0443 10/07/22  0700   WBC 7.1 5.8 5.5   HGB 10.1* 11.3* 10.5*   HCT 35.0 38.4 33.9*   MCV 92.3 93.9 90.2    143 136         Recent Labs     10/05/22  1602 10/06/22  0443 10/07/22  0700    133 144   K 5.3* 4.9 3.8    101 110*   CO2 24 24 21*   CREATININE 1.2* 1.1* 0.9   BUN 30* 22 12   LABGLOM 43 47 60   GLUCOSE 68* 124* 121*   CALCIUM 9.3 9.0 8.7         No results found for: VITD25    No results found for: PTH    Recent Labs     10/04/22  1613   ALT 8   AST 20   ALKPHOS 111*   BILITOT 0.3         Recent Labs     10/04/22  1613   LABALBU 4.0         No results found for: FERRITIN, IRON, TIBC    No results found for: AURQBGKU34    No results found for: FOLATE    Lab Results   Component Value Date/Time    COLORU Yellow 10/04/2022 06:19 PM    NITRU Negative 10/04/2022 06:19 PM    GLUCOSEU Negative 10/04/2022 06:19 PM    GLUCOSEU NEGATIVE 03/31/2012 06:34 PM    KETUA Negative 10/04/2022 06:19 PM    UROBILINOGEN 0.2 10/04/2022 06:19 PM    BILIRUBINUR Negative 10/04/2022 06:19 PM    BILIRUBINUR NEGATIVE 03/31/2012 06:34 PM       No results found for: JYOTHI, CREURRAN, MACREATRATIO, OSMOU    No components found for: URIC    No results found for: LIPIDPAN    Assessment and Plans:    BETTINA stage II  Likely prerenal in the setting of decreased effective renal perfusion -patient with poor oral intake for approximately 1 week; on enalapril prior to admission  Hypotension noted on 10/4- BP 77/41  CT abd pelvis 10/4 no acute abnormality, right kidney unremarkable, 2.9 cm cyst left upper renal pole  UA spec grav 1.02, large leuk est, mod bacteria, negative protein/bili/ketones/glucose  baseline creatinine of 0.7-1  Creatinine peaked at 1.8 on 10/4--> 0.9 today  Hold enalapril  await urine studies  Avoid nephrotoxins/NSAIDs-adjust meds for level of renal function  Strict I&O, daily weights  On IVF NS - will decrease rate to 50 mL/hour  Monitor labs    2. Hyperkalemia  Likely in setting of BETTINA, enalapril, potassium supplementation  S/p medical management in the ED  K+ 6.5 on 10/4--> 3.8 today  Hold enalapril and potassium supplement  Low potassium diet  Monitor labs    3.   Diabetes mellitus  On insulin lispro, Victoza, glipizide  Management per primary service    SARA Sol - CNP  Pt seen and examined agree with above  [facility] for [place] today  Randy Dahl MD

## 2022-10-07 NOTE — DISCHARGE SUMMARY
Discharge Summary    Date: 10/7/2022  Patient Name: Jeff Mcnulty    YOB: 1940     Age: 78 y.o. Admit Date: 10/4/2022  Discharge Date: 10/7/2022  Discharge Condition: Stable    Admission Diagnosis  Hyperkalemia [E87.5]; Acute kidney injury ([facility] [address] [N17.9]; Urinary tract infection in elderly patient [N39.0]      Discharge Diagnosis  Principal Problem:    Hyperkalemia  Resolved Problems:    * No resolved hospital problems. [facility] AND [facility] Stay  Narrative of Hospital Course:  Patient admitted for BETTINA. Improved with fluids per renal.  Agreeable to placement. Consultants:  IP CONSULT TO NEPHROLOGY  IP CONSULT TO INTERNAL MEDICINE  IP CONSULT TO CASE MANAGEMENT    Surgeries/procedures Performed:      Treatments:            Discharge Plan/Disposition:  [facility]/Incidental Findings Requiring Follow Up:    Patient Instructions:    Diet: Diabetic Diet    Activity:Activity as Tolerated  For number of days (if applicable): Other Instructions:    Provider Follow-Up:   No follow-ups on file. Significant Diagnostic Studies:    Recent Labs:  Admission on 10/04/2022  No results displayed because visit has over 200 results. ------------    Radiology last 7 days:  CT ABDOMEN PELVIS WO CONTRAST Additional Contrast? None    Result Date: 10/4/2022  No acute abdominopelvic abnormality. XR KNEE RIGHT (3 VIEWS)    Result Date: 10/4/2022  Right knee: Unremarkable. Pelvis and right hip: No active process. Please note that a skin fold mimics a fracture as noted above. Chest: Diminishing atelectasis and or infiltrate at both lower lungs. XR ANKLE LEFT (MIN 3 VIEWS)    Result Date: 10/4/2022  No acute abnormality of the ankle. CT Head WO Contrast    Result Date: 10/4/2022  No acute intracranial abnormality. CT Cervical Spine WO Contrast    Result Date: 10/4/2022  No acute abnormality of the cervical spine.      XR CHEST PORTABLE    Result Date: 10/4/2022  Right knee: Unremarkable. Pelvis and right hip: No active process. Please note that a skin fold mimics a fracture as noted above. Chest: Diminishing atelectasis and or infiltrate at both lower lungs. XR HIP 2-3 VW W PELVIS RIGHT    Result Date: 10/4/2022  Right knee: Unremarkable. Pelvis and right hip: No active process. Please note that a skin fold mimics a fracture as noted above. Chest: Diminishing atelectasis and or infiltrate at both lower lungs. Pending Labs     Order Current Status    Eosinophil Smear, Urine Collected (10/05/22 1222)    Microalbumin / Creatinine Urine Ratio Collected (10/05/22 1222)    Urea nitrogen, urine Collected (10/05/22 1222)    Urine electrolytes Collected (10/05/22 1222)    Culture, Blood 1 Preliminary result    Culture, Blood 2 Preliminary result    Culture, Urine Preliminary result        Discharge Medications    Current Discharge Medication List        Current Discharge Medication List    CONTINUE these medications which have CHANGED    pantoprazole (PROTONIX) 40 MG tablet  Take 1 tablet by mouth daily  Qty: 30 tablet Refills: 3          Current Discharge Medication List    CONTINUE these medications which have NOT CHANGED    pregabalin (LYRICA) 75 MG capsule  Take 75 mg by mouth 3 times daily.     docusate sodium (COLACE) 100 MG capsule  Take 100 mg by mouth daily    lansoprazole (PREVACID) 30 MG delayed release capsule  Take 30 mg by mouth daily    atorvastatin (LIPITOR) 40 MG tablet  Take 40 mg by mouth daily    aspirin EC 81 MG EC tablet  Take 1 tablet by mouth 2 times daily for 28 days  Qty: 56 tablet Refills: 0    acetaminophen (TYLENOL) 325 MG tablet  Take 650 mg by mouth every 6 hours as needed for Pain    Liraglutide (VICTOZA) 18 MG/3ML SOPN SC injection  Inject 1.8 mg into the skin daily     PARoxetine (PAXIL) 40 MG tablet  Take 40 mg by mouth daily     glyBURIDE (DIABETA) 5 MG tablet  Take 5 mg by mouth daily           Current Discharge Medication List    STOP taking these medications    furosemide (LASIX) 20 MG tablet  Comments:  Reason for Stopping:    meclizine (ANTIVERT) 25 MG tablet  Comments:  Reason for Stopping:    potassium chloride (KLOR-CON M) 20 MEQ extended release tablet  Comments:  Reason for Stopping:    blood glucose test strips (ASCENSIA AUTODISC VI;ONE TOUCH ULTRA TEST VI) strip  Comments:  Reason for Stopping:    HYDROcodone-acetaminophen (NORCO) 5-325 MG per tablet  Comments:  Reason for Stopping:    enalapril (VASOTEC) 20 MG tablet  Comments:  Reason for Stopping:    metformin (GLUCOPHAGE) 500 MG tablet  Comments:  Reason for Stopping:          Time Spent on Discharge:  15 minutes were spent in patient examination, evaluation, counseling as well as medication reconciliation, prescriptions for required medications, discharge plan, and follow up.     Electronically signed by Jaspreet Rockwell MD on 10/7/22 at 2:06 PM EDT

## 2022-10-07 NOTE — PROGRESS NOTES
Progress Note  Date:10/7/2022       [facility]:[id number]  Patient Lauren Freeman     Date of Birth:[birth year]     Age:82 y.o. Patient a little agitated this morning, says she did not sleep well. Subjective    Subjective:  Symptoms:  Stable. No shortness of breath or chest pain. Diet:  Adequate intake. Activity level: Impaired due to weakness. Pain:  She reports no pain. Review of Systems   Constitutional:  Negative for activity change and fever. HENT:  Negative for congestion. Respiratory:  Negative for shortness of breath. Cardiovascular:  Negative for chest pain. Gastrointestinal:  Negative for abdominal pain. Neurological:  Negative for dizziness. Objective         Vitals Last 24 Hours:  TEMPERATURE:  Temp  Av.2 °F (36.8 °C)  Min: 98.1 °F (36.7 °C)  Max: 98.2 °F (36.8 °C)  RESPIRATIONS RANGE: Resp  Av.3  Min: 18  Max: 20  PULSE OXIMETRY RANGE: SpO2  Av %  Min: 94 %  Max: 98 %  PULSE RANGE: Pulse  Av.3  Min: 69  Max: 99  BLOOD PRESSURE RANGE: Systolic (77ATT), LBD:840 , Min:125 , XUQ:613   ; Diastolic (12PWU), CD, Min:68, Max:86    I/O (24Hr): Intake/Output Summary (Last 24 hours) at 10/7/2022 0656  Last data filed at 10/7/2022 0634  Gross per 24 hour   Intake --   Output 3075 ml   Net -3075 ml       Objective:  General Appearance:  Comfortable. Vital signs: (most recent): Blood pressure 138/68, pulse 97, temperature 98.2 °F (36.8 °C), temperature source Oral, resp. rate 18, height 5' 6\" (1.676 m), weight 162 lb (73.5 kg), SpO2 98 %. No fever. Lungs:  Normal effort and normal respiratory rate. Breath sounds clear to auscultation. Heart: Normal rate. Regular rhythm.   S1 normal and S2 normal.    Labs/Imaging/Diagnostics    Labs:  CBC:  Recent Labs     10/04/22  1613 10/05/22  0709 10/06/22  0443   WBC 10.6 7.1 5.8   RBC 4.35 3.79 4.09   HGB 11.8 10.1* 11.3*   HCT 39.3 35.0 38.4   MCV 90.3 92.3 93.9   RDW 15.9* 16.0* 15.8*    149 143 CHEMISTRIES:  Recent Labs     10/05/22  0709 10/05/22  1602 10/06/22  0443    137 133   K 5.9* 5.3* 4.9    104 101   CO2 24 24 24   BUN 39* 30* 22   CREATININE 1.4* 1.2* 1.1*   GLUCOSE 67* 68* 124*       PT/INR:No results for input(s): PROTIME, INR in the last 72 hours. APTT:No results for input(s): APTT in the last 72 hours. LIVER PROFILE:  Recent Labs     10/04/22  1613   AST 20   ALT 8   BILITOT 0.3   ALKPHOS 111*         Imaging Last 24 Hours:  CT ABDOMEN PELVIS WO CONTRAST Additional Contrast? None    Result Date: 10/4/2022  EXAMINATION: CT OF THE ABDOMEN AND PELVIS WITHOUT CONTRAST 10/4/2022 10:21 pm TECHNIQUE: CT of the abdomen and pelvis was performed without the administration of intravenous contrast. Multiplanar reformatted images are provided for review. Automated exposure control, iterative reconstruction, and/or weight based adjustment of the mA/kV was utilized to reduce the radiation dose to as low as reasonably achievable. COMPARISON: 08/29/2022 HISTORY: ORDERING SYSTEM PROVIDED HISTORY: sepsis hypotension TECHNOLOGIST PROVIDED HISTORY: Reason for exam:->sepsis hypotension Additional Contrast?->None Decision Support Exception - unselect if not a suspected or confirmed emergency medical condition->Emergency Medical Condition (MA) What reading provider will be dictating this exam?->CRC FINDINGS: Lower Chest:  Visualized portion of the lower chest demonstrates no acute abnormality. Organs: The liver, spleen, pancreas, adrenals, and right kidney are unremarkable. The gallbladder is surgically absent. There is a 2.9 cm left upper renal pole exophytic cyst. GI/Bowel: There is a left lower quadrant colostomy. No evidence of obstruction or inflammation. Pelvis: The urinary bladder is collapsed around a Galindo catheter. The uterus is absent. Peritoneum/Retroperitoneum: No evidence of ascites or free air. No evidence of lymphadenopathy. Aorta is normal in caliber. IVC filter is noted. Bones/Soft Tissues:  No acute abnormality of the visualized osseous structures. No acute abdominopelvic abnormality. XR KNEE RIGHT (3 VIEWS)    Result Date: 10/4/2022  EXAMINATION: THREE XRAY VIEWS OF THE RIGHT KNEE; ONE XRAY VIEW OF THE CHEST; ONE XRAY VIEW OF THE PELVIS AND TWO XRAY VIEWS RIGHT HIP 10/4/2022 4:05 pm; 10/4/2022 4:06 pm COMPARISON: Chest 29 August 2022 HISTORY: ORDERING SYSTEM PROVIDED HISTORY: fall; pain at right knee TECHNOLOGIST PROVIDED HISTORY: Reason for exam:->fall; pain at right knee; ORDERING SYSTEM PROVIDED HISTORY: fall TECHNOLOGIST PROVIDED HISTORY: Reason for exam:->fall What reading provider will be dictating this exam?->CRC FINDINGS: Right knee: No evident fracture or dislocation. No significant arthropathy. Normal soft tissues. Please note that the lateral view of the right knee is contained in the image set with the ankle. Pelvis and right hip: A skin fold mimics a subtrochanteric fracture at the right femur. No significant arthropathy. Normal soft tissues. Chest: Stable cardiomediastinal silhouette. Normal pulmonary vascularity. Diminishing atelectasis and or infiltrate in both lower lungs. Right knee: Unremarkable. Pelvis and right hip: No active process. Please note that a skin fold mimics a fracture as noted above. Chest: Diminishing atelectasis and or infiltrate at both lower lungs. XR ANKLE LEFT (MIN 3 VIEWS)    Result Date: 10/4/2022  EXAMINATION: THREE XRAY VIEWS OF THE LEFT ANKLE 10/4/2022 5:05 pm COMPARISON: 07/28/2021 HISTORY: ORDERING SYSTEM PROVIDED HISTORY: fall; pain and swelling at ankle TECHNOLOGIST PROVIDED HISTORY: Reason for exam:->fall; pain and swelling at ankle What reading provider will be dictating this exam?->CRC FINDINGS: No evidence of acute fracture or dislocation. Normal alignment of the ankle mortise. Large dorsal and plantar calcaneal spurs. No evidence of joint effusion. No focal soft tissue abnormality.      No acute abnormality of the ankle. CT Head WO Contrast    Result Date: 10/4/2022  EXAMINATION: CT OF THE HEAD WITHOUT CONTRAST  10/4/2022 5:07 pm TECHNIQUE: CT of the head was performed without the administration of intravenous contrast. Automated exposure control, iterative reconstruction, and/or weight based adjustment of the mA/kV was utilized to reduce the radiation dose to as low as reasonably achievable. COMPARISON: None. HISTORY: ORDERING SYSTEM PROVIDED HISTORY: fall TECHNOLOGIST PROVIDED HISTORY: Reason for exam:->fall Has a \"code stroke\" or \"stroke alert\" been called? ->No Decision Support Exception - unselect if not a suspected or confirmed emergency medical condition->Emergency Medical Condition (MA) What reading provider will be dictating this exam?->CRC FINDINGS: BRAIN/VENTRICLES: There is no acute intracranial hemorrhage, mass effect or midline shift. No abnormal extra-axial fluid collection. The gray-white differentiation is maintained without evidence of an acute infarct. There is no evidence of hydrocephalus. Periventricular white matter changes consistent chronic microvascular disease. ORBITS: The visualized portion of the orbits demonstrate no acute abnormality. SINUSES: The visualized paranasal sinuses and mastoid air cells demonstrate no acute abnormality. SOFT TISSUES/SKULL:  No acute abnormality of the visualized skull or soft tissues. No acute intracranial abnormality. CT Cervical Spine WO Contrast    Result Date: 10/4/2022  EXAMINATION: CT OF THE CERVICAL SPINE WITHOUT CONTRAST 10/4/2022 5:07 pm TECHNIQUE: CT of the cervical spine was performed without the administration of intravenous contrast. Multiplanar reformatted images are provided for review. Automated exposure control, iterative reconstruction, and/or weight based adjustment of the mA/kV was utilized to reduce the radiation dose to as low as reasonably achievable.  COMPARISON: April 26, 2021 HISTORY: ORDERING SYSTEM PROVIDED HISTORY: fall; neck stiffness TECHNOLOGIST PROVIDED HISTORY: Reason for exam:->fall; neck stiffness Decision Support Exception - unselect if not a suspected or confirmed emergency medical condition->Emergency Medical Condition (MA) What reading provider will be dictating this exam?->CRC FINDINGS: No evidence of fracture or malalignment. Redemonstration of significant degenerative changes at level of C1 and dens with subchondral sclerosis and marginal osteophytosis. Severe disc space narrowing at C6/C7 with subchondral sclerosis and marginal osteophytosis. No prevertebral soft tissue edema. Moderate facet arthropathy at multiple levels notable on the left at C2/C3 and C3/C4. No acute abnormality of the cervical spine. XR CHEST PORTABLE    Result Date: 10/4/2022  EXAMINATION: THREE XRAY VIEWS OF THE RIGHT KNEE; ONE XRAY VIEW OF THE CHEST; ONE XRAY VIEW OF THE PELVIS AND TWO XRAY VIEWS RIGHT HIP 10/4/2022 4:05 pm; 10/4/2022 4:06 pm COMPARISON: Chest 29 August 2022 HISTORY: ORDERING SYSTEM PROVIDED HISTORY: fall; pain at right knee TECHNOLOGIST PROVIDED HISTORY: Reason for exam:->fall; pain at right knee; ORDERING SYSTEM PROVIDED HISTORY: fall TECHNOLOGIST PROVIDED HISTORY: Reason for exam:->fall What reading provider will be dictating this exam?->CRC FINDINGS: Right knee: No evident fracture or dislocation. No significant arthropathy. Normal soft tissues. Please note that the lateral view of the right knee is contained in the image set with the ankle. Pelvis and right hip: A skin fold mimics a subtrochanteric fracture at the right femur. No significant arthropathy. Normal soft tissues. Chest: Stable cardiomediastinal silhouette. Normal pulmonary vascularity. Diminishing atelectasis and or infiltrate in both lower lungs. Right knee: Unremarkable. Pelvis and right hip: No active process. Please note that a skin fold mimics a fracture as noted above.  Chest: Diminishing atelectasis and or infiltrate at both lower lungs. XR HIP 2-3 VW W PELVIS RIGHT    Result Date: 10/4/2022  EXAMINATION: THREE XRAY VIEWS OF THE RIGHT KNEE; ONE XRAY VIEW OF THE CHEST; ONE XRAY VIEW OF THE PELVIS AND TWO XRAY VIEWS RIGHT HIP 10/4/2022 4:05 pm; 10/4/2022 4:06 pm COMPARISON: Chest 29 August 2022 HISTORY: ORDERING SYSTEM PROVIDED HISTORY: fall; pain at right knee TECHNOLOGIST PROVIDED HISTORY: Reason for exam:->fall; pain at right knee; ORDERING SYSTEM PROVIDED HISTORY: fall TECHNOLOGIST PROVIDED HISTORY: Reason for exam:->fall What reading provider will be dictating this exam?->CRC FINDINGS: Right knee: No evident fracture or dislocation. No significant arthropathy. Normal soft tissues. Please note that the lateral view of the right knee is contained in the image set with the ankle. Pelvis and right hip: A skin fold mimics a subtrochanteric fracture at the right femur. No significant arthropathy. Normal soft tissues. Chest: Stable cardiomediastinal silhouette. Normal pulmonary vascularity. Diminishing atelectasis and or infiltrate in both lower lungs. Right knee: Unremarkable. Pelvis and right hip: No active process. Please note that a skin fold mimics a fracture as noted above. Chest: Diminishing atelectasis and or infiltrate at both lower lungs. Assessment//Plan           Hospital Problems             Last Modified POA    * (Principal) Hyperkalemia 10/4/2022 Yes   Assessment:   (Hyperkalemia  BETTINA  Falls  DM  HTN  Hyperlipidemia     Plan  Await AM labs. Renal following. PT/OT  Continue rest of meds. Placement when ok with Renal.  ).

## 2022-10-07 NOTE — CARE COORDINATION
Care Coordination: Received message from Vicente Kramer at [facility], pt will need to bring her own supply of VICTOZA to facility upon discharge to [facility]. Not covered at facility    [facility]: cleared per attending, need to reach out to renal for dc clearance- discussed in rounds with nurse    Juan C Holley    Addendum: discussed with nursing, who reached out to Renal and cleared for dc. Will check with attending for dc instructions. Spoke to Vicente rKamer at [employer] and will cover [employer]. Spoke to Piedad Colmenares at [facility] and  at 4 pm. Will need negative covid prior to leaving. Spoke to daughter, she brought victoza and syringes in for pt to take to facility today.      Juan C Holley

## 2022-10-07 NOTE — DISCHARGE INSTR - COC
Continuity of Care Form    Patient Name: Miles Berrios   :  1940  MRN:  [id number]    Admit date:  10/4/2022  Discharge date:  10/7/22    Code Status Order: Prior   Advance Directives:     Admitting Physician:  Jenny Flores MD  PCP: Mandy Moreira MD    Discharging Nurse:  [facility] Unit/Room#: 4009/7730-D  Discharging Unit Phone Number: [phone]    Emergency Contact:   Extended Emergency Contact Information  Primary Emergency Contact: Sally Gardner  Address: [address] Phone: [phone]  Relation: Spouse  Secondary Emergency Contact: Gale Lira  Address[facility] [place] [address] Phone: [phone]  Relation: Child    Past Surgical History:  Past Surgical History:   Procedure Laterality Date    ABDOMEN SURGERY      DIVERTICULITIS    ANKLE FRACTURE SURGERY Left 2021    LEFT ANKLE OPEN REDUCTION INTERNAL FIXATION--SYNTHES performed by Juanjo Marte MD at [facility]     HYSTERECTOMY (CERVIX STATUS UNKNOWN)      ILEOSTOMY OR JEJUNOSTOMY      done and reversed    THYROID SURGERY      partial thyroidectomy       Immunization History:   Immunization History   Administered Date(s) Administered    COVID-19, PFIZER GRAY top, DO NOT Dilute, (age 15 y+), IM, 30 mcg/0.3 mL 2022    COVID-19, PFIZER PURPLE top, DILUTE for use, (age 15 y+), 30mcg/0.3mL 2021, 2021, 2021    Tdap (Boostrix, Adacel) 2016       Active Problems:  Patient Active Problem List   Diagnosis Code    Pelvic abscess in female N73.9    Ankle fracture, bimalleolar, closed, left, initial encounter S82.842A    Closed fracture of left ankle S82.892A    Bowel obstruction ([facility] [address] K56.609    Hyperkalemia E87.5       Isolation/Infection:   Isolation            No Isolation Patient Infection Status       Infection Onset Added Last Indicated Last Indicated By Review Planned Expiration Resolved Resolved By    None active    Resolved    COVID-19 (Rule Out) 10/04/22 10/04/22 10/04/22 COVID-19, Rapid (Ordered)   10/04/22 Rule-Out Test Resulted            Nurse Assessment:  Last Vital Signs: /81   Pulse 94   Temp 98.9 °F (37.2 °C)   Resp 18   Ht 5' 6\" (1.676 m)   Wt 162 lb (73.5 kg)   SpO2 92%   BMI 26.15 kg/m²     Last documented pain score (0-10 scale): Pain Level: 0  Last Weight:   Wt Readings from Last 1 Encounters:   10/04/22 162 lb (73.5 kg)     Mental Status:  oriented, alert, and confused at times    IV Access:  - None    Nursing Mobility/ADLs:  Walking   Assisted  Transfer  Assisted  Bathing  Assisted  Dressing  Assisted  Toileting  Assisted  Feeding  [address]  Independent  Med Delivery   whole    Wound Care Documentation and Therapy:        Elimination:  Continence: Bowel: No  Bladder: No  Urinary Catheter: Removal Date 10/7/22 at 1550    Colostomy/Ileostomy/Ileal Conduit: Yes  Colostomy LLQ-Stomal Appliance: Clean, dry & intact  Colostomy LLQ-Stoma  Assessment: Moist, Pink  Colostomy LLQ-Peristomal Assessment: Clean, dry & intact  Colostomy LLQ-Stool Appearance: Soft  Colostomy LLQ-Stool Color: Brown  Colostomy LLQ-Stool Amount: Medium  Colostomy LLQ-Output (mL): 75 ml    Date of Last BM: ***    Intake/Output Summary (Last 24 hours) at 10/7/2022 1542  Last data filed at 10/7/2022 1430  Gross per 24 hour   Intake 140 ml   Output 2425 ml   Net -2285 ml     I/O last 3 completed shifts:  In: -   Out: 0655 [Urine:4600; Stool:375]    Safety Concerns:     Sundowners [place] and At Risk for Falls    Impairments/Disabilities:      Vision and Hearing    Nutrition Therapy:  Current Nutrition Therapy:   - Oral Diet:  General and Carb Control 4 carbs/meal (1800kcals/day)    Routes of Feeding: Oral  Liquids:  Thin Liquids  Daily Fluid Restriction: no  Last

## 2022-10-07 NOTE — PROGRESS NOTES
Spouse notified, Carlos Fry, patient's supply of Victoza is needed in the AM. Family reports they will attempt to supply medication.

## 2022-10-08 LAB
ALBUMIN SERPL-MCNC: 3.6 G/DL (ref 3.5–5.2)
ALP BLD-CCNC: 106 U/L (ref 35–104)
ALT SERPL-CCNC: 7 U/L (ref 0–32)
ANION GAP SERPL CALCULATED.3IONS-SCNC: 12 MMOL/L (ref 7–16)
AST SERPL-CCNC: 19 U/L (ref 0–31)
BILIRUB SERPL-MCNC: 0.4 MG/DL (ref 0–1.2)
BUN BLDV-MCNC: 11 MG/DL (ref 6–23)
CALCIUM SERPL-MCNC: 9.4 MG/DL (ref 8.6–10.2)
CHLORIDE BLD-SCNC: 107 MMOL/L (ref 98–107)
CHOLESTEROL, TOTAL: 162 MG/DL (ref 0–199)
CO2: 26 MMOL/L (ref 22–29)
CREAT SERPL-MCNC: 1 MG/DL (ref 0.5–1)
GFR AFRICAN AMERICAN: >60
GFR NON-AFRICAN AMERICAN: 53 ML/MIN/1.73
GLUCOSE BLD-MCNC: 149 MG/DL (ref 74–99)
HBA1C MFR BLD: 5.7 % (ref 4–5.6)
HCT VFR BLD CALC: 36.2 % (ref 34–48)
HDLC SERPL-MCNC: 35 MG/DL
HEMOGLOBIN: 11.2 G/DL (ref 11.5–15.5)
LDL CHOLESTEROL CALCULATED: 102 MG/DL (ref 0–99)
MCH RBC QN AUTO: 27.4 PG (ref 26–35)
MCHC RBC AUTO-ENTMCNC: 30.9 % (ref 32–34.5)
MCV RBC AUTO: 88.5 FL (ref 80–99.9)
ORGANISM: ABNORMAL
PDW BLD-RTO: 15.4 FL (ref 11.5–15)
PLATELET # BLD: 148 E9/L (ref 130–450)
PMV BLD AUTO: 11.1 FL (ref 7–12)
POTASSIUM SERPL-SCNC: 4.1 MMOL/L (ref 3.5–5)
RBC # BLD: 4.09 E12/L (ref 3.5–5.5)
SODIUM BLD-SCNC: 145 MMOL/L (ref 132–146)
TOTAL PROTEIN: 6.6 G/DL (ref 6.4–8.3)
TRIGL SERPL-MCNC: 125 MG/DL (ref 0–149)
URINE CULTURE, ROUTINE: ABNORMAL
VITAMIN D 25-HYDROXY: 56 NG/ML (ref 30–100)
VLDLC SERPL CALC-MCNC: 25 MG/DL
WBC # BLD: 8.2 E9/L (ref 4.5–11.5)

## 2022-10-10 LAB
BLOOD CULTURE, ROUTINE: NORMAL
CULTURE, BLOOD 2: ABNORMAL
ORGANISM: ABNORMAL

## 2022-10-14 LAB
ALBUMIN SERPL-MCNC: 3.6 G/DL (ref 3.5–5.2)
ALP BLD-CCNC: 126 U/L (ref 35–104)
ALT SERPL-CCNC: 10 U/L (ref 0–32)
ANION GAP SERPL CALCULATED.3IONS-SCNC: 10 MMOL/L (ref 7–16)
AST SERPL-CCNC: 23 U/L (ref 0–31)
BILIRUB SERPL-MCNC: 0.2 MG/DL (ref 0–1.2)
BUN BLDV-MCNC: 13 MG/DL (ref 6–23)
CALCIUM SERPL-MCNC: 9.5 MG/DL (ref 8.6–10.2)
CHLORIDE BLD-SCNC: 100 MMOL/L (ref 98–107)
CO2: 31 MMOL/L (ref 22–29)
CREAT SERPL-MCNC: 1 MG/DL (ref 0.5–1)
GFR AFRICAN AMERICAN: >60
GFR NON-AFRICAN AMERICAN: 53 ML/MIN/1.73
GLUCOSE BLD-MCNC: 133 MG/DL (ref 74–99)
HCT VFR BLD CALC: 37.7 % (ref 34–48)
HEMOGLOBIN: 11.4 G/DL (ref 11.5–15.5)
MCH RBC QN AUTO: 27.6 PG (ref 26–35)
MCHC RBC AUTO-ENTMCNC: 30.2 % (ref 32–34.5)
MCV RBC AUTO: 91.3 FL (ref 80–99.9)
PDW BLD-RTO: 15.7 FL (ref 11.5–15)
PLATELET # BLD: 224 E9/L (ref 130–450)
PMV BLD AUTO: 10.1 FL (ref 7–12)
POTASSIUM SERPL-SCNC: 3.8 MMOL/L (ref 3.5–5)
RBC # BLD: 4.13 E12/L (ref 3.5–5.5)
SODIUM BLD-SCNC: 141 MMOL/L (ref 132–146)
TOTAL PROTEIN: 6.7 G/DL (ref 6.4–8.3)
WBC # BLD: 6.9 E9/L (ref 4.5–11.5)

## 2022-10-21 LAB
ALBUMIN SERPL-MCNC: 2.9 G/DL (ref 3.5–5.2)
ALP BLD-CCNC: 102 U/L (ref 35–104)
ALT SERPL-CCNC: 5 U/L (ref 0–32)
ANION GAP SERPL CALCULATED.3IONS-SCNC: 10 MMOL/L (ref 7–16)
AST SERPL-CCNC: 15 U/L (ref 0–31)
BILIRUB SERPL-MCNC: 0.2 MG/DL (ref 0–1.2)
BUN BLDV-MCNC: 15 MG/DL (ref 6–23)
CALCIUM SERPL-MCNC: 8.9 MG/DL (ref 8.6–10.2)
CHLORIDE BLD-SCNC: 104 MMOL/L (ref 98–107)
CO2: 30 MMOL/L (ref 22–29)
CREAT SERPL-MCNC: 0.9 MG/DL (ref 0.5–1)
GFR SERPL CREATININE-BSD FRML MDRD: >60 ML/MIN/1.73
GLUCOSE BLD-MCNC: 113 MG/DL (ref 74–99)
HCT VFR BLD CALC: 31.5 % (ref 34–48)
HEMOGLOBIN: 9.4 G/DL (ref 11.5–15.5)
MCH RBC QN AUTO: 27.2 PG (ref 26–35)
MCHC RBC AUTO-ENTMCNC: 29.8 % (ref 32–34.5)
MCV RBC AUTO: 91.3 FL (ref 80–99.9)
PDW BLD-RTO: 15.7 FL (ref 11.5–15)
PLATELET # BLD: 152 E9/L (ref 130–450)
PMV BLD AUTO: 11 FL (ref 7–12)
POTASSIUM SERPL-SCNC: 3.8 MMOL/L (ref 3.5–5)
RBC # BLD: 3.45 E12/L (ref 3.5–5.5)
SODIUM BLD-SCNC: 144 MMOL/L (ref 132–146)
TOTAL PROTEIN: 5.3 G/DL (ref 6.4–8.3)
WBC # BLD: 7.9 E9/L (ref 4.5–11.5)

## 2022-10-24 LAB
HCT VFR BLD CALC: 31.6 % (ref 34–48)
HEMOGLOBIN: 9.5 G/DL (ref 11.5–15.5)
MCH RBC QN AUTO: 27.2 PG (ref 26–35)
MCHC RBC AUTO-ENTMCNC: 30.1 % (ref 32–34.5)
MCV RBC AUTO: 90.5 FL (ref 80–99.9)
PDW BLD-RTO: 15.8 FL (ref 11.5–15)
PLATELET # BLD: 147 E9/L (ref 130–450)
PMV BLD AUTO: 10.8 FL (ref 7–12)
RBC # BLD: 3.49 E12/L (ref 3.5–5.5)
WBC # BLD: 6.6 E9/L (ref 4.5–11.5)

## 2022-10-27 LAB
ALBUMIN SERPL-MCNC: 3.2 G/DL (ref 3.5–5.2)
ALP BLD-CCNC: 93 U/L (ref 35–104)
ALT SERPL-CCNC: <5 U/L (ref 0–32)
ANION GAP SERPL CALCULATED.3IONS-SCNC: 8 MMOL/L (ref 7–16)
AST SERPL-CCNC: 11 U/L (ref 0–31)
BASOPHILS ABSOLUTE: 0.03 E9/L (ref 0–0.2)
BASOPHILS RELATIVE PERCENT: 0.4 % (ref 0–2)
BILIRUB SERPL-MCNC: 0.2 MG/DL (ref 0–1.2)
BUN BLDV-MCNC: 25 MG/DL (ref 6–23)
CALCIUM SERPL-MCNC: 9 MG/DL (ref 8.6–10.2)
CHLORIDE BLD-SCNC: 105 MMOL/L (ref 98–107)
CO2: 28 MMOL/L (ref 22–29)
CREAT SERPL-MCNC: 1.2 MG/DL (ref 0.5–1)
EOSINOPHILS ABSOLUTE: 0.3 E9/L (ref 0.05–0.5)
EOSINOPHILS RELATIVE PERCENT: 4 % (ref 0–6)
GFR SERPL CREATININE-BSD FRML MDRD: 44 ML/MIN/1.73
GLUCOSE BLD-MCNC: 118 MG/DL (ref 74–99)
HCT VFR BLD CALC: 30.4 % (ref 34–48)
HEMOGLOBIN: 9.1 G/DL (ref 11.5–15.5)
IMMATURE GRANULOCYTES #: 0.06 E9/L
IMMATURE GRANULOCYTES %: 0.8 % (ref 0–5)
LYMPHOCYTES ABSOLUTE: 2.02 E9/L (ref 1.5–4)
LYMPHOCYTES RELATIVE PERCENT: 27 % (ref 20–42)
MCH RBC QN AUTO: 27.2 PG (ref 26–35)
MCHC RBC AUTO-ENTMCNC: 29.9 % (ref 32–34.5)
MCV RBC AUTO: 91 FL (ref 80–99.9)
MONOCYTES ABSOLUTE: 0.62 E9/L (ref 0.1–0.95)
MONOCYTES RELATIVE PERCENT: 8.3 % (ref 2–12)
NEUTROPHILS ABSOLUTE: 4.46 E9/L (ref 1.8–7.3)
NEUTROPHILS RELATIVE PERCENT: 59.5 % (ref 43–80)
PDW BLD-RTO: 15.5 FL (ref 11.5–15)
PLATELET # BLD: 162 E9/L (ref 130–450)
PMV BLD AUTO: 11 FL (ref 7–12)
POTASSIUM SERPL-SCNC: 4.6 MMOL/L (ref 3.5–5)
RBC # BLD: 3.34 E12/L (ref 3.5–5.5)
SODIUM BLD-SCNC: 141 MMOL/L (ref 132–146)
TOTAL PROTEIN: 5.6 G/DL (ref 6.4–8.3)
WBC # BLD: 7.5 E9/L (ref 4.5–11.5)

## 2022-10-28 LAB
ALBUMIN SERPL-MCNC: 3.3 G/DL (ref 3.5–5.2)
ALP BLD-CCNC: 101 U/L (ref 35–104)
ALT SERPL-CCNC: <5 U/L (ref 0–32)
ANION GAP SERPL CALCULATED.3IONS-SCNC: 11 MMOL/L (ref 7–16)
AST SERPL-CCNC: 15 U/L (ref 0–31)
BASOPHILS ABSOLUTE: 0.04 E9/L (ref 0–0.2)
BASOPHILS RELATIVE PERCENT: 0.5 % (ref 0–2)
BILIRUB SERPL-MCNC: 0.4 MG/DL (ref 0–1.2)
BUN BLDV-MCNC: 21 MG/DL (ref 6–23)
CALCIUM SERPL-MCNC: 9.7 MG/DL (ref 8.6–10.2)
CHLORIDE BLD-SCNC: 100 MMOL/L (ref 98–107)
CO2: 29 MMOL/L (ref 22–29)
CREAT SERPL-MCNC: 0.9 MG/DL (ref 0.5–1)
EOSINOPHILS ABSOLUTE: 0.32 E9/L (ref 0.05–0.5)
EOSINOPHILS RELATIVE PERCENT: 3.7 % (ref 0–6)
GFR SERPL CREATININE-BSD FRML MDRD: >60 ML/MIN/1.73
GLUCOSE BLD-MCNC: 120 MG/DL (ref 74–99)
HCT VFR BLD CALC: 33.4 % (ref 34–48)
HEMOGLOBIN: 10.2 G/DL (ref 11.5–15.5)
IMMATURE GRANULOCYTES #: 0.06 E9/L
IMMATURE GRANULOCYTES %: 0.7 % (ref 0–5)
LYMPHOCYTES ABSOLUTE: 2.22 E9/L (ref 1.5–4)
LYMPHOCYTES RELATIVE PERCENT: 25.8 % (ref 20–42)
MCH RBC QN AUTO: 27.5 PG (ref 26–35)
MCHC RBC AUTO-ENTMCNC: 30.5 % (ref 32–34.5)
MCV RBC AUTO: 90 FL (ref 80–99.9)
MONOCYTES ABSOLUTE: 0.86 E9/L (ref 0.1–0.95)
MONOCYTES RELATIVE PERCENT: 10 % (ref 2–12)
NEUTROPHILS ABSOLUTE: 5.12 E9/L (ref 1.8–7.3)
NEUTROPHILS RELATIVE PERCENT: 59.3 % (ref 43–80)
PDW BLD-RTO: 15.6 FL (ref 11.5–15)
PLATELET # BLD: 179 E9/L (ref 130–450)
PMV BLD AUTO: 10.5 FL (ref 7–12)
POTASSIUM SERPL-SCNC: 4.7 MMOL/L (ref 3.5–5)
RBC # BLD: 3.71 E12/L (ref 3.5–5.5)
SODIUM BLD-SCNC: 140 MMOL/L (ref 132–146)
TOTAL PROTEIN: 6.5 G/DL (ref 6.4–8.3)
WBC # BLD: 8.6 E9/L (ref 4.5–11.5)

## 2022-10-31 LAB
ALBUMIN SERPL-MCNC: 3.4 G/DL (ref 3.5–5.2)
ALP BLD-CCNC: 104 U/L (ref 35–104)
ALT SERPL-CCNC: 5 U/L (ref 0–32)
ANION GAP SERPL CALCULATED.3IONS-SCNC: 12 MMOL/L (ref 7–16)
AST SERPL-CCNC: 17 U/L (ref 0–31)
BASOPHILS ABSOLUTE: 0.05 E9/L (ref 0–0.2)
BASOPHILS RELATIVE PERCENT: 0.7 % (ref 0–2)
BILIRUB SERPL-MCNC: 0.3 MG/DL (ref 0–1.2)
BUN BLDV-MCNC: 23 MG/DL (ref 6–23)
CALCIUM SERPL-MCNC: 9.9 MG/DL (ref 8.6–10.2)
CHLORIDE BLD-SCNC: 102 MMOL/L (ref 98–107)
CO2: 27 MMOL/L (ref 22–29)
CREAT SERPL-MCNC: 1 MG/DL (ref 0.5–1)
EOSINOPHILS ABSOLUTE: 0.42 E9/L (ref 0.05–0.5)
EOSINOPHILS RELATIVE PERCENT: 5.9 % (ref 0–6)
GFR SERPL CREATININE-BSD FRML MDRD: 55 ML/MIN/1.73
GLUCOSE BLD-MCNC: 112 MG/DL (ref 74–99)
HCT VFR BLD CALC: 35.1 % (ref 34–48)
HEMOGLOBIN: 10.5 G/DL (ref 11.5–15.5)
IMMATURE GRANULOCYTES #: 0.03 E9/L
IMMATURE GRANULOCYTES %: 0.4 % (ref 0–5)
LYMPHOCYTES ABSOLUTE: 2.34 E9/L (ref 1.5–4)
LYMPHOCYTES RELATIVE PERCENT: 32.7 % (ref 20–42)
MCH RBC QN AUTO: 27.4 PG (ref 26–35)
MCHC RBC AUTO-ENTMCNC: 29.9 % (ref 32–34.5)
MCV RBC AUTO: 91.6 FL (ref 80–99.9)
MONOCYTES ABSOLUTE: 0.67 E9/L (ref 0.1–0.95)
MONOCYTES RELATIVE PERCENT: 9.4 % (ref 2–12)
NEUTROPHILS ABSOLUTE: 3.64 E9/L (ref 1.8–7.3)
NEUTROPHILS RELATIVE PERCENT: 50.9 % (ref 43–80)
PDW BLD-RTO: 15.6 FL (ref 11.5–15)
PLATELET # BLD: 170 E9/L (ref 130–450)
PMV BLD AUTO: 10.7 FL (ref 7–12)
POTASSIUM SERPL-SCNC: 5.2 MMOL/L (ref 3.5–5)
RBC # BLD: 3.83 E12/L (ref 3.5–5.5)
SODIUM BLD-SCNC: 141 MMOL/L (ref 132–146)
TOTAL PROTEIN: 6.2 G/DL (ref 6.4–8.3)
WBC # BLD: 7.2 E9/L (ref 4.5–11.5)

## 2022-11-01 LAB — POTASSIUM SERPL-SCNC: 4.8 MMOL/L (ref 3.5–5)

## 2022-11-04 ENCOUNTER — OUTSIDE SERVICES (OUTPATIENT)
Dept: PRIMARY CARE CLINIC | Age: 82
End: 2022-11-04

## 2022-11-04 DIAGNOSIS — K29.60 PHLEGMONOUS GASTRITIS: ICD-10-CM

## 2022-11-04 DIAGNOSIS — K21.9 GASTROESOPHAGEAL REFLUX DISEASE WITHOUT ESOPHAGITIS: ICD-10-CM

## 2022-11-04 DIAGNOSIS — F41.9 ANXIETY AND DEPRESSION: ICD-10-CM

## 2022-11-04 DIAGNOSIS — F32.A ANXIETY AND DEPRESSION: ICD-10-CM

## 2022-11-04 DIAGNOSIS — N17.9 ACUTE RENAL FAILURE, UNSPECIFIED ACUTE RENAL FAILURE TYPE (HCC): ICD-10-CM

## 2022-11-04 DIAGNOSIS — I10 HYPERTENSION, ESSENTIAL: ICD-10-CM

## 2022-11-04 DIAGNOSIS — N39.0 URINARY TRACT INFECTION WITHOUT HEMATURIA, SITE UNSPECIFIED: ICD-10-CM

## 2022-11-04 DIAGNOSIS — E78.5 HYPERLIPIDEMIA, UNSPECIFIED HYPERLIPIDEMIA TYPE: Primary | ICD-10-CM

## 2022-11-04 DIAGNOSIS — R42 DIZZINESS AND GIDDINESS: ICD-10-CM

## 2022-11-04 DIAGNOSIS — K56.609 INTESTINAL OBSTRUCTION, UNSPECIFIED CAUSE, UNSPECIFIED WHETHER PARTIAL OR COMPLETE (HCC): ICD-10-CM

## 2022-11-04 DIAGNOSIS — N73.9 FEMALE PELVIC INFLAMMATION: ICD-10-CM

## 2022-12-01 PROBLEM — S82.842A ANKLE FRACTURE, BIMALLEOLAR, CLOSED, LEFT, INITIAL ENCOUNTER: Status: RESOLVED | Noted: 2021-04-26 | Resolved: 2022-12-01

## 2023-03-23 ENCOUNTER — APPOINTMENT (OUTPATIENT)
Dept: NON INVASIVE DIAGNOSTICS | Age: 83
End: 2023-03-23
Payer: MEDICARE

## 2023-03-23 ENCOUNTER — APPOINTMENT (OUTPATIENT)
Dept: GENERAL RADIOLOGY | Age: 83
End: 2023-03-23
Payer: MEDICARE

## 2023-03-23 ENCOUNTER — APPOINTMENT (OUTPATIENT)
Dept: NUCLEAR MEDICINE | Age: 83
End: 2023-03-23
Payer: MEDICARE

## 2023-03-23 ENCOUNTER — HOSPITAL ENCOUNTER (INPATIENT)
Age: 83
LOS: 1 days | Discharge: HOME | End: 2023-03-24
Attending: STUDENT IN AN ORGANIZED HEALTH CARE EDUCATION/TRAINING PROGRAM | Admitting: FAMILY MEDICINE
Payer: MEDICARE

## 2023-03-23 DIAGNOSIS — J96.01 ACUTE RESPIRATORY FAILURE WITH HYPOXIA (HCC): ICD-10-CM

## 2023-03-23 DIAGNOSIS — R07.9 CHEST PAIN, UNSPECIFIED TYPE: Primary | ICD-10-CM

## 2023-03-23 LAB
ALBUMIN SERPL-MCNC: 4.1 G/DL (ref 3.5–5.2)
ALP SERPL-CCNC: 70 U/L (ref 35–104)
ALT SERPL-CCNC: 11 U/L (ref 0–32)
ANION GAP SERPL CALCULATED.3IONS-SCNC: 10 MMOL/L (ref 7–16)
ANION GAP SERPL CALCULATED.3IONS-SCNC: 9 MMOL/L (ref 7–16)
AST SERPL-CCNC: 32 U/L (ref 0–31)
B PARAP IS1001 DNA NPH QL NAA+NON-PROBE: NOT DETECTED
B PERT.PT PRMT NPH QL NAA+NON-PROBE: NOT DETECTED
BASOPHILS # BLD: 0.04 E9/L (ref 0–0.2)
BASOPHILS NFR BLD: 0.5 % (ref 0–2)
BILIRUB DIRECT SERPL-MCNC: <0.2 MG/DL (ref 0–0.3)
BILIRUB INDIRECT SERPL-MCNC: ABNORMAL MG/DL (ref 0–1)
BILIRUB SERPL-MCNC: 0.3 MG/DL (ref 0–1.2)
BNP BLD-MCNC: 307 PG/ML (ref 0–450)
BUN SERPL-MCNC: 20 MG/DL (ref 6–23)
BUN SERPL-MCNC: 27 MG/DL (ref 6–23)
C PNEUM DNA NPH QL NAA+NON-PROBE: NOT DETECTED
CALCIUM SERPL-MCNC: 7.7 MG/DL (ref 8.6–10.2)
CALCIUM SERPL-MCNC: 9.5 MG/DL (ref 8.6–10.2)
CHLORIDE SERPL-SCNC: 109 MMOL/L (ref 98–107)
CHLORIDE SERPL-SCNC: 98 MMOL/L (ref 98–107)
CHOLESTEROL, TOTAL: 95 MG/DL (ref 0–199)
CO2 SERPL-SCNC: 22 MMOL/L (ref 22–29)
CO2 SERPL-SCNC: 27 MMOL/L (ref 22–29)
CREAT SERPL-MCNC: 0.7 MG/DL (ref 0.5–1)
CREAT SERPL-MCNC: 0.8 MG/DL (ref 0.5–1)
D DIMER: <200 NG/ML DDU
EKG ATRIAL RATE: 57 BPM
EKG P AXIS: 15 DEGREES
EKG P-R INTERVAL: 126 MS
EKG Q-T INTERVAL: 414 MS
EKG QRS DURATION: 82 MS
EKG QTC CALCULATION (BAZETT): 402 MS
EKG R AXIS: 7 DEGREES
EKG T AXIS: 20 DEGREES
EKG VENTRICULAR RATE: 57 BPM
EOSINOPHIL # BLD: 0.48 E9/L (ref 0.05–0.5)
EOSINOPHIL NFR BLD: 5.7 % (ref 0–6)
ERYTHROCYTE [DISTWIDTH] IN BLOOD BY AUTOMATED COUNT: 15.9 FL (ref 11.5–15)
FLUAV RNA NPH QL NAA+NON-PROBE: NOT DETECTED
FLUBV RNA NPH QL NAA+NON-PROBE: NOT DETECTED
GLUCOSE SERPL-MCNC: 116 MG/DL (ref 74–99)
GLUCOSE SERPL-MCNC: 48 MG/DL (ref 74–99)
HADV DNA NPH QL NAA+NON-PROBE: NOT DETECTED
HCOV 229E RNA NPH QL NAA+NON-PROBE: NOT DETECTED
HCOV HKU1 RNA NPH QL NAA+NON-PROBE: NOT DETECTED
HCOV NL63 RNA NPH QL NAA+NON-PROBE: NOT DETECTED
HCOV OC43 RNA NPH QL NAA+NON-PROBE: NOT DETECTED
HCT VFR BLD AUTO: 35.9 % (ref 34–48)
HDLC SERPL-MCNC: 42 MG/DL
HGB BLD-MCNC: 10.6 G/DL (ref 11.5–15.5)
HMPV RNA NPH QL NAA+NON-PROBE: NOT DETECTED
HPIV1 RNA NPH QL NAA+NON-PROBE: NOT DETECTED
HPIV2 RNA NPH QL NAA+NON-PROBE: NOT DETECTED
HPIV3 RNA NPH QL NAA+NON-PROBE: NOT DETECTED
HPIV4 RNA NPH QL NAA+NON-PROBE: NOT DETECTED
IMM GRANULOCYTES # BLD: 0.05 E9/L
IMM GRANULOCYTES NFR BLD: 0.6 % (ref 0–5)
LDLC SERPL CALC-MCNC: 40 MG/DL (ref 0–99)
LV EF: 79 %
LVEF MODALITY: NORMAL
LYMPHOCYTES # BLD: 2.7 E9/L (ref 1.5–4)
LYMPHOCYTES NFR BLD: 32.1 % (ref 20–42)
M PNEUMO DNA NPH QL NAA+NON-PROBE: NOT DETECTED
MAGNESIUM SERPL-MCNC: 1.5 MG/DL (ref 1.6–2.6)
MCH RBC QN AUTO: 26.1 PG (ref 26–35)
MCHC RBC AUTO-ENTMCNC: 29.5 % (ref 32–34.5)
MCV RBC AUTO: 88.4 FL (ref 80–99.9)
METER GLUCOSE: 109 MG/DL (ref 74–99)
METER GLUCOSE: 133 MG/DL (ref 74–99)
METER GLUCOSE: 91 MG/DL (ref 74–99)
MONOCYTES # BLD: 0.7 E9/L (ref 0.1–0.95)
MONOCYTES NFR BLD: 8.3 % (ref 2–12)
NEUTROPHILS # BLD: 4.43 E9/L (ref 1.8–7.3)
NEUTS SEG NFR BLD: 52.8 % (ref 43–80)
PLATELET # BLD AUTO: 174 E9/L (ref 130–450)
PMV BLD AUTO: 10.8 FL (ref 7–12)
POTASSIUM SERPL-SCNC: 3.5 MMOL/L (ref 3.5–5)
POTASSIUM SERPL-SCNC: 5.6 MMOL/L (ref 3.5–5)
PROT SERPL-MCNC: 6.8 G/DL (ref 6.4–8.3)
RBC # BLD AUTO: 4.06 E12/L (ref 3.5–5.5)
RSV RNA NPH QL NAA+NON-PROBE: NOT DETECTED
RV+EV RNA NPH QL NAA+NON-PROBE: NOT DETECTED
SARS-COV-2 RNA NPH QL NAA+NON-PROBE: NOT DETECTED
SODIUM SERPL-SCNC: 135 MMOL/L (ref 132–146)
SODIUM SERPL-SCNC: 140 MMOL/L (ref 132–146)
TRIGL SERPL-MCNC: 63 MG/DL (ref 0–149)
TROPONIN, HIGH SENSITIVITY: 25 NG/L (ref 0–9)
TROPONIN, HIGH SENSITIVITY: 29 NG/L (ref 0–9)
TSH SERPL-MCNC: 3.66 UIU/ML (ref 0.27–4.2)
VLDLC SERPL CALC-MCNC: 13 MG/DL
WBC # BLD: 8.4 E9/L (ref 4.5–11.5)

## 2023-03-23 PROCEDURE — 0202U NFCT DS 22 TRGT SARS-COV-2: CPT

## 2023-03-23 PROCEDURE — 80048 BASIC METABOLIC PNL TOTAL CA: CPT

## 2023-03-23 PROCEDURE — 85378 FIBRIN DEGRADE SEMIQUANT: CPT

## 2023-03-23 PROCEDURE — 6360000002 HC RX W HCPCS: Performed by: INTERNAL MEDICINE

## 2023-03-23 PROCEDURE — 94640 AIRWAY INHALATION TREATMENT: CPT

## 2023-03-23 PROCEDURE — 93005 ELECTROCARDIOGRAM TRACING: CPT | Performed by: STUDENT IN AN ORGANIZED HEALTH CARE EDUCATION/TRAINING PROGRAM

## 2023-03-23 PROCEDURE — 6370000000 HC RX 637 (ALT 250 FOR IP): Performed by: FAMILY MEDICINE

## 2023-03-23 PROCEDURE — 3430000000 HC RX DIAGNOSTIC RADIOPHARMACEUTICAL: Performed by: RADIOLOGY

## 2023-03-23 PROCEDURE — 80076 HEPATIC FUNCTION PANEL: CPT

## 2023-03-23 PROCEDURE — 83880 ASSAY OF NATRIURETIC PEPTIDE: CPT

## 2023-03-23 PROCEDURE — 2140000000 HC CCU INTERMEDIATE R&B

## 2023-03-23 PROCEDURE — 78452 HT MUSCLE IMAGE SPECT MULT: CPT

## 2023-03-23 PROCEDURE — 93016 CV STRESS TEST SUPVJ ONLY: CPT | Performed by: INTERNAL MEDICINE

## 2023-03-23 PROCEDURE — 6360000002 HC RX W HCPCS

## 2023-03-23 PROCEDURE — 71045 X-RAY EXAM CHEST 1 VIEW: CPT

## 2023-03-23 PROCEDURE — 93010 ELECTROCARDIOGRAM REPORT: CPT | Performed by: INTERNAL MEDICINE

## 2023-03-23 PROCEDURE — 82962 GLUCOSE BLOOD TEST: CPT

## 2023-03-23 PROCEDURE — 80061 LIPID PANEL: CPT

## 2023-03-23 PROCEDURE — 96374 THER/PROPH/DIAG INJ IV PUSH: CPT

## 2023-03-23 PROCEDURE — 93018 CV STRESS TEST I&R ONLY: CPT | Performed by: INTERNAL MEDICINE

## 2023-03-23 PROCEDURE — 85025 COMPLETE CBC W/AUTO DIFF WBC: CPT

## 2023-03-23 PROCEDURE — APPSS60 APP SPLIT SHARED TIME 46-60 MINUTES

## 2023-03-23 PROCEDURE — 99285 EMERGENCY DEPT VISIT HI MDM: CPT

## 2023-03-23 PROCEDURE — 84484 ASSAY OF TROPONIN QUANT: CPT

## 2023-03-23 PROCEDURE — 84443 ASSAY THYROID STIM HORMONE: CPT

## 2023-03-23 PROCEDURE — 78452 HT MUSCLE IMAGE SPECT MULT: CPT | Performed by: INTERNAL MEDICINE

## 2023-03-23 PROCEDURE — 36415 COLL VENOUS BLD VENIPUNCTURE: CPT

## 2023-03-23 PROCEDURE — 83735 ASSAY OF MAGNESIUM: CPT

## 2023-03-23 PROCEDURE — A9500 TC99M SESTAMIBI: HCPCS | Performed by: RADIOLOGY

## 2023-03-23 PROCEDURE — 6360000002 HC RX W HCPCS: Performed by: FAMILY MEDICINE

## 2023-03-23 PROCEDURE — 96372 THER/PROPH/DIAG INJ SC/IM: CPT

## 2023-03-23 PROCEDURE — 99223 1ST HOSP IP/OBS HIGH 75: CPT | Performed by: INTERNAL MEDICINE

## 2023-03-23 PROCEDURE — 93017 CV STRESS TEST TRACING ONLY: CPT

## 2023-03-23 PROCEDURE — 6360000002 HC RX W HCPCS: Performed by: STUDENT IN AN ORGANIZED HEALTH CARE EDUCATION/TRAINING PROGRAM

## 2023-03-23 RX ORDER — POTASSIUM CHLORIDE 20 MEQ/1
40 TABLET, EXTENDED RELEASE ORAL DAILY
COMMUNITY

## 2023-03-23 RX ORDER — FENTANYL CITRATE 50 UG/ML
50 INJECTION, SOLUTION INTRAMUSCULAR; INTRAVENOUS ONCE
Status: COMPLETED | OUTPATIENT
Start: 2023-03-23 | End: 2023-03-23

## 2023-03-23 RX ORDER — INSULIN LISPRO 100 [IU]/ML
0-4 INJECTION, SOLUTION INTRAVENOUS; SUBCUTANEOUS NIGHTLY
Status: DISCONTINUED | OUTPATIENT
Start: 2023-03-23 | End: 2023-03-24 | Stop reason: HOSPADM

## 2023-03-23 RX ORDER — MAGNESIUM SULFATE IN WATER 40 MG/ML
2000 INJECTION, SOLUTION INTRAVENOUS ONCE
Status: COMPLETED | OUTPATIENT
Start: 2023-03-23 | End: 2023-03-23

## 2023-03-23 RX ORDER — TETRAKIS(2-METHOXYISOBUTYLISOCYANIDE)COPPER(I) TETRAFLUOROBORATE 1 MG/ML
35 INJECTION, POWDER, LYOPHILIZED, FOR SOLUTION INTRAVENOUS
Status: COMPLETED | OUTPATIENT
Start: 2023-03-23 | End: 2023-03-23

## 2023-03-23 RX ORDER — NITROGLYCERIN 0.4 MG/1
0.4 TABLET SUBLINGUAL EVERY 5 MIN PRN
Status: DISCONTINUED | OUTPATIENT
Start: 2023-03-23 | End: 2023-03-23

## 2023-03-23 RX ORDER — KETOROLAC TROMETHAMINE 30 MG/ML
15 INJECTION, SOLUTION INTRAMUSCULAR; INTRAVENOUS ONCE
Status: COMPLETED | OUTPATIENT
Start: 2023-03-23 | End: 2023-03-23

## 2023-03-23 RX ORDER — ACETAMINOPHEN 325 MG/1
650 TABLET ORAL EVERY 6 HOURS PRN
Status: DISCONTINUED | OUTPATIENT
Start: 2023-03-23 | End: 2023-03-24 | Stop reason: HOSPADM

## 2023-03-23 RX ORDER — DOCUSATE SODIUM 100 MG/1
100 CAPSULE, LIQUID FILLED ORAL DAILY
Status: DISCONTINUED | OUTPATIENT
Start: 2023-03-23 | End: 2023-03-24 | Stop reason: HOSPADM

## 2023-03-23 RX ORDER — MECLIZINE HYDROCHLORIDE 25 MG/1
25 TABLET ORAL 3 TIMES DAILY
COMMUNITY

## 2023-03-23 RX ORDER — GLIPIZIDE 5 MG/1
5 TABLET ORAL DAILY
Status: DISCONTINUED | OUTPATIENT
Start: 2023-03-23 | End: 2023-03-24 | Stop reason: HOSPADM

## 2023-03-23 RX ORDER — IPRATROPIUM BROMIDE AND ALBUTEROL SULFATE 2.5; .5 MG/3ML; MG/3ML
1 SOLUTION RESPIRATORY (INHALATION)
Status: DISCONTINUED | OUTPATIENT
Start: 2023-03-23 | End: 2023-03-24 | Stop reason: HOSPADM

## 2023-03-23 RX ORDER — TETRAKIS(2-METHOXYISOBUTYLISOCYANIDE)COPPER(I) TETRAFLUOROBORATE 1 MG/ML
11 INJECTION, POWDER, LYOPHILIZED, FOR SOLUTION INTRAVENOUS
Status: COMPLETED | OUTPATIENT
Start: 2023-03-23 | End: 2023-03-23

## 2023-03-23 RX ORDER — ENOXAPARIN SODIUM 100 MG/ML
40 INJECTION SUBCUTANEOUS DAILY
Status: DISCONTINUED | OUTPATIENT
Start: 2023-03-23 | End: 2023-03-24 | Stop reason: HOSPADM

## 2023-03-23 RX ORDER — ROSUVASTATIN CALCIUM 20 MG/1
20 TABLET, COATED ORAL DAILY
Status: ON HOLD | COMMUNITY
End: 2023-03-24 | Stop reason: HOSPADM

## 2023-03-23 RX ORDER — PANTOPRAZOLE SODIUM 40 MG/1
40 TABLET, DELAYED RELEASE ORAL
Status: DISCONTINUED | OUTPATIENT
Start: 2023-03-23 | End: 2023-03-24 | Stop reason: HOSPADM

## 2023-03-23 RX ORDER — ATORVASTATIN CALCIUM 40 MG/1
40 TABLET, FILM COATED ORAL NIGHTLY
Status: DISCONTINUED | OUTPATIENT
Start: 2023-03-23 | End: 2023-03-24 | Stop reason: HOSPADM

## 2023-03-23 RX ORDER — INSULIN LISPRO 100 [IU]/ML
0-4 INJECTION, SOLUTION INTRAVENOUS; SUBCUTANEOUS
Status: DISCONTINUED | OUTPATIENT
Start: 2023-03-23 | End: 2023-03-24 | Stop reason: HOSPADM

## 2023-03-23 RX ORDER — DEXTROSE MONOHYDRATE 100 MG/ML
INJECTION, SOLUTION INTRAVENOUS CONTINUOUS PRN
Status: DISCONTINUED | OUTPATIENT
Start: 2023-03-23 | End: 2023-03-24 | Stop reason: HOSPADM

## 2023-03-23 RX ADMIN — Medication 11 MILLICURIE: at 13:48

## 2023-03-23 RX ADMIN — ACETAMINOPHEN 650 MG: 325 TABLET ORAL at 19:03

## 2023-03-23 RX ADMIN — IPRATROPIUM BROMIDE AND ALBUTEROL SULFATE 1 AMPULE: .5; 2.5 SOLUTION RESPIRATORY (INHALATION) at 20:49

## 2023-03-23 RX ADMIN — ATORVASTATIN CALCIUM 40 MG: 40 TABLET, FILM COATED ORAL at 21:10

## 2023-03-23 RX ADMIN — ENOXAPARIN SODIUM 40 MG: 100 INJECTION SUBCUTANEOUS at 11:19

## 2023-03-23 RX ADMIN — FENTANYL CITRATE 50 MCG: 50 INJECTION, SOLUTION INTRAMUSCULAR; INTRAVENOUS at 03:23

## 2023-03-23 RX ADMIN — MAGNESIUM SULFATE HEPTAHYDRATE 2000 MG: 40 INJECTION, SOLUTION INTRAVENOUS at 17:14

## 2023-03-23 RX ADMIN — Medication 35 MILLICURIE: at 14:47

## 2023-03-23 RX ADMIN — KETOROLAC TROMETHAMINE 15 MG: 30 INJECTION, SOLUTION INTRAMUSCULAR at 13:36

## 2023-03-23 RX ADMIN — IPRATROPIUM BROMIDE AND ALBUTEROL SULFATE 1 AMPULE: .5; 2.5 SOLUTION RESPIRATORY (INHALATION) at 12:44

## 2023-03-23 RX ADMIN — PANTOPRAZOLE SODIUM 40 MG: 40 TABLET, DELAYED RELEASE ORAL at 11:19

## 2023-03-23 RX ADMIN — REGADENOSON 0.4 MG: 0.08 INJECTION, SOLUTION INTRAVENOUS at 14:44

## 2023-03-23 RX ADMIN — IPRATROPIUM BROMIDE AND ALBUTEROL SULFATE 1 AMPULE: .5; 2.5 SOLUTION RESPIRATORY (INHALATION) at 18:09

## 2023-03-23 RX ADMIN — MAGNESIUM SULFATE HEPTAHYDRATE 2000 MG: 40 INJECTION, SOLUTION INTRAVENOUS at 13:35

## 2023-03-23 RX ADMIN — GLIPIZIDE 5 MG: 5 TABLET ORAL at 11:19

## 2023-03-23 ASSESSMENT — PAIN DESCRIPTION - DESCRIPTORS
DESCRIPTORS: OTHER (COMMENT)
DESCRIPTORS: ACHING;DISCOMFORT

## 2023-03-23 ASSESSMENT — PAIN DESCRIPTION - LOCATION
LOCATION: CHEST

## 2023-03-23 ASSESSMENT — PAIN DESCRIPTION - ORIENTATION
ORIENTATION: LEFT

## 2023-03-23 ASSESSMENT — PAIN SCALES - GENERAL
PAINLEVEL_OUTOF10: 5
PAINLEVEL_OUTOF10: 8
PAINLEVEL_OUTOF10: 6
PAINLEVEL_OUTOF10: 5

## 2023-03-23 ASSESSMENT — PAIN DESCRIPTION - PAIN TYPE
TYPE: ACUTE PAIN
TYPE: ACUTE PAIN

## 2023-03-23 ASSESSMENT — ENCOUNTER SYMPTOMS: SHORTNESS OF BREATH: 0

## 2023-03-23 ASSESSMENT — PAIN - FUNCTIONAL ASSESSMENT: PAIN_FUNCTIONAL_ASSESSMENT: 0-10

## 2023-03-23 NOTE — H&P
sounds. No wheezing. Abdominal:      General: Abdomen is flat. Bowel sounds are normal.      Tenderness: There is no abdominal tenderness. Skin:     General: Skin is warm and dry. Capillary Refill: Capillary refill takes less than 2 seconds. Neurological:      General: No focal deficit present. Mental Status: She is alert.    Psychiatric:         Mood and Affect: Mood normal.       Labs      Recent Results (from the past 24 hour(s))   EKG 12 Lead    Collection Time: 03/23/23  1:35 AM   Result Value Ref Range    Ventricular Rate 57 BPM    Atrial Rate 57 BPM    P-R Interval 126 ms    QRS Duration 82 ms    Q-T Interval 414 ms    QTc Calculation (Bazett) 402 ms    P Axis 15 degrees    R Axis 7 degrees    T Axis 20 degrees   CBC with Auto Differential    Collection Time: 03/23/23  2:25 AM   Result Value Ref Range    WBC 8.4 4.5 - 11.5 E9/L    RBC 4.06 3.50 - 5.50 E12/L    Hemoglobin 10.6 (L) 11.5 - 15.5 g/dL    Hematocrit 35.9 34.0 - 48.0 %    MCV 88.4 80.0 - 99.9 fL    MCH 26.1 26.0 - 35.0 pg    MCHC 29.5 (L) 32.0 - 34.5 %    RDW 15.9 (H) 11.5 - 15.0 fL    Platelets 958 001 - 079 E9/L    MPV 10.8 7.0 - 12.0 fL    Neutrophils % 52.8 43.0 - 80.0 %    Immature Granulocytes % 0.6 0.0 - 5.0 %    Lymphocytes % 32.1 20.0 - 42.0 %    Monocytes % 8.3 2.0 - 12.0 %    Eosinophils % 5.7 0.0 - 6.0 %    Basophils % 0.5 0.0 - 2.0 %    Neutrophils Absolute 4.43 1.80 - 7.30 E9/L    Immature Granulocytes # 0.05 E9/L    Lymphocytes Absolute 2.70 1.50 - 4.00 E9/L    Monocytes Absolute 0.70 0.10 - 0.95 E9/L    Eosinophils Absolute 0.48 0.05 - 0.50 E9/L    Basophils Absolute 0.04 0.00 - 0.20 K5/E   Basic Metabolic Panel    Collection Time: 03/23/23  2:25 AM   Result Value Ref Range    Sodium 135 132 - 146 mmol/L    Potassium 5.6 (H) 3.5 - 5.0 mmol/L    Chloride 98 98 - 107 mmol/L    CO2 27 22 - 29 mmol/L    Anion Gap 10 7 - 16 mmol/L    Glucose 116 (H) 74 - 99 mg/dL    BUN 27 (H) 6 - 23 mg/dL    Creatinine 0.8 0.5 - 1.0 mg/dL Est, Glom Filt Rate >60 >=60 mL/min/1.73    Calcium 9.5 8.6 - 10.2 mg/dL   Hepatic Function Panel    Collection Time: 03/23/23  2:25 AM   Result Value Ref Range    Total Protein 6.8 6.4 - 8.3 g/dL    Albumin 4.1 3.5 - 5.2 g/dL    Alkaline Phosphatase 70 35 - 104 U/L    ALT 11 0 - 32 U/L    AST 32 (H) 0 - 31 U/L    Total Bilirubin 0.3 0.0 - 1.2 mg/dL    Bilirubin, Direct <0.2 0.0 - 0.3 mg/dL    Bilirubin, Indirect see below 0.0 - 1.0 mg/dL   Troponin    Collection Time: 03/23/23  2:25 AM   Result Value Ref Range    Troponin, High Sensitivity 29 (H) 0 - 9 ng/L   Brain Natriuretic Peptide    Collection Time: 03/23/23  2:25 AM   Result Value Ref Range    Pro- 0 - 450 pg/mL   D-Dimer, Quantitative    Collection Time: 03/23/23  2:25 AM   Result Value Ref Range    D-Dimer, Quant <200 ng/mL DDU   Troponin    Collection Time: 03/23/23  3:52 AM   Result Value Ref Range    Troponin, High Sensitivity 25 (H) 0 - 9 ng/L   Respiratory Panel, Molecular, with COVID-19 (Restricted: peds pts or suitable admitted adults)    Collection Time: 03/23/23  3:52 AM    Specimen: Nasopharyngeal   Result Value Ref Range    Adenovirus by PCR Not Detected Not Detected    Bordetella parapertussis by PCR Not Detected Not Detected    Bordetella pertussis by PCR Not Detected Not Detected    Chlamydophilia pneumoniae by PCR Not Detected Not Detected    Coronavirus 229E by PCR Not Detected Not Detected    Coronavirus HKU1 by PCR Not Detected Not Detected    Coronavirus NL63 by PCR Not Detected Not Detected    Coronavirus OC43 by PCR Not Detected Not Detected    SARS-CoV-2, PCR Not Detected Not Detected    Human Metapneumovirus by PCR Not Detected Not Detected    Human Rhinovirus/Enterovirus by PCR Not Detected Not Detected    Influenza A by PCR Not Detected Not Detected    Influenza B by PCR Not Detected Not Detected    Mycoplasma pneumoniae by PCR Not Detected Not Detected    Parainfluenza Virus 1 by PCR Not Detected Not Detected

## 2023-03-23 NOTE — ED PROVIDER NOTES
MG TABLET    Take 40 mg by mouth daily    DOCUSATE SODIUM (COLACE) 100 MG CAPSULE    Take 100 mg by mouth daily    GLYBURIDE (DIABETA) 5 MG TABLET    Take 5 mg by mouth daily     LANSOPRAZOLE (PREVACID) 30 MG DELAYED RELEASE CAPSULE    Take 30 mg by mouth daily    LIRAGLUTIDE (VICTOZA) 18 MG/3ML SOPN SC INJECTION    Inject 1.8 mg into the skin daily     PANTOPRAZOLE (PROTONIX) 40 MG TABLET    Take 1 tablet by mouth daily    PAROXETINE (PAXIL) 40 MG TABLET    Take 40 mg by mouth daily     PREGABALIN (LYRICA) 75 MG CAPSULE    Take 75 mg by mouth 3 times daily. ALLERGIES     Daptomycin, Vancomycin, and Adhesive tape    FAMILYHISTORY     History reviewed. No pertinent family history. SOCIAL HISTORY       Social History     Tobacco Use    Smoking status: Never    Smokeless tobacco: Former   Substance Use Topics    Alcohol use: Yes     Comment: very seldom    Drug use: No       SCREENINGS        Callum Coma Scale  Eye Opening: Spontaneous  Best Verbal Response: Oriented  Best Motor Response: Obeys commands  Callum Coma Scale Score: 15                CIWA Assessment  BP: (!) 144/63  Heart Rate: 86           PHYSICAL EXAM  1 or more Elements     ED Triage Vitals [03/23/23 0129]   BP Temp Temp Source Heart Rate Resp SpO2 Height Weight   (!) 143/87 98.6 °F (37 °C) Oral 84 16 93 % 5' 6\" (1.676 m) 162 lb (73.5 kg)         Physical Exam  Vitals and nursing note reviewed. Constitutional:       Appearance: Normal appearance. HENT:      Head: Normocephalic and atraumatic. Right Ear: External ear normal.      Left Ear: External ear normal.      Nose: Nose normal.      Mouth/Throat:      Mouth: Mucous membranes are moist.   Eyes:      Extraocular Movements: Extraocular movements intact. Pupils: Pupils are equal, round, and reactive to light. Cardiovascular:      Rate and Rhythm: Normal rate and regular rhythm. Pulses: Normal pulses. Heart sounds: Normal heart sounds.    Pulmonary:      Effort: No medications on file              (Please note that portions of this note were completed with a voice recognition program.  Efforts were made to edit the dictations but occasionally words are mis-transcribed.)    Dony Alcala MD (electronically signed)           Jesus Varghese MD  Resident  03/23/23 8909

## 2023-03-23 NOTE — ED NOTES
Stress lab notified to send patient to [phone] when done at their test.      Miley Boone, RN  03/23/23 6654

## 2023-03-23 NOTE — ED NOTES
This Nurse attempted to call nurse to nurse, the lady who answered asked for a number to have the nurse call down to for report. This RN told her to call [phone] and ask for Wily Ambrosio to receive report.       Mariah Vasquez RN  03/23/23 0666

## 2023-03-23 NOTE — PROGRESS NOTES
Dr. Selvin Oreilly notified pt continues to have episodes where HR is in the 80s then drops to the 40s. Pt remains asymptomatic during these episodes.

## 2023-03-23 NOTE — PROGRESS NOTES
Notified Dr. Rafael Davis via perfect serve about patient asking for diet order. Awaiting orders. Samson Gillespie

## 2023-03-23 NOTE — ED NOTES
The following labs were labeled with appropriate pt sticker and tubed to lab:     [] Blue     [] Lavender   [] on ice  [x] Green/yellow  [] Green/black [] on ice  [] Foster Royal  [] on ice  [] Yellow  [] Red  [] Type/ Screen  [] ABG  [] VBG    [] COVID-19 swab    [] Rapid  [] PCR  [] Flu swab  [] Peds Viral Panel     [] Urine Sample  [] Fecal Sample  [] Pelvic Cultures  [] Blood Cultures  [] X 2  [] STREP Cultures         Chloe Ochoa RN  03/23/23 9485

## 2023-03-23 NOTE — ED NOTES
Nurse to nurse called to [facility] [place].       Miley Boone, RN  03/23/23 7116

## 2023-03-23 NOTE — ED NOTES
This RN tried to titrate patient's oxygen down to room air. Her SpO2 dropped to 90-92% so this RN placed her back on 2L n/c oxygen.      Maryjo Naqvi RN  03/23/23 1015

## 2023-03-23 NOTE — CONSULTS
HYSTERECTOMY (CERVIX STATUS UNKNOWN)      ILEOSTOMY OR JEJUNOSTOMY      done and reversed    THYROID SURGERY      partial thyroidectomy       Medications Prior to admit:  Prior to Admission medications    Medication Sig Start Date End Date Taking? Authorizing Provider   pantoprazole (PROTONIX) 40 MG tablet Take 1 tablet by mouth daily 10/8/22   Sumit De León MD   pregabalin (LYRICA) 75 MG capsule Take 75 mg by mouth 3 times daily. Historical Provider, MD   docusate sodium (COLACE) 100 MG capsule Take 100 mg by mouth daily    Historical Provider, MD   lansoprazole (PREVACID) 30 MG delayed release capsule Take 30 mg by mouth daily 9/17/22   Historical Provider, MD   atorvastatin (LIPITOR) 40 MG tablet Take 40 mg by mouth daily    Historical Provider, MD   aspirin EC 81 MG EC tablet Take 1 tablet by mouth 2 times daily for 28 days 5/1/21 5/29/21  Manjit Perez DO   acetaminophen (TYLENOL) 325 MG tablet Take 650 mg by mouth every 6 hours as needed for Pain    Historical Provider, MD   Liraglutide (VICTOZA) 18 MG/3ML SOPN SC injection Inject 1.8 mg into the skin daily     Historical Provider, MD   PARoxetine (PAXIL) 40 MG tablet Take 40 mg by mouth daily     Historical Provider, MD   glyBURIDE (DIABETA) 5 MG tablet Take 5 mg by mouth daily     Historical Provider, MD   metformin (GLUCOPHAGE) 500 MG tablet Take 1,000 mg by mouth 2 times daily (with meals).     9/8/22  Historical Provider, MD       Current Medications:    Current Facility-Administered Medications: acetaminophen (TYLENOL) tablet 650 mg, 650 mg, Oral, Q6H PRN  atorvastatin (LIPITOR) tablet 40 mg, 40 mg, Oral, Daily  docusate sodium (COLACE) capsule 100 mg, 100 mg, Oral, Daily  glyBURIDE (DIABETA) tablet 5 mg, 5 mg, Oral, Daily  pantoprazole (PROTONIX) tablet 40 mg, 40 mg, Oral, QAM AC  Liraglutide (VICTOZA) SC injection 1.8 mg, 1.8 mg, SubCUTAneous, Daily  enoxaparin (LOVENOX) injection 40 mg, 40 mg, SubCUTAneous, Daily  insulin lispro (HUMALOG) injection vial 0-4 Units, 0-4 Units, SubCUTAneous, TID WC  insulin lispro (HUMALOG) injection vial 0-4 Units, 0-4 Units, SubCUTAneous, Nightly  glucose chewable tablet 16 g, 4 tablet, Oral, PRN  dextrose bolus 10% 125 mL, 125 mL, IntraVENous, PRN **OR** dextrose bolus 10% 250 mL, 250 mL, IntraVENous, PRN  glucagon injection 1 mg, 1 mg, SubCUTAneous, PRN  dextrose 10 % infusion, , IntraVENous, Continuous PRN  ipratropium-albuterol (DUONEB) nebulizer solution 1 ampule, 1 ampule, Inhalation, Q4H WA    Allergies:  Daptomycin, Vancomycin, and Adhesive tape    Social History:    Social History     Socioeconomic History    Marital status:      Spouse name: Not on file    Number of children: Not on file    Years of education: Not on file    Highest education level: Not on file   Occupational History    Not on file   Tobacco Use    Smoking status: Never    Smokeless tobacco: Former   Vaping Use    Vaping Use: Not on file   Substance and Sexual Activity    Alcohol use: Yes     Comment: very seldom    Drug use: No    Sexual activity: Not on file   Other Topics Concern    Not on file   Social History Narrative    Not on file     Social Determinants of Health     Financial Resource Strain: Not on file   Food Insecurity: Not on file   Transportation Needs: Not on file   Physical Activity: Not on file   Stress: Not on file   Social Connections: Not on file   Intimate Partner Violence: Not on file   Housing Stability: Not on file       Family History:   History reviewed. No pertinent family history. REVIEW OF SYSTEMS:     Constitutional: Denies fevers, chills, night sweats, and fatigue  HEENT: Denies headaches, nose bleeds, and blurred vision,oral pain, abscess or lesion. Musculoskeletal: Denies pain to BLE with ambulation. Intermittent left lower extremity edema s/p surgery per patient.   Reports frequent falls  Neurological: Denies dizziness and lightheadedness, numbness and tingling  Cardiovascular: Denies behavior, not anxious. Left breast/chest: Extremely tender to palpation. She states that this exactly reproduces her symptoms. Patient seen and examined. Chart, labs & data reviewed. A:  Atypical chest discomfort as described above. Mildly elevated but flat pattern troponin. No acute ischemic changes on her ECG. Hypertension  Sleep apnea  GERD  Anemia  Diverticulitis resulting in colostomy  Diabetes      Rec:  Lexiscan Cardiolite stress test.  IV Toradol  Echocardiogram  Replace magnesium  Okay for discharge with cardiology if the stress test is negative. Electronically signed by Heena Fox DO on 3/23/2023 at 3:04 PM    Note: This report was completed using computerized voice recognition software. Every effort has been made to ensure accuracy, however; and invert and computerized transcription errors may be present.

## 2023-03-23 NOTE — PROGRESS NOTES
Nathalie Roche was ordered Liraglutide ([address]) which is a nonformulary medication. This medication will need to be supplied by the patient as the pharmacy does not carry this non-formulary medication. If the medication has not been administered by 1400 on the following day from the time the order was placed, a pharmacist will follow-up with the nurse of the patient to assess the capability of the patient to bring in the medication. If it is determined that the patient cannot supply the medication and it is not available to be dispensed from the pharmacy, the provider will be notified.

## 2023-03-23 NOTE — ED NOTES
Patient found 78% on RA. 4L NC applied and ER resident notified.       Bernardino Washington RN  03/23/23 [id number]

## 2023-03-23 NOTE — PROCEDURES
Following placement of an intravenous catheter 10 millicuries of technetium 99m sestamibi was administered followed by a saline flush. Approximately 45 minutes later resting SPECT images were obtained. After completion of resting images a regadenoson stress test was performed via a bolus injection of 0.4 milligrams of regadenason followed by a saline flush. Following regadenoson administration 30 millicuries of technetium 99m sestamibi was injected followed by repeat post stress SPECT imaging approximately 60 minutes post completion of administration. Baseline tracing demonstrates sinus rhythm and otherwise unremarkable. The patient experienced no anginal symptoms and remained hemodynamically stable during administration and no electrocardiographic changes were noted. Perfusion images pending.

## 2023-03-23 NOTE — CONSULTS
Pulmonary Consultation    Admit Date: 3/23/2023  Requesting Physician: Tra Soriano MD    CC:  hypoxia    HPI:  Harsh Meraz 78 y.o. female, life-long non-smoker, with PMH of HLD, HTN, diverticulitis, colostomy, type II diabetes mellitus, obesity, multiple falls, SOFI but non-compliant with CPAP who presented to the ED today with complaints of sudden onset chest pain located under her left breast that radiating to her shoulder and arm. Patient was given  and nitro per EMS. EKG new T wave inversion. While in ER she was found to be hypoxic 75% on room air and was placed on nasal cannula. Labs: HS troponin 29>25, K 5.6, Mg 1.5, ddimer < 200. Respiratory panel negative  CXR: bibasilar atelectasis  Cardiology consulted for chest paint. Pulmonary consulted for hypoxia    Patient seen on 2L NC pulse ox 99%. Patient denies any shortness of breath, cough or wheezing. She has no lung disease, is not on inhalers and never followed with pulmonology. She is somewhat a poor historian, noting possible history of DVT. She denies any fever, chills, known sick contacts. PMH:    Patient confirms history noted below but also notes hx of possible blood clot with a right groin stent? . Denies history of asthma.    Past Medical History:   Diagnosis Date    Arthritis     Colostomy in place [facility])     Diabetes mellitus ([facility] [address]     Diverticulitis     GERD (gastroesophageal reflux disease)     Hernia     History of blood transfusion     Hyperlipidemia     Hypertension      PSH:    Patient confirms history noted below   Past Surgical History:   Procedure Laterality Date    ABDOMEN SURGERY      DIVERTICULITIS    ANKLE FRACTURE SURGERY Left 5/1/2021    LEFT ANKLE OPEN REDUCTION INTERNAL FIXATION--SYNTHES performed by Ani Owen MD at [facility] 2012    HYSTERECTOMY (CERVIX STATUS UNKNOWN)      ILEOSTOMY OR JEJUNOSTOMY      done and reversed 163/87   Pulse 74   Temp 98.6 °F (37 °C) (Oral)   Resp 13   Ht 5' 6\" (1.676 m)   Wt 162 lb (73.5 kg)   SpO2 99%   BMI 26.15 kg/m²   24HR INTAKE/OUTPUT:  No intake or output data in the 24 hours ending 23 1305  CURRENT PULSE OXIMETRY:  SpO2: 99 %  24HR PULSE OXIMETRY RANGE:  SpO2  Av.9 %  Min: 75 %  Max: 100 %      EXAM:  General: No distress. 2L alert  Eyes: PERRL. No sclera icterus. No conjunctival injection. ENT: No discharge. Pharynx clear. Neck: Trachea midline. Normal thyroid. Resp: No accessory muscle use. Crackles bilateral lower lobes. No wheezing. No rhonchi. CV: Regular rate. Regular rhythm. +murmur  ABD: Non-tender. Non-distended. LLL colostomy Normal bowel sounds. Skin: Warm and dry. No nodule on exposed extremities. No rash on exposed extremities. Ext: No joint deformity. No clubbing. No cyanosis. No edema  Neuro: Awake. Follows commands      Lab Results:  CBC:   Recent Labs     23   WBC 8.4   HGB 10.6*   HCT 35.9   MCV 88.4        BMP:   Recent Labs     23      K 5.6*   CL 98   CO2 27   BUN 27*   CREATININE 0.8     LFT:   Recent Labs     23   ALKPHOS 70   ALT 11   AST 32*   PROT 6.8   BILITOT 0.3   BILIDIR <0.2   LABALBU 4.1     PT/INR: No results for input(s): PROTIME, INR in the last 72 hours. Cultures:  No results for input(s): CULTRESP in the last 72 hours. ABG:   No results for input(s): PH, PO2, PCO2, HCO3, BE, O2SAT in the last 72 hours. Films:  XR CHEST PORTABLE 3/23/2023  EXAMINATION: ONE XRAY VIEW OF THE CHEST 3/23/2023 1:55 am COMPARISON: 10/04/2022 HISTORY: ORDERING SYSTEM PROVIDED HISTORY: chest pain TECHNOLOGIST PROVIDED HISTORY: Reason for exam:->chest pain What reading provider will be dictating this exam?->CRC FINDINGS: The cardiomediastinal silhouette is within normal limits. There is bibasilar atelectasis. No pneumothorax or pleural effusion. No acute cardiopulmonary abnormality. Assessment/Plan:  Hypoxia   75% on room air while sleeping in ED. Likely due to untreated SOFI  Now on 2L NC 99%. Baseline is room air  Wean supplemental O2 as able to maintain pulse ox > 90%  Ambulatory pulse ox prior to discharge  Chest pain   Cardiology following  For nuclear stress   Echo pending  Atelectasis   Add incentive spirometry   Frequent falls  SOFI non-compliant   Obesity   Hypertension  Hyperlipidemia       Thank you for allowing us to see this patient in consultation. Please contact us with any questions. Electronically signed by Ellwood Pallas, APRN - CNP on 3/23/2023 at 1:05 PM     Seen and examined, agree with above. Meds, labs and imaging reviewed. Possible sofi and has hypoxia from bibasilar atelectasis. Just use IS and agree with OP PSG. Await completion of chest pain workup.

## 2023-03-24 VITALS
WEIGHT: 162 LBS | HEIGHT: 66 IN | RESPIRATION RATE: 16 BRPM | SYSTOLIC BLOOD PRESSURE: 129 MMHG | TEMPERATURE: 97.7 F | HEART RATE: 92 BPM | OXYGEN SATURATION: 95 % | DIASTOLIC BLOOD PRESSURE: 88 MMHG | BODY MASS INDEX: 26.03 KG/M2

## 2023-03-24 LAB
ANION GAP SERPL CALCULATED.3IONS-SCNC: 11 MMOL/L (ref 7–16)
BUN SERPL-MCNC: 21 MG/DL (ref 6–23)
CALCIUM SERPL-MCNC: 8.9 MG/DL (ref 8.6–10.2)
CHLORIDE SERPL-SCNC: 99 MMOL/L (ref 98–107)
CO2 SERPL-SCNC: 28 MMOL/L (ref 22–29)
CREAT SERPL-MCNC: 0.8 MG/DL (ref 0.5–1)
EKG ATRIAL RATE: 49 BPM
EKG P AXIS: 36 DEGREES
EKG P-R INTERVAL: 132 MS
EKG Q-T INTERVAL: 408 MS
EKG QRS DURATION: 84 MS
EKG QTC CALCULATION (BAZETT): 368 MS
EKG R AXIS: 12 DEGREES
EKG T AXIS: 24 DEGREES
EKG VENTRICULAR RATE: 49 BPM
ERYTHROCYTE [DISTWIDTH] IN BLOOD BY AUTOMATED COUNT: 15.7 FL (ref 11.5–15)
GLUCOSE SERPL-MCNC: 106 MG/DL (ref 74–99)
HCT VFR BLD AUTO: 36.2 % (ref 34–48)
HGB BLD-MCNC: 10.8 G/DL (ref 11.5–15.5)
LV EF: 60 %
LVEF MODALITY: NORMAL
MAGNESIUM SERPL-MCNC: 2.5 MG/DL (ref 1.6–2.6)
MCH RBC QN AUTO: 26.5 PG (ref 26–35)
MCHC RBC AUTO-ENTMCNC: 29.8 % (ref 32–34.5)
MCV RBC AUTO: 88.7 FL (ref 80–99.9)
METER GLUCOSE: 121 MG/DL (ref 74–99)
METER GLUCOSE: 125 MG/DL (ref 74–99)
METER GLUCOSE: 93 MG/DL (ref 74–99)
PLATELET # BLD AUTO: 150 E9/L (ref 130–450)
PMV BLD AUTO: 10.5 FL (ref 7–12)
POTASSIUM SERPL-SCNC: 4.5 MMOL/L (ref 3.5–5)
RBC # BLD AUTO: 4.08 E12/L (ref 3.5–5.5)
SODIUM SERPL-SCNC: 138 MMOL/L (ref 132–146)
WBC # BLD: 7.3 E9/L (ref 4.5–11.5)

## 2023-03-24 PROCEDURE — 2700000000 HC OXYGEN THERAPY PER DAY

## 2023-03-24 PROCEDURE — 6370000000 HC RX 637 (ALT 250 FOR IP): Performed by: FAMILY MEDICINE

## 2023-03-24 PROCEDURE — 36415 COLL VENOUS BLD VENIPUNCTURE: CPT

## 2023-03-24 PROCEDURE — 97535 SELF CARE MNGMENT TRAINING: CPT

## 2023-03-24 PROCEDURE — 82962 GLUCOSE BLOOD TEST: CPT

## 2023-03-24 PROCEDURE — 80048 BASIC METABOLIC PNL TOTAL CA: CPT

## 2023-03-24 PROCEDURE — 6360000002 HC RX W HCPCS: Performed by: FAMILY MEDICINE

## 2023-03-24 PROCEDURE — 97165 OT EVAL LOW COMPLEX 30 MIN: CPT

## 2023-03-24 PROCEDURE — 6370000000 HC RX 637 (ALT 250 FOR IP): Performed by: INTERNAL MEDICINE

## 2023-03-24 PROCEDURE — 99231 SBSQ HOSP IP/OBS SF/LOW 25: CPT | Performed by: INTERNAL MEDICINE

## 2023-03-24 PROCEDURE — 83735 ASSAY OF MAGNESIUM: CPT

## 2023-03-24 PROCEDURE — 97161 PT EVAL LOW COMPLEX 20 MIN: CPT

## 2023-03-24 PROCEDURE — 93010 ELECTROCARDIOGRAM REPORT: CPT | Performed by: INTERNAL MEDICINE

## 2023-03-24 PROCEDURE — 93306 TTE W/DOPPLER COMPLETE: CPT

## 2023-03-24 PROCEDURE — 85027 COMPLETE CBC AUTOMATED: CPT

## 2023-03-24 PROCEDURE — 97530 THERAPEUTIC ACTIVITIES: CPT

## 2023-03-24 PROCEDURE — 94640 AIRWAY INHALATION TREATMENT: CPT

## 2023-03-24 RX ORDER — LOSARTAN POTASSIUM 25 MG/1
25 TABLET ORAL DAILY
Qty: 30 TABLET | Refills: 3 | Status: SHIPPED | OUTPATIENT
Start: 2023-03-25

## 2023-03-24 RX ORDER — METOPROLOL SUCCINATE 25 MG/1
25 TABLET, EXTENDED RELEASE ORAL DAILY
Status: DISCONTINUED | OUTPATIENT
Start: 2023-03-24 | End: 2023-03-24

## 2023-03-24 RX ORDER — LOSARTAN POTASSIUM 25 MG/1
25 TABLET ORAL DAILY
Status: DISCONTINUED | OUTPATIENT
Start: 2023-03-24 | End: 2023-03-24 | Stop reason: HOSPADM

## 2023-03-24 RX ORDER — ACETAMINOPHEN 325 MG/1
650 TABLET ORAL EVERY 6 HOURS PRN
Qty: 120 TABLET | Refills: 3 | Status: SHIPPED | OUTPATIENT
Start: 2023-03-24

## 2023-03-24 RX ORDER — ATORVASTATIN CALCIUM 40 MG/1
40 TABLET, FILM COATED ORAL NIGHTLY
Qty: 30 TABLET | Refills: 3 | Status: SHIPPED | OUTPATIENT
Start: 2023-03-24

## 2023-03-24 RX ADMIN — GLIPIZIDE 5 MG: 5 TABLET ORAL at 09:50

## 2023-03-24 RX ADMIN — IPRATROPIUM BROMIDE AND ALBUTEROL SULFATE 1 AMPULE: .5; 2.5 SOLUTION RESPIRATORY (INHALATION) at 15:52

## 2023-03-24 RX ADMIN — LOSARTAN POTASSIUM 25 MG: 25 TABLET, FILM COATED ORAL at 09:50

## 2023-03-24 RX ADMIN — DOCUSATE SODIUM 100 MG: 100 CAPSULE, LIQUID FILLED ORAL at 09:50

## 2023-03-24 RX ADMIN — IPRATROPIUM BROMIDE AND ALBUTEROL SULFATE 1 AMPULE: .5; 2.5 SOLUTION RESPIRATORY (INHALATION) at 08:13

## 2023-03-24 RX ADMIN — ACETAMINOPHEN 650 MG: 325 TABLET ORAL at 02:29

## 2023-03-24 RX ADMIN — IPRATROPIUM BROMIDE AND ALBUTEROL SULFATE 1 AMPULE: .5; 2.5 SOLUTION RESPIRATORY (INHALATION) at 12:11

## 2023-03-24 RX ADMIN — ENOXAPARIN SODIUM 40 MG: 100 INJECTION SUBCUTANEOUS at 09:50

## 2023-03-24 ASSESSMENT — ENCOUNTER SYMPTOMS
ABDOMINAL PAIN: 0
SHORTNESS OF BREATH: 0

## 2023-03-24 ASSESSMENT — PAIN DESCRIPTION - DESCRIPTORS: DESCRIPTORS: ACHING

## 2023-03-24 ASSESSMENT — PAIN DESCRIPTION - LOCATION: LOCATION: BACK

## 2023-03-24 ASSESSMENT — PAIN DESCRIPTION - ORIENTATION: ORIENTATION: LOWER

## 2023-03-24 ASSESSMENT — PAIN SCALES - GENERAL
PAINLEVEL_OUTOF10: 7
PAINLEVEL_OUTOF10: 0

## 2023-03-24 NOTE — DISCHARGE SUMMARY
03/23/2023  Value: 0.6         Ref range: 0.0 - 5.0 %        Status: Final  Lymphocytes %                                 Date: 03/23/2023  Value: 32.1        Ref range: 20.0 - 42.0 %      Status: Final  Monocytes %                                   Date: 03/23/2023  Value: 8.3         Ref range: 2.0 - 12.0 %       Status: Final  Eosinophils %                                 Date: 03/23/2023  Value: 5.7         Ref range: 0.0 - 6.0 %        Status: Final  Basophils %                                   Date: 03/23/2023  Value: 0.5         Ref range: 0.0 - 2.0 %        Status: Final  Neutrophils Absolute                          Date: 03/23/2023  Value: 4.43        Ref range: 1.80 - 7.30 E9/L   Status: Final  Immature Granulocytes #                       Date: 03/23/2023  Value: 0.05        Ref range: E9/L               Status: Final  Lymphocytes Absolute                          Date: 03/23/2023  Value: 2.70        Ref range: 1.50 - 4.00 E9/L   Status: Final  Monocytes Absolute                            Date: 03/23/2023  Value: 0.70        Ref range: 0.10 - 0.95 E9/L   Status: Final  Eosinophils Absolute                          Date: 03/23/2023  Value: 0.48        Ref range: 0.05 - 0.50 E9/L   Status: Final  Basophils Absolute                            Date: 03/23/2023  Value: 0.04        Ref range: 0.00 - 0.20 E9/L   Status: Final  Sodium                                        Date: 03/23/2023  Value: 135         Ref range: 132 - 146 mmol/L   Status: Final  Potassium                                     Date: 03/23/2023  Value: 5.6 (A)     Ref range: 3.5 - 5.0 mmol/L   Status: Final  Chloride                                      Date: 03/23/2023  Value: 98          Ref range: 98 - 107 mmol/L    Status: Final  CO2                                           Date: 03/23/2023  Value: 27          Ref range: 22 - 29 mmol/L     Status: Final  Anion Gap                                     Date: 03/23/2023  Value: 10 Date: 03/23/2023  Value: 3.660       Ref range: 0.270 - 4.200 uI*  Status: Final  Meter Glucose                                 Date: 03/23/2023  Value: 91          Ref range: 74 - 99 mg/dL      Status: Final  Sodium                                        Date: 03/23/2023  Value: 140         Ref range: 132 - 146 mmol/L   Status: Final  Potassium                                     Date: 03/23/2023  Value: 3.5         Ref range: 3.5 - 5.0 mmol/L   Status: Final  Chloride                                      Date: 03/23/2023  Value: 109 (A)     Ref range: 98 - 107 mmol/L    Status: Final  CO2                                           Date: 03/23/2023  Value: 22          Ref range: 22 - 29 mmol/L     Status: Final  Anion Gap                                     Date: 03/23/2023  Value: 9           Ref range: 7 - 16 mmol/L      Status: Final  Glucose                                       Date: 03/23/2023  Value: 48 (A)      Ref range: 74 - 99 mg/dL      Status: Final  BUN                                           Date: 03/23/2023  Value: 20          Ref range: 6 - 23 mg/dL       Status: Final  Creatinine                                    Date: 03/23/2023  Value: 0.7         Ref range: 0.5 - 1.0 mg/dL    Status: Final  Est, Glom Filt Rate                           Date: 03/23/2023  Value: >60         Ref range: >=60 mL/min/1.73   Status: Final                Comment: Pediatric calculator link  [email or web address]. org/professionals/kdoqi/gfr_calculatorped  Effective Oct 3, 2022  These results are not intended for use in patients  <25years of age. eGFR results are calculated without  a race factor using the 2021 CKD-EPI equation. Careful  clinical correlation is recommended, particularly when  comparing to results calculated using previous equations.   The CKD-EPI equation is less accurate in patients with  extremes of muscle mass, extra-renal metabolism of  creatinine, excessive creatinine ingestion, or Date: 03/24/2023  Value: 10.8 (A)    Ref range: 11.5 - 15.5 g/dL   Status: Final  Hematocrit                                    Date: 03/24/2023  Value: 36.2        Ref range: 34.0 - 48.0 %      Status: Final  MCV                                           Date: 03/24/2023  Value: 88.7        Ref range: 80.0 - 99.9 fL     Status: Final  MCH                                           Date: 03/24/2023  Value: 26.5        Ref range: 26.0 - 35.0 pg     Status: Final  MCHC                                          Date: 03/24/2023  Value: 29.8 (A)    Ref range: 32.0 - 34.5 %      Status: Final  RDW                                           Date: 03/24/2023  Value: 15.7 (A)    Ref range: 11.5 - 15.0 fL     Status: Final  Platelets                                     Date: 03/24/2023  Value: 150         Ref range: 130 - 450 E9/L     Status: Final  MPV                                           Date: 03/24/2023  Value: 10.5        Ref range: 7.0 - 12.0 fL      Status: Final  Sodium                                        Date: 03/24/2023  Value: 138         Ref range: 132 - 146 mmol/L   Status: Final  Potassium                                     Date: 03/24/2023  Value: 4.5         Ref range: 3.5 - 5.0 mmol/L   Status: Final  Chloride                                      Date: 03/24/2023  Value: 99          Ref range: 98 - 107 mmol/L    Status: Final  CO2                                           Date: 03/24/2023  Value: 28          Ref range: 22 - 29 mmol/L     Status: Final  Anion Gap                                     Date: 03/24/2023  Value: 11          Ref range: 7 - 16 mmol/L      Status: Final  Glucose                                       Date: 03/24/2023  Value: 106 (A)     Ref range: 74 - 99 mg/dL      Status: Final  BUN                                           Date: 03/24/2023  Value: 21          Ref range: 6 - 23 mg/dL       Status: Final  Creatinine

## 2023-03-24 NOTE — PROGRESS NOTES
PROGRESS NOTE     CARDIOLOGY    Chief complaint: Seen today for follow up, management & recommendations for chest pain    She was seen earlier this morning. She states that her discomfort from yesterday is nearly resolved but still denies chest pain or shortness of breath today. Wt Readings from Last 3 Encounters:   03/23/23 162 lb (73.5 kg)   10/04/22 162 lb (73.5 kg)   08/28/22 179 lb (81.2 kg)     Temp Readings from Last 3 Encounters:   03/24/23 97.4 °F (36.3 °C) (Oral)   10/07/22 98.9 °F (37.2 °C)   09/08/22 96.8 °F (36 °C) (Temporal)     BP Readings from Last 3 Encounters:   03/24/23 (!) 160/76   10/07/22 133/81   09/08/22 134/85     Pulse Readings from Last 3 Encounters:   03/24/23 81   10/07/22 94   09/08/22 93         Intake/Output Summary (Last 24 hours) at 3/24/2023 1620  Last data filed at 3/24/2023 0951  Gross per 24 hour   Intake 0 ml   Output --   Net 0 ml       Recent Labs     03/23/23 0225 03/24/23  0603   WBC 8.4 7.3   HGB 10.6* 10.8*   HCT 35.9 36.2   MCV 88.4 88.7    150     Recent Labs     03/23/23 0225 03/23/23  0352 03/23/23  1337 03/24/23  0603     --  140 138   K 5.6*  --  3.5 4.5   CL 98  --  109* 99   CO2 27  --  22 28   BUN 27*  --  20 21   CREATININE 0.8  --  0.7 0.8   MG  --  1.5*  --  2.5     No results for input(s): PROTIME, INR in the last 72 hours. No results for input(s): CKTOTAL, CKMB, CKMBINDEX, TROPONINI in the last 72 hours. No results for input(s): BNP in the last 72 hours.   Recent Labs     03/23/23  0352   CHOL 95   HDL 42   TRIG 63     Recent Labs     03/23/23 0225 03/23/23  0352   TROPHS 29* 25*         losartan (COZAAR) tablet 25 mg, Daily  acetaminophen (TYLENOL) tablet 650 mg, Q6H PRN  atorvastatin (LIPITOR) tablet 40 mg, Nightly  docusate sodium (COLACE) capsule 100 mg, Daily  glipiZIDE (GLUCOTROL) tablet 5 mg, Daily  pantoprazole (PROTONIX) tablet 40 mg, QAM AC  Liraglutide (VICTOZA) SC injection 1.8 mg (Patient Supplied), Daily  enoxaparin (LOVENOX) injection 40 mg, Daily  insulin lispro (HUMALOG) injection vial 0-4 Units, TID WC  insulin lispro (HUMALOG) injection vial 0-4 Units, Nightly  glucose chewable tablet 16 g, PRN  dextrose bolus 10% 125 mL, PRN   Or  dextrose bolus 10% 250 mL, PRN  glucagon injection 1 mg, PRN  dextrose 10 % infusion, Continuous PRN  ipratropium-albuterol (DUONEB) nebulizer solution 1 ampule, Q4H WA  perflutren lipid microspheres (DEFINITY) injection 1.5 mL, ONCE PRN        Review of systems:     Heart: as above   Lungs: as above   Eyes: denies changes in vision or discharge. Ears: denies changes in hearing or pain. Nose: denies epistaxis or masses   Throat: denies sore throat or trouble swallowing. Neuro: denies numbness, tingling, tremors. Skin: denies rashes or itching. : denies hematuria, dysuria   GI: denies vomiting, diarrhea   Psych: denies mood changed, anxiety, depression. Physical exam:    Constitutional: A&O x3, communicates well, no acute distress. Eyes: extraocular muscles intact, PERRL. Normal lids & conjunctiva. No icterus. ENT: clear, no bleeding. No external masses. Lips normal formation. Neck: supple, full ROM, no JVD, no bruits, no lymphadenopathy. No masses. trachea midline. Heart: regular rate & rhythm, normal S1 & S2, no abnormal murmurs. No heave. Lungs: CTA. No accessory muscles. Abd: soft, non-tender. Normal bowel sounds. Neuro: Full ROM X 4, EOMI, no tremors. EXT: No bilateral lower extremity edema  Skin: warm, dry, intact. Good turgor. Psych: A&O x 3, normal behavior, not anxious. Assessment/Recommendations  Atypical chest discomfort. For stress test today. 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 Every effort has been made to ensure accuracy, however; and invert and computerized transcription errors may be present.

## 2023-03-24 NOTE — PROGRESS NOTES
PULSE OX ON ROOM AIR SITTING_96___%   PULSE OX ON ROOM AIR AMBULATING ___90_%   PULSE OX ON ROOM AIR RECOVERY __96_%

## 2023-03-24 NOTE — PROGRESS NOTES
Notified Dr. Genevieve Borges via perfect serve that Cardiology and pulmonology were ok for discharge on patient.

## 2023-03-24 NOTE — PROGRESS NOTES
Shena serve to Dr. Rafael Davis regarding patients diet order. Patient NPO at this time. Physician stated that patient was ok to have sips of water with medications.

## 2023-03-24 NOTE — PROGRESS NOTES
Spoke to patients  and daughter about patients discharge per Dr. Yoel Fair order. Family stated they would be coming to pick patient up tonight. Will continue to monitor up to discharge.

## 2023-03-24 NOTE — PROGRESS NOTES
Pulmonary Progress Note    Admit Date: 3/23/2023                            PCP: Rita Dominguez MD  Principal Problem:    Acute hypoxemic respiratory failure [facility])  Active Problems:    Chest pain  Resolved Problems:    * No resolved hospital problems. *      Subjective:  Patient seen on room air, pulse ox 95%  RT at bedside   Patient denies SOB, cough, wheezing or chest pain  She did admit to recently falling on her L arm/side, her L sided CP is reproducible with palpation     Medications:   dextrose          losartan  25 mg Oral Daily    atorvastatin  40 mg Oral Nightly    docusate sodium  100 mg Oral Daily    glipiZIDE  5 mg Oral Daily    pantoprazole  40 mg Oral QAM AC    Liraglutide  1.8 mg SubCUTAneous Daily    enoxaparin  40 mg SubCUTAneous Daily    insulin lispro  0-4 Units SubCUTAneous TID WC    insulin lispro  0-4 Units SubCUTAneous Nightly    ipratropium-albuterol  1 ampule Inhalation Q4H WA       Vitals:  VITALS:  BP (!) 160/76   Pulse 81   Temp 97.4 °F (36.3 °C) (Oral)   Resp 16   Ht 5' 6\" (1.676 m)   Wt 162 lb (73.5 kg)   SpO2 95%   BMI 26.15 kg/m²   24HR INTAKE/OUTPUT:    Intake/Output Summary (Last 24 hours) at 3/24/2023 1312  Last data filed at 3/24/2023 0951  Gross per 24 hour   Intake 0 ml   Output --   Net 0 ml     CURRENT PULSE OXIMETRY:  SpO2: 95 %  24HR PULSE OXIMETRY RANGE:  SpO2  Av %  Min: 93 %  Max: 100 %  CVP:    VENT SETTINGS:      Additional Respiratory Assessments  Heart Rate: 81  Resp: 16  SpO2: 95 %      EXAM:  General: No distress. Room air   Eyes: No sclera icterus. No conjunctival injection. ENT: No discharge. Pharynx clear. Neck: Trachea midline. Normal thyroid. Resp: No accessory muscle use. Crackles bilateral lower lobes. No wheezing. No rhonchi. CV: Regular rate. Regular rhythm. +murmur  ABD: Non-tender. Non-distended. LLL colostomy Normal bowel sounds. Skin: Warm and dry. No nodule on exposed extremities. No rash on exposed extremities.    Ext: No joint cavity size was noted to be normal both on the post regadenoson and resting images. Rotational analog analysis demonstrated no evidence of motion artifact with attenuation from the patient's left upper extremity remaining at her side during images as well as that of hepatic uptake adjacent to myocardial segments. The gated SPECT stress imaging in the short, vertical long, and horizontal long axis demonstrated normal homogeneous tracer distribution throughout the myocardium normal both on the post regadenoson and resting images. Gated SPECT left ventricular ejection fraction was calculated to be 79% with no regional wall motion abnormalities. The myocardial perfusion imaging was normal. Overall left ventricular systolic function was normal with no regional wall motion abnormalities. Low risk pharmacologic myocardial perfusion imaging study. Assessment/Plan:  78 y.o. female, life-long non-smoker, with PMH of HLD, HTN, diverticulitis, colostomy, type II diabetes mellitus, obesity, multiple falls, SOFI but non-compliant with CPAP   3/23 ED today with complaints of sudden onset chest pain located under her left breast that radiating to her shoulder and arm. Patient was given  and nitro per EMS. EKG new T wave inversion. While in ER she was found to be hypoxic 75% on room air and was placed on nasal cannula  3/24: room air, 95%    Hypoxia - resolved  75% on room air while sleeping in ED. Likely due to untreated SOFI  Now on 2L NC 99%.  Baseline is room air  Wean supplemental O2 as able to maintain pulse ox > 90%  Ambulatory pulse ox prior to discharge, ordered   Chest pain   Cardiology following  Nuclear stress 3/23, normal   Echo pending  CP reproducible with palpation on L side, patient reports recent fall landing on L side   Atelectasis   Add incentive spirometry   Frequent falls with recent fall on L side  SOFI non-compliant - dx 15+ years ago  Recommended PSG as outpatient   Obesity Hypertension  Hyperlipidemia     Patient is stable from a pulmonary standpoint. Follow up within 4 weeks for SOFI    Electronically signed by SARA Strange CNP on 3/24/2023 at 1:12 PM       Seen and examined, agree with above. Off O2 and does not qualify for home. No resp complaints probably just had mild atelectasis after fall. Okay to dc and will see as needed.

## 2023-03-24 NOTE — PROGRESS NOTES
Comprehensive Nutrition Assessment    Type and Reason for Visit:  Initial, Positive Nutrition Screen    Nutrition Recommendations/Plan:   Continue NPO, ADAT & will monitor for nutrition progression     Nutrition Assessment:    Pt w/ chest pain s/p cardiac stress test, note hypoxia resolved. Hx DM, diverticulitis w/ colostomy. Currently NPO, ADAT & will monitor for nutrition progression. Nutrition Related Findings:    A&O, I&Os unavailable per doc flow, no edema, abd WDL, colostomy Wound Type: None       Current Nutrition Intake & Therapies:    Average Meal Intake: NPO (pt asking for diet)  Average Supplements Intake: NPO  Diet NPO    Anthropometric Measures:  Height: 5' 6\" (167.6 cm)  Ideal Body Weight (IBW): 130 lbs (59 kg)       Current Body Weight: 162 lb (73.5 kg), 124.6 % IBW. Weight Source: Other (Comment) (3/23 estimated, UTO updated CBW at this time)  Current BMI (kg/m2): 26.2  Usual Body Weight: 173 lb (78.5 kg) (3/28/22 actual per EMR OV)  % Weight Change (Calculated): -6.4  Weight Adjustment For: No Adjustment                 BMI Categories: Overweight (BMI 25.0-29. 9)    Estimated Daily Nutrient Needs:  Energy Requirements Based On: Formula  Weight Used for Energy Requirements: Current  Energy (kcal/day):   Weight Used for Protein Requirements: Ideal  Protein (g/day): 70-80 (1.2-1.4)  Method Used for Fluid Requirements: 1 ml/kcal  Fluid (ml/day):     Nutrition Diagnosis:   No nutrition diagnosis at this time     Nutrition Interventions:   Food and/or Nutrient Delivery: Continue NPO (ADAT)  Nutrition Education/Counseling: No recommendation at this time  Coordination of Nutrition Care: Continue to monitor while inpatient       Goals:     Goals: Initiate PO diet, by next RD assessment       Nutrition Monitoring and Evaluation:      Food/Nutrient Intake Outcomes: Diet Advancement/Tolerance  Physical Signs/Symptoms Outcomes: Biochemical Data, GI Status, Fluid Status or Edema, Nutrition Focused Physical Findings, Skin, Weight    Discharge Planning:     Too soon to determine     Magali Castro RD, LD  Contact: [phone]

## 2023-03-24 NOTE — PROGRESS NOTES
Occupational Therapy  OCCUPATIONAL THERAPY INITIAL EVALUATION     [address] [address] [address]         Date:3/24/2023                                                  Patient Name: Leonor Beltran    MRN: [id number]    : 1940    Room: 5885/0459-      Evaluating OT: Cy Rea OTR/L [id number]       Referring Chacha Maurice MD    Specific Provider Orders/Date:  OT eval and treat 3/24/23       Diagnosis: Chest Pain      Surgery: none      Pertinent Medical History: DM, Arthritis, HTN           Precautions:  Fall Risk,      Assessment of current deficits    [x] Functional mobility  [x]ADLs  [x] Strength               []Cognition    [x] Functional transfers   [x] IADLs         [] Safety Awareness   [x]Endurance    [] Fine Coordination              [x] Balance      [] Vision/perception   []Sensation     []Gross Motor Coordination  [] ROM  [] Delirium                   [] Motor Control     OT PLAN OF CARE   OT POC based on physician orders, patient diagnosis and results of clinical assessment    Frequency/Duration 1-3 days/wk for 5-7 days  PRN   Specific OT Treatment Interventions to include:   * Instruction/training on adapted ADL techniques and AE recommendations to increase functional independence within precautions       * Training on energy conservation strategies, correct breathing pattern and techniques to improve independence/tolerance for self-care routine  * Functional transfer/mobility training/DME recommendations for increased independence, safety, and fall prevention  * Patient/Family education to increase follow through with safety techniques and functional independence  * Recommendation of environmental modifications for increased safety with functional transfers/mobility and ADLs  * Therapeutic exercise to improve motor endurance, ROM, and functional strength for ADLs/functional transfers  * Therapeutic activities to facilitate/challenge dynamic balance, stand tolerance for increased safety and independence with ADLs      Recommended Adaptive Equipment:  TBD     Home Living: Pt lives with  and daughter in a 1 story home with 3 entry steps and a handrail. Bathroom setup: walk in shower    Equipment owned: ww, shower seat, BSC     Prior Level of Function: pt needed some assist  with ADLs , assist with IADLs; ambulated  ww   Driving: pt does not drive   Occupation: pt is retired     Pain Level: mild chest pain   Cognition: A&O: 4/4; Follows multi  step directions   Memory:  good    Sequencing:  good    Problem solving:  good    Judgement/safety:  Fair+     Functional Assessment:  AM-PAC Daily Activity Raw Score: 19/24   Initial Eval Status  Date: 3/24/23  Treatment Status  Date: STGs = LTGs  Time frame: 5-7 days   Feeding Independent      Grooming Contact Guard Assist   Modified [place]    UB Dressing Stand by Assist   Independent    LB Dressing Moderate Assist   Minimal Assist    Bathing NT      Toileting Minimal Assist   Stand by Assist    Bed Mobility  Supine to sit: Stand by Assist   Sit to supine: Stand by Assist   Supine to sit: Independent   Sit to supine: Independent    Functional Transfers Minimal Assist with ww   Modified [place] with ww    Functional Mobility Minimal Assist   Modified [place] with ww    Balance Sitting:     Static:  Independent     Dynamic:SBA  Standing: Min A      Activity Tolerance Good      Visual/  Perceptual Glasses: reading                 Hand Dominance right    AROM (PROM) Strength Additional Info:    RUE  WFL 4/5 good  and wfl FMC/dexterity noted during ADL tasks         LUE WFL   4/5 good  and wfl FMC/dexterity noted during ADL tasks         Hearing: WFL   Sensation:  No c/o numbness or tingling   Tone: WFL   Edema: none     Comments: Upon arrival patient was supine in bed and agreeable to OT eval/tx .    At end of session, patient was returned to supine in bed  with call light and phone within reach, all lines and tubes intact. Overall patient demonstrated  decreased independence and safety during completion of ADL/functional transfer/mobility tasks. Pt would benefit from continued skilled OT to increase safety and independence with completion of ADL/IADL tasks for functional independence and quality of life. Treatment: OT treatment provided this date includes:   Instruction/training on safety and adapted techniques for completion of ADLs: Instruction/training on safe functional mobility/transfer techniques: Instruction/training on energy conservation/work simplification for completion of ADLs:.      Rehab Potential: Good  for established goals     Patient / Family Goal: go home today       Patient and/or family were instructed on functional diagnosis, prognosis/goals and OT plan of care. Demonstrated good understanding. Eval Complexity: Low    Time In: 1330   Time Out: 1355  Total Treatment Time: 25    Min Units   OT Eval Low 11165  10  1   OT Eval Medium 03278      OT Eval High 79071      OT Re-Eval [id number]       Therapeutic Ex 13999       Therapeutic Activities 56590       ADL/Self Care 52778  15  1   Orthotic Management 56894       Manual 10412     Neuro Re-Ed 06598       Non-Billable Time          Evaluation Time additionally includes thorough review of current medical information, gathering information on past medical history/social history and prior level of function, interpretation of standardized testing/informal observation of tasks, assessment of data and development of plan of care and goals.           Jace Lira OTR/L [id number]

## 2023-03-24 NOTE — PROGRESS NOTES
Physical Therapy    Initial Assessment     Name: Fely Tripathi  : 1940  MRN: [id number]      Date of Service: 3/24/2023    Evaluating PT: Yuli Rebolledo PT, DPT [id number]      Room #:  3483/1083-B  Diagnosis:  Chest pain [R07.9]  Acute respiratory failure with hypoxia (HCC) [J96.01]  Acute hypoxemic respiratory failure (HCC) [J96.01]  Chest pain, unspecified type [R07.9]  PMHx/PSHx:   has a past medical history of Arthritis, Colostomy in place [facility]), Diabetes mellitus ([facility] [address] Diverticulitis, GERD (gastroesophageal reflux disease), Hernia, History of blood transfusion, Hyperlipidemia, and Hypertension. Precautions:  Fall Risk    SUBJECTIVE:    Pt lives with  and daughter in a single story house with 3 stair(s) and 1 rail(s) to enter. Pt ambulated with [facility] prior to admission. OBJECTIVE:   Initial Evaluation  Date: 3/24/23 Treatment Date: Short Term/ Long Term   Goals   AM-PAC 6 Clicks 48/48     Was pt agreeable to Eval/treatment? Yes     Does pt have pain? 6/10 HA pain     Bed Mobility  Rolling: NT  Supine to sit: SBA  Sit to supine: SBA  Scooting: SBA to EOB  Rolling: Independent   Supine to sit: Independent   Sit to supine: Independent   Scooting: Independent    Transfers Sit to stand: Min A  Stand to sit: Min A  Stand pivot: Min A with [facility]  Sit to stand: Independent   Stand to sit: Independent   Stand pivot: Supervision with [facility]   Ambulation   50 feet with [facility] with Min A  >200 feet with [facility] with Supervision   Stair negotiation: ascended and descended NT  3 step(s) with 1 rail(s) with Supervision   ROM BUE: Refer to OT note  BLE: WFL     Strength BUE: Refer to OT note  BLE: WFL     Balance Sitting EOB: SBA  Dynamic Standing: SBA with [facility]  Sitting EOB: Independent   Dynamic Standing: Mod Independent with [facility]     Pt is A & O x: 4 to person, place, month/year, and situation. Sensation: Pt denies numbness and tingling of extremities. Edema: Unremarkable.     Patient education  Pt educated on PT role in acute care setting. Patient response to education:   Pt verbalized understanding Pt demonstrated skill Pt requires further education in this area   Yes NA No      ASSESSMENT:    Conditions Requiring Skilled Therapeutic Intervention:    [x]Decreased strength     []Decreased ROM  [x]Decreased functional mobility  [x]Decreased balance   [x]Decreased endurance   []Decreased posture  []Decreased sensation  []Decreased coordination   []Decreased vision  []Decreased safety awareness   []Increased pain       Comments:    Pt was in bed upon room entry; agreeable to PT evaluation. Pt ambulated around room with WW. Gait was slow but steady. Pt ambulated back to chair and was seated. Echo arrived and requested pt be returned to bed. Pt was assisted back to bed. O2 sat was 96% on RA with all activity. Pt was left in bed with all needs met at conclusion of session. Treatment:  Patient practiced and was instructed in the following treatment:    Therapeutic activities:  Bed mobility: Pt was cued for technique during bed mobility transfers. Transfers: Pt was cued for hand placement during sit <> stand transfers. Pt completed multiple transfers from various surfaces (EOB x1, chair x1). Ambulation: Pt ambulated short distance with [employer]. Pt was cued for [employer] technique and safety. Vitals and symptoms were closely monitored throughout session. Skillful positioning in bed to protect skin/joint integrity. Pt's/family goals:  1. To return home. Prognosis is Fair for reaching above PT goals. Patient and or family understand(s) diagnosis, prognosis, and plan of care. Yes.     PHYSICAL THERAPY PLAN OF CARE:    PT POC is established based on physician order and patient diagnosis     Referring provider/PT Order:    Start   Ordering Provider    03/24/23 1000  PT eval and treat  Start:  03/24/23 1000,   End:  03/24/23 1000,   ONE TIME,   Standing Count:  1 Occurrences,   Natty Delcid MD      Diagnosis:  Chest pain [R07.9]  Acute respiratory failure with hypoxia (HCC) [J96.01]  Acute hypoxemic respiratory failure (HCC) [J96.01]  Chest pain, unspecified type [R07.9]  Specific instructions for next treatment:  Progress activity. Current Treatment Recommendations:     [x] Strengthening to improve independence with functional mobility   [] ROM to improve independence with functional mobility   [x] Balance Training to improve static/dynamic balance and to reduce fall risk  [x] Endurance Training to improve activity tolerance during functional mobility   [x] Transfer Training to improve safety and independence with all functional transfers   [x] Gait Training to improve gait mechanics, endurance and assess need for appropriate assistive device  [x] Stair Training in preparation for safe discharge home and/or into the community   [] Positioning to prevent skin breakdown and contractures  [x] Safety and Education Training   [] Patient/Caregiver Education   [] HEP  [] Other     PT long term treatment goals are located in above grid    Frequency of treatments: 2-5x/week x 1-2 weeks. Time in  1030  Time out  1055    Total Treatment Time  10 minutes     Evaluation Time includes thorough review of current medical information, gathering information on past medical history/social history and prior level of function, completion of standardized testing/informal observation of tasks, assessment of data and education on plan of care and goals.     CPT codes:  [x] Low Complexity PT evaluation 03503  [] Moderate Complexity PT evaluation 19676  [] High Complexity PT evaluation 36216  [] PT Re-evaluation 66790  [] Gait training 70511 0 minutes  [] Manual therapy 65009 0 minutes  [x] Therapeutic activities 24914 10 minutes  [] Therapeutic exercises 27004 0 minutes  [] Neuromuscular reeducation 19896 0 minutes     Shawna Felix, PT, DPT  [id number]

## 2023-03-24 NOTE — PROGRESS NOTES
Shena serve to Dr. Shira Joiner about patients pharmacy change for discharge medications. Physician stated he will call pharmacy. Patient being discharged home with family.

## 2023-03-24 NOTE — PROGRESS NOTES
Progress Note  Date:3/24/2023       VRBA:2242/0299-U  Patient Purcell Meigs     Date of Birth:[birth year]     Age:83 y.o. Patient says she feels well today. Subjective    Subjective:  Symptoms:  No shortness of breath or chest pain. Diet:  Adequate intake. Activity level: Normal.    Pain:  She reports no pain. Review of Systems   Constitutional:  Negative for activity change and fever. Respiratory:  Negative for shortness of breath. Cardiovascular:  Negative for chest pain. Gastrointestinal:  Negative for abdominal pain. Musculoskeletal:  Negative for arthralgias. Neurological:  Negative for dizziness. Objective         Vitals Last 24 Hours:  TEMPERATURE:  Temp  Av.6 °F (36.4 °C)  Min: 97.4 °F (36.3 °C)  Max: 97.9 °F (36.6 °C)  RESPIRATIONS RANGE: Resp  Avg: 15.8  Min: 13  Max: 19  PULSE OXIMETRY RANGE: SpO2  Av.3 %  Min: 90 %  Max: 100 %  PULSE RANGE: Pulse  Av.1  Min: 40  Max: 85  BLOOD PRESSURE RANGE: Systolic (69HTU), VSL:178 , Min:138 , VKR:432   ; Diastolic (99QSX), FQT:95, Min:60, Max:135    I/O (24Hr): No intake or output data in the 24 hours ending 23 0744  Objective:  General Appearance:  Comfortable. Vital signs: (most recent): Blood pressure (!) 160/76, pulse 81, temperature 97.4 °F (36.3 °C), temperature source Oral, resp. rate 16, height 5' 6\" (1.676 m), weight 162 lb (73.5 kg), SpO2 100 %. No fever. Lungs:  Normal effort and normal respiratory rate. Breath sounds clear to auscultation. Heart: Normal rate. Regular rhythm.   S1 normal and S2 normal.    Labs/Imaging/Diagnostics    Labs:  CBC:  Recent Labs     23  0603   WBC 8.4 7.3   RBC 4.06 4.08   HGB 10.6* 10.8*   HCT 35.9 36.2   MCV 88.4 88.7   RDW 15.9* 15.7*    150     CHEMISTRIES:  Recent Labs     23/23  0352 23  1337 23  0603     --  140 138   K 5.6*  --  3.5 4.5   CL 98  --  109* 99   CO2 27  --  22 28   BUN 27*  -- 20 21   CREATININE 0.8  --  0.7 0.8   GLUCOSE 116*  --  48* 106*   MG  --  1.5*  --  2.5     PT/INR:No results for input(s): PROTIME, INR in the last 72 hours. APTT:No results for input(s): APTT in the last 72 hours. LIVER PROFILE:  Recent Labs     03/23/23  0225   AST 32*   ALT 11   BILIDIR <0.2   BILITOT 0.3   ALKPHOS 70       Imaging Last 24 Hours:  XR CHEST PORTABLE    Result Date: 3/23/2023  EXAMINATION: ONE XRAY VIEW OF THE CHEST 3/23/2023 1:55 am COMPARISON: 10/04/2022 HISTORY: ORDERING SYSTEM PROVIDED HISTORY: chest pain TECHNOLOGIST PROVIDED HISTORY: Reason for exam:->chest pain What reading provider will be dictating this exam?->CRC FINDINGS: The cardiomediastinal silhouette is within normal limits. There is bibasilar atelectasis. No pneumothorax or pleural effusion. No acute cardiopulmonary abnormality. NM Cardiac Stress Test Nuclear Imaging    Result Date: 3/23/2023  Indication: Chest Pain. Clinical History:   Patient has no known history of coronary artery disease. IMAGING: Myocardial perfusion imaging was performed at rest 30-35 minutes following the intravenous injection of 11.0 mCi of (Tc-Sestamibi) followed by 10 ml of Normal Saline. At peak exercise, the patient was injected intravenously with 37mCi of (Tc-Sestamibi) followed by 10 ml of Normal Saline. Gated post-stress tomographic imaging was performed 20-25 minutes after stress. FINDINGS: The overall quality of the study was good. Left ventricular cavity size was noted to be normal both on the post regadenoson and resting images. Rotational analog analysis demonstrated no evidence of motion artifact with attenuation from the patient's left upper extremity remaining at her side during images as well as that of hepatic uptake adjacent to myocardial segments.  The gated SPECT stress imaging in the short, vertical long, and horizontal long axis demonstrated normal homogeneous tracer distribution throughout the myocardium normal both on the post regadenoson and resting images. Gated SPECT left ventricular ejection fraction was calculated to be 79% with no regional wall motion abnormalities. The myocardial perfusion imaging was normal. Overall left ventricular systolic function was normal with no regional wall motion abnormalities. Low risk pharmacologic myocardial perfusion imaging study. Assessment//Plan           Hospital Problems             Last Modified POA    * (Principal) Acute hypoxemic respiratory failure ([address] 3/23/2023 Yes    Chest pain 3/23/2023 Yes     Assessment:   (Hypoxia  Chest pain  DM  HTN  Hyperlipidemia     ). Plan:    (Nuclear stress test low risk  Pulmonary agrees likely SOFI, needs sleep study as outpatient  Wean oxygen as tolerated. ).      Electronically signed by Edgar Ann MD on 3/24/23 at 7:44 AM EDT

## 2023-03-24 NOTE — CARE COORDINATION
Patient presented to the ED due to left-sided chest pain, shortness of breath, was 78% on room air; admitted for acute respiratory failure with hypoxia. Met with patient at bedside for transition of care planning. Patient reports she lives in a home with her  and daughter, Everette Bahena, 4 steps to enter, patient is independent and ambulates with a walker and sometimes without any device; has a cane, wheelchair and bedside commode at home. Uses [facility] pharmacy [facility]) and PCP is Dr. Daisy Fong. Patient is currently up in the room with a walker, on room air, plans to return home, reports no home going needs and daughter to transport.     Kelly Mcdonald, MSW, LSW [phone]

## 2023-04-22 ENCOUNTER — HOSPITAL ENCOUNTER (INPATIENT)
Age: 83
LOS: 1 days | Discharge: HOME | DRG: 690 | End: 2023-04-25
Attending: EMERGENCY MEDICINE | Admitting: FAMILY MEDICINE
Payer: MEDICARE

## 2023-04-22 ENCOUNTER — APPOINTMENT (OUTPATIENT)
Dept: CT IMAGING | Age: 83
DRG: 690 | End: 2023-04-22
Payer: MEDICARE

## 2023-04-22 DIAGNOSIS — N30.01 ACUTE CYSTITIS WITH HEMATURIA: Primary | ICD-10-CM

## 2023-04-22 LAB
ALBUMIN SERPL-MCNC: 4.2 G/DL (ref 3.5–5.2)
ALP SERPL-CCNC: 83 U/L (ref 35–104)
ALT SERPL-CCNC: 9 U/L (ref 0–32)
ANION GAP SERPL CALCULATED.3IONS-SCNC: 16 MMOL/L (ref 7–16)
AST SERPL-CCNC: 22 U/L (ref 0–31)
BACTERIA URNS QL MICRO: ABNORMAL /HPF
BASOPHILS # BLD: 0.06 E9/L (ref 0–0.2)
BASOPHILS NFR BLD: 0.5 % (ref 0–2)
BILIRUB SERPL-MCNC: <0.2 MG/DL (ref 0–1.2)
BILIRUB UR QL STRIP: ABNORMAL
BUN SERPL-MCNC: 21 MG/DL (ref 6–23)
CALCIUM SERPL-MCNC: 9.8 MG/DL (ref 8.6–10.2)
CHLORIDE SERPL-SCNC: 101 MMOL/L (ref 98–107)
CLARITY UR: ABNORMAL
CO2 SERPL-SCNC: 25 MMOL/L (ref 22–29)
COLOR UR: YELLOW
CREAT SERPL-MCNC: 1.1 MG/DL (ref 0.5–1)
EOSINOPHIL # BLD: 0.44 E9/L (ref 0.05–0.5)
EOSINOPHIL NFR BLD: 4 % (ref 0–6)
ERYTHROCYTE [DISTWIDTH] IN BLOOD BY AUTOMATED COUNT: 15.6 FL (ref 11.5–15)
GLUCOSE SERPL-MCNC: 122 MG/DL (ref 74–99)
GLUCOSE UR STRIP-MCNC: NEGATIVE MG/DL
HCT VFR BLD AUTO: 38.8 % (ref 34–48)
HGB BLD-MCNC: 11.6 G/DL (ref 11.5–15.5)
HGB UR QL STRIP: ABNORMAL
IMM GRANULOCYTES # BLD: 0.06 E9/L
IMM GRANULOCYTES NFR BLD: 0.5 % (ref 0–5)
KETONES UR STRIP-MCNC: ABNORMAL MG/DL
LACTATE BLDV-SCNC: 4.8 MMOL/L (ref 0.5–2.2)
LACTATE BLDV-SCNC: 5.4 MMOL/L (ref 0.5–2.2)
LEUKOCYTE ESTERASE UR QL STRIP: ABNORMAL
LYMPHOCYTES # BLD: 2.41 E9/L (ref 1.5–4)
LYMPHOCYTES NFR BLD: 21.8 % (ref 20–42)
MCH RBC QN AUTO: 26.1 PG (ref 26–35)
MCHC RBC AUTO-ENTMCNC: 29.9 % (ref 32–34.5)
MCV RBC AUTO: 87.4 FL (ref 80–99.9)
MONOCYTES # BLD: 0.86 E9/L (ref 0.1–0.95)
MONOCYTES NFR BLD: 7.8 % (ref 2–12)
NEUTROPHILS # BLD: 7.2 E9/L (ref 1.8–7.3)
NEUTS SEG NFR BLD: 65.4 % (ref 43–80)
NITRITE UR QL STRIP: POSITIVE
PH UR STRIP: 6 [PH] (ref 5–9)
PLATELET # BLD AUTO: 194 E9/L (ref 130–450)
PMV BLD AUTO: 10.4 FL (ref 7–12)
POTASSIUM SERPL-SCNC: 4.8 MMOL/L (ref 3.5–5)
PROT SERPL-MCNC: 7.1 G/DL (ref 6.4–8.3)
PROT UR STRIP-MCNC: 100 MG/DL
RBC # BLD AUTO: 4.44 E12/L (ref 3.5–5.5)
RBC #/AREA URNS HPF: ABNORMAL /HPF (ref 0–2)
SODIUM SERPL-SCNC: 142 MMOL/L (ref 132–146)
SP GR UR STRIP: 1.02 (ref 1–1.03)
UROBILINOGEN UR STRIP-ACNC: 0.2 E.U./DL
WBC # BLD: 11 E9/L (ref 4.5–11.5)
WBC #/AREA URNS HPF: >20 /HPF (ref 0–5)

## 2023-04-22 PROCEDURE — 2580000003 HC RX 258: Performed by: EMERGENCY MEDICINE

## 2023-04-22 PROCEDURE — 80053 COMPREHEN METABOLIC PANEL: CPT

## 2023-04-22 PROCEDURE — 36415 COLL VENOUS BLD VENIPUNCTURE: CPT

## 2023-04-22 PROCEDURE — 83605 ASSAY OF LACTIC ACID: CPT

## 2023-04-22 PROCEDURE — 96361 HYDRATE IV INFUSION ADD-ON: CPT

## 2023-04-22 PROCEDURE — 99285 EMERGENCY DEPT VISIT HI MDM: CPT

## 2023-04-22 PROCEDURE — 87040 BLOOD CULTURE FOR BACTERIA: CPT

## 2023-04-22 PROCEDURE — 6360000002 HC RX W HCPCS: Performed by: EMERGENCY MEDICINE

## 2023-04-22 PROCEDURE — 87088 URINE BACTERIA CULTURE: CPT

## 2023-04-22 PROCEDURE — 96374 THER/PROPH/DIAG INJ IV PUSH: CPT

## 2023-04-22 PROCEDURE — 74176 CT ABD & PELVIS W/O CONTRAST: CPT

## 2023-04-22 PROCEDURE — 85025 COMPLETE CBC W/AUTO DIFF WBC: CPT

## 2023-04-22 PROCEDURE — 81001 URINALYSIS AUTO W/SCOPE: CPT

## 2023-04-22 RX ORDER — 0.9 % SODIUM CHLORIDE 0.9 %
1000 INTRAVENOUS SOLUTION INTRAVENOUS ONCE
Status: COMPLETED | OUTPATIENT
Start: 2023-04-22 | End: 2023-04-22

## 2023-04-22 RX ORDER — 0.9 % SODIUM CHLORIDE 0.9 %
500 INTRAVENOUS SOLUTION INTRAVENOUS ONCE
Status: COMPLETED | OUTPATIENT
Start: 2023-04-22 | End: 2023-04-22

## 2023-04-22 RX ORDER — FENTANYL CITRATE 50 UG/ML
50 INJECTION, SOLUTION INTRAMUSCULAR; INTRAVENOUS ONCE
Status: COMPLETED | OUTPATIENT
Start: 2023-04-23 | End: 2023-04-23

## 2023-04-22 RX ADMIN — WATER 1000 MG: 1 INJECTION INTRAMUSCULAR; INTRAVENOUS; SUBCUTANEOUS at 19:03

## 2023-04-22 RX ADMIN — SODIUM CHLORIDE 500 ML: 9 INJECTION, SOLUTION INTRAVENOUS at 19:02

## 2023-04-22 RX ADMIN — SODIUM CHLORIDE 1000 ML: 9 INJECTION, SOLUTION INTRAVENOUS at 19:58

## 2023-04-22 ASSESSMENT — PAIN SCALES - GENERAL
PAINLEVEL_OUTOF10: 8
PAINLEVEL_OUTOF10: 10

## 2023-04-22 ASSESSMENT — PAIN DESCRIPTION - ONSET
ONSET: ON-GOING
ONSET: SUDDEN

## 2023-04-22 ASSESSMENT — PAIN DESCRIPTION - DESCRIPTORS
DESCRIPTORS: BURNING
DESCRIPTORS: SQUEEZING

## 2023-04-22 ASSESSMENT — PAIN - FUNCTIONAL ASSESSMENT
PAIN_FUNCTIONAL_ASSESSMENT: NONE - DENIES PAIN
PAIN_FUNCTIONAL_ASSESSMENT: ACTIVITIES ARE NOT PREVENTED
PAIN_FUNCTIONAL_ASSESSMENT: 0-10
PAIN_FUNCTIONAL_ASSESSMENT: 0-10

## 2023-04-22 ASSESSMENT — PAIN DESCRIPTION - FREQUENCY
FREQUENCY: CONTINUOUS
FREQUENCY: CONTINUOUS

## 2023-04-22 ASSESSMENT — PAIN DESCRIPTION - LOCATION
LOCATION: VAGINA
LOCATION: ABDOMEN;BACK

## 2023-04-22 ASSESSMENT — PAIN DESCRIPTION - PAIN TYPE
TYPE: ACUTE PAIN
TYPE: ACUTE PAIN

## 2023-04-22 ASSESSMENT — PAIN DESCRIPTION - ORIENTATION: ORIENTATION: RIGHT;LEFT;LOWER

## 2023-04-23 ENCOUNTER — APPOINTMENT (OUTPATIENT)
Dept: GENERAL RADIOLOGY | Age: 83
DRG: 690 | End: 2023-04-23
Payer: MEDICARE

## 2023-04-23 PROBLEM — N39.0 UTI (URINARY TRACT INFECTION): Status: ACTIVE | Noted: 2023-04-23

## 2023-04-23 LAB
ANION GAP SERPL CALCULATED.3IONS-SCNC: 9 MMOL/L (ref 7–16)
BUN SERPL-MCNC: 18 MG/DL (ref 6–23)
CALCIUM SERPL-MCNC: 8.6 MG/DL (ref 8.6–10.2)
CHLORIDE SERPL-SCNC: 105 MMOL/L (ref 98–107)
CO2 SERPL-SCNC: 27 MMOL/L (ref 22–29)
CREAT SERPL-MCNC: 0.9 MG/DL (ref 0.5–1)
ERYTHROCYTE [DISTWIDTH] IN BLOOD BY AUTOMATED COUNT: 15.3 FL (ref 11.5–15)
GLUCOSE SERPL-MCNC: 96 MG/DL (ref 74–99)
HCT VFR BLD AUTO: 34.2 % (ref 34–48)
HGB BLD-MCNC: 10.1 G/DL (ref 11.5–15.5)
LACTATE BLDV-SCNC: 0.8 MMOL/L (ref 0.5–2.2)
MCH RBC QN AUTO: 26.2 PG (ref 26–35)
MCHC RBC AUTO-ENTMCNC: 29.5 % (ref 32–34.5)
MCV RBC AUTO: 88.6 FL (ref 80–99.9)
METER GLUCOSE: 114 MG/DL (ref 74–99)
METER GLUCOSE: 119 MG/DL (ref 74–99)
METER GLUCOSE: 125 MG/DL (ref 74–99)
METER GLUCOSE: 70 MG/DL (ref 74–99)
METER GLUCOSE: 94 MG/DL (ref 74–99)
PLATELET # BLD AUTO: 152 E9/L (ref 130–450)
PMV BLD AUTO: 10.5 FL (ref 7–12)
POTASSIUM SERPL-SCNC: 4.4 MMOL/L (ref 3.5–5)
RBC # BLD AUTO: 3.86 E12/L (ref 3.5–5.5)
SODIUM SERPL-SCNC: 141 MMOL/L (ref 132–146)
WBC # BLD: 8.3 E9/L (ref 4.5–11.5)

## 2023-04-23 PROCEDURE — 80048 BASIC METABOLIC PNL TOTAL CA: CPT

## 2023-04-23 PROCEDURE — 87088 URINE BACTERIA CULTURE: CPT

## 2023-04-23 PROCEDURE — 51798 US URINE CAPACITY MEASURE: CPT

## 2023-04-23 PROCEDURE — 6370000000 HC RX 637 (ALT 250 FOR IP): Performed by: FAMILY MEDICINE

## 2023-04-23 PROCEDURE — 6360000002 HC RX W HCPCS: Performed by: FAMILY MEDICINE

## 2023-04-23 PROCEDURE — 85027 COMPLETE CBC AUTOMATED: CPT

## 2023-04-23 PROCEDURE — 6360000002 HC RX W HCPCS: Performed by: EMERGENCY MEDICINE

## 2023-04-23 PROCEDURE — 36415 COLL VENOUS BLD VENIPUNCTURE: CPT

## 2023-04-23 PROCEDURE — 96376 TX/PRO/DX INJ SAME DRUG ADON: CPT

## 2023-04-23 PROCEDURE — 83605 ASSAY OF LACTIC ACID: CPT

## 2023-04-23 PROCEDURE — 96375 TX/PRO/DX INJ NEW DRUG ADDON: CPT

## 2023-04-23 PROCEDURE — 87040 BLOOD CULTURE FOR BACTERIA: CPT

## 2023-04-23 PROCEDURE — 96372 THER/PROPH/DIAG INJ SC/IM: CPT

## 2023-04-23 PROCEDURE — G0378 HOSPITAL OBSERVATION PER HR: HCPCS

## 2023-04-23 PROCEDURE — 2580000003 HC RX 258: Performed by: FAMILY MEDICINE

## 2023-04-23 PROCEDURE — 73560 X-RAY EXAM OF KNEE 1 OR 2: CPT

## 2023-04-23 PROCEDURE — 82962 GLUCOSE BLOOD TEST: CPT

## 2023-04-23 RX ORDER — PAROXETINE HYDROCHLORIDE 20 MG/1
40 TABLET, FILM COATED ORAL DAILY
Status: DISCONTINUED | OUTPATIENT
Start: 2023-04-23 | End: 2023-04-25 | Stop reason: HOSPADM

## 2023-04-23 RX ORDER — SODIUM CHLORIDE 9 MG/ML
INJECTION, SOLUTION INTRAVENOUS CONTINUOUS
Status: DISCONTINUED | OUTPATIENT
Start: 2023-04-23 | End: 2023-04-25 | Stop reason: HOSPADM

## 2023-04-23 RX ORDER — DOCUSATE SODIUM 100 MG/1
100 CAPSULE, LIQUID FILLED ORAL DAILY
Status: DISCONTINUED | OUTPATIENT
Start: 2023-04-23 | End: 2023-04-25 | Stop reason: HOSPADM

## 2023-04-23 RX ORDER — DEXTROSE MONOHYDRATE 100 MG/ML
INJECTION, SOLUTION INTRAVENOUS CONTINUOUS PRN
Status: DISCONTINUED | OUTPATIENT
Start: 2023-04-23 | End: 2023-04-25 | Stop reason: HOSPADM

## 2023-04-23 RX ORDER — GLIPIZIDE 5 MG/1
5 TABLET ORAL
Status: DISCONTINUED | OUTPATIENT
Start: 2023-04-23 | End: 2023-04-25 | Stop reason: HOSPADM

## 2023-04-23 RX ORDER — MECLIZINE HCL 12.5 MG/1
25 TABLET ORAL 3 TIMES DAILY
Status: DISCONTINUED | OUTPATIENT
Start: 2023-04-23 | End: 2023-04-25 | Stop reason: HOSPADM

## 2023-04-23 RX ORDER — LOSARTAN POTASSIUM 25 MG/1
25 TABLET ORAL DAILY
Status: DISCONTINUED | OUTPATIENT
Start: 2023-04-23 | End: 2023-04-25 | Stop reason: HOSPADM

## 2023-04-23 RX ORDER — POTASSIUM CHLORIDE 20 MEQ/1
40 TABLET, EXTENDED RELEASE ORAL DAILY
Status: DISCONTINUED | OUTPATIENT
Start: 2023-04-23 | End: 2023-04-25 | Stop reason: HOSPADM

## 2023-04-23 RX ORDER — INSULIN LISPRO 100 [IU]/ML
0-4 INJECTION, SOLUTION INTRAVENOUS; SUBCUTANEOUS NIGHTLY
Status: DISCONTINUED | OUTPATIENT
Start: 2023-04-23 | End: 2023-04-25 | Stop reason: HOSPADM

## 2023-04-23 RX ORDER — INSULIN LISPRO 100 [IU]/ML
0-4 INJECTION, SOLUTION INTRAVENOUS; SUBCUTANEOUS
Status: DISCONTINUED | OUTPATIENT
Start: 2023-04-23 | End: 2023-04-25 | Stop reason: HOSPADM

## 2023-04-23 RX ORDER — ATORVASTATIN CALCIUM 40 MG/1
40 TABLET, FILM COATED ORAL NIGHTLY
Status: DISCONTINUED | OUTPATIENT
Start: 2023-04-23 | End: 2023-04-25 | Stop reason: HOSPADM

## 2023-04-23 RX ORDER — HYDROCODONE BITARTRATE AND ACETAMINOPHEN 5; 325 MG/1; MG/1
1 TABLET ORAL EVERY 4 HOURS PRN
Status: DISCONTINUED | OUTPATIENT
Start: 2023-04-23 | End: 2023-04-25 | Stop reason: HOSPADM

## 2023-04-23 RX ORDER — PREGABALIN 75 MG/1
75 CAPSULE ORAL 3 TIMES DAILY
Status: DISCONTINUED | OUTPATIENT
Start: 2023-04-23 | End: 2023-04-25 | Stop reason: HOSPADM

## 2023-04-23 RX ORDER — PANTOPRAZOLE SODIUM 40 MG/1
40 TABLET, DELAYED RELEASE ORAL
Status: DISCONTINUED | OUTPATIENT
Start: 2023-04-23 | End: 2023-04-25 | Stop reason: HOSPADM

## 2023-04-23 RX ORDER — ACETAMINOPHEN 325 MG/1
650 TABLET ORAL EVERY 6 HOURS PRN
Status: DISCONTINUED | OUTPATIENT
Start: 2023-04-23 | End: 2023-04-25 | Stop reason: HOSPADM

## 2023-04-23 RX ORDER — ENOXAPARIN SODIUM 100 MG/ML
40 INJECTION SUBCUTANEOUS DAILY
Status: DISCONTINUED | OUTPATIENT
Start: 2023-04-23 | End: 2023-04-25 | Stop reason: HOSPADM

## 2023-04-23 RX ADMIN — MECLIZINE 25 MG: 12.5 TABLET ORAL at 13:34

## 2023-04-23 RX ADMIN — HYDROCODONE BITARTRATE AND ACETAMINOPHEN 1 TABLET: 5; 325 TABLET ORAL at 17:56

## 2023-04-23 RX ADMIN — ATORVASTATIN CALCIUM 40 MG: 40 TABLET, FILM COATED ORAL at 20:51

## 2023-04-23 RX ADMIN — GLIPIZIDE 5 MG: 5 TABLET ORAL at 08:31

## 2023-04-23 RX ADMIN — DOCUSATE SODIUM 100 MG: 100 CAPSULE, LIQUID FILLED ORAL at 08:31

## 2023-04-23 RX ADMIN — METFORMIN HYDROCHLORIDE 1000 MG: 1000 TABLET ORAL at 08:31

## 2023-04-23 RX ADMIN — POTASSIUM CHLORIDE 40 MEQ: 1500 TABLET, EXTENDED RELEASE ORAL at 08:31

## 2023-04-23 RX ADMIN — PAROXETINE HYDROCHLORIDE 40 MG: 20 TABLET, FILM COATED ORAL at 08:31

## 2023-04-23 RX ADMIN — WATER 1000 MG: 1 INJECTION INTRAMUSCULAR; INTRAVENOUS; SUBCUTANEOUS at 18:04

## 2023-04-23 RX ADMIN — FENTANYL CITRATE 50 MCG: 0.05 INJECTION, SOLUTION INTRAMUSCULAR; INTRAVENOUS at 00:03

## 2023-04-23 RX ADMIN — PANTOPRAZOLE SODIUM 40 MG: 40 TABLET, DELAYED RELEASE ORAL at 06:00

## 2023-04-23 RX ADMIN — HYDROCODONE BITARTRATE AND ACETAMINOPHEN 1 TABLET: 5; 325 TABLET ORAL at 10:43

## 2023-04-23 RX ADMIN — ACETAMINOPHEN 650 MG: 325 TABLET ORAL at 07:20

## 2023-04-23 RX ADMIN — PREGABALIN 75 MG: 75 CAPSULE ORAL at 20:51

## 2023-04-23 RX ADMIN — PREGABALIN 75 MG: 75 CAPSULE ORAL at 08:30

## 2023-04-23 RX ADMIN — SODIUM CHLORIDE: 9 INJECTION, SOLUTION INTRAVENOUS at 03:13

## 2023-04-23 RX ADMIN — MECLIZINE 25 MG: 12.5 TABLET ORAL at 08:31

## 2023-04-23 RX ADMIN — METFORMIN HYDROCHLORIDE 1000 MG: 1000 TABLET ORAL at 17:56

## 2023-04-23 RX ADMIN — LOSARTAN POTASSIUM 25 MG: 25 TABLET, FILM COATED ORAL at 08:31

## 2023-04-23 RX ADMIN — PREGABALIN 75 MG: 75 CAPSULE ORAL at 13:34

## 2023-04-23 RX ADMIN — ENOXAPARIN SODIUM 40 MG: 100 INJECTION SUBCUTANEOUS at 08:30

## 2023-04-23 RX ADMIN — MECLIZINE 25 MG: 12.5 TABLET ORAL at 20:51

## 2023-04-23 ASSESSMENT — PAIN DESCRIPTION - ONSET: ONSET: ON-GOING

## 2023-04-23 ASSESSMENT — PAIN DESCRIPTION - ORIENTATION
ORIENTATION: RIGHT;LEFT;LOWER
ORIENTATION: RIGHT;LEFT
ORIENTATION: RIGHT;LEFT;LOWER

## 2023-04-23 ASSESSMENT — PAIN DESCRIPTION - LOCATION
LOCATION: ABDOMEN
LOCATION: ABDOMEN
LOCATION: ABDOMEN;HIP

## 2023-04-23 ASSESSMENT — PAIN - FUNCTIONAL ASSESSMENT: PAIN_FUNCTIONAL_ASSESSMENT: PREVENTS OR INTERFERES SOME ACTIVE ACTIVITIES AND ADLS

## 2023-04-23 ASSESSMENT — PAIN SCALES - GENERAL
PAINLEVEL_OUTOF10: 0
PAINLEVEL_OUTOF10: 9
PAINLEVEL_OUTOF10: 8
PAINLEVEL_OUTOF10: 10

## 2023-04-23 ASSESSMENT — PAIN DESCRIPTION - FREQUENCY: FREQUENCY: CONTINUOUS

## 2023-04-23 ASSESSMENT — PAIN DESCRIPTION - DESCRIPTORS
DESCRIPTORS: ACHING;DISCOMFORT;SQUEEZING
DESCRIPTORS: ACHING

## 2023-04-23 ASSESSMENT — PAIN DESCRIPTION - PAIN TYPE: TYPE: ACUTE PAIN

## 2023-04-23 ASSESSMENT — ENCOUNTER SYMPTOMS: SHORTNESS OF BREATH: 0

## 2023-04-24 LAB
ANION GAP SERPL CALCULATED.3IONS-SCNC: 7 MMOL/L (ref 7–16)
BACTERIA UR CULT: NORMAL
BUN SERPL-MCNC: 13 MG/DL (ref 6–23)
CALCIUM SERPL-MCNC: 8.8 MG/DL (ref 8.6–10.2)
CHLORIDE SERPL-SCNC: 105 MMOL/L (ref 98–107)
CO2 SERPL-SCNC: 30 MMOL/L (ref 22–29)
CREAT SERPL-MCNC: 0.8 MG/DL (ref 0.5–1)
ERYTHROCYTE [DISTWIDTH] IN BLOOD BY AUTOMATED COUNT: 15.3 FL (ref 11.5–15)
GLUCOSE SERPL-MCNC: 91 MG/DL (ref 74–99)
HCT VFR BLD AUTO: 35.2 % (ref 34–48)
HGB BLD-MCNC: 10.4 G/DL (ref 11.5–15.5)
MCH RBC QN AUTO: 26.6 PG (ref 26–35)
MCHC RBC AUTO-ENTMCNC: 29.5 % (ref 32–34.5)
MCV RBC AUTO: 90 FL (ref 80–99.9)
METER GLUCOSE: 100 MG/DL (ref 74–99)
METER GLUCOSE: 117 MG/DL (ref 74–99)
METER GLUCOSE: 164 MG/DL (ref 74–99)
METER GLUCOSE: 69 MG/DL (ref 74–99)
PLATELET # BLD AUTO: 153 E9/L (ref 130–450)
PMV BLD AUTO: 10.9 FL (ref 7–12)
POTASSIUM SERPL-SCNC: 4.9 MMOL/L (ref 3.5–5)
RBC # BLD AUTO: 3.91 E12/L (ref 3.5–5.5)
SODIUM SERPL-SCNC: 142 MMOL/L (ref 132–146)
WBC # BLD: 7.4 E9/L (ref 4.5–11.5)

## 2023-04-24 PROCEDURE — 85027 COMPLETE CBC AUTOMATED: CPT

## 2023-04-24 PROCEDURE — G0378 HOSPITAL OBSERVATION PER HR: HCPCS

## 2023-04-24 PROCEDURE — 6360000002 HC RX W HCPCS: Performed by: FAMILY MEDICINE

## 2023-04-24 PROCEDURE — 2580000003 HC RX 258: Performed by: FAMILY MEDICINE

## 2023-04-24 PROCEDURE — 97116 GAIT TRAINING THERAPY: CPT

## 2023-04-24 PROCEDURE — 80048 BASIC METABOLIC PNL TOTAL CA: CPT

## 2023-04-24 PROCEDURE — 6370000000 HC RX 637 (ALT 250 FOR IP): Performed by: FAMILY MEDICINE

## 2023-04-24 PROCEDURE — 96372 THER/PROPH/DIAG INJ SC/IM: CPT

## 2023-04-24 PROCEDURE — 36415 COLL VENOUS BLD VENIPUNCTURE: CPT

## 2023-04-24 PROCEDURE — 97530 THERAPEUTIC ACTIVITIES: CPT

## 2023-04-24 PROCEDURE — 82962 GLUCOSE BLOOD TEST: CPT

## 2023-04-24 PROCEDURE — 96376 TX/PRO/DX INJ SAME DRUG ADON: CPT

## 2023-04-24 RX ADMIN — MECLIZINE 25 MG: 12.5 TABLET ORAL at 08:59

## 2023-04-24 RX ADMIN — METFORMIN HYDROCHLORIDE 1000 MG: 1000 TABLET ORAL at 16:47

## 2023-04-24 RX ADMIN — GLIPIZIDE 5 MG: 5 TABLET ORAL at 08:59

## 2023-04-24 RX ADMIN — METFORMIN HYDROCHLORIDE 1000 MG: 1000 TABLET ORAL at 08:59

## 2023-04-24 RX ADMIN — DOCUSATE SODIUM 100 MG: 100 CAPSULE, LIQUID FILLED ORAL at 08:59

## 2023-04-24 RX ADMIN — ATORVASTATIN CALCIUM 40 MG: 40 TABLET, FILM COATED ORAL at 21:09

## 2023-04-24 RX ADMIN — PANTOPRAZOLE SODIUM 40 MG: 40 TABLET, DELAYED RELEASE ORAL at 05:25

## 2023-04-24 RX ADMIN — PREGABALIN 75 MG: 75 CAPSULE ORAL at 21:09

## 2023-04-24 RX ADMIN — LOSARTAN POTASSIUM 25 MG: 25 TABLET, FILM COATED ORAL at 08:59

## 2023-04-24 RX ADMIN — POTASSIUM CHLORIDE 40 MEQ: 1500 TABLET, EXTENDED RELEASE ORAL at 08:59

## 2023-04-24 RX ADMIN — PAROXETINE HYDROCHLORIDE 40 MG: 20 TABLET, FILM COATED ORAL at 09:01

## 2023-04-24 RX ADMIN — WATER 1000 MG: 1 INJECTION INTRAMUSCULAR; INTRAVENOUS; SUBCUTANEOUS at 20:16

## 2023-04-24 RX ADMIN — MECLIZINE 25 MG: 12.5 TABLET ORAL at 21:10

## 2023-04-24 RX ADMIN — MECLIZINE 25 MG: 12.5 TABLET ORAL at 15:01

## 2023-04-24 RX ADMIN — PREGABALIN 75 MG: 75 CAPSULE ORAL at 08:59

## 2023-04-24 RX ADMIN — HYDROCODONE BITARTRATE AND ACETAMINOPHEN 1 TABLET: 5; 325 TABLET ORAL at 09:09

## 2023-04-24 RX ADMIN — ENOXAPARIN SODIUM 40 MG: 100 INJECTION SUBCUTANEOUS at 08:59

## 2023-04-24 RX ADMIN — PREGABALIN 75 MG: 75 CAPSULE ORAL at 15:01

## 2023-04-24 ASSESSMENT — PAIN DESCRIPTION - LOCATION: LOCATION: HIP

## 2023-04-24 ASSESSMENT — PAIN SCALES - GENERAL
PAINLEVEL_OUTOF10: 0
PAINLEVEL_OUTOF10: 8

## 2023-04-24 ASSESSMENT — ENCOUNTER SYMPTOMS
SHORTNESS OF BREATH: 0
ABDOMINAL PAIN: 0

## 2023-04-24 ASSESSMENT — PAIN DESCRIPTION - DESCRIPTORS: DESCRIPTORS: ACHING;DISCOMFORT

## 2023-04-24 NOTE — CARE COORDINATION
Transition of Care: Met with patient at bedside to discuss transition of care. She lives with her  and daughter. She has a ww, cane, bsc and wc. PCP . Pharmacy [facility]. She was at [facility] 10/2022. Awaiting therapy evals to determine discharge needs. IV Rocephin continues. ID following. CM/ to follow (TF)    Addy Patel RN  [phone]    Case Management Assessment  Initial Evaluation    Date/Time of Evaluation: 4/24/2023 3:28 PM  Assessment Completed by: Addy Patel RN    If patient is discharged prior to next notation, then this note serves as note for discharge by case management. Patient Name: Vivian Christiansen                   YOB: 1940  Diagnosis: Acute cystitis with hematuria [N30.01]  UTI (urinary tract infection) [N39.0]                   Date / Time: 4/22/2023  4:53 PM    Patient Admission Status: Observation   Readmission Risk (Low < 19, Mod (19-27), High > 27): Readmission Risk Score: 15    Current PCP: Phan Camara MD  PCP verified by ? Yes    Chart Reviewed: Yes      History Provided by: Patient  Patient Orientation: Alert and Oriented    Patient Cognition: Alert    Hospitalization in the last 30 days (Readmission):  No    If yes, Readmission Assessment in  Navigator will be completed.     Advance Directives:      Code Status: Full Code   Patient's Primary Decision Maker is: Legal Next of Kin    Primary Decision Maker: Glenroy Sloan Spouse - [phone]    Secondary Decision Maker: Elisha Soni Child - [phone]    Discharge Planning:    Patient lives with: Children, Spouse/Significant Other Type of Home: House  Primary Care Giver: Self  Patient Support Systems include: Spouse/Significant Other, Children, Family Members   Current Financial resources:    Current community resources:    Current services prior to admission: None            Current DME:              Type of Home Care services:  None    ADLS  Prior functional level:

## 2023-04-24 NOTE — ACP (ADVANCE CARE PLANNING)
Advance Care Planning   The patient has the following advanced directives on file:  Advance Directives       Power of [address] Will ACP-Advance Directive ACP-Power of     Not on File Filed on 10/01/13 Filed Not on File            The patient has appointed the following active healthcare agents:    Primary Decision Maker: Claude - [phone]    Secondary Decision Maker: Ronald Armstrong [facility] [phone]    The Patient has the following current code status:    Code Status: Full Code      Daniele Hearn RN  4/24/2023

## 2023-04-25 VITALS
RESPIRATION RATE: 18 BRPM | TEMPERATURE: 98 F | BODY MASS INDEX: 27.64 KG/M2 | HEART RATE: 80 BPM | HEIGHT: 66 IN | WEIGHT: 172 LBS | OXYGEN SATURATION: 98 % | DIASTOLIC BLOOD PRESSURE: 93 MMHG | SYSTOLIC BLOOD PRESSURE: 161 MMHG

## 2023-04-25 PROBLEM — N30.01 ACUTE CYSTITIS WITH HEMATURIA: Status: ACTIVE | Noted: 2023-04-25

## 2023-04-25 LAB
ANION GAP SERPL CALCULATED.3IONS-SCNC: 9 MMOL/L (ref 7–16)
BACTERIA UR CULT: NORMAL
BUN SERPL-MCNC: 12 MG/DL (ref 6–23)
CALCIUM SERPL-MCNC: 8.8 MG/DL (ref 8.6–10.2)
CHLORIDE SERPL-SCNC: 105 MMOL/L (ref 98–107)
CO2 SERPL-SCNC: 26 MMOL/L (ref 22–29)
CREAT SERPL-MCNC: 0.9 MG/DL (ref 0.5–1)
ERYTHROCYTE [DISTWIDTH] IN BLOOD BY AUTOMATED COUNT: 15.2 FL (ref 11.5–15)
GLUCOSE SERPL-MCNC: 110 MG/DL (ref 74–99)
HCT VFR BLD AUTO: 32.4 % (ref 34–48)
HGB BLD-MCNC: 9.7 G/DL (ref 11.5–15.5)
MCH RBC QN AUTO: 26.3 PG (ref 26–35)
MCHC RBC AUTO-ENTMCNC: 29.9 % (ref 32–34.5)
MCV RBC AUTO: 87.8 FL (ref 80–99.9)
METER GLUCOSE: 104 MG/DL (ref 74–99)
METER GLUCOSE: 129 MG/DL (ref 74–99)
PLATELET # BLD AUTO: 141 E9/L (ref 130–450)
PMV BLD AUTO: 10.2 FL (ref 7–12)
POTASSIUM SERPL-SCNC: 4.6 MMOL/L (ref 3.5–5)
RBC # BLD AUTO: 3.69 E12/L (ref 3.5–5.5)
SODIUM SERPL-SCNC: 140 MMOL/L (ref 132–146)
WBC # BLD: 8.3 E9/L (ref 4.5–11.5)

## 2023-04-25 PROCEDURE — 85027 COMPLETE CBC AUTOMATED: CPT

## 2023-04-25 PROCEDURE — 2580000003 HC RX 258: Performed by: FAMILY MEDICINE

## 2023-04-25 PROCEDURE — G0378 HOSPITAL OBSERVATION PER HR: HCPCS

## 2023-04-25 PROCEDURE — 1200000000 HC SEMI PRIVATE

## 2023-04-25 PROCEDURE — 36415 COLL VENOUS BLD VENIPUNCTURE: CPT

## 2023-04-25 PROCEDURE — 96372 THER/PROPH/DIAG INJ SC/IM: CPT

## 2023-04-25 PROCEDURE — 97535 SELF CARE MNGMENT TRAINING: CPT

## 2023-04-25 PROCEDURE — 6370000000 HC RX 637 (ALT 250 FOR IP): Performed by: FAMILY MEDICINE

## 2023-04-25 PROCEDURE — 80048 BASIC METABOLIC PNL TOTAL CA: CPT

## 2023-04-25 PROCEDURE — 6360000002 HC RX W HCPCS: Performed by: FAMILY MEDICINE

## 2023-04-25 PROCEDURE — 82962 GLUCOSE BLOOD TEST: CPT

## 2023-04-25 PROCEDURE — 97530 THERAPEUTIC ACTIVITIES: CPT

## 2023-04-25 PROCEDURE — 97165 OT EVAL LOW COMPLEX 30 MIN: CPT

## 2023-04-25 RX ADMIN — PREGABALIN 75 MG: 75 CAPSULE ORAL at 14:22

## 2023-04-25 RX ADMIN — PAROXETINE HYDROCHLORIDE 40 MG: 20 TABLET, FILM COATED ORAL at 09:35

## 2023-04-25 RX ADMIN — POTASSIUM CHLORIDE 40 MEQ: 1500 TABLET, EXTENDED RELEASE ORAL at 09:34

## 2023-04-25 RX ADMIN — LOSARTAN POTASSIUM 25 MG: 25 TABLET, FILM COATED ORAL at 09:46

## 2023-04-25 RX ADMIN — ENOXAPARIN SODIUM 40 MG: 100 INJECTION SUBCUTANEOUS at 09:33

## 2023-04-25 RX ADMIN — PREGABALIN 75 MG: 75 CAPSULE ORAL at 09:34

## 2023-04-25 RX ADMIN — DOCUSATE SODIUM 100 MG: 100 CAPSULE, LIQUID FILLED ORAL at 09:35

## 2023-04-25 RX ADMIN — SODIUM CHLORIDE: 9 INJECTION, SOLUTION INTRAVENOUS at 14:34

## 2023-04-25 RX ADMIN — MECLIZINE 25 MG: 12.5 TABLET ORAL at 09:34

## 2023-04-25 RX ADMIN — METFORMIN HYDROCHLORIDE 1000 MG: 1000 TABLET ORAL at 09:34

## 2023-04-25 RX ADMIN — GLIPIZIDE 5 MG: 5 TABLET ORAL at 09:34

## 2023-04-25 RX ADMIN — HYDROCODONE BITARTRATE AND ACETAMINOPHEN 1 TABLET: 5; 325 TABLET ORAL at 03:03

## 2023-04-25 RX ADMIN — SODIUM CHLORIDE: 9 INJECTION, SOLUTION INTRAVENOUS at 03:59

## 2023-04-25 RX ADMIN — PANTOPRAZOLE SODIUM 40 MG: 40 TABLET, DELAYED RELEASE ORAL at 05:25

## 2023-04-25 RX ADMIN — MECLIZINE 25 MG: 12.5 TABLET ORAL at 14:19

## 2023-04-25 ASSESSMENT — ENCOUNTER SYMPTOMS
SHORTNESS OF BREATH: 0
ABDOMINAL PAIN: 0

## 2023-04-25 ASSESSMENT — PAIN SCALES - GENERAL: PAINLEVEL_OUTOF10: 10

## 2023-04-25 NOTE — CARE COORDINATION
Update CM Note: Plan at discharge remains Home with no anticipated needs. IVF continue. IV Rocephin continues. Blood/urine cultures pending.  CM/SW to follow (TF)    Herminio Kinney RN  [phone]

## 2023-04-25 NOTE — DISCHARGE INSTRUCTIONS
Your information:  Name: Renay Acosta  : 1940    Your instructions:    ***    What to do after you leave the hospital:    Recommended diet: {diet:15322}    Recommended activity: {discharge activity:88478}  [address] and inspiration  Come join us! [address] [facility] [address] homecare office  [address]  [address]    March 8 September 13  May 10             November 8  July  12  _________________________________  [place]  [address] [facility] room(second floor)        For more information contact  [email or web address]  [phone]    Every person with an ostomy has a     story to tell. The ostomy support  group provides a safe, caring place  to tell your story among people  who understand and share your   experiences and feelings. Family members and people  considering an ostomy are also  welcome. The group offers education,  encouragement and support to anyone  whose life is affected by an ostomy. The [facility] ostomy support  group meets from 6-7 p.m. Parking is  free and we serve light refreshments. The following personal items were collected during your admission and were returned to you:    Belongings  Dental Appliances: Uppers  Vision - Corrective Lenses: Eyeglasses  Hearing Aid: None  Clothing: Pants, Undergarments, Slippers, Shirt  Jewelry: None  Electronic Devices: None  Weapons (Notify Protective Services/Security): None  Home Medications: None  Valuables Given To: Patient  Provide Name(s) of Who Valuable(s) Were Given To: n/a    Information obtained by:  By signing below, I understand that if any problems occur once I leave the hospital I am to contact ***. I understand and acknowledge receipt of the instructions indicated above.

## 2023-04-25 NOTE — PROGRESS NOTES
ET note: Patient has colostomy. Pouch intact, no need to change. Patient states she changes her pouch every 5-6 days.  Will follow up tomorrow, Mukesh Duarte RN
Occupational Therapy  OCCUPATIONAL THERAPY INITIAL EVALUATION     [facility] [employer] [address] [address]      Date:2023                                                Patient Name: Vivian Christiansen  MRN: [id number]  : 1940  Room: 8410/8410-[address]    Referring Provider: Olegario Hernandez MD  Specific Provider Orders/Date: OT eval and treat  23    Diagnosis: Acute cystitis with hematuria [N30.01]  UTI (urinary tract infection) [N39.0]   Pt admitted to hospital on 23 for UTI w/ frequency, abdominal pain    Pertinent Medical History:  has a past medical history of Arthritis, Colostomy in place [facility]), Diabetes mellitus ([facility] [address] Diverticulitis, GERD (gastroesophageal reflux disease), Hernia, History of blood transfusion, Hyperlipidemia, and Hypertension.        Precautions:  Fall Risk, external catheter, +dizziness w/ activity (see vitals below), colostomy, bed/chair alarm    Assessment of current deficits    [x] Functional mobility  [x]ADLs  [x] Strength               []Cognition    [x] Functional transfers   [x] IADLs         [x] Safety Awareness   [x]Endurance    [] Fine Coordination              [x] Balance      [] Vision/perception   []Sensation     []Gross Motor Coordination  [] ROM  [] Delirium                   [] Motor Control     OT PLAN OF CARE   OT POC based on physician orders, patient diagnosis and results of clinical assessment    Frequency/Duration 1-3 days/wk for 2 weeks PRN   Specific OT Treatment Interventions to include:   * Instruction/training on adapted ADL techniques and AE recommendations to increase functional independence within precautions       * Training on energy conservation strategies, correct breathing pattern and techniques to improve independence/tolerance for self-care routine  * Functional transfer/mobility training/DME recommendations for increased independence,
Progress Note  Date:2023       Room:84/8410  Patient Gladis De     Date of Birth:[birth year]     Age:83 y.o. Patient says she feels fair today. Subjective    Subjective:  Symptoms:  Stable. No shortness of breath or chest pain. Diet:  Adequate intake. Activity level: Impaired due to weakness. Pain:  She reports no pain. Review of Systems   Constitutional:  Negative for activity change and fever. HENT:  Negative for congestion. Respiratory:  Negative for shortness of breath. Cardiovascular:  Negative for chest pain. Gastrointestinal:  Negative for abdominal pain. Genitourinary:  Positive for dyspareunia. Neurological:  Negative for dizziness. Objective         Vitals Last 24 Hours:  TEMPERATURE:  Temp  Av.1 °F (36.7 °C)  Min: 98 °F (36.7 °C)  Max: 98.2 °F (36.8 °C)  RESPIRATIONS RANGE: Resp  Av  Min: 16  Max: 18  PULSE OXIMETRY RANGE: SpO2  Av %  Min: 98 %  Max: 100 %  PULSE RANGE: Pulse  Av  Min: 63  Max: 81  BLOOD PRESSURE RANGE: Systolic (91MLX), JZC:832 , Min:122 , ICJ:572   ; Diastolic (82RBK), ITR:60, Min:69, Max:87    I/O (24Hr): Intake/Output Summary (Last 24 hours) at 2023 0600  Last data filed at 2023 0526  Gross per 24 hour   Intake 2571.31 ml   Output 2725 ml   Net -153.69 ml       Objective:  General Appearance:  Comfortable. Vital signs: (most recent): Blood pressure 122/69, pulse 81, temperature 98.2 °F (36.8 °C), temperature source Temporal, resp. rate 18, height 5' 5.5\" (1.664 m), weight 172 lb (78 kg), SpO2 100 %. No fever. Lungs:  Normal respiratory rate. Breath sounds clear to auscultation. Heart: Normal rate. Regular rhythm.   S1 normal and S2 normal.    Labs/Imaging/Diagnostics    Labs:  CBC:  Recent Labs     23  0548 23  0523 23  0435   WBC 8.3 7.4 8.3   RBC 3.86 3.91 3.69   HGB 10.1* 10.4* 9.7*   HCT 34.2 35.2 32.4*   MCV 88.6 90.0 87.8   RDW 15.3* 15.3* 15.2*    153 141
Progress Note  Date:2023       Room:84/8410  Patient Renee Sprague     Date of Birth:[birth year]     Age:83 y.o. Patient says she feels fair today. Subjective    Subjective:  Symptoms:  Stable. No shortness of breath or chest pain. Diet:  Adequate intake. Activity level: Impaired due to weakness. Pain:  She reports no pain. Review of Systems   Constitutional:  Negative for activity change and fever. HENT:  Negative for congestion. Respiratory:  Negative for shortness of breath. Cardiovascular:  Negative for chest pain. Gastrointestinal:  Negative for abdominal pain. Genitourinary:  Positive for dyspareunia. Neurological:  Negative for dizziness. Objective         Vitals Last 24 Hours:  TEMPERATURE:  Temp  Av.4 °F (36.3 °C)  Min: 97.2 °F (36.2 °C)  Max: 97.7 °F (36.5 °C)  RESPIRATIONS RANGE: Resp  Av  Min: 16  Max: 18  PULSE OXIMETRY RANGE: SpO2  Av %  Min: 95 %  Max: 95 %  PULSE RANGE: Pulse  Av.7  Min: 64  Max: 79  BLOOD PRESSURE RANGE: Systolic (45LLT), QWR:003 , Min:120 , WDO:935   ; Diastolic (89PLS), WWF:76, Min:71, Max:75    I/O (24Hr): Intake/Output Summary (Last 24 hours) at 2023 0605  Last data filed at 2023 0416  Gross per 24 hour   Intake --   Output 2600 ml   Net -2600 ml     Objective:  General Appearance:  Comfortable. Vital signs: (most recent): Blood pressure 120/73, pulse 75, temperature 97.2 °F (36.2 °C), temperature source Temporal, resp. rate 16, height 5' 5.5\" (1.664 m), weight 172 lb (78 kg), SpO2 95 %. No fever. Lungs:  Normal respiratory rate. Breath sounds clear to auscultation. Heart: Normal rate. Regular rhythm.   S1 normal and S2 normal.    Labs/Imaging/Diagnostics    Labs:  CBC:  Recent Labs     23  1846 23  0548   WBC 11.0 8.3   RBC 4.44 3.86   HGB 11.6 10.1*   HCT 38.8 34.2   MCV 87.4 88.6   RDW 15.6* 15.3*    152     CHEMISTRIES:  Recent Labs     23  7011
RAUDEL PROGRESS NOTE      Chief complaint: Follow-up of UTI    The patient is an 40-year-old female with history of DM, hypertension, hyperlipidemia, presented on 04/22 with lower abdominal pain accompanied by dysuria for 1 day. On admission, she was afebrile and hemodynamically stable with no leukocytosis. Urinalysis showed pyuria of more than 20 WBCs with urine culture showing ,000 CFU per mL of mixed abhijit including less than 10,000 CFU per mL of gram-negative rods. Repeat urine culture on 04/23 showed no growth. Blood cultures showed no growth to date. CT abdomen and pelvis showed no urinary tract stones or hydronephrosis, normal urinary bladder. Ceftriaxone was started on admission. Subjective: Patient was seen and examined. No chills, has chronic diarrhea, no rash, no itching, still has lower abdominal pain and dysuria. Objective:    Vitals:    04/25/23 0817   BP: (!) 161/93   Pulse: 80   Resp: 18   Temp: 98 °F (36.7 °C)   SpO2: 98%     Constitutional: Alert, not in distress  Respiratory: Clear breath sounds, no crackles, no wheezes  Cardiovascular: Regular rate and rhythm, no murmurs  Gastrointestinal: Bowel sounds present, soft, nontender  Skin: Warm and dry, no active dermatoses  Musculoskeletal: No joint swelling, no joint erythema    Labs, imaging, and medical records/notes were personally reviewed. Assessment:  Cystitis, suspect interstitial    Recommendations:  Stop ceftriaxone. No further antibiotic therapy indicated for now. Follow-up blood cultures. Thank you for involving me in the care of [address] [place] [address]. I will sign off for now. Please do not hesitate to call for any questions, concerns, or new findings.         Electronically signed by Winston Coulter MD on 4/25/2023 at 11:16 AM
RAUDEL PROGRESS NOTE      Chief complaint: Follow-up of UTI    The patient is an 78-year-old female with history of DM, hypertension, hyperlipidemia, presented on 04/22 with lower abdominal pain accompanied by dysuria for 1 day. On admission, she was afebrile and hemodynamically stable with no leukocytosis. Urinalysis showed pyuria of more than 20 WBCs. Blood cultures showed no growth to date. CT abdomen and pelvis showed no urinary tract stones or hydronephrosis, normal urinary bladder. Ceftriaxone was started on admission. Subjective: Patient was seen and examined. No chills, no abdominal pain, no diarrhea, no rash, no itching, has dysuria. Objective:    Vitals:    04/24/23 0725   BP: 130/87   Pulse: 63   Resp: 16   Temp: 98 °F (36.7 °C)   SpO2: 98%     Constitutional: Alert, not in distress  Respiratory: Clear breath sounds, no crackles, no wheezes  Cardiovascular: Regular rate and rhythm, no murmurs  Gastrointestinal: Bowel sounds present, soft, nontender  Skin: Warm and dry, no active dermatoses  Musculoskeletal: No joint swelling, no joint erythema    Labs, imaging, and medical records/notes were personally reviewed. Assessment:  Cystitis    Recommendations:  Continue ceftriaxone 1 g every 24 hours pending urine culture results. Follow-up blood and urine cultures. Thank you for involving me in the care of [address] [place] [address]. I will continue to follow. Please do not hesitate to call for any questions or concerns.       Electronically signed by Jurgen Chiu MD on 4/24/2023 at 10:59 AM
Pt demonstrated skill Pt requires further education in this area   yes yes reinforce     ASSESSMENT:    Conditions Requiring Skilled Therapeutic Intervention:    [x]Decreased strength     []Decreased ROM  [x]Decreased functional mobility  [x]Decreased balance   [x]Decreased endurance   [x]Decreased posture  []Decreased sensation  []Decreased coordination   []Decreased vision  []Decreased safety awareness   []Increased pain       Comments:  Pt supine in bed upon entering, pt agreeable to participate. Pt instructed to transfer to EOB, completing transfer with assist of trunk. Pt sitting upright with good static sitting balance. Pt with c/o dizziness with position change, pt provided time to allow symptoms to subside. Pt then cued for hand placement and instructed to stand from EOB. Pt standing with good balance with ww. Pt instructed to ambulate to tolerance. Pt ambulating with decreased anastasiia, cueing provided for ww spacing. Pt demonstrated good tolerance to ambulation bout. Pt was assisted back to bedside and was transferred to bedside chair. Pt positioned for comfort with all needs met and call bell in reach prior to exiting. Treatment:  Patient practiced and was instructed in the following treatment:    Bed mobility training - pt given verbal and tactile cues to facilitate proper sequencing and safety during rolling and supine>sit as well as provided with physical assistance to complete task   STS and pivot transfer training - pt educated on proper hand and foot placement, safety and sequencing, and use of verbal and tactile cues to safely complete sit<>stand and pivot transfers with hands on assistance to complete task safely   Gait training- pt was given verbal and tactile cues to facilitate pt safety with ww use during ambulation as well as provided with hands on assistance. Pt's/ family goals   1. Return home    Prognosis is good for reaching above PT goals.     Patient and or family understand(s)

## 2023-04-28 LAB
BACTERIA BLD CULT ORG #2: NORMAL
BACTERIA BLD CULT ORG #2: NORMAL
BACTERIA BLD CULT: NORMAL
BACTERIA BLD CULT: NORMAL

## 2023-05-23 PROBLEM — N39.0 UTI (URINARY TRACT INFECTION): Status: RESOLVED | Noted: 2023-04-23 | Resolved: 2023-05-23

## 2024-03-22 ENCOUNTER — APPOINTMENT (OUTPATIENT)
Dept: MRI IMAGING | Age: 84
End: 2024-03-22
Payer: MEDICARE

## 2024-03-22 ENCOUNTER — HOSPITAL ENCOUNTER (OUTPATIENT)
Age: 84
Setting detail: OBSERVATION
Discharge: HOME | End: 2024-03-25
Attending: EMERGENCY MEDICINE | Admitting: STUDENT IN AN ORGANIZED HEALTH CARE EDUCATION/TRAINING PROGRAM
Payer: MEDICARE

## 2024-03-22 DIAGNOSIS — G89.29 ACUTE EXACERBATION OF CHRONIC LOW BACK PAIN: Primary | ICD-10-CM

## 2024-03-22 DIAGNOSIS — M54.50 ACUTE EXACERBATION OF CHRONIC LOW BACK PAIN: Primary | ICD-10-CM

## 2024-03-22 PROBLEM — M47.26 OSTEOARTHRITIS OF SPINE WITH RADICULOPATHY, LUMBAR REGION: Status: ACTIVE | Noted: 2024-03-22

## 2024-03-22 LAB
ALBUMIN SERPL-MCNC: 3.9 G/DL (ref 3.5–5.2)
ALP SERPL-CCNC: 82 U/L (ref 35–104)
ALT SERPL-CCNC: 8 U/L (ref 0–32)
ANION GAP SERPL CALCULATED.3IONS-SCNC: 10 MMOL/L (ref 7–16)
AST SERPL-CCNC: 19 U/L (ref 0–31)
ATYPICAL LYMPHOCYTE ABSOLUTE COUNT: 0.07 K/UL (ref 0–0.46)
ATYPICAL LYMPHOCYTES: 1 % (ref 0–4)
BACTERIA URNS QL MICRO: ABNORMAL
BASOPHILS # BLD: 0.07 K/UL (ref 0–0.2)
BASOPHILS NFR BLD: 1 % (ref 0–2)
BILIRUB SERPL-MCNC: 0.3 MG/DL (ref 0–1.2)
BILIRUB UR QL STRIP: NEGATIVE
BUN SERPL-MCNC: 14 MG/DL (ref 6–23)
CALCIUM SERPL-MCNC: 9.1 MG/DL (ref 8.6–10.2)
CHLORIDE SERPL-SCNC: 98 MMOL/L (ref 98–107)
CLARITY UR: CLEAR
CO2 SERPL-SCNC: 30 MMOL/L (ref 22–29)
COLOR UR: YELLOW
CREAT SERPL-MCNC: 0.8 MG/DL (ref 0.5–1)
CRP SERPL HS-MCNC: 113 MG/L (ref 0–5)
EOSINOPHIL # BLD: 0.13 K/UL (ref 0.05–0.5)
EOSINOPHILS RELATIVE PERCENT: 2 % (ref 0–6)
EPI CELLS #/AREA URNS HPF: ABNORMAL /HPF
ERYTHROCYTE [DISTWIDTH] IN BLOOD BY AUTOMATED COUNT: 18.1 % (ref 11.5–15)
ERYTHROCYTE [SEDIMENTATION RATE] IN BLOOD BY WESTERGREN METHOD: 19 MM/HR (ref 0–20)
GFR SERPL CREATININE-BSD FRML MDRD: >60 ML/MIN/1.73M2
GLUCOSE BLD-MCNC: 219 MG/DL (ref 74–99)
GLUCOSE SERPL-MCNC: 139 MG/DL (ref 74–99)
GLUCOSE UR STRIP-MCNC: NEGATIVE MG/DL
HCT VFR BLD AUTO: 31.1 % (ref 34–48)
HGB BLD-MCNC: 8.6 G/DL (ref 11.5–15.5)
HGB UR QL STRIP.AUTO: NEGATIVE
KETONES UR STRIP-MCNC: ABNORMAL MG/DL
LEUKOCYTE ESTERASE UR QL STRIP: NEGATIVE
LYMPHOCYTES NFR BLD: 1 K/UL (ref 1.5–4)
LYMPHOCYTES RELATIVE PERCENT: 13 % (ref 20–42)
MCH RBC QN AUTO: 21.5 PG (ref 26–35)
MCHC RBC AUTO-ENTMCNC: 27.7 G/DL (ref 32–34.5)
MCV RBC AUTO: 77.8 FL (ref 80–99.9)
MONOCYTES NFR BLD: 0.54 K/UL (ref 0.1–0.95)
MONOCYTES NFR BLD: 7 % (ref 2–12)
NEUTROPHILS NFR BLD: 77 % (ref 43–80)
NEUTS SEG NFR BLD: 5.89 K/UL (ref 1.8–7.3)
NITRITE UR QL STRIP: NEGATIVE
PH UR STRIP: 6.5 [PH] (ref 5–9)
PLATELET # BLD AUTO: 172 K/UL (ref 130–450)
PMV BLD AUTO: 9.9 FL (ref 7–12)
POTASSIUM SERPL-SCNC: 3.7 MMOL/L (ref 3.5–5)
PROT SERPL-MCNC: 7.2 G/DL (ref 6.4–8.3)
PROT UR STRIP-MCNC: ABNORMAL MG/DL
RBC # BLD AUTO: 4 M/UL (ref 3.5–5.5)
RBC # BLD: ABNORMAL 10*6/UL
RBC #/AREA URNS HPF: ABNORMAL /HPF
SODIUM SERPL-SCNC: 138 MMOL/L (ref 132–146)
SP GR UR STRIP: 1.02 (ref 1–1.03)
UROBILINOGEN UR STRIP-ACNC: 0.2 EU/DL (ref 0–1)
WBC #/AREA URNS HPF: ABNORMAL /HPF
WBC OTHER # BLD: 7.7 K/UL (ref 4.5–11.5)

## 2024-03-22 PROCEDURE — 96372 THER/PROPH/DIAG INJ SC/IM: CPT

## 2024-03-22 PROCEDURE — 96375 TX/PRO/DX INJ NEW DRUG ADDON: CPT

## 2024-03-22 PROCEDURE — 6370000000 HC RX 637 (ALT 250 FOR IP): Performed by: STUDENT IN AN ORGANIZED HEALTH CARE EDUCATION/TRAINING PROGRAM

## 2024-03-22 PROCEDURE — 6360000002 HC RX W HCPCS

## 2024-03-22 PROCEDURE — 99285 EMERGENCY DEPT VISIT HI MDM: CPT

## 2024-03-22 PROCEDURE — G0378 HOSPITAL OBSERVATION PER HR: HCPCS

## 2024-03-22 PROCEDURE — 2580000003 HC RX 258: Performed by: STUDENT IN AN ORGANIZED HEALTH CARE EDUCATION/TRAINING PROGRAM

## 2024-03-22 PROCEDURE — 6360000002 HC RX W HCPCS: Performed by: STUDENT IN AN ORGANIZED HEALTH CARE EDUCATION/TRAINING PROGRAM

## 2024-03-22 PROCEDURE — 80053 COMPREHEN METABOLIC PANEL: CPT

## 2024-03-22 PROCEDURE — 96374 THER/PROPH/DIAG INJ IV PUSH: CPT

## 2024-03-22 PROCEDURE — 81001 URINALYSIS AUTO W/SCOPE: CPT

## 2024-03-22 PROCEDURE — 85025 COMPLETE CBC W/AUTO DIFF WBC: CPT

## 2024-03-22 PROCEDURE — 72148 MRI LUMBAR SPINE W/O DYE: CPT

## 2024-03-22 PROCEDURE — 82962 GLUCOSE BLOOD TEST: CPT

## 2024-03-22 PROCEDURE — 99222 1ST HOSP IP/OBS MODERATE 55: CPT | Performed by: STUDENT IN AN ORGANIZED HEALTH CARE EDUCATION/TRAINING PROGRAM

## 2024-03-22 PROCEDURE — 86140 C-REACTIVE PROTEIN: CPT

## 2024-03-22 PROCEDURE — 85652 RBC SED RATE AUTOMATED: CPT

## 2024-03-22 RX ORDER — ENOXAPARIN SODIUM 100 MG/ML
40 INJECTION SUBCUTANEOUS DAILY
Status: DISCONTINUED | OUTPATIENT
Start: 2024-03-22 | End: 2024-03-25 | Stop reason: HOSPADM

## 2024-03-22 RX ORDER — ACETAMINOPHEN 325 MG/1
650 TABLET ORAL EVERY 6 HOURS PRN
Status: DISCONTINUED | OUTPATIENT
Start: 2024-03-22 | End: 2024-03-25 | Stop reason: HOSPADM

## 2024-03-22 RX ORDER — POTASSIUM CHLORIDE 7.45 MG/ML
10 INJECTION INTRAVENOUS PRN
Status: DISCONTINUED | OUTPATIENT
Start: 2024-03-22 | End: 2024-03-25 | Stop reason: HOSPADM

## 2024-03-22 RX ORDER — ONDANSETRON 2 MG/ML
4 INJECTION INTRAMUSCULAR; INTRAVENOUS EVERY 6 HOURS PRN
Status: DISCONTINUED | OUTPATIENT
Start: 2024-03-22 | End: 2024-03-25 | Stop reason: HOSPADM

## 2024-03-22 RX ORDER — MECLIZINE HCL 12.5 MG/1
25 TABLET ORAL 3 TIMES DAILY
Status: DISCONTINUED | OUTPATIENT
Start: 2024-03-22 | End: 2024-03-25 | Stop reason: HOSPADM

## 2024-03-22 RX ORDER — INSULIN LISPRO 100 [IU]/ML
0-4 INJECTION, SOLUTION INTRAVENOUS; SUBCUTANEOUS NIGHTLY
Status: DISCONTINUED | OUTPATIENT
Start: 2024-03-22 | End: 2024-03-25 | Stop reason: HOSPADM

## 2024-03-22 RX ORDER — SODIUM CHLORIDE 9 MG/ML
INJECTION, SOLUTION INTRAVENOUS CONTINUOUS
Status: ACTIVE | OUTPATIENT
Start: 2024-03-22 | End: 2024-03-24

## 2024-03-22 RX ORDER — PAROXETINE HYDROCHLORIDE 20 MG/1
40 TABLET, FILM COATED ORAL DAILY
Status: DISCONTINUED | OUTPATIENT
Start: 2024-03-23 | End: 2024-03-25 | Stop reason: HOSPADM

## 2024-03-22 RX ORDER — FENTANYL CITRATE 50 UG/ML
50 INJECTION, SOLUTION INTRAMUSCULAR; INTRAVENOUS
Status: DISCONTINUED | OUTPATIENT
Start: 2024-03-22 | End: 2024-03-25 | Stop reason: HOSPADM

## 2024-03-22 RX ORDER — SODIUM CHLORIDE 9 MG/ML
INJECTION, SOLUTION INTRAVENOUS PRN
Status: DISCONTINUED | OUTPATIENT
Start: 2024-03-22 | End: 2024-03-25 | Stop reason: HOSPADM

## 2024-03-22 RX ORDER — ONDANSETRON 4 MG/1
4 TABLET, ORALLY DISINTEGRATING ORAL EVERY 8 HOURS PRN
Status: DISCONTINUED | OUTPATIENT
Start: 2024-03-22 | End: 2024-03-25 | Stop reason: HOSPADM

## 2024-03-22 RX ORDER — FAMOTIDINE 20 MG/1
20 TABLET, FILM COATED ORAL DAILY
Status: DISCONTINUED | OUTPATIENT
Start: 2024-03-22 | End: 2024-03-22

## 2024-03-22 RX ORDER — PANTOPRAZOLE SODIUM 40 MG/1
40 TABLET, DELAYED RELEASE ORAL
Status: DISCONTINUED | OUTPATIENT
Start: 2024-03-23 | End: 2024-03-25 | Stop reason: HOSPADM

## 2024-03-22 RX ORDER — ACETAMINOPHEN 650 MG/1
650 SUPPOSITORY RECTAL EVERY 6 HOURS PRN
Status: DISCONTINUED | OUTPATIENT
Start: 2024-03-22 | End: 2024-03-25 | Stop reason: HOSPADM

## 2024-03-22 RX ORDER — DEXAMETHASONE SODIUM PHOSPHATE 10 MG/ML
10 INJECTION INTRAMUSCULAR; INTRAVENOUS ONCE
Status: COMPLETED | OUTPATIENT
Start: 2024-03-22 | End: 2024-03-22

## 2024-03-22 RX ORDER — INSULIN LISPRO 100 [IU]/ML
0-4 INJECTION, SOLUTION INTRAVENOUS; SUBCUTANEOUS
Status: DISCONTINUED | OUTPATIENT
Start: 2024-03-22 | End: 2024-03-25 | Stop reason: HOSPADM

## 2024-03-22 RX ORDER — FENTANYL CITRATE 50 UG/ML
50 INJECTION, SOLUTION INTRAMUSCULAR; INTRAVENOUS ONCE
Status: COMPLETED | OUTPATIENT
Start: 2024-03-22 | End: 2024-03-22

## 2024-03-22 RX ORDER — POLYETHYLENE GLYCOL 3350 17 G/17G
17 POWDER, FOR SOLUTION ORAL DAILY PRN
Status: DISCONTINUED | OUTPATIENT
Start: 2024-03-22 | End: 2024-03-25 | Stop reason: HOSPADM

## 2024-03-22 RX ORDER — ATORVASTATIN CALCIUM 40 MG/1
40 TABLET, FILM COATED ORAL NIGHTLY
Status: DISCONTINUED | OUTPATIENT
Start: 2024-03-22 | End: 2024-03-25 | Stop reason: HOSPADM

## 2024-03-22 RX ORDER — LOSARTAN POTASSIUM 25 MG/1
25 TABLET ORAL DAILY
Status: DISCONTINUED | OUTPATIENT
Start: 2024-03-22 | End: 2024-03-25 | Stop reason: HOSPADM

## 2024-03-22 RX ORDER — PREGABALIN 75 MG/1
75 CAPSULE ORAL 3 TIMES DAILY
Status: DISCONTINUED | OUTPATIENT
Start: 2024-03-22 | End: 2024-03-25 | Stop reason: HOSPADM

## 2024-03-22 RX ORDER — POTASSIUM CHLORIDE 20 MEQ/1
40 TABLET, EXTENDED RELEASE ORAL PRN
Status: DISCONTINUED | OUTPATIENT
Start: 2024-03-22 | End: 2024-03-25 | Stop reason: HOSPADM

## 2024-03-22 RX ORDER — MAGNESIUM SULFATE IN WATER 40 MG/ML
2000 INJECTION, SOLUTION INTRAVENOUS PRN
Status: DISCONTINUED | OUTPATIENT
Start: 2024-03-22 | End: 2024-03-25 | Stop reason: HOSPADM

## 2024-03-22 RX ORDER — HYDROCODONE BITARTRATE AND ACETAMINOPHEN 5; 325 MG/1; MG/1
1 TABLET ORAL EVERY 6 HOURS PRN
Status: DISCONTINUED | OUTPATIENT
Start: 2024-03-22 | End: 2024-03-25 | Stop reason: HOSPADM

## 2024-03-22 RX ORDER — SODIUM CHLORIDE 0.9 % (FLUSH) 0.9 %
5-40 SYRINGE (ML) INJECTION EVERY 12 HOURS SCHEDULED
Status: DISCONTINUED | OUTPATIENT
Start: 2024-03-22 | End: 2024-03-25 | Stop reason: HOSPADM

## 2024-03-22 RX ORDER — SODIUM CHLORIDE 0.9 % (FLUSH) 0.9 %
5-40 SYRINGE (ML) INJECTION PRN
Status: DISCONTINUED | OUTPATIENT
Start: 2024-03-22 | End: 2024-03-25 | Stop reason: HOSPADM

## 2024-03-22 RX ORDER — ORPHENADRINE CITRATE 30 MG/ML
60 INJECTION INTRAMUSCULAR; INTRAVENOUS ONCE
Status: COMPLETED | OUTPATIENT
Start: 2024-03-22 | End: 2024-03-22

## 2024-03-22 RX ORDER — POTASSIUM CHLORIDE 20 MEQ/1
40 TABLET, EXTENDED RELEASE ORAL DAILY
Status: DISCONTINUED | OUTPATIENT
Start: 2024-03-22 | End: 2024-03-25 | Stop reason: HOSPADM

## 2024-03-22 RX ADMIN — ATORVASTATIN CALCIUM 40 MG: 40 TABLET, FILM COATED ORAL at 22:14

## 2024-03-22 RX ADMIN — DEXAMETHASONE SODIUM PHOSPHATE 10 MG: 10 INJECTION INTRAMUSCULAR; INTRAVENOUS at 13:01

## 2024-03-22 RX ADMIN — PREGABALIN 75 MG: 75 CAPSULE ORAL at 22:15

## 2024-03-22 RX ADMIN — SODIUM CHLORIDE: 9 INJECTION, SOLUTION INTRAVENOUS at 22:17

## 2024-03-22 RX ADMIN — FENTANYL CITRATE 50 MCG: 50 INJECTION INTRAMUSCULAR; INTRAVENOUS at 13:02

## 2024-03-22 RX ADMIN — ACETAMINOPHEN 650 MG: 325 TABLET ORAL at 22:15

## 2024-03-22 RX ADMIN — MECLIZINE 25 MG: 12.5 TABLET ORAL at 22:14

## 2024-03-22 RX ADMIN — SODIUM CHLORIDE, PRESERVATIVE FREE 10 ML: 5 INJECTION INTRAVENOUS at 22:19

## 2024-03-22 RX ADMIN — ORPHENADRINE CITRATE 60 MG: 60 INJECTION INTRAMUSCULAR; INTRAVENOUS at 13:02

## 2024-03-22 RX ADMIN — POTASSIUM CHLORIDE 40 MEQ: 1500 TABLET, EXTENDED RELEASE ORAL at 22:30

## 2024-03-22 RX ADMIN — ENOXAPARIN SODIUM 40 MG: 100 INJECTION SUBCUTANEOUS at 22:30

## 2024-03-22 RX ADMIN — LOSARTAN POTASSIUM 25 MG: 25 TABLET, FILM COATED ORAL at 22:14

## 2024-03-22 ASSESSMENT — PAIN DESCRIPTION - ORIENTATION: ORIENTATION: RIGHT;LEFT

## 2024-03-22 ASSESSMENT — PAIN DESCRIPTION - LOCATION: LOCATION: ABDOMEN;HIP

## 2024-03-22 ASSESSMENT — PAIN SCALES - GENERAL
PAINLEVEL_OUTOF10: 3
PAINLEVEL_OUTOF10: 3

## 2024-03-22 ASSESSMENT — PAIN DESCRIPTION - DESCRIPTORS: DESCRIPTORS: ACHING;DISCOMFORT

## 2024-03-22 ASSESSMENT — PAIN DESCRIPTION - PAIN TYPE: TYPE: ACUTE PAIN

## 2024-03-22 ASSESSMENT — PAIN DESCRIPTION - ONSET: ONSET: GRADUAL

## 2024-03-22 ASSESSMENT — PAIN DESCRIPTION - FREQUENCY: FREQUENCY: INTERMITTENT

## 2024-03-22 NOTE — ED PROVIDER NOTES
[facility] EMERGENCY DEPARTMENT  EMERGENCY DEPARTMENT ENCOUNTER        Pt Name: Alisa De La Fuente  MRN: [id number]  Birthdate 1940  Date of evaluation: 3/22/2024  Provider: Greg Boogie MD  PCP: Jean Carlos Espinosa MD  Note Started: 5:05 PM EDT 3/22/24    CHIEF COMPLAINT       Chief Complaint   Patient presents with    Back Pain     Urine incontinance with low back pain started Tuesday with weakness        HISTORY OF PRESENT ILLNESS: 1 or more Elements   History From: Patient.    Limitations to history : None    Alisa De La Fuente is a 84 y.o. female who presents to the ED with complaints of lower back pain, urinary incontinence.  Patient states that she does have a history of low back pain but started getting worse since last Tuesday.  She was very tender on her lower lumbar vertebral level.  Patient also states that she does have urinary incontinence intermittently sometimes.  She is a very poor historian.  She is not sure for how long she is having intermittent urinary incontinence.  No fever, no steroid use, no IV drug use.  Patient denies any recent fever, headache, nausea, abdominal pain, constipation, diarrhea or any other active complaints now in the ED.    Nursing Notes were all reviewed and agreed with or any disagreements were addressed in the HPI.    ROS:   Pertinent positives and negatives are stated within HPI, all other systems reviewed and are negative.    --------------------------------------------- PAST HISTORY ---------------------------------------------  Past Medical History:  has a past medical history of Arthritis, Colostomy in place (HCC), Diabetes mellitus (HCC), Diverticulitis, GERD (gastroesophageal reflux disease), Hernia, History of blood transfusion, Hyperlipidemia, and Hypertension.    Past Surgical History:  has a past surgical history that includes Hysterectomy; Cholecystectomy; hernia repair; Abdomen surgery; colostomy; Appendectomy; Thyroid surgery;

## 2024-03-22 NOTE — H&P
patient, reviewing charts, discussing plan of care and documentation.      NOTE: This report was transcribed using voice recognition software. Every effort was made to ensure accuracy; however, inadvertent computerized transcription errors may be present.  Electronically signed by Earl Colvin MD on 3/22/2024 at 7:45 PM

## 2024-03-23 LAB
ALBUMIN SERPL-MCNC: 3.3 G/DL (ref 3.5–5.2)
ALP SERPL-CCNC: 72 U/L (ref 35–104)
ALT SERPL-CCNC: 6 U/L (ref 0–32)
ANION GAP SERPL CALCULATED.3IONS-SCNC: 9 MMOL/L (ref 7–16)
AST SERPL-CCNC: 16 U/L (ref 0–31)
BASOPHILS # BLD: 0.01 K/UL (ref 0–0.2)
BASOPHILS NFR BLD: 0 % (ref 0–2)
BILIRUB SERPL-MCNC: 0.2 MG/DL (ref 0–1.2)
BUN SERPL-MCNC: 20 MG/DL (ref 6–23)
CALCIUM SERPL-MCNC: 8.8 MG/DL (ref 8.6–10.2)
CHLORIDE SERPL-SCNC: 100 MMOL/L (ref 98–107)
CO2 SERPL-SCNC: 28 MMOL/L (ref 22–29)
CREAT SERPL-MCNC: 0.8 MG/DL (ref 0.5–1)
EOSINOPHIL # BLD: 0 K/UL (ref 0.05–0.5)
EOSINOPHILS RELATIVE PERCENT: 0 % (ref 0–6)
ERYTHROCYTE [DISTWIDTH] IN BLOOD BY AUTOMATED COUNT: 18.1 % (ref 11.5–15)
FERRITIN SERPL-MCNC: 19 NG/ML
GFR SERPL CREATININE-BSD FRML MDRD: >60 ML/MIN/1.73M2
GLUCOSE BLD-MCNC: 186 MG/DL (ref 74–99)
GLUCOSE BLD-MCNC: 190 MG/DL (ref 74–99)
GLUCOSE BLD-MCNC: 218 MG/DL (ref 74–99)
GLUCOSE BLD-MCNC: 272 MG/DL (ref 74–99)
GLUCOSE SERPL-MCNC: 188 MG/DL (ref 74–99)
HBA1C MFR BLD: 6.6 % (ref 4–5.6)
HCT VFR BLD AUTO: 29.6 % (ref 34–48)
HGB BLD-MCNC: 8.1 G/DL (ref 11.5–15.5)
IMM GRANULOCYTES # BLD AUTO: 0.04 K/UL (ref 0–0.58)
IMM GRANULOCYTES NFR BLD: 1 % (ref 0–5)
IRON SATN MFR SERPL: 4 % (ref 15–50)
IRON SERPL-MCNC: 12 UG/DL (ref 37–145)
LYMPHOCYTES NFR BLD: 1.21 K/UL (ref 1.5–4)
LYMPHOCYTES RELATIVE PERCENT: 19 % (ref 20–42)
MCH RBC QN AUTO: 21 PG (ref 26–35)
MCHC RBC AUTO-ENTMCNC: 27.4 G/DL (ref 32–34.5)
MCV RBC AUTO: 76.9 FL (ref 80–99.9)
MONOCYTES NFR BLD: 0.55 K/UL (ref 0.1–0.95)
MONOCYTES NFR BLD: 9 % (ref 2–12)
NEUTROPHILS NFR BLD: 72 % (ref 43–80)
NEUTS SEG NFR BLD: 4.67 K/UL (ref 1.8–7.3)
PLATELET # BLD AUTO: 196 K/UL (ref 130–450)
PMV BLD AUTO: 10 FL (ref 7–12)
POTASSIUM SERPL-SCNC: 5.2 MMOL/L (ref 3.5–5)
PROT SERPL-MCNC: 6.6 G/DL (ref 6.4–8.3)
RBC # BLD AUTO: 3.85 M/UL (ref 3.5–5.5)
RBC # BLD: ABNORMAL 10*6/UL
SODIUM SERPL-SCNC: 137 MMOL/L (ref 132–146)
TIBC SERPL-MCNC: 320 UG/DL (ref 250–450)
WBC OTHER # BLD: 6.5 K/UL (ref 4.5–11.5)

## 2024-03-23 PROCEDURE — 96372 THER/PROPH/DIAG INJ SC/IM: CPT

## 2024-03-23 PROCEDURE — 83036 HEMOGLOBIN GLYCOSYLATED A1C: CPT

## 2024-03-23 PROCEDURE — 80053 COMPREHEN METABOLIC PANEL: CPT

## 2024-03-23 PROCEDURE — 82728 ASSAY OF FERRITIN: CPT

## 2024-03-23 PROCEDURE — 6370000000 HC RX 637 (ALT 250 FOR IP)

## 2024-03-23 PROCEDURE — 82962 GLUCOSE BLOOD TEST: CPT

## 2024-03-23 PROCEDURE — G0378 HOSPITAL OBSERVATION PER HR: HCPCS

## 2024-03-23 PROCEDURE — 6360000002 HC RX W HCPCS: Performed by: STUDENT IN AN ORGANIZED HEALTH CARE EDUCATION/TRAINING PROGRAM

## 2024-03-23 PROCEDURE — 99232 SBSQ HOSP IP/OBS MODERATE 35: CPT | Performed by: INTERNAL MEDICINE

## 2024-03-23 PROCEDURE — 6370000000 HC RX 637 (ALT 250 FOR IP): Performed by: INTERNAL MEDICINE

## 2024-03-23 PROCEDURE — 83540 ASSAY OF IRON: CPT

## 2024-03-23 PROCEDURE — 36415 COLL VENOUS BLD VENIPUNCTURE: CPT

## 2024-03-23 PROCEDURE — 94667 MNPJ CHEST WALL 1ST: CPT

## 2024-03-23 PROCEDURE — 83550 IRON BINDING TEST: CPT

## 2024-03-23 PROCEDURE — 6370000000 HC RX 637 (ALT 250 FOR IP): Performed by: STUDENT IN AN ORGANIZED HEALTH CARE EDUCATION/TRAINING PROGRAM

## 2024-03-23 PROCEDURE — 85025 COMPLETE CBC W/AUTO DIFF WBC: CPT

## 2024-03-23 RX ORDER — GLYBURIDE 5 MG/1
5 TABLET ORAL DAILY
Status: DISCONTINUED | OUTPATIENT
Start: 2024-03-23 | End: 2024-03-23 | Stop reason: CLARIF

## 2024-03-23 RX ORDER — GLUCAGON 1 MG/ML
1 KIT INJECTION PRN
Status: DISCONTINUED | OUTPATIENT
Start: 2024-03-23 | End: 2024-03-25 | Stop reason: HOSPADM

## 2024-03-23 RX ORDER — GLIPIZIDE 5 MG/1
5 TABLET ORAL
Status: DISCONTINUED | OUTPATIENT
Start: 2024-03-23 | End: 2024-03-25 | Stop reason: HOSPADM

## 2024-03-23 RX ORDER — DEXTROSE MONOHYDRATE 100 MG/ML
INJECTION, SOLUTION INTRAVENOUS CONTINUOUS PRN
Status: DISCONTINUED | OUTPATIENT
Start: 2024-03-23 | End: 2024-03-25 | Stop reason: HOSPADM

## 2024-03-23 RX ADMIN — PAROXETINE HYDROCHLORIDE 40 MG: 20 TABLET, FILM COATED ORAL at 08:53

## 2024-03-23 RX ADMIN — INSULIN LISPRO 2 UNITS: 100 INJECTION, SOLUTION INTRAVENOUS; SUBCUTANEOUS at 11:19

## 2024-03-23 RX ADMIN — HYDROCODONE BITARTRATE AND ACETAMINOPHEN 1 TABLET: 5; 325 TABLET ORAL at 20:48

## 2024-03-23 RX ADMIN — GLIPIZIDE 5 MG: 5 TABLET ORAL at 11:19

## 2024-03-23 RX ADMIN — LOSARTAN POTASSIUM 25 MG: 25 TABLET, FILM COATED ORAL at 08:54

## 2024-03-23 RX ADMIN — ATORVASTATIN CALCIUM 40 MG: 40 TABLET, FILM COATED ORAL at 20:49

## 2024-03-23 RX ADMIN — ENOXAPARIN SODIUM 40 MG: 100 INJECTION SUBCUTANEOUS at 22:52

## 2024-03-23 RX ADMIN — PANTOPRAZOLE SODIUM 40 MG: 40 TABLET, DELAYED RELEASE ORAL at 05:59

## 2024-03-23 RX ADMIN — MECLIZINE 25 MG: 12.5 TABLET ORAL at 08:53

## 2024-03-23 RX ADMIN — MICONAZOLE NITRATE: 2 OINTMENT TOPICAL at 11:19

## 2024-03-23 RX ADMIN — MICONAZOLE NITRATE: 2 OINTMENT TOPICAL at 22:51

## 2024-03-23 RX ADMIN — HYDROCODONE BITARTRATE AND ACETAMINOPHEN 1 TABLET: 5; 325 TABLET ORAL at 00:07

## 2024-03-23 RX ADMIN — PREGABALIN 75 MG: 75 CAPSULE ORAL at 08:52

## 2024-03-23 RX ADMIN — MECLIZINE 25 MG: 12.5 TABLET ORAL at 14:56

## 2024-03-23 RX ADMIN — PREGABALIN 75 MG: 75 CAPSULE ORAL at 14:56

## 2024-03-23 RX ADMIN — MECLIZINE 25 MG: 12.5 TABLET ORAL at 22:50

## 2024-03-23 RX ADMIN — PREGABALIN 75 MG: 75 CAPSULE ORAL at 20:48

## 2024-03-23 ASSESSMENT — PAIN SCALES - GENERAL
PAINLEVEL_OUTOF10: 0
PAINLEVEL_OUTOF10: 0
PAINLEVEL_OUTOF10: 10
PAINLEVEL_OUTOF10: 6
PAINLEVEL_OUTOF10: 2

## 2024-03-23 ASSESSMENT — PAIN DESCRIPTION - ORIENTATION
ORIENTATION: LOWER
ORIENTATION: LEFT

## 2024-03-23 ASSESSMENT — PAIN DESCRIPTION - FREQUENCY: FREQUENCY: INTERMITTENT

## 2024-03-23 ASSESSMENT — PAIN DESCRIPTION - ONSET: ONSET: GRADUAL

## 2024-03-23 ASSESSMENT — PAIN DESCRIPTION - DESCRIPTORS
DESCRIPTORS: ACHING;SORE;TENDER
DESCRIPTORS: ACHING;DISCOMFORT

## 2024-03-23 ASSESSMENT — PAIN - FUNCTIONAL ASSESSMENT: PAIN_FUNCTIONAL_ASSESSMENT: PREVENTS OR INTERFERES SOME ACTIVE ACTIVITIES AND ADLS

## 2024-03-23 ASSESSMENT — PAIN DESCRIPTION - LOCATION
LOCATION: ABDOMEN;HIP
LOCATION: BACK

## 2024-03-23 ASSESSMENT — PAIN DESCRIPTION - PAIN TYPE: TYPE: ACUTE PAIN

## 2024-03-23 NOTE — ED NOTES
ED to Inpatient Handoff Report    Notified delores that electronic handoff available and patient ready for transport to room 726.    Safety Risks: None identified    Patient in Restraints: no    Constant Observer or Patient : no    Telemetry Monitoring Ordered: No          Order to transfer to unit without monitor: NO    Last MEWS: 1 Time completed: 2001    Deterioration Index: 27.01    Vitals:    03/22/24 1140 03/22/24 1650   BP: (!) 166/77 (!) 155/71   Pulse: 87 88   Resp: 18 18   Temp: 98.5 °F (36.9 °C) 98.1 °F (36.7 °C)   TempSrc:  Oral   SpO2: 95% 95%   Weight: 74.8 kg (165 lb)    Height: 1.575 m (5' 2\")        Opportunity for questions and clarification was provided.

## 2024-03-24 LAB
ANION GAP SERPL CALCULATED.3IONS-SCNC: 6 MMOL/L (ref 7–16)
BASOPHILS # BLD: 0.04 K/UL (ref 0–0.2)
BASOPHILS NFR BLD: 1 % (ref 0–2)
BUN SERPL-MCNC: 28 MG/DL (ref 6–23)
CALCIUM SERPL-MCNC: 8.6 MG/DL (ref 8.6–10.2)
CHLORIDE SERPL-SCNC: 101 MMOL/L (ref 98–107)
CO2 SERPL-SCNC: 27 MMOL/L (ref 22–29)
CREAT SERPL-MCNC: 0.9 MG/DL (ref 0.5–1)
EOSINOPHIL # BLD: 0.52 K/UL (ref 0.05–0.5)
EOSINOPHILS RELATIVE PERCENT: 6 % (ref 0–6)
ERYTHROCYTE [DISTWIDTH] IN BLOOD BY AUTOMATED COUNT: 18.5 % (ref 11.5–15)
GFR SERPL CREATININE-BSD FRML MDRD: >60 ML/MIN/1.73M2
GLUCOSE BLD-MCNC: 163 MG/DL (ref 74–99)
GLUCOSE BLD-MCNC: 201 MG/DL (ref 74–99)
GLUCOSE BLD-MCNC: 221 MG/DL (ref 74–99)
GLUCOSE SERPL-MCNC: 167 MG/DL (ref 74–99)
HCT VFR BLD AUTO: 30.2 % (ref 34–48)
HGB BLD-MCNC: 8.1 G/DL (ref 11.5–15.5)
IMM GRANULOCYTES # BLD AUTO: 0.07 K/UL (ref 0–0.58)
IMM GRANULOCYTES NFR BLD: 1 % (ref 0–5)
LYMPHOCYTES NFR BLD: 2.65 K/UL (ref 1.5–4)
LYMPHOCYTES RELATIVE PERCENT: 32 % (ref 20–42)
MCH RBC QN AUTO: 20.6 PG (ref 26–35)
MCHC RBC AUTO-ENTMCNC: 26.8 G/DL (ref 32–34.5)
MCV RBC AUTO: 76.8 FL (ref 80–99.9)
MONOCYTES NFR BLD: 0.68 K/UL (ref 0.1–0.95)
MONOCYTES NFR BLD: 8 % (ref 2–12)
NEUTROPHILS NFR BLD: 52 % (ref 43–80)
NEUTS SEG NFR BLD: 4.32 K/UL (ref 1.8–7.3)
PLATELET # BLD AUTO: 202 K/UL (ref 130–450)
PMV BLD AUTO: 9.9 FL (ref 7–12)
POTASSIUM SERPL-SCNC: 4.5 MMOL/L (ref 3.5–5)
RBC # BLD AUTO: 3.93 M/UL (ref 3.5–5.5)
RBC # BLD: ABNORMAL 10*6/UL
SODIUM SERPL-SCNC: 134 MMOL/L (ref 132–146)
WBC OTHER # BLD: 8.3 K/UL (ref 4.5–11.5)

## 2024-03-24 PROCEDURE — 6360000002 HC RX W HCPCS: Performed by: STUDENT IN AN ORGANIZED HEALTH CARE EDUCATION/TRAINING PROGRAM

## 2024-03-24 PROCEDURE — 80048 BASIC METABOLIC PNL TOTAL CA: CPT

## 2024-03-24 PROCEDURE — 2580000003 HC RX 258: Performed by: STUDENT IN AN ORGANIZED HEALTH CARE EDUCATION/TRAINING PROGRAM

## 2024-03-24 PROCEDURE — 6370000000 HC RX 637 (ALT 250 FOR IP): Performed by: STUDENT IN AN ORGANIZED HEALTH CARE EDUCATION/TRAINING PROGRAM

## 2024-03-24 PROCEDURE — 97161 PT EVAL LOW COMPLEX 20 MIN: CPT

## 2024-03-24 PROCEDURE — 97530 THERAPEUTIC ACTIVITIES: CPT

## 2024-03-24 PROCEDURE — G0378 HOSPITAL OBSERVATION PER HR: HCPCS

## 2024-03-24 PROCEDURE — 82962 GLUCOSE BLOOD TEST: CPT

## 2024-03-24 PROCEDURE — 99233 SBSQ HOSP IP/OBS HIGH 50: CPT | Performed by: INTERNAL MEDICINE

## 2024-03-24 PROCEDURE — 85025 COMPLETE CBC W/AUTO DIFF WBC: CPT

## 2024-03-24 PROCEDURE — 96372 THER/PROPH/DIAG INJ SC/IM: CPT

## 2024-03-24 PROCEDURE — 6370000000 HC RX 637 (ALT 250 FOR IP)

## 2024-03-24 PROCEDURE — 6370000000 HC RX 637 (ALT 250 FOR IP): Performed by: INTERNAL MEDICINE

## 2024-03-24 PROCEDURE — 36415 COLL VENOUS BLD VENIPUNCTURE: CPT

## 2024-03-24 RX ADMIN — GLIPIZIDE 5 MG: 5 TABLET ORAL at 06:22

## 2024-03-24 RX ADMIN — PREGABALIN 75 MG: 75 CAPSULE ORAL at 17:00

## 2024-03-24 RX ADMIN — PREGABALIN 75 MG: 75 CAPSULE ORAL at 09:00

## 2024-03-24 RX ADMIN — MICONAZOLE NITRATE: 2 OINTMENT TOPICAL at 21:43

## 2024-03-24 RX ADMIN — PREGABALIN 75 MG: 75 CAPSULE ORAL at 19:55

## 2024-03-24 RX ADMIN — ACETAMINOPHEN 650 MG: 325 TABLET ORAL at 12:13

## 2024-03-24 RX ADMIN — MECLIZINE 25 MG: 12.5 TABLET ORAL at 17:00

## 2024-03-24 RX ADMIN — PANTOPRAZOLE SODIUM 40 MG: 40 TABLET, DELAYED RELEASE ORAL at 06:22

## 2024-03-24 RX ADMIN — HYDROCODONE BITARTRATE AND ACETAMINOPHEN 1 TABLET: 5; 325 TABLET ORAL at 10:05

## 2024-03-24 RX ADMIN — ATORVASTATIN CALCIUM 40 MG: 40 TABLET, FILM COATED ORAL at 19:54

## 2024-03-24 RX ADMIN — HYDROCODONE BITARTRATE AND ACETAMINOPHEN 1 TABLET: 5; 325 TABLET ORAL at 19:54

## 2024-03-24 RX ADMIN — SODIUM CHLORIDE, PRESERVATIVE FREE 10 ML: 5 INJECTION INTRAVENOUS at 21:43

## 2024-03-24 RX ADMIN — LOSARTAN POTASSIUM 25 MG: 25 TABLET, FILM COATED ORAL at 10:05

## 2024-03-24 RX ADMIN — ENOXAPARIN SODIUM 40 MG: 100 INJECTION SUBCUTANEOUS at 19:55

## 2024-03-24 RX ADMIN — MECLIZINE 25 MG: 12.5 TABLET ORAL at 19:55

## 2024-03-24 RX ADMIN — MECLIZINE 25 MG: 12.5 TABLET ORAL at 10:05

## 2024-03-24 RX ADMIN — MICONAZOLE NITRATE: 2 OINTMENT TOPICAL at 10:06

## 2024-03-24 RX ADMIN — PAROXETINE HYDROCHLORIDE 40 MG: 20 TABLET, FILM COATED ORAL at 10:05

## 2024-03-24 ASSESSMENT — PAIN SCALES - GENERAL
PAINLEVEL_OUTOF10: 8
PAINLEVEL_OUTOF10: 0

## 2024-03-24 ASSESSMENT — PAIN DESCRIPTION - DESCRIPTORS: DESCRIPTORS: ACHING;SORE;SPASM

## 2024-03-24 ASSESSMENT — PAIN - FUNCTIONAL ASSESSMENT: PAIN_FUNCTIONAL_ASSESSMENT: PREVENTS OR INTERFERES SOME ACTIVE ACTIVITIES AND ADLS

## 2024-03-24 ASSESSMENT — PAIN DESCRIPTION - LOCATION: LOCATION: BACK;ABDOMEN

## 2024-03-24 NOTE — ACP (ADVANCE CARE PLANNING)
Advance Care Planning   Healthcare Decision Maker:    Primary Decision Maker: Ksenia De La Fuente - [facility] - [phone]      Today we documented Decision Maker(s) consistent with Legal Next of Kin hierarchy.

## 2024-03-24 NOTE — CARE COORDINATION
Pt admitted observation w/back pain. PT am-pac: 16/24, appropriate for HHC. CM met w/pt and family at bedside w/role of CM explained. Pt requests [facility] for HHC, CM informed pt that [facility] is no longer in business. Pt is agreeable to [facility] w/referral made to [facility], [phone], await acceptance. Prior to admission pt required assistance w/ADLs and used a walker. Dtr and son assist w/needs. Hx of [facility] HHC, no hx of SNF stay. No home O2, cpap or nebulizer. Pt and family deny any equipment needed at home. Family will transport, will follow.  EDILMA Lewis, RN  [facility] Case Management  [phone]      [facility] accepts w/orders in place. SOC is for Tuesday.

## 2024-03-24 NOTE — PLAN OF CARE
Problem: Discharge Planning  Goal: Discharge to home or other facility with appropriate resources  3/24/2024 1036 by Heidy Dunaway RN  Outcome: Progressing  3/24/2024 0115 by Sirena Deshpande RN  Outcome: Progressing  Flowsheets (Taken 3/23/2024 2100)  Discharge to home or other facility with appropriate resources: Identify barriers to discharge with patient and caregiver     Problem: Safety - Adult  Goal: Free from fall injury  3/24/2024 1036 by Heidy Dunaway RN  Outcome: Progressing  3/24/2024 0115 by Sirena Deshpande RN  Outcome: Progressing     Problem: ABCDS Injury Assessment  Goal: Absence of physical injury  3/24/2024 1036 by Heidy Dunaway RN  Outcome: Progressing  3/24/2024 0115 by Sirena Deshpande, RN  Outcome: Progressing     Problem: Pain  Goal: Verbalizes/displays adequate comfort level or baseline comfort level  3/24/2024 1036 by Heidy Dunaway RN  Outcome: Progressing  3/24/2024 0115 by Sirena Deshpande, RN  Outcome: Progressing

## 2024-03-24 NOTE — PLAN OF CARE
Problem: Discharge Planning  Goal: Discharge to home or other facility with appropriate resources  Outcome: Progressing  Flowsheets (Taken 3/23/2024 2100)  Discharge to home or other facility with appropriate resources: Identify barriers to discharge with patient and caregiver     Problem: Safety - Adult  Goal: Free from fall injury  Outcome: Progressing     Problem: ABCDS Injury Assessment  Goal: Absence of physical injury  Outcome: Progressing     Problem: Pain  Goal: Verbalizes/displays adequate comfort level or baseline comfort level  Outcome: Progressing

## 2024-03-25 VITALS
TEMPERATURE: 97.7 F | RESPIRATION RATE: 16 BRPM | HEART RATE: 93 BPM | OXYGEN SATURATION: 96 % | BODY MASS INDEX: 30.36 KG/M2 | DIASTOLIC BLOOD PRESSURE: 73 MMHG | WEIGHT: 165 LBS | SYSTOLIC BLOOD PRESSURE: 150 MMHG | HEIGHT: 62 IN

## 2024-03-25 LAB
ANION GAP SERPL CALCULATED.3IONS-SCNC: 11 MMOL/L (ref 7–16)
BASOPHILS # BLD: 0.04 K/UL (ref 0–0.2)
BASOPHILS NFR BLD: 1 % (ref 0–2)
BUN SERPL-MCNC: 21 MG/DL (ref 6–23)
CALCIUM SERPL-MCNC: 8.7 MG/DL (ref 8.6–10.2)
CHLORIDE SERPL-SCNC: 98 MMOL/L (ref 98–107)
CO2 SERPL-SCNC: 25 MMOL/L (ref 22–29)
CREAT SERPL-MCNC: 0.9 MG/DL (ref 0.5–1)
EOSINOPHIL # BLD: 0.78 K/UL (ref 0.05–0.5)
EOSINOPHILS RELATIVE PERCENT: 11 % (ref 0–6)
ERYTHROCYTE [DISTWIDTH] IN BLOOD BY AUTOMATED COUNT: 18.5 % (ref 11.5–15)
GFR SERPL CREATININE-BSD FRML MDRD: 65 ML/MIN/1.73M2
GLUCOSE BLD-MCNC: 163 MG/DL (ref 74–99)
GLUCOSE BLD-MCNC: 184 MG/DL (ref 74–99)
GLUCOSE SERPL-MCNC: 199 MG/DL (ref 74–99)
HCT VFR BLD AUTO: 33.1 % (ref 34–48)
HGB BLD-MCNC: 8.9 G/DL (ref 11.5–15.5)
IMM GRANULOCYTES # BLD AUTO: 0.04 K/UL (ref 0–0.58)
IMM GRANULOCYTES NFR BLD: 1 % (ref 0–5)
LYMPHOCYTES NFR BLD: 2.25 K/UL (ref 1.5–4)
LYMPHOCYTES RELATIVE PERCENT: 31 % (ref 20–42)
MCH RBC QN AUTO: 21.4 PG (ref 26–35)
MCHC RBC AUTO-ENTMCNC: 26.9 G/DL (ref 32–34.5)
MCV RBC AUTO: 79.8 FL (ref 80–99.9)
MONOCYTES NFR BLD: 0.57 K/UL (ref 0.1–0.95)
MONOCYTES NFR BLD: 8 % (ref 2–12)
NEUTROPHILS NFR BLD: 49 % (ref 43–80)
NEUTS SEG NFR BLD: 3.57 K/UL (ref 1.8–7.3)
PLATELET CONFIRMATION: NORMAL
PLATELET, FLUORESCENCE: 170 K/UL (ref 130–450)
PMV BLD AUTO: 10.2 FL (ref 7–12)
POTASSIUM SERPL-SCNC: 4.4 MMOL/L (ref 3.5–5)
RBC # BLD AUTO: 4.15 M/UL (ref 3.5–5.5)
RBC # BLD: ABNORMAL 10*6/UL
SODIUM SERPL-SCNC: 134 MMOL/L (ref 132–146)
WBC OTHER # BLD: 7.3 K/UL (ref 4.5–11.5)

## 2024-03-25 PROCEDURE — 2580000003 HC RX 258: Performed by: STUDENT IN AN ORGANIZED HEALTH CARE EDUCATION/TRAINING PROGRAM

## 2024-03-25 PROCEDURE — 85025 COMPLETE CBC W/AUTO DIFF WBC: CPT

## 2024-03-25 PROCEDURE — 6370000000 HC RX 637 (ALT 250 FOR IP): Performed by: INTERNAL MEDICINE

## 2024-03-25 PROCEDURE — 80048 BASIC METABOLIC PNL TOTAL CA: CPT

## 2024-03-25 PROCEDURE — 99239 HOSP IP/OBS DSCHRG MGMT >30: CPT | Performed by: INTERNAL MEDICINE

## 2024-03-25 PROCEDURE — G0378 HOSPITAL OBSERVATION PER HR: HCPCS

## 2024-03-25 PROCEDURE — 36415 COLL VENOUS BLD VENIPUNCTURE: CPT

## 2024-03-25 PROCEDURE — 82962 GLUCOSE BLOOD TEST: CPT

## 2024-03-25 PROCEDURE — 6370000000 HC RX 637 (ALT 250 FOR IP): Performed by: STUDENT IN AN ORGANIZED HEALTH CARE EDUCATION/TRAINING PROGRAM

## 2024-03-25 PROCEDURE — 6370000000 HC RX 637 (ALT 250 FOR IP)

## 2024-03-25 RX ADMIN — GLIPIZIDE 5 MG: 5 TABLET ORAL at 05:34

## 2024-03-25 RX ADMIN — HYDROCODONE BITARTRATE AND ACETAMINOPHEN 1 TABLET: 5; 325 TABLET ORAL at 02:23

## 2024-03-25 RX ADMIN — HYDROCODONE BITARTRATE AND ACETAMINOPHEN 1 TABLET: 5; 325 TABLET ORAL at 09:06

## 2024-03-25 RX ADMIN — PAROXETINE HYDROCHLORIDE 40 MG: 20 TABLET, FILM COATED ORAL at 08:58

## 2024-03-25 RX ADMIN — MECLIZINE 25 MG: 12.5 TABLET ORAL at 08:58

## 2024-03-25 RX ADMIN — SODIUM CHLORIDE, PRESERVATIVE FREE 10 ML: 5 INJECTION INTRAVENOUS at 09:00

## 2024-03-25 RX ADMIN — POTASSIUM CHLORIDE 40 MEQ: 1500 TABLET, EXTENDED RELEASE ORAL at 08:59

## 2024-03-25 RX ADMIN — PANTOPRAZOLE SODIUM 40 MG: 40 TABLET, DELAYED RELEASE ORAL at 05:34

## 2024-03-25 RX ADMIN — MICONAZOLE NITRATE: 2 OINTMENT TOPICAL at 09:00

## 2024-03-25 RX ADMIN — LOSARTAN POTASSIUM 25 MG: 25 TABLET, FILM COATED ORAL at 08:59

## 2024-03-25 ASSESSMENT — PAIN DESCRIPTION - DESCRIPTORS
DESCRIPTORS: ACHING;DISCOMFORT
DESCRIPTORS: TENDER

## 2024-03-25 ASSESSMENT — PAIN - FUNCTIONAL ASSESSMENT: PAIN_FUNCTIONAL_ASSESSMENT: PREVENTS OR INTERFERES SOME ACTIVE ACTIVITIES AND ADLS

## 2024-03-25 ASSESSMENT — PAIN SCALES - GENERAL
PAINLEVEL_OUTOF10: 4
PAINLEVEL_OUTOF10: 0
PAINLEVEL_OUTOF10: 10

## 2024-03-25 ASSESSMENT — PAIN DESCRIPTION - ORIENTATION: ORIENTATION: LOWER

## 2024-03-25 ASSESSMENT — PAIN DESCRIPTION - LOCATION
LOCATION: BACK
LOCATION: BACK

## 2024-03-25 NOTE — PROGRESS NOTES
[facility] Hospitalist Progress Note    Admitting Date and Time: 3/22/2024 11:27 AM  Admit Dx: Acute exacerbation of chronic low back pain [M54.50, G89.29]  Osteoarthritis of spine with radiculopathy, lumbar region [M47.26]    Subjective:  Patient is being followed for Acute exacerbation of chronic low back pain [M54.50, G89.29]  Osteoarthritis of spine with radiculopathy, lumbar region [M47.26]     Patient states that she has cough for several months that does not come up. I did recommend chest PT    ROS: denies fever, chills, cp, sob, n/v, HA unless stated above.      glyBURIDE  5 mg Oral Daily    Liraglutide  1.8 mg SubCUTAneous Daily    sodium chloride flush  5-40 mL IntraVENous 2 times per day    enoxaparin  40 mg SubCUTAneous Daily    atorvastatin  40 mg Oral Nightly    pantoprazole  40 mg Oral QAM AC    losartan  25 mg Oral Daily    meclizine  25 mg Oral TID    PARoxetine  40 mg Oral Daily    potassium chloride  40 mEq Oral Daily    pregabalin  75 mg Oral TID    insulin lispro  0-4 Units SubCUTAneous TID WC    insulin lispro  0-4 Units SubCUTAneous Nightly     glucose, 4 tablet, PRN  dextrose bolus, 125 mL, PRN   Or  dextrose bolus, 250 mL, PRN  glucagon (rDNA), 1 mg, PRN  dextrose, , Continuous PRN  sodium chloride flush, 5-40 mL, PRN  sodium chloride, , PRN  potassium chloride, 40 mEq, PRN   Or  potassium alternative oral replacement, 40 mEq, PRN   Or  potassium chloride, 10 mEq, PRN  magnesium sulfate, 2,000 mg, PRN  ondansetron, 4 mg, Q8H PRN   Or  ondansetron, 4 mg, Q6H PRN  polyethylene glycol, 17 g, Daily PRN  acetaminophen, 650 mg, Q6H PRN   Or  acetaminophen, 650 mg, Q6H PRN  fentanNYL, 50 mcg, Q2H PRN  HYDROcodone 5 mg - acetaminophen, 1 tablet, Q6H PRN         Objective:    /87   Pulse 85   Temp 97.4 °F (36.3 °C) (Oral)   Resp 18   Ht 1.575 m (5' 2\")   Wt 74.8 kg (165 lb)   SpO2 98%   BMI 30.18 kg/m²     General Appearance: alert and oriented to person, place and time and in 
       [facility] Hospitalist Progress Note    Admitting Date and Time: 3/22/2024 11:27 AM  Admit Dx: Acute exacerbation of chronic low back pain [M54.50, G89.29]  Osteoarthritis of spine with radiculopathy, lumbar region [M47.26]    Subjective:  Patient is being followed for Acute exacerbation of chronic low back pain [M54.50, G89.29]  Osteoarthritis of spine with radiculopathy, lumbar region [M47.26]     Patient's  was admitted yesterday and passed away. Will provide spiritual guidance and family support.    ROS: denies fever, chills, cp, sob, n/v, HA unless stated above.      Liraglutide  1.8 mg SubCUTAneous Daily    glipiZIDE  5 mg Oral QAM AC    miconazole nitrate   Topical BID    sodium chloride flush  5-40 mL IntraVENous 2 times per day    enoxaparin  40 mg SubCUTAneous Daily    atorvastatin  40 mg Oral Nightly    pantoprazole  40 mg Oral QAM AC    losartan  25 mg Oral Daily    meclizine  25 mg Oral TID    PARoxetine  40 mg Oral Daily    potassium chloride  40 mEq Oral Daily    pregabalin  75 mg Oral TID    insulin lispro  0-4 Units SubCUTAneous TID WC    insulin lispro  0-4 Units SubCUTAneous Nightly     dextrose bolus, 125 mL, PRN   Or  dextrose bolus, 250 mL, PRN  glucagon (rDNA), 1 mg, PRN  dextrose, , Continuous PRN  Glucose, 15 g, Once PRN  sodium chloride flush, 5-40 mL, PRN  sodium chloride, , PRN  potassium chloride, 40 mEq, PRN   Or  potassium alternative oral replacement, 40 mEq, PRN   Or  potassium chloride, 10 mEq, PRN  magnesium sulfate, 2,000 mg, PRN  ondansetron, 4 mg, Q8H PRN   Or  ondansetron, 4 mg, Q6H PRN  polyethylene glycol, 17 g, Daily PRN  acetaminophen, 650 mg, Q6H PRN   Or  acetaminophen, 650 mg, Q6H PRN  fentanNYL, 50 mcg, Q2H PRN  HYDROcodone 5 mg - acetaminophen, 1 tablet, Q6H PRN         Objective:    BP (!) 154/84   Pulse 93   Temp 97.5 °F (36.4 °C) (Oral)   Resp 18   Ht 1.575 m (5' 2\")   Wt 74.8 kg (165 lb)   SpO2 98%   BMI 30.18 kg/m²     General Appearance: 
 attempted to see patient in response to spiritual care consult.  Patient was sleeping.   prayed a silent prayer for the patient.  will attempt to follow up.  
 was referred to patient by nurse due to patient's  dying earlier at the hospital. Patient and family declined  support, but expressed appreciation for coming by.   
4 Eyes Skin Assessment     NAME:  Alisa De La Fuente  YOB: 1940  MEDICAL RECORD NUMBER:  [id number]    The patient is being assessed for  Admission    I agree that at least one RN has performed a thorough Head to Toe Skin Assessment on the patient. ALL assessment sites listed below have been assessed.      Areas assessed by both nurses:    Head, Face, Ears, Shoulders, Back, Chest, Arms, Elbows, Hands, Sacrum. Buttock, Coccyx, Ischium, Legs. Feet and Heels, and Under Medical Devices         Does the Patient have a Wound? No noted wound(s)       Subhash Prevention initiated by RN: Yes  Wound Care Orders initiated by RN: No    Pressure Injury (Stage 3,4, Unstageable, DTI, NWPT, and Complex wounds) if present, place Wound referral order by RN under : No    New Ostomies, if present place, Ostomy referral order under : Yes     Nurse 1 eSignature: Electronically signed by Cailin Diaz RN on 3/23/24 at 1:11 AM EDT    **SHARE this note so that the co-signing nurse can place an eSignature**    Nurse 2 eSignature: Electronically signed by Kirstin Cárdenas RN on 3/23/24 at 1:21 AM EDT   
P Quality Flow/Interdisciplinary Rounds Progress Note        Quality Flow Rounds held on March 23, 2024    Disciplines Attending:  Bedside Nurse, , , and Nursing Unit Leadership    Alisa De La Fuente was admitted on 3/22/2024 11:27 AM    Anticipated Discharge Date:       Disposition:    Subhash Score:  Subhash Scale Score: 19    Readmission Risk              Risk of Unplanned Readmission:  0           Discussed patient goal for the day, patient clinical progression, and barriers to discharge.  The following Goal(s) of the Day/Commitment(s) have been identified:   discharge planning      Pietro Henao RN  March 23, 2024        
[facility] Quality Flow/Interdisciplinary Rounds Progress Note        Quality Flow Rounds held on March 25, 2024    Disciplines Attending:  Bedside Nurse, , , and Nursing Unit Leadership    Alisa De La Fuente was admitted on 3/22/2024 11:27 AM    Anticipated Discharge Date:  Expected Discharge Date: 03/25/24    Disposition:    Subhash Score:  Subhash Scale Score: 19    Readmission Risk              Risk of Unplanned Readmission:  0           Discussed patient goal for the day, patient clinical progression, and barriers to discharge.  The following Goal(s) of the Day/Commitment(s) have been identified:   discharge planning - home today      Pietro Henao RN  March 25, 2024        
SPIRITUAL HEALTH SERVICES - [facility]  PROGRESS NOTE    Name: Alisa De La Fuente                Sabianism: Oriental orthodox    Anointed (Last Rites): NA    Referral: Spiritual Care Consult    Assessment:  Upon entering the room  observes patient laying in bed. Patient appeared calm, alert, and was welcoming to .  Patient expressed concerns about  who was presently receiving care in the ED.        Intervention:   listened attentively to patient's concerns about her , nurtured hope and offered prayer for the patient and her .    Outcome:  Patient seemed encouraged, engaged in conversation, and expressed gratitude.    Plan:  Chaplains will remain available to offer spiritual and emotional support as needed.      Electronically signed by Chaplain Daniel, on 3/23/2024 at 2:32 PM.  Spiritual Care Department  [facility]  [phone]     
lumbar/spinal and B/L hip pain static and dynamic kinetic standing.  Amb with WW with CGA in a reciprocal step through gait cycle slower paced.  Inc FWD trunk flexion with WW usage. No drifting, lateral veering or LOB with indep WW guidances.  Progressive inc LBP/hip pain and general fatigue as walk distances advanced; self-limited walk to 100' this AM.   Remained in chair @ Eval.  Waffle cushion and chair alarm in line and ON.  Call light and wall phone on bed to LT and easily reachable.  Tray centrally placed.  Very appreciative for care.     Treatment:  Patient practiced and was instructed in the following treatment:    Eval  Amb    Pt's/ family goals   1. NA    Patient and or family understand(s) diagnosis, prognosis, and plan of care.    PLAN:    PT care will be provided in accordance with the objectives noted above. Exercises and functional mobility practice will be used as well as appropriate assistive devices or modalities to obtain goals. Patient and family education will also be administered as needed.    Frequency of treatments: 3-5x/week x 1-2 weeks.    Total Treatment Time  15 minutes     Evaluation Time includes thorough review of current medical information, gathering information on past medical history/social history and prior level of function, completion of standardized testing/informal observation of tasks, assessment of data and education on plan of care and goals.    CPT codes:  [x] Low Complexity PT evaluation 99970  [] Moderate Complexity PT evaluation 91061  [] High Complexity PT evaluation 87135  [] PT Re-evaluation 96709  [] Gait training 95207 ** minutes  [] Manual therapy 76673 ** minutes  [x] Therapeutic activities 84076 ** minutes  [] Therapeutic exercises 88903 ** minutes  [] Neuromuscular reeducation 02364 ** minutes    Cheikh Palm PT, RRT, CEAS

## 2024-03-25 NOTE — PLAN OF CARE
Problem: Discharge Planning  Goal: Discharge to home or other facility with appropriate resources  3/25/2024 1110 by Teja Webb RN  Outcome: Adequate for Discharge  3/25/2024 0040 by Sirena Deshpande RN  Outcome: Progressing     Problem: Safety - Adult  Goal: Free from fall injury  3/25/2024 1110 by Teja Webb RN  Outcome: Adequate for Discharge  3/25/2024 0040 by Sirena Deshpande RN  Outcome: Progressing     Problem: ABCDS Injury Assessment  Goal: Absence of physical injury  3/25/2024 1110 by Teja Webb RN  Outcome: Adequate for Discharge  3/25/2024 0040 by Sirena Deshpande RN  Outcome: Progressing     Problem: Pain  Goal: Verbalizes/displays adequate comfort level or baseline comfort level  3/25/2024 1110 by Teja Webb RN  Outcome: Adequate for Discharge  3/25/2024 0040 by Sirena Deshpande RN  Outcome: Progressing     Problem: Chronic Conditions and Co-morbidities  Goal: Patient's chronic conditions and co-morbidity symptoms are monitored and maintained or improved  Outcome: Adequate for Discharge

## 2024-03-25 NOTE — CARE COORDINATION
Updated plan of care. [facility] home care notified of discharge. Orders completed-o

## 2024-05-15 NOTE — DISCHARGE SUMMARY
[facility] Hospitalist Physician Discharge Summary       [facility]  [address] [address]  [phone]          Activity level: As tolerated     Dispo: Home      Condition on discharge: Stable     Patient ID:  Alisa De La Fuente  [id number]  84 y.o.  1940    Admit date: 3/22/2024    Discharge date and time:  3/25/2024  1:12 PM    Admission Diagnoses: Principal Problem:    Osteoarthritis of spine with radiculopathy, lumbar region  Resolved Problems:    * No resolved hospital problems. *      Discharge Diagnoses: Principal Problem:    Osteoarthritis of spine with radiculopathy, lumbar region  Resolved Problems:    * No resolved hospital problems. *      Consults:  IP CONSULT TO SOCIAL WORK  IP CONSULT TO SPIRITUAL SERVICES  IP CONSULT TO HOME CARE NEEDS    Procedures: None    Hospital Course:   Patient Alisa De La Fuente is a 84 y.o. presented with Acute exacerbation of chronic low back pain [M54.50, G89.29]  Osteoarthritis of spine with radiculopathy, lumbar region [M47.26]    This is a 84 year old female who presents to the hospital with inability to walk and back/hip pain. She was seen by PT/OT and was to go to [facility] however due to family circumstance she wants to go home.     Discharge Exam:    General Appearance: alert and oriented to person, place and time and in no acute distress  Skin: warm and dry  Head: normocephalic and atraumatic  Eyes: pupils equal, round, extraocular eye movements intact, conjunctivae normal  Pulmonary/Chest: clear to auscultation bilaterally- no wheezes, rales or rhonchi, normal air movement, no respiratory distress  Cardiovascular: normal rate, normal S1 and S2   Abdomen: soft, non-tender, non-distended, normal bowel sounds,   Extremities: no cyanosis, no clubbing and no edema  Neurologic: no cranial nerve deficit and speech normal, did not assess ambulation      I/O last 3 completed shifts:  In: 680 [P.O.:680]  Out: 500 [Urine:200; Stool:300]  I/O this 
[FreeTextEntry1] : CXR reveals a normal sized heart; no evidence of infiltrate or effusion, scoliosis   6 minute walk test reveals a low saturation of 91% with no evidence of dyspnea or fatigue; walked  586 meters   Full PFT reveals normal flows; FEV1 was 4.29 L which is 124% of predicted; normal lung volumes; normal diffusion at 4.29, which is 109% of predicted; normal flow volume loop. PFTs were performed to evaluate for SOB  FENO was 19; a normal value being less than 25 Fractional exhaled nitric oxide (FENO) is regarded as a simple, noninvasive method for assessing eosinophilic airway inflammation. Produced by a variety of cells within the lung, nitric oxide (NO) concentrations are generally low in healthy individuals. However, high concentrations of NO appear to be involved in nonspecific host defense mechanisms and chronic inflammatory diseases such as asthma. The American Thoracic Society (ATS) therefore has recommended using FENO to aid in the diagnosis and monitoring of eosinophilic airway inflammation and asthma, and for identifying steroid responsive individuals whose chronic respiratory symptoms may be caused by airway inflammation.   The American Thoracic Society (ATS) strongly recommends the use of FeNO measurement to aid in the assessment, management, and long-term monitoring of asthma. In their 2011 clinical practice guideline, the ATS emphasizes the importance of using FeNO.

## 2024-07-27 ENCOUNTER — APPOINTMENT (OUTPATIENT)
Dept: GENERAL RADIOLOGY | Age: 84
End: 2024-07-27
Payer: MEDICARE

## 2024-07-27 ENCOUNTER — HOSPITAL ENCOUNTER (INPATIENT)
Age: 84
LOS: 6 days | Discharge: NURSING, REHAB OR LONG-TERM CARE | End: 2024-08-02
Attending: STUDENT IN AN ORGANIZED HEALTH CARE EDUCATION/TRAINING PROGRAM | Admitting: INTERNAL MEDICINE
Payer: MEDICARE

## 2024-07-27 ENCOUNTER — APPOINTMENT (OUTPATIENT)
Dept: CT IMAGING | Age: 84
End: 2024-07-27
Payer: MEDICARE

## 2024-07-27 DIAGNOSIS — M47.26 OSTEOARTHRITIS OF SPINE WITH RADICULOPATHY, LUMBAR REGION: Primary | ICD-10-CM

## 2024-07-27 DIAGNOSIS — K56.609 SBO (SMALL BOWEL OBSTRUCTION) (HCC): ICD-10-CM

## 2024-07-27 DIAGNOSIS — M26.621 ARTHRALGIA OF RIGHT TEMPOROMANDIBULAR JOINT: ICD-10-CM

## 2024-07-27 LAB
ALBUMIN SERPL-MCNC: 3.9 G/DL (ref 3.5–5.2)
ALP SERPL-CCNC: 89 U/L (ref 35–104)
ALT SERPL-CCNC: 7 U/L (ref 0–32)
ANION GAP SERPL CALCULATED.3IONS-SCNC: 15 MMOL/L (ref 7–16)
AST SERPL-CCNC: 17 U/L (ref 0–31)
BASOPHILS # BLD: 0.03 K/UL (ref 0–0.2)
BASOPHILS NFR BLD: 0 % (ref 0–2)
BILIRUB SERPL-MCNC: 0.5 MG/DL (ref 0–1.2)
BILIRUB UR QL STRIP: NEGATIVE
BUN SERPL-MCNC: 16 MG/DL (ref 6–23)
CALCIUM SERPL-MCNC: 9.2 MG/DL (ref 8.6–10.2)
CHLORIDE SERPL-SCNC: 98 MMOL/L (ref 98–107)
CLARITY UR: CLEAR
CO2 SERPL-SCNC: 27 MMOL/L (ref 22–29)
COLOR UR: YELLOW
CREAT SERPL-MCNC: 0.7 MG/DL (ref 0.5–1)
EOSINOPHIL # BLD: 0.21 K/UL (ref 0.05–0.5)
EOSINOPHILS RELATIVE PERCENT: 2 % (ref 0–6)
ERYTHROCYTE [DISTWIDTH] IN BLOOD BY AUTOMATED COUNT: 17.8 % (ref 11.5–15)
GFR, ESTIMATED: 80 ML/MIN/1.73M2
GLUCOSE SERPL-MCNC: 163 MG/DL (ref 74–99)
GLUCOSE UR STRIP-MCNC: NEGATIVE MG/DL
HCT VFR BLD AUTO: 35.2 % (ref 34–48)
HGB BLD-MCNC: 9.9 G/DL (ref 11.5–15.5)
HGB UR QL STRIP.AUTO: NEGATIVE
IMM GRANULOCYTES # BLD AUTO: 0.05 K/UL (ref 0–0.58)
IMM GRANULOCYTES NFR BLD: 0 % (ref 0–5)
INR PPP: 1.2
KETONES UR STRIP-MCNC: ABNORMAL MG/DL
LACTATE BLDV-SCNC: 3.3 MMOL/L (ref 0.5–2.2)
LEUKOCYTE ESTERASE UR QL STRIP: NEGATIVE
LIPASE SERPL-CCNC: 26 U/L (ref 13–60)
LYMPHOCYTES NFR BLD: 2.02 K/UL (ref 1.5–4)
LYMPHOCYTES RELATIVE PERCENT: 17 % (ref 20–42)
MAGNESIUM SERPL-MCNC: 1.7 MG/DL (ref 1.6–2.6)
MCH RBC QN AUTO: 20.9 PG (ref 26–35)
MCHC RBC AUTO-ENTMCNC: 28.1 G/DL (ref 32–34.5)
MCV RBC AUTO: 74.3 FL (ref 80–99.9)
MONOCYTES NFR BLD: 0.88 K/UL (ref 0.1–0.95)
MONOCYTES NFR BLD: 8 % (ref 2–12)
NEUTROPHILS NFR BLD: 73 % (ref 43–80)
NEUTS SEG NFR BLD: 8.57 K/UL (ref 1.8–7.3)
NITRITE UR QL STRIP: NEGATIVE
PARTIAL THROMBOPLASTIN TIME: 31.1 SEC (ref 24.5–35.1)
PH UR STRIP: 6 [PH] (ref 5–9)
PLATELET # BLD AUTO: 275 K/UL (ref 130–450)
PMV BLD AUTO: 9.3 FL (ref 7–12)
POTASSIUM SERPL-SCNC: 3.4 MMOL/L (ref 3.5–5)
PROT SERPL-MCNC: 7.1 G/DL (ref 6.4–8.3)
PROT UR STRIP-MCNC: NEGATIVE MG/DL
PROTHROMBIN TIME: 13 SEC (ref 9.3–12.4)
RBC # BLD AUTO: 4.74 M/UL (ref 3.5–5.5)
RBC # BLD: ABNORMAL 10*6/UL
RBC # BLD: ABNORMAL 10*6/UL
RBC #/AREA URNS HPF: ABNORMAL /HPF
SODIUM SERPL-SCNC: 140 MMOL/L (ref 132–146)
SP GR UR STRIP: 1.02 (ref 1–1.03)
TROPONIN I SERPL HS-MCNC: 26 NG/L (ref 0–9)
TROPONIN I SERPL HS-MCNC: 28 NG/L (ref 0–9)
UROBILINOGEN UR STRIP-ACNC: 0.2 EU/DL (ref 0–1)
WBC #/AREA URNS HPF: ABNORMAL /HPF
WBC OTHER # BLD: 11.8 K/UL (ref 4.5–11.5)

## 2024-07-27 PROCEDURE — 6360000004 HC RX CONTRAST MEDICATION: Performed by: RADIOLOGY

## 2024-07-27 PROCEDURE — 96361 HYDRATE IV INFUSION ADD-ON: CPT

## 2024-07-27 PROCEDURE — 84145 PROCALCITONIN (PCT): CPT

## 2024-07-27 PROCEDURE — 85025 COMPLETE CBC W/AUTO DIFF WBC: CPT

## 2024-07-27 PROCEDURE — 85610 PROTHROMBIN TIME: CPT

## 2024-07-27 PROCEDURE — 80053 COMPREHEN METABOLIC PANEL: CPT

## 2024-07-27 PROCEDURE — 84484 ASSAY OF TROPONIN QUANT: CPT

## 2024-07-27 PROCEDURE — 74177 CT ABD & PELVIS W/CONTRAST: CPT

## 2024-07-27 PROCEDURE — 74018 RADEX ABDOMEN 1 VIEW: CPT

## 2024-07-27 PROCEDURE — 6360000002 HC RX W HCPCS: Performed by: STUDENT IN AN ORGANIZED HEALTH CARE EDUCATION/TRAINING PROGRAM

## 2024-07-27 PROCEDURE — 36415 COLL VENOUS BLD VENIPUNCTURE: CPT

## 2024-07-27 PROCEDURE — 83036 HEMOGLOBIN GLYCOSYLATED A1C: CPT

## 2024-07-27 PROCEDURE — 2580000003 HC RX 258: Performed by: STUDENT IN AN ORGANIZED HEALTH CARE EDUCATION/TRAINING PROGRAM

## 2024-07-27 PROCEDURE — 87154 CUL TYP ID BLD PTHGN 6+ TRGT: CPT

## 2024-07-27 PROCEDURE — 6360000002 HC RX W HCPCS: Performed by: INTERNAL MEDICINE

## 2024-07-27 PROCEDURE — 99285 EMERGENCY DEPT VISIT HI MDM: CPT

## 2024-07-27 PROCEDURE — 86140 C-REACTIVE PROTEIN: CPT

## 2024-07-27 PROCEDURE — 87040 BLOOD CULTURE FOR BACTERIA: CPT

## 2024-07-27 PROCEDURE — 83735 ASSAY OF MAGNESIUM: CPT

## 2024-07-27 PROCEDURE — 99222 1ST HOSP IP/OBS MODERATE 55: CPT | Performed by: INTERNAL MEDICINE

## 2024-07-27 PROCEDURE — 83690 ASSAY OF LIPASE: CPT

## 2024-07-27 PROCEDURE — 93005 ELECTROCARDIOGRAM TRACING: CPT | Performed by: STUDENT IN AN ORGANIZED HEALTH CARE EDUCATION/TRAINING PROGRAM

## 2024-07-27 PROCEDURE — 96374 THER/PROPH/DIAG INJ IV PUSH: CPT

## 2024-07-27 PROCEDURE — 87077 CULTURE AEROBIC IDENTIFY: CPT

## 2024-07-27 PROCEDURE — 83605 ASSAY OF LACTIC ACID: CPT

## 2024-07-27 PROCEDURE — 96375 TX/PRO/DX INJ NEW DRUG ADDON: CPT

## 2024-07-27 PROCEDURE — 87086 URINE CULTURE/COLONY COUNT: CPT

## 2024-07-27 PROCEDURE — 81001 URINALYSIS AUTO W/SCOPE: CPT

## 2024-07-27 PROCEDURE — 1200000000 HC SEMI PRIVATE

## 2024-07-27 PROCEDURE — 85730 THROMBOPLASTIN TIME PARTIAL: CPT

## 2024-07-27 RX ORDER — MORPHINE SULFATE 4 MG/ML
4 INJECTION, SOLUTION INTRAMUSCULAR; INTRAVENOUS ONCE
Status: COMPLETED | OUTPATIENT
Start: 2024-07-27 | End: 2024-07-27

## 2024-07-27 RX ORDER — SODIUM CHLORIDE, SODIUM LACTATE, POTASSIUM CHLORIDE, CALCIUM CHLORIDE 600; 310; 30; 20 MG/100ML; MG/100ML; MG/100ML; MG/100ML
INJECTION, SOLUTION INTRAVENOUS CONTINUOUS
Status: ACTIVE | OUTPATIENT
Start: 2024-07-27 | End: 2024-07-29

## 2024-07-27 RX ORDER — MORPHINE SULFATE 4 MG/ML
4 INJECTION, SOLUTION INTRAMUSCULAR; INTRAVENOUS ONCE
Status: DISCONTINUED | OUTPATIENT
Start: 2024-07-27 | End: 2024-07-27

## 2024-07-27 RX ORDER — 0.9 % SODIUM CHLORIDE 0.9 %
1000 INTRAVENOUS SOLUTION INTRAVENOUS ONCE
Status: COMPLETED | OUTPATIENT
Start: 2024-07-27 | End: 2024-07-27

## 2024-07-27 RX ORDER — SODIUM CHLORIDE 0.9 % (FLUSH) 0.9 %
5-40 SYRINGE (ML) INJECTION EVERY 12 HOURS SCHEDULED
Status: DISCONTINUED | OUTPATIENT
Start: 2024-07-27 | End: 2024-08-02 | Stop reason: HOSPADM

## 2024-07-27 RX ORDER — POLYETHYLENE GLYCOL 3350 17 G/17G
17 POWDER, FOR SOLUTION ORAL DAILY PRN
Status: DISCONTINUED | OUTPATIENT
Start: 2024-07-27 | End: 2024-08-02

## 2024-07-27 RX ORDER — POTASSIUM CHLORIDE 20 MEQ/1
40 TABLET, EXTENDED RELEASE ORAL PRN
Status: DISCONTINUED | OUTPATIENT
Start: 2024-07-27 | End: 2024-08-02 | Stop reason: HOSPADM

## 2024-07-27 RX ORDER — ONDANSETRON 2 MG/ML
4 INJECTION INTRAMUSCULAR; INTRAVENOUS EVERY 6 HOURS PRN
Status: DISCONTINUED | OUTPATIENT
Start: 2024-07-27 | End: 2024-08-02 | Stop reason: HOSPADM

## 2024-07-27 RX ORDER — ACETAMINOPHEN 650 MG/1
650 SUPPOSITORY RECTAL EVERY 6 HOURS PRN
Status: DISCONTINUED | OUTPATIENT
Start: 2024-07-27 | End: 2024-08-02 | Stop reason: HOSPADM

## 2024-07-27 RX ORDER — SODIUM CHLORIDE 0.9 % (FLUSH) 0.9 %
5-40 SYRINGE (ML) INJECTION PRN
Status: DISCONTINUED | OUTPATIENT
Start: 2024-07-27 | End: 2024-08-02 | Stop reason: HOSPADM

## 2024-07-27 RX ORDER — POTASSIUM CHLORIDE 7.45 MG/ML
10 INJECTION INTRAVENOUS PRN
Status: DISCONTINUED | OUTPATIENT
Start: 2024-07-27 | End: 2024-08-02 | Stop reason: HOSPADM

## 2024-07-27 RX ORDER — FENTANYL CITRATE 50 UG/ML
50 INJECTION, SOLUTION INTRAMUSCULAR; INTRAVENOUS ONCE
Status: COMPLETED | OUTPATIENT
Start: 2024-07-27 | End: 2024-07-27

## 2024-07-27 RX ORDER — MORPHINE SULFATE 4 MG/ML
4 INJECTION, SOLUTION INTRAMUSCULAR; INTRAVENOUS EVERY 4 HOURS PRN
Status: DISCONTINUED | OUTPATIENT
Start: 2024-07-27 | End: 2024-07-30

## 2024-07-27 RX ORDER — GLUCAGON 1 MG/ML
1 KIT INJECTION PRN
Status: DISCONTINUED | OUTPATIENT
Start: 2024-07-27 | End: 2024-08-02 | Stop reason: HOSPADM

## 2024-07-27 RX ORDER — ONDANSETRON 4 MG/1
4 TABLET, ORALLY DISINTEGRATING ORAL EVERY 8 HOURS PRN
Status: DISCONTINUED | OUTPATIENT
Start: 2024-07-27 | End: 2024-08-02 | Stop reason: HOSPADM

## 2024-07-27 RX ORDER — DEXTROSE MONOHYDRATE 100 MG/ML
INJECTION, SOLUTION INTRAVENOUS CONTINUOUS PRN
Status: DISCONTINUED | OUTPATIENT
Start: 2024-07-27 | End: 2024-08-02 | Stop reason: HOSPADM

## 2024-07-27 RX ORDER — SODIUM CHLORIDE 9 MG/ML
INJECTION, SOLUTION INTRAVENOUS PRN
Status: DISCONTINUED | OUTPATIENT
Start: 2024-07-27 | End: 2024-08-02 | Stop reason: HOSPADM

## 2024-07-27 RX ORDER — ACETAMINOPHEN 325 MG/1
650 TABLET ORAL EVERY 6 HOURS PRN
Status: DISCONTINUED | OUTPATIENT
Start: 2024-07-27 | End: 2024-08-02 | Stop reason: HOSPADM

## 2024-07-27 RX ORDER — INSULIN LISPRO 100 [IU]/ML
0-4 INJECTION, SOLUTION INTRAVENOUS; SUBCUTANEOUS
Status: DISCONTINUED | OUTPATIENT
Start: 2024-07-28 | End: 2024-07-29

## 2024-07-27 RX ORDER — ENOXAPARIN SODIUM 100 MG/ML
40 INJECTION SUBCUTANEOUS DAILY
Status: DISCONTINUED | OUTPATIENT
Start: 2024-07-28 | End: 2024-08-02 | Stop reason: HOSPADM

## 2024-07-27 RX ORDER — ONDANSETRON 2 MG/ML
4 INJECTION INTRAMUSCULAR; INTRAVENOUS ONCE
Status: COMPLETED | OUTPATIENT
Start: 2024-07-27 | End: 2024-07-27

## 2024-07-27 RX ORDER — MAGNESIUM SULFATE IN WATER 40 MG/ML
2000 INJECTION, SOLUTION INTRAVENOUS PRN
Status: DISCONTINUED | OUTPATIENT
Start: 2024-07-27 | End: 2024-08-02 | Stop reason: HOSPADM

## 2024-07-27 RX ADMIN — FENTANYL CITRATE 50 MCG: 50 INJECTION INTRAMUSCULAR; INTRAVENOUS at 18:32

## 2024-07-27 RX ADMIN — MORPHINE SULFATE 4 MG: 4 INJECTION, SOLUTION INTRAMUSCULAR; INTRAVENOUS at 23:30

## 2024-07-27 RX ADMIN — FENTANYL CITRATE 50 MCG: 50 INJECTION INTRAMUSCULAR; INTRAVENOUS at 17:04

## 2024-07-27 RX ADMIN — SODIUM CHLORIDE 1000 ML: 9 INJECTION, SOLUTION INTRAVENOUS at 17:03

## 2024-07-27 RX ADMIN — IOPAMIDOL 75 ML: 755 INJECTION, SOLUTION INTRAVENOUS at 17:51

## 2024-07-27 RX ADMIN — MORPHINE SULFATE 4 MG: 4 INJECTION, SOLUTION INTRAMUSCULAR; INTRAVENOUS at 21:19

## 2024-07-27 RX ADMIN — ONDANSETRON 4 MG: 2 INJECTION INTRAMUSCULAR; INTRAVENOUS at 17:04

## 2024-07-27 ASSESSMENT — PAIN DESCRIPTION - LOCATION
LOCATION: ABDOMEN

## 2024-07-27 ASSESSMENT — PAIN - FUNCTIONAL ASSESSMENT
PAIN_FUNCTIONAL_ASSESSMENT: PREVENTS OR INTERFERES SOME ACTIVE ACTIVITIES AND ADLS
PAIN_FUNCTIONAL_ASSESSMENT: PREVENTS OR INTERFERES SOME ACTIVE ACTIVITIES AND ADLS
PAIN_FUNCTIONAL_ASSESSMENT: 0-10
PAIN_FUNCTIONAL_ASSESSMENT: 0-10
PAIN_FUNCTIONAL_ASSESSMENT: NONE - DENIES PAIN

## 2024-07-27 ASSESSMENT — PAIN DESCRIPTION - DESCRIPTORS
DESCRIPTORS: ACHING
DESCRIPTORS: ACHING;DISCOMFORT

## 2024-07-27 ASSESSMENT — PAIN SCALES - GENERAL
PAINLEVEL_OUTOF10: 8
PAINLEVEL_OUTOF10: 10
PAINLEVEL_OUTOF10: 2
PAINLEVEL_OUTOF10: 9
PAINLEVEL_OUTOF10: 8
PAINLEVEL_OUTOF10: 10

## 2024-07-27 ASSESSMENT — PAIN DESCRIPTION - FREQUENCY
FREQUENCY: CONTINUOUS
FREQUENCY: CONTINUOUS

## 2024-07-27 ASSESSMENT — LIFESTYLE VARIABLES
HOW OFTEN DO YOU HAVE A DRINK CONTAINING ALCOHOL: NEVER
HOW MANY STANDARD DRINKS CONTAINING ALCOHOL DO YOU HAVE ON A TYPICAL DAY: PATIENT DOES NOT DRINK

## 2024-07-27 ASSESSMENT — PAIN DESCRIPTION - ONSET
ONSET: ON-GOING
ONSET: ON-GOING

## 2024-07-27 ASSESSMENT — PAIN DESCRIPTION - PAIN TYPE: TYPE: ACUTE PAIN

## 2024-07-27 NOTE — ED PROVIDER NOTES
[facility] EMERGENCY DEPARTMENT  EMERGENCY DEPARTMENT ENCOUNTER        Pt Name: Alisa De La Fuente  MRN: [id number]  Birthdate 1940  Date of evaluation: 7/27/2024  Provider: Garland Gaines MD  PCP: Jean Carlos Espinosa MD  Note Started: 4:33 PM EDT 7/27/24    CHIEF COMPLAINT       Chief Complaint   Patient presents with    Abdominal Pain     With vomiting since last night.        HISTORY OF PRESENT ILLNESS: 1 or more Elements     Limitations to history : None    Alisa De La Fuente is a 84 y.o. female with past medical history of diabetes, diverticulitis s/p proctocolectomy with colostomy back in 2013 at [facility], large ventral hernia s/p mesh repair in 2013 which recurred in 2014.  She presents to the ED for evaluation of abdominal pain nausea vomiting and decreased ostomy output.  Her symptoms started last night.  She last changed her colostomy bag last night and today there is just watery output.  She denies any black or bloody stools.  She has not had any fevers or chills, chest pain palpitations or shortness of breath.  I reviewed patient's chart history and the last time she was evaluated with general surgery was in our system in September 2022, I reviewed documentation from that chart encounter she was seen by Dr. Aguilar inpatient for SBO.      Nursing Notes were all reviewed and agreed with or any disagreements were addressed in the HPI.      REVIEW OF EXTERNAL NOTE :       As documented above.    Chart Review/External Note Review    Last Echo reviewed by Me:  Lab Results   Component Value Date    LVEF 60 03/24/2023           Controlled Substance Monitoring:    Acute and Chronic Pain Monitoring:        No data to display                      REVIEW OF SYSTEMS :      Review of Systems   Constitutional:  Negative for chills and fever.   Eyes:  Negative for visual disturbance.   Respiratory:  Negative for cough and shortness of breath.    Cardiovascular:  Negative for chest pain and

## 2024-07-28 ENCOUNTER — APPOINTMENT (OUTPATIENT)
Dept: GENERAL RADIOLOGY | Age: 84
End: 2024-07-28
Payer: MEDICARE

## 2024-07-28 LAB
ALBUMIN SERPL-MCNC: 3.8 G/DL (ref 3.5–5.2)
ALP SERPL-CCNC: 95 U/L (ref 35–104)
ALT SERPL-CCNC: 8 U/L (ref 0–32)
ANION GAP SERPL CALCULATED.3IONS-SCNC: 14 MMOL/L (ref 7–16)
ANION GAP SERPL CALCULATED.3IONS-SCNC: 14 MMOL/L (ref 7–16)
AST SERPL-CCNC: 22 U/L (ref 0–31)
BASOPHILS # BLD: 0.03 K/UL (ref 0–0.2)
BASOPHILS # BLD: 0.05 K/UL (ref 0–0.2)
BASOPHILS NFR BLD: 0 % (ref 0–2)
BASOPHILS NFR BLD: 0 % (ref 0–2)
BILIRUB SERPL-MCNC: 0.4 MG/DL (ref 0–1.2)
BUN SERPL-MCNC: 15 MG/DL (ref 6–23)
BUN SERPL-MCNC: 21 MG/DL (ref 6–23)
CALCIUM SERPL-MCNC: 8.2 MG/DL (ref 8.6–10.2)
CALCIUM SERPL-MCNC: 8.7 MG/DL (ref 8.6–10.2)
CHLORIDE SERPL-SCNC: 100 MMOL/L (ref 98–107)
CHLORIDE SERPL-SCNC: 99 MMOL/L (ref 98–107)
CO2 SERPL-SCNC: 27 MMOL/L (ref 22–29)
CO2 SERPL-SCNC: 29 MMOL/L (ref 22–29)
CREAT SERPL-MCNC: 0.7 MG/DL (ref 0.5–1)
CREAT SERPL-MCNC: 0.8 MG/DL (ref 0.5–1)
CRP SERPL HS-MCNC: 15 MG/L (ref 0–5)
EOSINOPHIL # BLD: 0.02 K/UL (ref 0.05–0.5)
EOSINOPHIL # BLD: 0.09 K/UL (ref 0.05–0.5)
EOSINOPHILS RELATIVE PERCENT: 0 % (ref 0–6)
EOSINOPHILS RELATIVE PERCENT: 1 % (ref 0–6)
ERYTHROCYTE [DISTWIDTH] IN BLOOD BY AUTOMATED COUNT: 18.1 % (ref 11.5–15)
ERYTHROCYTE [DISTWIDTH] IN BLOOD BY AUTOMATED COUNT: 18.2 % (ref 11.5–15)
GFR, ESTIMATED: 77 ML/MIN/1.73M2
GFR, ESTIMATED: 84 ML/MIN/1.73M2
GLUCOSE BLD-MCNC: 139 MG/DL (ref 74–99)
GLUCOSE BLD-MCNC: 142 MG/DL (ref 74–99)
GLUCOSE BLD-MCNC: 146 MG/DL (ref 74–99)
GLUCOSE BLD-MCNC: 158 MG/DL (ref 74–99)
GLUCOSE SERPL-MCNC: 140 MG/DL (ref 74–99)
GLUCOSE SERPL-MCNC: 164 MG/DL (ref 74–99)
HBA1C MFR BLD: 6.7 % (ref 4–5.6)
HCT VFR BLD AUTO: 32.5 % (ref 34–48)
HCT VFR BLD AUTO: 37.9 % (ref 34–48)
HGB BLD-MCNC: 10.4 G/DL (ref 11.5–15.5)
HGB BLD-MCNC: 8.9 G/DL (ref 11.5–15.5)
IMM GRANULOCYTES # BLD AUTO: 0.05 K/UL (ref 0–0.58)
IMM GRANULOCYTES # BLD AUTO: 0.12 K/UL (ref 0–0.58)
IMM GRANULOCYTES NFR BLD: 0 % (ref 0–5)
IMM GRANULOCYTES NFR BLD: 1 % (ref 0–5)
LACTATE BLDV-SCNC: 2.3 MMOL/L (ref 0.5–2.2)
LACTATE BLDV-SCNC: 2.9 MMOL/L (ref 0.5–2.2)
LACTATE BLDV-SCNC: 3.5 MMOL/L (ref 0.5–2.2)
LACTATE BLDV-SCNC: 4.4 MMOL/L (ref 0.5–1.9)
LYMPHOCYTES NFR BLD: 0.39 K/UL (ref 1.5–4)
LYMPHOCYTES NFR BLD: 0.66 K/UL (ref 1.5–4)
LYMPHOCYTES RELATIVE PERCENT: 2 % (ref 20–42)
LYMPHOCYTES RELATIVE PERCENT: 5 % (ref 20–42)
MCH RBC QN AUTO: 21.4 PG (ref 26–35)
MCH RBC QN AUTO: 21.6 PG (ref 26–35)
MCHC RBC AUTO-ENTMCNC: 27.4 G/DL (ref 32–34.5)
MCHC RBC AUTO-ENTMCNC: 27.4 G/DL (ref 32–34.5)
MCV RBC AUTO: 78.1 FL (ref 80–99.9)
MCV RBC AUTO: 78.8 FL (ref 80–99.9)
MONOCYTES NFR BLD: 0.87 K/UL (ref 0.1–0.95)
MONOCYTES NFR BLD: 1.19 K/UL (ref 0.1–0.95)
MONOCYTES NFR BLD: 5 % (ref 2–12)
MONOCYTES NFR BLD: 9 % (ref 2–12)
NEUTROPHILS NFR BLD: 85 % (ref 43–80)
NEUTROPHILS NFR BLD: 92 % (ref 43–80)
NEUTS SEG NFR BLD: 11.43 K/UL (ref 1.8–7.3)
NEUTS SEG NFR BLD: 17.19 K/UL (ref 1.8–7.3)
PHOSPHATE SERPL-MCNC: 4.6 MG/DL (ref 2.5–4.5)
PLATELET # BLD AUTO: 266 K/UL (ref 130–450)
PLATELET # BLD AUTO: 289 K/UL (ref 130–450)
PMV BLD AUTO: 10.1 FL (ref 7–12)
PMV BLD AUTO: 9.9 FL (ref 7–12)
POTASSIUM SERPL-SCNC: 3.9 MMOL/L (ref 3.5–5)
POTASSIUM SERPL-SCNC: 4 MMOL/L (ref 3.5–5)
PROCALCITONIN SERPL-MCNC: 0.06 NG/ML (ref 0–0.08)
PROT SERPL-MCNC: 7 G/DL (ref 6.4–8.3)
RBC # BLD AUTO: 4.16 M/UL (ref 3.5–5.5)
RBC # BLD AUTO: 4.81 M/UL (ref 3.5–5.5)
RBC # BLD: ABNORMAL 10*6/UL
SODIUM SERPL-SCNC: 141 MMOL/L (ref 132–146)
SODIUM SERPL-SCNC: 142 MMOL/L (ref 132–146)
TROPONIN I SERPL HS-MCNC: 27 NG/L (ref 0–9)
WBC # BLD: ABNORMAL 10*3/UL
WBC OTHER # BLD: 13.5 K/UL (ref 4.5–11.5)
WBC OTHER # BLD: 18.6 K/UL (ref 4.5–11.5)

## 2024-07-28 PROCEDURE — 83605 ASSAY OF LACTIC ACID: CPT

## 2024-07-28 PROCEDURE — 93005 ELECTROCARDIOGRAM TRACING: CPT | Performed by: INTERNAL MEDICINE

## 2024-07-28 PROCEDURE — 71045 X-RAY EXAM CHEST 1 VIEW: CPT

## 2024-07-28 PROCEDURE — 82962 GLUCOSE BLOOD TEST: CPT

## 2024-07-28 PROCEDURE — 80048 BASIC METABOLIC PNL TOTAL CA: CPT

## 2024-07-28 PROCEDURE — 84100 ASSAY OF PHOSPHORUS: CPT

## 2024-07-28 PROCEDURE — 2700000000 HC OXYGEN THERAPY PER DAY

## 2024-07-28 PROCEDURE — 99232 SBSQ HOSP IP/OBS MODERATE 35: CPT | Performed by: INTERNAL MEDICINE

## 2024-07-28 PROCEDURE — 99222 1ST HOSP IP/OBS MODERATE 55: CPT | Performed by: STUDENT IN AN ORGANIZED HEALTH CARE EDUCATION/TRAINING PROGRAM

## 2024-07-28 PROCEDURE — 6360000002 HC RX W HCPCS: Performed by: INTERNAL MEDICINE

## 2024-07-28 PROCEDURE — 85025 COMPLETE CBC W/AUTO DIFF WBC: CPT

## 2024-07-28 PROCEDURE — 36415 COLL VENOUS BLD VENIPUNCTURE: CPT

## 2024-07-28 PROCEDURE — 74018 RADEX ABDOMEN 1 VIEW: CPT

## 2024-07-28 PROCEDURE — 1200000000 HC SEMI PRIVATE

## 2024-07-28 PROCEDURE — 2580000003 HC RX 258: Performed by: INTERNAL MEDICINE

## 2024-07-28 RX ORDER — SODIUM CHLORIDE, SODIUM LACTATE, POTASSIUM CHLORIDE, CALCIUM CHLORIDE 600; 310; 30; 20 MG/100ML; MG/100ML; MG/100ML; MG/100ML
INJECTION, SOLUTION INTRAVENOUS ONCE
Status: DISCONTINUED | OUTPATIENT
Start: 2024-07-28 | End: 2024-07-28

## 2024-07-28 RX ADMIN — MORPHINE SULFATE 4 MG: 4 INJECTION, SOLUTION INTRAMUSCULAR; INTRAVENOUS at 05:13

## 2024-07-28 RX ADMIN — ENOXAPARIN SODIUM 40 MG: 100 INJECTION SUBCUTANEOUS at 09:04

## 2024-07-28 RX ADMIN — SODIUM CHLORIDE, PRESERVATIVE FREE 10 ML: 5 INJECTION INTRAVENOUS at 00:30

## 2024-07-28 RX ADMIN — MORPHINE SULFATE 4 MG: 4 INJECTION, SOLUTION INTRAMUSCULAR; INTRAVENOUS at 16:02

## 2024-07-28 RX ADMIN — MORPHINE SULFATE 4 MG: 4 INJECTION, SOLUTION INTRAMUSCULAR; INTRAVENOUS at 11:51

## 2024-07-28 RX ADMIN — SODIUM CHLORIDE, POTASSIUM CHLORIDE, SODIUM LACTATE AND CALCIUM CHLORIDE: 600; 310; 30; 20 INJECTION, SOLUTION INTRAVENOUS at 00:32

## 2024-07-28 ASSESSMENT — PAIN DESCRIPTION - ORIENTATION
ORIENTATION: RIGHT;LEFT
ORIENTATION: RIGHT;LEFT;MID

## 2024-07-28 ASSESSMENT — PAIN SCALES - GENERAL
PAINLEVEL_OUTOF10: 10
PAINLEVEL_OUTOF10: 10
PAINLEVEL_OUTOF10: 7

## 2024-07-28 ASSESSMENT — PAIN DESCRIPTION - LOCATION
LOCATION: ABDOMEN

## 2024-07-28 ASSESSMENT — PAIN DESCRIPTION - DESCRIPTORS: DESCRIPTORS: ACHING;STABBING

## 2024-07-28 NOTE — PROGRESS NOTES
Contacted daughter via phone to discuss code status as patient mentioned she had DNR paperwork at home. Her daughter is not aware of any DNR paperwork but did state patient would not want any resuscitative measures in the event of an arrest. Code status reviewed. Daughter said patient would not have wanted any measures including CPR, intubation, shock, or meds. Code status changed to limited code (no to everything) and palliative care has been consulted.    Electronically signed by Hakeem Singh MD on 7/28/2024 at 12:37 PM

## 2024-07-28 NOTE — PROGRESS NOTES
4 Eyes Skin Assessment     NAME:  Alisa De La Fuente  YOB: 1940  MEDICAL RECORD NUMBER:  [id number]    The patient is being assessed for  Admission    I agree that at least one RN has performed a thorough Head to Toe Skin Assessment on the patient. ALL assessment sites listed below have been assessed.      Areas assessed by both nurses:    Head, Face, Ears, Shoulders, Back, Chest, Arms, Elbows, Hands, Sacrum. Buttock, Coccyx, Ischium, Legs. Feet and Heels, and Under Medical Devices         Does the Patient have a Wound? No noted wound(s) No open wounds, redness under R breast, generalized bruising, ABD scars, Coccyx is red/purple blanchable       Subhash Prevention initiated by RN: Yes  Wound Care Orders initiated by RN: No    Pressure Injury (Stage 3,4, Unstageable, DTI, NWPT, and Complex wounds) if present, place Wound referral order by RN under : No    New Ostomies, if present place, Ostomy referral order under : No   Old ostomy     Nurse 1 eSignature: Electronically signed by Belkis Rueda RN on 7/28/24 at 6:38 AM EDT    **SHARE this note so that the co-signing nurse can place an eSignature**    Nurse 2 eSignature: Electronically signed by Edith Melendez RN on 7/28/24 at 7:00 AM EDT

## 2024-07-28 NOTE — PROGRESS NOTES
Pt resting in bed. States she is feeling slightly better after NG tube has been placed. Still not much output from colostomy. She is soft, ventral hernia w loss of domain in midline where previous scar present, tenderness to point palpation slightly right of midline. She is occasionally hiccuping still during my exam.    pt states she does not want to undergo surgery as she is too old, she agrees to continue conservative management with NG tube     - continue NG to LIWS   - NPO   - continue IVF   - monitor abdominal exam   - plan for contrasted imaging   - will discuss with Dr. Madden    Electronically signed by Sharlene Echeverria DO on 7/28/2024 at 7:52 AM

## 2024-07-28 NOTE — CONSULTS
GENERAL SURGERY  CONSULT NOTE    Patient's Name/Date of Birth: Alisa De La Fuente / 1940    Date: July 28, 2024     PCP: Jean Carlos Espinosa MD     Chief Complaint:   Chief Complaint   Patient presents with    Abdominal Pain     With vomiting since last night.        Physician Consulted: Dr. Booker  Reason for Consult: SBO  Referring Physician: Dr. William Chatman    HPI  Alisa De La Fuente is a 84 y.o. female with history of small bowel obstruction who presents for evaluation of abdominal pain, vomiting, and decreased ostomy output since Friday.  CT scan shows dilated loops small bowel with surrounding fluid and likely transition point in right abdomen.  She has an extensive surgical history including ex lap MARC, sigmoid takedown, colovaginal and colocutaneous fistula takedown and sigmoidectomy 2005, appendectomy, cholecystectomy, hysterectomy and bowel resection for diverticulitis, Lap ventral and parastomal hernia repair 2013 with strattice mesh, IVC filter 2004.  She was treated for small bowel obstruction nonoperatively in 2022 by Dr. Aguilar.  She currently has an NG in place.  She complains primarily of nausea.    Afebrile, 112/61, 90 bpm, on 3 L O2 nasal cannula.  WBC 18.6 Lactic acid 3.3 then 4.4.      Past Medical History:   Diagnosis Date    Arthritis     Colostomy in place (HCC)     Diabetes mellitus (HCC)     Diverticulitis     GERD (gastroesophageal reflux disease)     Hernia     History of blood transfusion     Hyperlipidemia     Hypertension        Past Surgical History:   Procedure Laterality Date    ABDOMEN SURGERY      DIVERTICULITIS    ANKLE FRACTURE SURGERY Left 5/1/2021    LEFT ANKLE OPEN REDUCTION INTERNAL FIXATION--SYNTHES performed by Chilo Garg MD at [facility] OR    APPENDECTOMY      CHOLECYSTECTOMY      COLOSTOMY      HERNIA REPAIR      OCT 2012    HYSTERECTOMY (CERVIX STATUS UNKNOWN)      ILEOSTOMY OR JEJUNOSTOMY      done and reversed    THYROID SURGERY      partial thyroidectomy       Prior

## 2024-07-28 NOTE — PROGRESS NOTES
[facility] Hospitalist Progress Note    Admitting Date and Time: 7/27/2024  4:17 PM  Admit Dx: SBO (small bowel obstruction) ([facility]) [K56.609]  Small bowel obstruction ([facility]) [K56.609]    Synopsis:    The patient is an 84-year-old female with past medical history of  Diverticulitis status post proctocolectomy with colostomy, diabetes, ventral hernia status post repair who presented to the emergency department and for abdominal pain nausea vomiting and decrease in ostomy output since yesterday.  On arrival to the emergency department she was hemodynamically stable.  Labs are pertinent for K of 3.4, lactic acid 3.3, glucose 163, troponin 28, WBC 11.8, hemoglobin 9.9.  CT scan of the abdomen confirmed small bowel obstruction.  Patient was given IV fluids, fentanyl and Zofran.  Patient was then transferred to [facility] for admission. On my evaluation the patient has no nausea but with severe abdominal pain, distended abdomen.    7/28: General surgery following.  Patient does not want further surgery.  Conservative management for now.    Subjective:  Patient is being followed for SBO (small bowel obstruction) ([facility]) [K56.609]  Small bowel obstruction ([facility]) [K56.609]     Patient seen and examined at bedside this morning.  Continues to complain of abdominal pain.  States she does not want any further surgeries.    ROS: denies fever, chills, cp, sob, n/v, HA unless stated above.      sodium chloride flush  5-40 mL IntraVENous 2 times per day    enoxaparin  40 mg SubCUTAneous Daily    insulin lispro  0-4 Units SubCUTAneous TID WC     sodium chloride flush, 5-40 mL, PRN  sodium chloride, , PRN  potassium chloride, 40 mEq, PRN   Or  potassium alternative oral replacement, 40 mEq, PRN   Or  potassium chloride, 10 mEq, PRN  magnesium sulfate, 2,000 mg, PRN  ondansetron, 4 mg, Q8H PRN   Or  ondansetron, 4 mg, Q6H PRN  polyethylene glycol, 17 g, Daily PRN  acetaminophen, 650 mg, Q6H PRN   Or  acetaminophen, 650

## 2024-07-28 NOTE — PROGRESS NOTES
Dr. Rangel up to see pt. Attempted to ask questions but pt is [place] and half asleep, just got morphine. Pt stated she didn't want surgery and when asked about her code status she was unable to answer/confused. Stated she has paperwork at home  Attempted to call daughter Ksenia but went to voicemail and mailbox was full.   X-ray showed NG in place. Connected to wall suction LIS.    Dr Amira Chatman updated

## 2024-07-28 NOTE — H&P
[facility] Hospitalist Group History and Physical    CHIEF COMPLAINT: Abdominal pain    History of Present Illness: The patient is an 84-year-old female with past medical history of  Diverticulitis status post proctocolectomy with colostomy, diabetes, ventral hernia status post repair who presented to the emergency department and for abdominal pain nausea vomiting and decrease in ostomy output since yesterday.  On arrival to the emergency department she was hemodynamically stable.  Labs are pertinent for K of 3.4, lactic acid 3.3, glucose 163, troponin 28, WBC 11.8, hemoglobin 9.9.  CT scan of the abdomen confirmed small bowel obstruction.  Patient was given IV fluids, fentanyl and Zofran.  Patient was then transferred to [facility] for admission. On my evaluation the patient has no nausea but with severe abdominal pain, distended abdomen      Informant(s) for H&P: Patient, chart    REVIEW OF SYSTEMS:  A comprehensive review of systems was negative except for: what is in the HPI      PMH:  Past Medical History:   Diagnosis Date    Arthritis     Colostomy in place (HCC)     Diabetes mellitus (HCC)     Diverticulitis     GERD (gastroesophageal reflux disease)     Hernia     History of blood transfusion     Hyperlipidemia     Hypertension        Surgical History:  Past Surgical History:   Procedure Laterality Date    ABDOMEN SURGERY      DIVERTICULITIS    ANKLE FRACTURE SURGERY Left 5/1/2021    LEFT ANKLE OPEN REDUCTION INTERNAL FIXATION--SYNTHES performed by Chilo Garg MD at [facility] OR    APPENDECTOMY      CHOLECYSTECTOMY      COLOSTOMY      HERNIA REPAIR      OCT 2012    HYSTERECTOMY (CERVIX STATUS UNKNOWN)      ILEOSTOMY OR JEJUNOSTOMY      done and reversed    THYROID SURGERY      partial thyroidectomy       Medications Prior to Admission:    Prior to Admission medications    Medication Sig Start Date End Date Taking? Authorizing Provider   acetaminophen (TYLENOL) 325 MG tablet Take 2

## 2024-07-29 ENCOUNTER — APPOINTMENT (OUTPATIENT)
Dept: GENERAL RADIOLOGY | Age: 84
End: 2024-07-29
Payer: MEDICARE

## 2024-07-29 LAB
ANION GAP SERPL CALCULATED.3IONS-SCNC: 8 MMOL/L (ref 7–16)
BASOPHILS # BLD: 0.03 K/UL (ref 0–0.2)
BASOPHILS NFR BLD: 1 % (ref 0–2)
BUN SERPL-MCNC: 31 MG/DL (ref 6–23)
CALCIUM SERPL-MCNC: 8.4 MG/DL (ref 8.6–10.2)
CHLORIDE SERPL-SCNC: 101 MMOL/L (ref 98–107)
CO2 SERPL-SCNC: 31 MMOL/L (ref 22–29)
CREAT SERPL-MCNC: 0.8 MG/DL (ref 0.5–1)
EOSINOPHIL # BLD: 0.13 K/UL (ref 0.05–0.5)
EOSINOPHILS RELATIVE PERCENT: 2 % (ref 0–6)
ERYTHROCYTE [DISTWIDTH] IN BLOOD BY AUTOMATED COUNT: 18.1 % (ref 11.5–15)
GFR, ESTIMATED: 74 ML/MIN/1.73M2
GLUCOSE BLD-MCNC: 121 MG/DL (ref 74–99)
GLUCOSE BLD-MCNC: 179 MG/DL (ref 74–99)
GLUCOSE BLD-MCNC: 200 MG/DL (ref 74–99)
GLUCOSE SERPL-MCNC: 131 MG/DL (ref 74–99)
HCT VFR BLD AUTO: 31.8 % (ref 34–48)
HGB BLD-MCNC: 8.5 G/DL (ref 11.5–15.5)
IMM GRANULOCYTES # BLD AUTO: <0.03 K/UL (ref 0–0.58)
IMM GRANULOCYTES NFR BLD: 0 % (ref 0–5)
LACTATE BLDV-SCNC: 2.3 MMOL/L (ref 0.5–2.2)
LACTATE BLDV-SCNC: 2.3 MMOL/L (ref 0.5–2.2)
LYMPHOCYTES NFR BLD: 1.06 K/UL (ref 1.5–4)
LYMPHOCYTES RELATIVE PERCENT: 18 % (ref 20–42)
MCH RBC QN AUTO: 21.4 PG (ref 26–35)
MCHC RBC AUTO-ENTMCNC: 26.7 G/DL (ref 32–34.5)
MCV RBC AUTO: 79.9 FL (ref 80–99.9)
MICROORGANISM SPEC CULT: ABNORMAL
MICROORGANISM SPEC CULT: ABNORMAL
MONOCYTES NFR BLD: 0.6 K/UL (ref 0.1–0.95)
MONOCYTES NFR BLD: 10 % (ref 2–12)
NEUTROPHILS NFR BLD: 68 % (ref 43–80)
NEUTS SEG NFR BLD: 3.93 K/UL (ref 1.8–7.3)
PLATELET # BLD AUTO: 237 K/UL (ref 130–450)
PMV BLD AUTO: 9.7 FL (ref 7–12)
POTASSIUM SERPL-SCNC: 4.2 MMOL/L (ref 3.5–5)
RBC # BLD AUTO: 3.98 M/UL (ref 3.5–5.5)
RBC # BLD: ABNORMAL 10*6/UL
SERVICE CMNT-IMP: ABNORMAL
SODIUM SERPL-SCNC: 140 MMOL/L (ref 132–146)
SPECIMEN DESCRIPTION: ABNORMAL
WBC OTHER # BLD: 5.8 K/UL (ref 4.5–11.5)

## 2024-07-29 PROCEDURE — 85025 COMPLETE CBC W/AUTO DIFF WBC: CPT

## 2024-07-29 PROCEDURE — 99222 1ST HOSP IP/OBS MODERATE 55: CPT

## 2024-07-29 PROCEDURE — 87040 BLOOD CULTURE FOR BACTERIA: CPT

## 2024-07-29 PROCEDURE — 2580000003 HC RX 258: Performed by: INTERNAL MEDICINE

## 2024-07-29 PROCEDURE — 82962 GLUCOSE BLOOD TEST: CPT

## 2024-07-29 PROCEDURE — 1200000000 HC SEMI PRIVATE

## 2024-07-29 PROCEDURE — 6360000002 HC RX W HCPCS: Performed by: INTERNAL MEDICINE

## 2024-07-29 PROCEDURE — 36415 COLL VENOUS BLD VENIPUNCTURE: CPT

## 2024-07-29 PROCEDURE — 6370000000 HC RX 637 (ALT 250 FOR IP)

## 2024-07-29 PROCEDURE — 6360000004 HC RX CONTRAST MEDICATION

## 2024-07-29 PROCEDURE — 2700000000 HC OXYGEN THERAPY PER DAY

## 2024-07-29 PROCEDURE — 83605 ASSAY OF LACTIC ACID: CPT

## 2024-07-29 PROCEDURE — 74250 X-RAY XM SM INT 1CNTRST STD: CPT

## 2024-07-29 PROCEDURE — 80048 BASIC METABOLIC PNL TOTAL CA: CPT

## 2024-07-29 PROCEDURE — 99232 SBSQ HOSP IP/OBS MODERATE 35: CPT | Performed by: SURGERY

## 2024-07-29 PROCEDURE — 99232 SBSQ HOSP IP/OBS MODERATE 35: CPT | Performed by: INTERNAL MEDICINE

## 2024-07-29 RX ORDER — INSULIN LISPRO 100 [IU]/ML
0-8 INJECTION, SOLUTION INTRAVENOUS; SUBCUTANEOUS
Status: DISCONTINUED | OUTPATIENT
Start: 2024-07-29 | End: 2024-08-02 | Stop reason: HOSPADM

## 2024-07-29 RX ORDER — INSULIN LISPRO 100 [IU]/ML
0-8 INJECTION, SOLUTION INTRAVENOUS; SUBCUTANEOUS EVERY 4 HOURS
Status: DISCONTINUED | OUTPATIENT
Start: 2024-07-29 | End: 2024-07-29

## 2024-07-29 RX ADMIN — DIATRIZOATE MEGLUMINE AND DIATRIZOATE SODIUM 240 ML: 660; 100 LIQUID ORAL; RECTAL at 10:10

## 2024-07-29 RX ADMIN — SODIUM CHLORIDE, PRESERVATIVE FREE 10 ML: 5 INJECTION INTRAVENOUS at 21:20

## 2024-07-29 RX ADMIN — ENOXAPARIN SODIUM 40 MG: 100 INJECTION SUBCUTANEOUS at 08:45

## 2024-07-29 RX ADMIN — MORPHINE SULFATE 4 MG: 4 INJECTION, SOLUTION INTRAMUSCULAR; INTRAVENOUS at 23:20

## 2024-07-29 RX ADMIN — INSULIN LISPRO 2 UNITS: 100 INJECTION, SOLUTION INTRAVENOUS; SUBCUTANEOUS at 21:20

## 2024-07-29 ASSESSMENT — PAIN DESCRIPTION - ONSET: ONSET: ON-GOING

## 2024-07-29 ASSESSMENT — PAIN DESCRIPTION - DESCRIPTORS: DESCRIPTORS: ACHING;DISCOMFORT;TENDER

## 2024-07-29 ASSESSMENT — PAIN DESCRIPTION - LOCATION: LOCATION: ABDOMEN

## 2024-07-29 ASSESSMENT — PAIN DESCRIPTION - FREQUENCY: FREQUENCY: CONTINUOUS

## 2024-07-29 ASSESSMENT — PAIN - FUNCTIONAL ASSESSMENT: PAIN_FUNCTIONAL_ASSESSMENT: PREVENTS OR INTERFERES SOME ACTIVE ACTIVITIES AND ADLS

## 2024-07-29 ASSESSMENT — PAIN SCALES - GENERAL: PAINLEVEL_OUTOF10: 9

## 2024-07-29 ASSESSMENT — PAIN DESCRIPTION - PAIN TYPE: TYPE: ACUTE PAIN

## 2024-07-29 ASSESSMENT — PAIN DESCRIPTION - ORIENTATION: ORIENTATION: LEFT

## 2024-07-29 NOTE — PROGRESS NOTES
[facility]  PROGRESS NOTE  ATTENDING NOTE    Chief Complaint   Patient presents with    Abdominal Pain     With vomiting since last night.      S: 84-year-old female presents with abdominal pain.  She had an exploratory laparotomy with colostomy bag for perforated diverticulitis about 20 years ago.  She states she really would not want to have surgery if she would need to.  She is having some pain today but asking for water.    BP (!) 118/54   Pulse (!) 110   Temp 97.4 °F (36.3 °C) (Temporal)   Resp 20   Ht 1.575 m (5' 2\")   Wt 71.9 kg (158 lb 9.6 oz)   SpO2 97%   BMI 29.01 kg/m²   Gen:  NAD  Abd:  soft, ND, moderate midline abdominal TTP  Ostomy:  stool in bag    CT A/P reviewed--small bowel obstruction with transition point located in the right abdomen.  There is stool in the colon.  There does appear to be some fecalization of the small bowel.  There is no free fluid or free air.    ASSESSMENT/PLAN:  SBO  --plan for SBFT today  --serial abdominal exams  --c/w NPO/IVF    Susan Ward MD, MSc, FACS  7/29/2024  10:20 AM

## 2024-07-29 NOTE — PROGRESS NOTES
General Surgery   Daily Progress Note      Patient's Name/Date of Birth: Alisa De La Fuente / 1940    Date: July 29, 2024     Chief Complaint: abdominal pain    Patient Active Problem List   Diagnosis    Pelvic abscess in female    Bowel obstruction (HCC)    Hyperkalemia    Acute hypoxemic respiratory failure (HCC)    Chest pain    Acute cystitis with hematuria    Osteoarthritis of spine with radiculopathy, lumbar region    SBO (small bowel obstruction) (HCC)    Small bowel obstruction (HCC)       Subjective: Patient with 1.1 L out of NG tube yesterday. Patient's sitter states that she has been eating ice chips throughout the night. She is having ostomy output. With similar abdominal pain to yesterday.    Objective:  BP (!) 146/68   Pulse (!) 125   Temp 97.3 °F (36.3 °C) (Temporal)   Resp 18   Ht 1.575 m (5' 2\")   Wt 71.9 kg (158 lb 9.6 oz)   SpO2 97%   BMI 29.01 kg/m²   Labs:  Recent Labs     07/27/24  2309 07/28/24  0758 07/29/24  0241   WBC 18.6* 13.5* 5.8   HGB 10.4* 8.9* 8.5*   HCT 37.9 32.5* 31.8*     Recent Labs     07/27/24  2309 07/28/24  0758 07/29/24  0241    141 140   K 4.0 3.9 4.2   CL 99 100 101   CO2 29 27 31*   BUN 15 21 31*   CREATININE 0.7 0.8 0.8     Recent Labs     07/27/24  1630 07/27/24  2309   ALKPHOS 89 95   ALT 7 8   AST 17 22   BILITOT 0.5 0.4   LIPASE 26  --          General appearance: NAD  Head: NCAT, PERRL, EOMI, pink conjunctiva  Neck: supple, no masses  Lungs: symmetric chest rise, no audible wheezes  Heart: warm throughout  Abdomen: softly distended, mildly tender throughout. No guarding or peritoneal signs. NG in place with 1.1 L bilious output. Colostomy pink and patent with stool and gas in the bag.  Skin: no visible lesions  Extremities: extremities normal, atraumatic, no cyanosis or edema      Assessment/Plan:  Alisa De La Fuente is a 84 y.o. female with SBO likely secondary to adhesive disease.    NPO  NG LIWS  LA q6- can decrease IVF once lactic acidosis

## 2024-07-29 NOTE — CONSULTS
Palliative Care Department  [phone]  Palliative Care Initial Consult  Provider Lakeisha Tyson, SARA - CNP      PATIENT: Alisa De La Fuente  : 1940  MRN: [id number]  ADMISSION DATE: 2024  4:17 PM  Referring Provider:  Hakeem Hernadez MD    Palliative Medicine was consulted on hospital day 2 for assistance with Goals of care    HPI:     Clinical Summary:Alisa De La Fuente is a 84 y.o. y/o female with a history of Diverticulitis status post proctocolectomy with colostomy, diabetes, ventral hernia status post repair  who presented to [facility] on 2024 with abdominal pain nausea vomiting and decrease in ostomy output.  CTAP showed small bowel obstruction.  General surgery consulted, patient does not want any further surgeries, plan is for Gastrografin challenge today.  Palliative medicine consulted to assist further with goals of care    ASSESSMENT/PLAN:     Pertinent Hospital Diagnoses     Small bowel obstruction  Hx of proctocolectomy with colostomy and ventral hernia s/p repair       Palliative Care Encounter / Counseling Regarding Goals of Care  Please see detailed goals of care discussion as below  At this time, Alisa De La Fuente, Does Not have capacity for medical decision-making.  Capacity is time limited and situation/question specific  During encounter Ksenia was surrogate medical decision-maker  Outcome of goals of care meeting:  Continue with current medical treatment  Continue limited code  Goals at discharge is rehab versus home  Per family, patient is okay for laparoscopy surgery, however they are against open abdominal surgery    Code status Limited no to all options  Advanced Directives: no POA or living will in [facility]  Surrogate/Legal NOK:  Ksenia De La Fuente (Daugther) [phone]  Juan R Heard (Son) [phone]    Spiritual assessment: no spiritual distress identified  Bereavement and grief: to be determined  Referrals to: none today    Thank you for the opportunity to participate in the

## 2024-07-29 NOTE — PROGRESS NOTES
[facility] Hospitalist Progress Note    Admitting Date and Time: 7/27/2024  4:17 PM  Admit Dx: SBO (small bowel obstruction) ([facility]) [K56.609]  Small bowel obstruction ([facility]) [K56.609]    Synopsis:    The patient is an 84-year-old female with past medical history of  Diverticulitis status post proctocolectomy with colostomy, diabetes, ventral hernia status post repair who presented to the emergency department and for abdominal pain nausea vomiting and decrease in ostomy output since yesterday.  On arrival to the emergency department she was hemodynamically stable.  Labs are pertinent for K of 3.4, lactic acid 3.3, glucose 163, troponin 28, WBC 11.8, hemoglobin 9.9.  CT scan of the abdomen confirmed small bowel obstruction.  Patient was given IV fluids, fentanyl and Zofran.  Patient was then transferred to [facility] for admission. On my evaluation the patient has no nausea but with severe abdominal pain, distended abdomen.    7/28: General surgery following.  Patient does not want further surgery.  Conservative management for now.    Subjective:  Patient is being followed for SBO (small bowel obstruction) ([facility]) [K56.609]  Small bowel obstruction ([facility]) [K56.609]     Patient seen and examined at bedside this morning.  Continues to complain of abdominal pain.  States she does not want any further surgeries.    ROS: denies fever, chills, cp, sob, n/v, HA unless stated above.      insulin lispro  0-8 Units SubCUTAneous Q4H    sodium chloride flush  5-40 mL IntraVENous 2 times per day    enoxaparin  40 mg SubCUTAneous Daily     diatrizoate meglumine-sodium, 240 mL, ONCE PRN  sodium chloride flush, 5-40 mL, PRN  sodium chloride, , PRN  potassium chloride, 40 mEq, PRN   Or  potassium alternative oral replacement, 40 mEq, PRN   Or  potassium chloride, 10 mEq, PRN  magnesium sulfate, 2,000 mg, PRN  ondansetron, 4 mg, Q8H PRN   Or  ondansetron, 4 mg, Q6H PRN  polyethylene glycol, 17 g, Daily

## 2024-07-29 NOTE — CONSULTS
[facility] CONSULT NOTE    Reason for Consult: Gram-positive cocci in clusters on blood culture  Requested by: Hakeem Hernadez MD     Chief complaint: Abdominal pain    History Obtained From: Patient and EMR    HISTORY OFPRESENT ILLNESS              The patient is a 84 y.o. 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  On admission, she was afebrile and hemodynamically stable with leukocytosis up to 18,000. Urinalysis showed insignificant pyuria of 0-5 WBC with urine culture showing mixed abhijit including mixed gram-positive organisms.  Blood cultures showed gram-positive cocci in clusters (methicillin-resistant Staphylococcus epidermidis by PCR).  ID service was subsequently consulted for further recommendations.    Past Medical History  Past Medical History:   Diagnosis Date    Arthritis     Colostomy in place (HCC)     Diabetes mellitus (HCC)     Diverticulitis     GERD (gastroesophageal reflux disease)     Hernia     History of blood transfusion     Hyperlipidemia     Hypertension        Current Facility-Administered Medications   Medication Dose Route Frequency Provider Last Rate Last Admin    insulin lispro (HUMALOG,ADMELOG) injection vial 0-8 Units  0-8 Units SubCUTAneous Q4H Susan Ward MD        diatrizoate meglumine-sodium (GASTROGRAFIN) 66-10 % solution 240 mL  240 mL Per NG tube ONCE PRN Raza Rangel DO   240 mL at 07/29/24 1010    sodium chloride flush 0.9 % injection 5-40 mL  5-40 mL IntraVENous 2 times per day Ledy Mosquera MD   10 mL at 07/28/24  IntraVENous Continuous PRN Ledy Mosquera MD        morphine sulfate (PF) injection 4 mg  4 mg IntraVENous Q4H PRN Ledy Mosquera MD   4 mg at 07/28/24 1602       Allergies   Allergen Reactions    Daptomycin Anaphylaxis     Wheezing/Stridor    Vancomycin Anaphylaxis and Rash    Adhesive Tape Itching and Rash       Surgical History  Past Surgical History:   Procedure Laterality Date    ABDOMEN SURGERY      DIVERTICULITIS    ANKLE FRACTURE SURGERY Left 5/1/2021    LEFT ANKLE OPEN REDUCTION INTERNAL FIXATION--SYNTHES performed by Chilo Garg MD at [facility] [place]    APPENDECTOMY      CHOLECYSTECTOMY      COLOSTOMY      HERNIA REPAIR      OCT 2012    HYSTERECTOMY (CERVIX STATUS UNKNOWN)      ILEOSTOMY OR JEJUNOSTOMY      done and reversed    THYROID SURGERY      partial thyroidectomy        Social History  Social History     Socioeconomic History    Marital status:    Tobacco Use    Smoking status: Never    Smokeless tobacco: Former   Substance and Sexual Activity    Alcohol use: Yes     Comment: very seldom    Drug use: No       Family Medical History  History reviewed. No pertinent family history.    Review of Systems:  Constitutional: No fever, no chills  Eyes: No vision changes, no retroorbital pain  ENT: No hearing changes, no ear pain  Respiratory: No cough, no dyspnea  Cardiovascular: No chest pain, no palpitations  Gastrointestinal: No abdominal pain, no diarrhea  Genitourinary: No dysuria, no hematuria  Integumentary: No rash, no itching  Musculoskeletal: No muscle pain, no joint pain  Neurologic: No headache, no numbness in extremities    Physical Examination:  Vitals:    07/28/24 1151 07/28/24 1606 07/28/24 2000 07/29/24 0748   BP: 133/68 100/71 (!) 146/68 (!) 118/54   Pulse: (!) 114 (!) 116 (!) 125 (!) 110   Resp: 20 18 18 20   Temp: 98.3 °F (36.8 °C) 98.4 °F (36.9 °C) 97.3 °F (36.3 °C) 97.4 °F (36.3 °C)   TempSrc:   Temporal Temporal   SpO2: 97% 97% 97% 97%

## 2024-07-29 NOTE — CARE COORDINATION
Patient with past medical history of Diverticulitis status post proctocolectomy with colostomy, diabetes, ventral hernia status post repair is admitted with SBO. Per general surgery note today, She had an exploratory laparotomy with colostomy bag for perforated diverticulitis about 20 years ago. She states she really would not want to have surgery if she would need to. She is having some pain today but asking for water. Plan for SBFT today. serial abdominal exams. c/w NPO/IVF/NG LIWS. She was eating ice chips throughout the night. She is having ostomy output. Per Palliative Care note today, Spoke with daughter Ksenia over the phone, introduced myself and the role of palliative medicine.  Ksenia informed, she has lived with the patient for 20 years, goal of discharge to return home, however she would also consider short-term rehab.  Goal is to continue with current medical treatment, however if patient needs surgical intervention for small bowel obstruction, she and her sibling will had to discuss it further, however they would not want open abdominal surgery, they would consider laparoscopic intervention only.  She confirmed, CODE STATUS is limited no to all options. Per internal med note today, Blood cultures x 2 positive for GPC. Repeat blood cultures ordered. ID consulted. Await input & plan. PT/OT evals ordered to assist with transition of care determination.   Chantell Sprague RN CM  [phone]

## 2024-07-30 LAB
ANION GAP SERPL CALCULATED.3IONS-SCNC: 8 MMOL/L (ref 7–16)
BASOPHILS # BLD: 0 K/UL (ref 0–0.2)
BASOPHILS NFR BLD: 0 % (ref 0–2)
BUN SERPL-MCNC: 29 MG/DL (ref 6–23)
CALCIUM SERPL-MCNC: 7.7 MG/DL (ref 8.6–10.2)
CHLORIDE SERPL-SCNC: 100 MMOL/L (ref 98–107)
CO2 SERPL-SCNC: 30 MMOL/L (ref 22–29)
CREAT SERPL-MCNC: 0.7 MG/DL (ref 0.5–1)
EKG ATRIAL RATE: 104 BPM
EKG ATRIAL RATE: 99 BPM
EKG P AXIS: 36 DEGREES
EKG P AXIS: 67 DEGREES
EKG P-R INTERVAL: 134 MS
EKG P-R INTERVAL: 168 MS
EKG Q-T INTERVAL: 332 MS
EKG Q-T INTERVAL: 354 MS
EKG QRS DURATION: 84 MS
EKG QRS DURATION: 86 MS
EKG QTC CALCULATION (BAZETT): 436 MS
EKG QTC CALCULATION (BAZETT): 454 MS
EKG R AXIS: -17 DEGREES
EKG R AXIS: -4 DEGREES
EKG T AXIS: -7 DEGREES
EKG T AXIS: 13 DEGREES
EKG VENTRICULAR RATE: 104 BPM
EKG VENTRICULAR RATE: 99 BPM
EOSINOPHIL # BLD: 0.18 K/UL (ref 0.05–0.5)
EOSINOPHILS RELATIVE PERCENT: 3 % (ref 0–6)
ERYTHROCYTE [DISTWIDTH] IN BLOOD BY AUTOMATED COUNT: 18.4 % (ref 11.5–15)
GFR, ESTIMATED: 83 ML/MIN/1.73M2
GLUCOSE BLD-MCNC: 143 MG/DL (ref 74–99)
GLUCOSE BLD-MCNC: 153 MG/DL (ref 74–99)
GLUCOSE BLD-MCNC: 161 MG/DL (ref 74–99)
GLUCOSE BLD-MCNC: 89 MG/DL (ref 74–99)
GLUCOSE SERPL-MCNC: 81 MG/DL (ref 74–99)
HCT VFR BLD AUTO: 30.7 % (ref 34–48)
HGB BLD-MCNC: 8.2 G/DL (ref 11.5–15.5)
LACTATE BLDV-SCNC: 1.9 MMOL/L (ref 0.5–2.2)
LYMPHOCYTES NFR BLD: 0.76 K/UL (ref 1.5–4)
LYMPHOCYTES RELATIVE PERCENT: 11 % (ref 20–42)
MAGNESIUM SERPL-MCNC: 1.9 MG/DL (ref 1.6–2.6)
MCH RBC QN AUTO: 21 PG (ref 26–35)
MCHC RBC AUTO-ENTMCNC: 26.7 G/DL (ref 32–34.5)
MCV RBC AUTO: 78.7 FL (ref 80–99.9)
MONOCYTES NFR BLD: 0.47 K/UL (ref 0.1–0.95)
MONOCYTES NFR BLD: 7 % (ref 2–12)
NEUTROPHILS NFR BLD: 79 % (ref 43–80)
NEUTS SEG NFR BLD: 5.29 K/UL (ref 1.8–7.3)
PLATELET # BLD AUTO: 230 K/UL (ref 130–450)
PMV BLD AUTO: 10 FL (ref 7–12)
POTASSIUM SERPL-SCNC: 3.3 MMOL/L (ref 3.5–5)
RBC # BLD AUTO: 3.9 M/UL (ref 3.5–5.5)
RBC # BLD: ABNORMAL 10*6/UL
SODIUM SERPL-SCNC: 138 MMOL/L (ref 132–146)
WBC # BLD: ABNORMAL 10*3/UL
WBC OTHER # BLD: 6.7 K/UL (ref 4.5–11.5)

## 2024-07-30 PROCEDURE — 1200000000 HC SEMI PRIVATE

## 2024-07-30 PROCEDURE — 83605 ASSAY OF LACTIC ACID: CPT

## 2024-07-30 PROCEDURE — 97161 PT EVAL LOW COMPLEX 20 MIN: CPT

## 2024-07-30 PROCEDURE — 80048 BASIC METABOLIC PNL TOTAL CA: CPT

## 2024-07-30 PROCEDURE — 6360000002 HC RX W HCPCS: Performed by: INTERNAL MEDICINE

## 2024-07-30 PROCEDURE — 2580000003 HC RX 258: Performed by: INTERNAL MEDICINE

## 2024-07-30 PROCEDURE — 83735 ASSAY OF MAGNESIUM: CPT

## 2024-07-30 PROCEDURE — 99232 SBSQ HOSP IP/OBS MODERATE 35: CPT | Performed by: SURGERY

## 2024-07-30 PROCEDURE — 97530 THERAPEUTIC ACTIVITIES: CPT

## 2024-07-30 PROCEDURE — 36415 COLL VENOUS BLD VENIPUNCTURE: CPT

## 2024-07-30 PROCEDURE — 85025 COMPLETE CBC W/AUTO DIFF WBC: CPT

## 2024-07-30 PROCEDURE — 2700000000 HC OXYGEN THERAPY PER DAY

## 2024-07-30 PROCEDURE — 97165 OT EVAL LOW COMPLEX 30 MIN: CPT

## 2024-07-30 PROCEDURE — 82962 GLUCOSE BLOOD TEST: CPT

## 2024-07-30 PROCEDURE — 93010 ELECTROCARDIOGRAM REPORT: CPT | Performed by: INTERNAL MEDICINE

## 2024-07-30 PROCEDURE — 97535 SELF CARE MNGMENT TRAINING: CPT

## 2024-07-30 PROCEDURE — 6370000000 HC RX 637 (ALT 250 FOR IP): Performed by: STUDENT IN AN ORGANIZED HEALTH CARE EDUCATION/TRAINING PROGRAM

## 2024-07-30 PROCEDURE — 99232 SBSQ HOSP IP/OBS MODERATE 35: CPT | Performed by: STUDENT IN AN ORGANIZED HEALTH CARE EDUCATION/TRAINING PROGRAM

## 2024-07-30 RX ORDER — POTASSIUM CHLORIDE 20 MEQ/1
40 TABLET, EXTENDED RELEASE ORAL ONCE
Status: COMPLETED | OUTPATIENT
Start: 2024-07-30 | End: 2024-07-30

## 2024-07-30 RX ORDER — OXYCODONE HYDROCHLORIDE AND ACETAMINOPHEN 5; 325 MG/1; MG/1
1 TABLET ORAL EVERY 4 HOURS PRN
Status: DISCONTINUED | OUTPATIENT
Start: 2024-07-30 | End: 2024-08-02 | Stop reason: HOSPADM

## 2024-07-30 RX ORDER — PREGABALIN 75 MG/1
75 CAPSULE ORAL 3 TIMES DAILY
Status: DISCONTINUED | OUTPATIENT
Start: 2024-07-31 | End: 2024-08-02 | Stop reason: HOSPADM

## 2024-07-30 RX ORDER — LORAZEPAM 2 MG/ML
2 INJECTION INTRAMUSCULAR ONCE
Status: COMPLETED | OUTPATIENT
Start: 2024-07-30 | End: 2024-07-30

## 2024-07-30 RX ORDER — FENTANYL CITRATE 50 UG/ML
25 INJECTION, SOLUTION INTRAMUSCULAR; INTRAVENOUS
Status: DISCONTINUED | OUTPATIENT
Start: 2024-07-30 | End: 2024-07-30

## 2024-07-30 RX ADMIN — SODIUM CHLORIDE, PRESERVATIVE FREE 10 ML: 5 INJECTION INTRAVENOUS at 09:37

## 2024-07-30 RX ADMIN — POTASSIUM CHLORIDE 40 MEQ: 1500 TABLET, EXTENDED RELEASE ORAL at 09:36

## 2024-07-30 RX ADMIN — OXYCODONE HYDROCHLORIDE AND ACETAMINOPHEN 1 TABLET: 5; 325 TABLET ORAL at 15:32

## 2024-07-30 RX ADMIN — LORAZEPAM 2 MG: 2 INJECTION INTRAMUSCULAR; INTRAVENOUS at 23:24

## 2024-07-30 RX ADMIN — ENOXAPARIN SODIUM 40 MG: 100 INJECTION SUBCUTANEOUS at 09:37

## 2024-07-30 RX ADMIN — SODIUM CHLORIDE, PRESERVATIVE FREE 10 ML: 5 INJECTION INTRAVENOUS at 20:38

## 2024-07-30 RX ADMIN — ONDANSETRON 4 MG: 2 INJECTION INTRAMUSCULAR; INTRAVENOUS at 19:17

## 2024-07-30 ASSESSMENT — PAIN SCALES - GENERAL
PAINLEVEL_OUTOF10: 4
PAINLEVEL_OUTOF10: 0
PAINLEVEL_OUTOF10: 10
PAINLEVEL_OUTOF10: 8
PAINLEVEL_OUTOF10: 5

## 2024-07-30 ASSESSMENT — PAIN DESCRIPTION - LOCATION: LOCATION: ABDOMEN

## 2024-07-30 ASSESSMENT — PAIN DESCRIPTION - DESCRIPTORS: DESCRIPTORS: ACHING;CRAMPING

## 2024-07-30 NOTE — PROGRESS NOTES
[facility]  PROGRESS NOTE  ATTENDING NOTE    Chief Complaint   Patient presents with    Abdominal Pain     With vomiting since last night.      S: 84-year-old female presents with abdominal pain.  She had an exploratory laparotomy with colostomy bag for perforated diverticulitis about 20 years ago.  She states she really would not want to have surgery if she would need to.      Sbft reviewed--contrast passes to colon  Tolerating diet  Confused this afternoon/sun downing    /88   Pulse (!) 132   Temp 97.5 °F (36.4 °C) (Temporal)   Resp 25   Ht 1.575 m (5' 2\")   Wt 71.9 kg (158 lb 9.6 oz)   SpO2 96%   BMI 29.01 kg/m²   Gen:  NAD  Abd:  soft, ND, NT, soft ventral hernia  Ostomy:  stool in bag      ASSESSMENT/PLAN:  SBO--resolved  --ADAT  --no further surgical issues, will s/o, please call if needed    Susan Ward MD, MSc, FACS  7/30/2024  2:16 PM

## 2024-07-30 NOTE — PLAN OF CARE
Problem: Pain  Goal: Verbalizes/displays adequate comfort level or baseline comfort level  7/30/2024 0911 by Amilcar Jain RN  Outcome: Progressing     Problem: Skin/Tissue Integrity  Goal: Absence of new skin breakdown  Description: 1.  Monitor for areas of redness and/or skin breakdown  2.  Assess vascular access sites hourly  3.  Every 4-6 hours minimum:  Change oxygen saturation probe site  4.  Every 4-6 hours:  If on nasal continuous positive airway pressure, respiratory therapy assess nares and determine need for appliance change or resting period.  7/30/2024 0911 by Amilcar Jain RN  Outcome: Progressing     Problem: Safety - Adult  Goal: Free from fall injury  7/30/2024 0911 by Amilcar Jain RN  Outcome: Progressing     Problem: ABCDS Injury Assessment  Goal: Absence of physical injury  7/30/2024 0911 by Amilcar Jain RN  Outcome: Progressing     Problem: Gastrointestinal - Adult  Goal: Minimal or absence of nausea and vomiting  7/30/2024 0911 by Amilcar Jain RN  Outcome: Progressing     Problem: Chronic Conditions and Co-morbidities  Goal: Patient's chronic conditions and co-morbidity symptoms are monitored and maintained or improved  Outcome: Progressing

## 2024-07-30 NOTE — PROGRESS NOTES
Spiritual Health Assessment/Progress Note  [facility]    (P) Initial Encounter, Spiritual/Emotional Needs, Palliative Care,  ,  ,      Name: Alisa De La Fuente MRN: [id number]    Age: 84 y.o.     Sex: female   Language: English   Hinduism: Rastafarian   SBO (small bowel obstruction) ([facility])     Date: 7/30/2024                           Spiritual Assessment began in [facility]        Referral/Consult From: (P) Rounding, Palliative Care   Encounter Overview/Reason: (P) Initial Encounter, Spiritual/Emotional Needs, Palliative Care  Service Provided For: (P) Patient    Amanda, Belief, Meaning:   Patient identifies as spiritual, is connected with a amanda tradition or spiritual practice, has beliefs or practices that help with coping during difficult times, and Other: Is a member of a [employer] in [place] which she has not been able to attend in many years.    Family/Friends No family/friends present      Importance and Influence:  Patient has spiritual/personal beliefs that influence decisions regarding their health  Family/Friends no family/friends present    Community:  Patient is connected with a spiritual community and feels well-supported. Support system includes: Children  Family/Friends Other: No family/friends present    Assessment and Plan of Care:     Patient Interventions include: Facilitated expression of thoughts and feelings, Explored spiritual coping/struggle/distress and theological reflection, Affirmed coping skills/support systems, and Other: Offered prayer  Family/Friends Interventions include: Other: No family/friends present    Patient Plan of Care: Spiritual Care available upon further referral  Family/Friends Plan of Care: Spiritual Care available upon further referral    Electronically signed by BECKY Smith on 7/30/2024 at 11:06 AM

## 2024-07-30 NOTE — PROGRESS NOTES
General Surgery   Daily Progress Note      Patient's Name/Date of Birth: Alisa De La Fuente / 1940    Date: July 30, 2024     Chief Complaint: abdominal pain    Patient Active Problem List   Diagnosis    Pelvic abscess in female    Bowel obstruction (HCC)    Hyperkalemia    Acute hypoxemic respiratory failure (HCC)    Chest pain    Acute cystitis with hematuria    Osteoarthritis of spine with radiculopathy, lumbar region    SBO (small bowel obstruction) (HCC)    Small bowel obstruction (HCC)       Subjective: Patient states she feels \"100% better\". Having good ostomy output. Tolerating clear liquid diet.     Objective:  /67   Pulse 90   Temp 97.5 °F (36.4 °C) (Temporal)   Resp 16   Ht 1.575 m (5' 2\")   Wt 71.9 kg (158 lb 9.6 oz)   SpO2 96%   BMI 29.01 kg/m²   Labs:  Recent Labs     07/27/24  2309 07/28/24  0758 07/29/24  0241   WBC 18.6* 13.5* 5.8   HGB 10.4* 8.9* 8.5*   HCT 37.9 32.5* 31.8*       Recent Labs     07/27/24  2309 07/28/24  0758 07/29/24  0241    141 140   K 4.0 3.9 4.2   CL 99 100 101   CO2 29 27 31*   BUN 15 21 31*   CREATININE 0.7 0.8 0.8       Recent Labs     07/27/24  1630 07/27/24  2309   ALKPHOS 89 95   ALT 7 8   AST 17 22   BILITOT 0.5 0.4   LIPASE 26  --            General appearance: NAD  Head: NCAT, PERRL, EOMI, pink conjunctiva  Neck: supple, no masses  Lungs: symmetric chest rise, no audible wheezes  Heart: warm throughout  Abdomen: soft, non-distended, mild tenderness over ventral hernia. Ostomy pink with stool in bag.  Skin: no visible lesions  Extremities: extremities normal, atraumatic, no cyanosis or edema      Assessment/Plan:  Alisa De La Fuente is a 84 y.o. female with SBO likely secondary to adhesive disease (resolved).    Advance diet as tolerated.  Prn nausea and pain control.  LA q6- can stop IVF once resolved.  Serial abdominal exams.    Delma Larson MD  PGY-4 General Surgery Resident

## 2024-07-30 NOTE — PROGRESS NOTES
Chart reviewed.  Goals of care and CODE STATUS has been established with patient and daughter Ksenia.  Plan is to continue with current medical management, continue limited code, patient and family will consider surgical intervention as long as no open abdominal surgery.  Short-term goals is CAMILLE with long-term goals returning home.  No further PM needs has been identified.  Going to sign off for now.  Please reconsult if new PM needs arises.

## 2024-07-30 NOTE — PROGRESS NOTES
Physical Therapy Initial Evaluation    Name: Alisa De La Fuente  : 1940  MRN: [id number]      Date of Service: 2024    Evaluating PT:  Leo Vázquez, PT [id number]    Referring provider/PT Order:  PT Eval and Treat   24 1630  PT eval and treat  Start:  24 1630,   End:  24 163,   ONE TIME,   Standing Count:  1 Occurrences,   R         Hakeem Hernadez MD     Room #:  8404/8404-B  Diagnosis:  SBO (small bowel obstruction) (HCC) [K56.609]  Small bowel obstruction (HCC) [K56.609]  PMHx/PSHx:     has a past medical history of Arthritis, Colostomy in place (HCC), Diabetes mellitus (HCC), Diverticulitis, GERD (gastroesophageal reflux disease), Hernia, History of blood transfusion, Hyperlipidemia, and Hypertension.    has a past surgical history that includes Hysterectomy; Cholecystectomy; hernia repair; Abdomen surgery; colostomy; Appendectomy; Thyroid surgery; Jejunostomy; and Ankle fracture surgery (Left, 2021).     Procedure/Surgery:  None  Precautions:  Falls, cognition, (+) alarms, [place], colostomy, O2  Equipment Needs: To be determined    SUBJECTIVE:    Patient lives with daughter and son in law  in a ranch home  with 2 steps to enter with 1 Rail. Bed is on 1 floor and bath is on 1 floor.  Patient ambulated with wheeled walker PTA. Equipment owned: Wheeled Walker    OBJECTIVE:   Initial Evaluation  Date: 24 Treatment Short Term/ Long Term   Goals   AM-PAC 6 Clicks      Was pt agreeable to Eval/treatment? Yes     Does pt have pain? No     Bed Mobility  Rolling: NT  Supine to sit:   Min   Sit to supine: Min   Scooting: Min   Rolling: Ind  Supine to sit: Ind  Sit to supine: Ind  Scooting: Ind   Transfers Sit to stand: Min to Wheeled Walker  Stand to sit: Min  Stand pivot: Min with Wheeled Walker  Sit to stand: Mod Ind  to Wheeled Walker  Stand to sit: Mod Ind    Stand pivot: Mod Ind  with Wheeled Walker   Ambulation    5 feet with Wheeled Walker   Min    40 feet with Wheeled Walker   based on physician order and patient diagnosis     Diagnosis:  SBO (small bowel obstruction) (HCC) [K56.609]  Small bowel obstruction (HCC) [K56.609]  Specific instructions for next treatment:  Continue to progress with functional mobility as able.  Current Treatment Recommendations:     [x] Strengthening to improve independence with functional mobility   [x] ROM to improve independence with functional mobility   [x] Balance Training to improve static/dynamic balance and to reduce fall risk  [x] Endurance Training to improve activity tolerance during functional mobility   [x] Transfer Training to improve safety and independence with all functional transfers   [x] Gait Training to improve gait mechanics, endurance and asses need for appropriate assistive device when appropriate.   [x] Stair Training in preparation for safe discharge home and/or into the community when appropriate  [] Positioning to prevent skin breakdown and contractures  [x] Safety and Education Training   [] Patient/Caregiver Education   [] HEP  [] Gait Team to be added to POC  [] Other     PT long term treatment goals are located in above grid    Frequency of treatments: 2-5x/week x 1-2 weeks.    Time in  0915  Time out  0940    Total Treatment Time  10 minutes     Evaluation Time includes thorough review of current medical information, gathering information on past medical history/social history and prior level of function, completion of standardized testing/informal observation of tasks, assessment of data and education on plan of care and goals.    CPT codes:  [x] Low Complexity PT evaluation 03158  [] Moderate Complexity PT evaluation 70507  [] High Complexity PT evaluation 89828  [] PT Re-evaluation 89996  [] Gait training 74758  minutes  [] Manual therapy 55226  minutes  [x] Therapeutic activities 24569 10 minutes  [] Therapeutic exercises 00414  minutes  [] Neuromuscular reeducation 01255 minutes     Britney Grijalva, SPT  Leo Vázquez, PT

## 2024-07-30 NOTE — CARE COORDINATION
Per general surgery note today, Patient states she feels \"100% better\". Having good ostomy output. Tolerating clear liquid diet. SBO likely secondary to adhesive disease (resolved). Advance diet as tolerated. Per ID note from today, Monitor clinically off antibiotics for now. Follow-up blood and urine cultures. I called and spoke to patient's daughter Ksenia to explain role and discuss transition of care. Patient lives with her and son in law  in a ranch home  with 2 steps to enter with 1 Rail. Bed is on 1 floor and bath is on 1 floor.  Patient ambulated with wheeled walker PTA. Equipment owned: Wheeled Walker. Ksenia would like [facility] for her mother and would like [facility]. Referral made to Cassie at [facility]. Await acceptance. Ksenia refused to give me other [facility] choices until she knows if [facility] can accept or not. Will need a 7000 for the accepting facility. Will determine mode of transportation closer to discharge.   Chantell Sprague RN CM  [phone]          Patient's daughter Ksenia is inquiring about [facility]. I called and spoke with Cassie from [facility], she is still reviewing for acceptance and will let me know as soon as possible. Cassie aware daughter is inquiring.   Chantell Sprague RN CM  [phone]

## 2024-07-30 NOTE — PROGRESS NOTES
Hospitalist Progress Note      Chief Complaint:  had concerns including Abdominal Pain (With vomiting since last night. ).    Admission Date: 2024     SYNOPSIS:The patient is an 84-year-old female with past medical history of  Diverticulitis status post proctocolectomy with colostomy, diabetes, ventral hernia status post repair who presented to the emergency department and for abdominal pain nausea vomiting and decrease in ostomy output since yesterday.  On arrival to the emergency department she was hemodynamically stable.  Labs are pertinent for K of 3.4, lactic acid 3.3, glucose 163, troponin 28, WBC 11.8, hemoglobin 9.9.  CT scan of the abdomen confirmed small bowel obstruction.  Patient was given IV fluids, fentanyl and Zofran.  Patient was then transferred to [facility] for admission. On my evaluation the patient has no nausea but with severe abdominal pain, distended abdomen.     : General surgery following.  Patient does not want further surgery.  Conservative management for now.    : Diet advanced as tolerated , patient having hallucinations, confusion, as previous episodes when she was placed on morphine, will discontinue morphine    SUBJECTIVE:    Patient seen and examined at bedside, patient appears pleasantly confused  Records reviewed.   Stable overnight. No overnight issues reported     Temp (24hrs), Av.5 °F (36.4 °C), Min:97.5 °F (36.4 °C), Max:97.5 °F (36.4 °C)    DIET: ADULT DIET; Regular; 4 carb choices (60 gm/meal)  CODE: Limited    Intake/Output Summary (Last 24 hours) at 2024 1812  Last data filed at 2024 1301  Gross per 24 hour   Intake 2140 ml   Output 1100 ml   Net 1040 ml       OBJECTIVE:    /88   Pulse (!) 132   Temp 97.5 °F (36.4 °C) (Temporal)   Resp 20   Ht 1.575 m (5' 2\")   Wt 71.9 kg (158 lb 9.6 oz)   SpO2 97%   BMI 29.01 kg/m²     General appearance: No apparent distress, appears stated age and cooperative.   HEENT:

## 2024-07-30 NOTE — PROGRESS NOTES
Occupational Therapy    OCCUPATIONAL THERAPY INITIAL EVALUATION    [facility]  [address], [place]        Date:2024                                                  Patient Name: Alisa De La Fuente    MRN: [id number]    : 1940    Room: [facility]84-          Evaluating OT: Sia Bay, DARREND, OTR/L; [id number]      Referring Provider:     Hakeem Hernadez MD       Specific Provider Orders/Date: OT Eval and Treat 2024      Diagnosis:    1. SBO (small bowel obstruction) (HCC)       Patient Active Problem List   Diagnosis    Pelvic abscess in female    Bowel obstruction (HCC)    Hyperkalemia    Acute hypoxemic respiratory failure (HCC)    Chest pain    Acute cystitis with hematuria    Osteoarthritis of spine with radiculopathy, lumbar region    SBO (small bowel obstruction) (HCC)    Small bowel obstruction (HCC)        Pertinent Medical History:   Past Medical History:   Diagnosis Date    Arthritis     Colostomy in place (HCC)     Diabetes mellitus (HCC)     Diverticulitis     GERD (gastroesophageal reflux disease)     Hernia     History of blood transfusion     Hyperlipidemia     Hypertension         Surgery: none this admission       Recommended Adaptive Equipment: TBD     Precautions:  Fall Risk, cognition, +alarms, incontinent     Assessment of current deficits    [x] Functional mobility  [x]ADLs  [x] Strength               [x]Cognition    [x] Functional transfers   [x] IADLs         [x] Safety Awareness   [x]Endurance    [x] Fine Coordination              [x] Balance      [] Vision/perception   []Sensation     []Gross Motor Coordination  [] ROM  [] Delirium                   [] Motor Control     OT PLAN OF CARE   OT POC based on physician orders, patient diagnosis and results of clinical assessment    Frequency/Duration 1-3 days/wk for 2 weeks PRN   Specific OT Treatment Interventions to include:   * Instruction/training on  Dynamic - CGA  Standing: Enma with BUE support at FWW, LOB with mod A to recover   Sitting:  Static: indep  Dynamic: Supervision   Standing: Supervision    Activity Tolerance Fair   good   Visual/  Perceptual Glasses: yes             Safety Poor    Good    Vitals SPO2 95-99% on 2LO2, RN weaned to RA           Hand Dominance right    AROM (PROM) Strength Additional Info:  Goal: (PRN)   RUE  WFL wfl grossly tested good  and wfl FMC/dexterity noted during ADL tasks   Improve overall RUE strength to 5/5 for participation in functional tasks       LUE WFL wfl grossly tested Good  and wfl FMC/dexterity noted during ADL tasks   Improve overall LUE strength to 5/5 for participation in functional tasks       Fine Motor Coordination:  appears WFL  Hearing: WFL   Sensation:  No c/o numbness or tingling, light touch assessment appears WFL  Tone: WFL   Edema: BLEs    Comment: Cleared by RN to see pt. Upon arrival patient supine in bed and agreeable to OT session. At end of session, patient supine in bed, RN present with call light and phone within reach, all lines and tubes intact.  Overall patient demonstrated decreased independence and safety during completion of ADL/functional transfer/mobility tasks. Therapist facilitated ADL tasks, functional transfers, functional mobility, bed mobility to address safety awareness, implementation of fall prevention strategies, & functional engagement throughout daily activities. Pt would benefit from continued skilled OT to increase safety and independence with completion of ADL/IADL tasks for functional independence and quality of life.    Treatment: OT treatment provided this date includes:   ADL-  Instruction/training on safety and adapted techniques for completion of ADLs: Therapist facilitated & pt educated on activity modifications/adaptations to promote implementation of fall prevention strategies, EC/WS strategies, & safety awareness throughout ADLs.   Mobility-

## 2024-07-30 NOTE — PROGRESS NOTES
Pt is having hallucinations and screaming out, called pt family and they said she gets like this when she takes morphine. Notified

## 2024-07-30 NOTE — PROGRESS NOTES
[facility] PROGRESS NOTE      Chief complaint: Follow-up of Gram-positive cocci in clusters on blood culture    The patient is a 84 y.o. 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  On admission, she was afebrile and hemodynamically stable with leukocytosis up to 18,000. Urinalysis showed insignificant pyuria of 0-5 WBC with urine culture showing mixed abhijit including mixed gram-positive organisms.  Blood cultures showed gram-positive cocci in clusters (methicillin-resistant Staphylococcus epidermidis by PCR).     Subjective: Patient was seen and examined. No chills, no abdominal pain, no diarrhea, no rash, no itching, no dysuria. Afebrile.      Objective:    Vitals:    07/30/24 0838   BP: 116/88   Pulse: (!) 132   Resp: 25   Temp: 97.5 °F (36.4 °C)   SpO2: 96%     Constitutional: Alert, not in distress  Respiratory: Clear breath sounds, no crackles, no wheezes  Cardiovascular: Regular rate and rhythm, no murmurs  Gastrointestinal: Bowel sounds present, soft, nontender. Colostomy in place  Skin: Warm and dry, no active dermatoses  Musculoskeletal: No joint swelling, no joint erythema    Labs, imaging, and medical records/notes were personally reviewed.    Assessment:  Gram-positive cocci in clusters (methicillin-resistant Staphylococcus epidermidis by PCR) on blood culture, suspect contaminant  Small bowel obstruction  Leukocytosis, resolved  Asymptomatic bacteriuria    Recommendations:  Monitor clinically off antibiotics for now.  Follow-up blood and urine cultures.  Continue supportive care.    no

## 2024-07-31 LAB
ACB COMPLEX DNA BLD POS QL NAA+NON-PROBE: NOT DETECTED
ANION GAP SERPL CALCULATED.3IONS-SCNC: 13 MMOL/L (ref 7–16)
B FRAGILIS DNA BLD POS QL NAA+NON-PROBE: NOT DETECTED
BASOPHILS # BLD: 0.03 K/UL (ref 0–0.2)
BASOPHILS NFR BLD: 1 % (ref 0–2)
BIOFIRE TEST COMMENT: ABNORMAL
BUN SERPL-MCNC: 29 MG/DL (ref 6–23)
C ALBICANS DNA BLD POS QL NAA+NON-PROBE: NOT DETECTED
C AURIS DNA BLD POS QL NAA+NON-PROBE: NOT DETECTED
C GATTII+NEOFOR DNA BLD POS QL NAA+N-PRB: NOT DETECTED
C GLABRATA DNA BLD POS QL NAA+NON-PROBE: NOT DETECTED
C KRUSEI DNA BLD POS QL NAA+NON-PROBE: NOT DETECTED
C PARAP DNA BLD POS QL NAA+NON-PROBE: NOT DETECTED
C TROPICLS DNA BLD POS QL NAA+NON-PROBE: NOT DETECTED
CALCIUM SERPL-MCNC: 8.1 MG/DL (ref 8.6–10.2)
CHLORIDE SERPL-SCNC: 104 MMOL/L (ref 98–107)
CO2 SERPL-SCNC: 26 MMOL/L (ref 22–29)
CREAT SERPL-MCNC: 0.8 MG/DL (ref 0.5–1)
E CLOAC COMP DNA BLD POS NAA+NON-PROBE: NOT DETECTED
E COLI DNA BLD POS QL NAA+NON-PROBE: NOT DETECTED
E FAECALIS DNA BLD POS QL NAA+NON-PROBE: NOT DETECTED
E FAECIUM DNA BLD POS QL NAA+NON-PROBE: NOT DETECTED
EKG ATRIAL RATE: 208 BPM
EKG Q-T INTERVAL: 356 MS
EKG QRS DURATION: 86 MS
EKG QTC CALCULATION (BAZETT): 456 MS
EKG R AXIS: 2 DEGREES
EKG T AXIS: -7 DEGREES
EKG VENTRICULAR RATE: 99 BPM
ENTEROBACTERALES DNA BLD POS NAA+N-PRB: NOT DETECTED
EOSINOPHIL # BLD: 0.18 K/UL (ref 0.05–0.5)
EOSINOPHILS RELATIVE PERCENT: 3 % (ref 0–6)
ERYTHROCYTE [DISTWIDTH] IN BLOOD BY AUTOMATED COUNT: 17.7 % (ref 11.5–15)
FOLATE SERPL-MCNC: 8 NG/ML (ref 4.8–24.2)
GFR, ESTIMATED: 71 ML/MIN/1.73M2
GLUCOSE BLD-MCNC: 102 MG/DL (ref 74–99)
GLUCOSE BLD-MCNC: 142 MG/DL (ref 74–99)
GLUCOSE BLD-MCNC: 90 MG/DL (ref 74–99)
GLUCOSE BLD-MCNC: 90 MG/DL (ref 74–99)
GLUCOSE BLD-MCNC: 94 MG/DL (ref 74–99)
GLUCOSE SERPL-MCNC: 110 MG/DL (ref 74–99)
GP B STREP DNA BLD POS QL NAA+NON-PROBE: NOT DETECTED
HAEM INFLU DNA BLD POS QL NAA+NON-PROBE: NOT DETECTED
HCT VFR BLD AUTO: 28.4 % (ref 34–48)
HGB BLD-MCNC: 7.8 G/DL (ref 11.5–15.5)
IMM GRANULOCYTES # BLD AUTO: 0.09 K/UL (ref 0–0.58)
IMM GRANULOCYTES NFR BLD: 1 % (ref 0–5)
IRON SATN MFR SERPL: 6 % (ref 15–50)
IRON SERPL-MCNC: 14 UG/DL (ref 37–145)
K OXYTOCA DNA BLD POS QL NAA+NON-PROBE: NOT DETECTED
KLEBSIELLA SP DNA BLD POS QL NAA+NON-PRB: NOT DETECTED
KLEBSIELLA SP DNA BLD POS QL NAA+NON-PRB: NOT DETECTED
L MONOCYTOG DNA BLD POS QL NAA+NON-PROBE: NOT DETECTED
LYMPHOCYTES NFR BLD: 1.09 K/UL (ref 1.5–4)
LYMPHOCYTES RELATIVE PERCENT: 17 % (ref 20–42)
MAGNESIUM SERPL-MCNC: 1.6 MG/DL (ref 1.6–2.6)
MCH RBC QN AUTO: 21.5 PG (ref 26–35)
MCHC RBC AUTO-ENTMCNC: 27.5 G/DL (ref 32–34.5)
MCV RBC AUTO: 78.2 FL (ref 80–99.9)
MECA+MECC ISLT/SPM QL: DETECTED
MICROORGANISM SPEC CULT: ABNORMAL
MICROORGANISM SPEC CULT: ABNORMAL
MICROORGANISM/AGENT SPEC: ABNORMAL
MICROORGANISM/AGENT SPEC: ABNORMAL
MONOCYTES NFR BLD: 0.92 K/UL (ref 0.1–0.95)
MONOCYTES NFR BLD: 15 % (ref 2–12)
N MEN DNA BLD POS QL NAA+NON-PROBE: NOT DETECTED
NEUTROPHILS NFR BLD: 64 % (ref 43–80)
NEUTS SEG NFR BLD: 4.02 K/UL (ref 1.8–7.3)
P AERUGINOSA DNA BLD POS NAA+NON-PROBE: NOT DETECTED
PLATELET # BLD AUTO: 220 K/UL (ref 130–450)
PMV BLD AUTO: 9.7 FL (ref 7–12)
POTASSIUM SERPL-SCNC: 3.3 MMOL/L (ref 3.5–5)
PROTEUS SP DNA BLD POS QL NAA+NON-PROBE: NOT DETECTED
RBC # BLD AUTO: 3.63 M/UL (ref 3.5–5.5)
RBC # BLD: ABNORMAL 10*6/UL
S AUREUS DNA BLD POS QL NAA+NON-PROBE: NOT DETECTED
S AUREUS+CONS DNA BLD POS NAA+NON-PROBE: DETECTED
S EPIDERMIDIS DNA BLD POS QL NAA+NON-PRB: DETECTED
S LUGDUNENSIS DNA BLD POS QL NAA+NON-PRB: NOT DETECTED
S MALTOPHILIA DNA BLD POS QL NAA+NON-PRB: NOT DETECTED
S MARCESCENS DNA BLD POS NAA+NON-PROBE: NOT DETECTED
S PNEUM DNA BLD POS QL NAA+NON-PROBE: NOT DETECTED
S PYO DNA BLD POS QL NAA+NON-PROBE: NOT DETECTED
SALMONELLA DNA BLD POS QL NAA+NON-PROBE: NOT DETECTED
SERVICE CMNT-IMP: ABNORMAL
SERVICE CMNT-IMP: ABNORMAL
SODIUM SERPL-SCNC: 143 MMOL/L (ref 132–146)
SPECIMEN DESCRIPTION: ABNORMAL
SPECIMEN DESCRIPTION: ABNORMAL
STREPTOCOCCUS DNA BLD POS NAA+NON-PROBE: NOT DETECTED
TIBC SERPL-MCNC: 230 UG/DL (ref 250–450)
VIT B12 SERPL-MCNC: 1698 PG/ML (ref 211–946)
WBC OTHER # BLD: 6.3 K/UL (ref 4.5–11.5)

## 2024-07-31 PROCEDURE — 36415 COLL VENOUS BLD VENIPUNCTURE: CPT

## 2024-07-31 PROCEDURE — 99232 SBSQ HOSP IP/OBS MODERATE 35: CPT | Performed by: STUDENT IN AN ORGANIZED HEALTH CARE EDUCATION/TRAINING PROGRAM

## 2024-07-31 PROCEDURE — 6360000002 HC RX W HCPCS: Performed by: STUDENT IN AN ORGANIZED HEALTH CARE EDUCATION/TRAINING PROGRAM

## 2024-07-31 PROCEDURE — 2580000003 HC RX 258: Performed by: INTERNAL MEDICINE

## 2024-07-31 PROCEDURE — 2700000000 HC OXYGEN THERAPY PER DAY

## 2024-07-31 PROCEDURE — 85025 COMPLETE CBC W/AUTO DIFF WBC: CPT

## 2024-07-31 PROCEDURE — 82607 VITAMIN B-12: CPT

## 2024-07-31 PROCEDURE — 93010 ELECTROCARDIOGRAM REPORT: CPT | Performed by: INTERNAL MEDICINE

## 2024-07-31 PROCEDURE — 1200000000 HC SEMI PRIVATE

## 2024-07-31 PROCEDURE — 2580000003 HC RX 258: Performed by: STUDENT IN AN ORGANIZED HEALTH CARE EDUCATION/TRAINING PROGRAM

## 2024-07-31 PROCEDURE — 80048 BASIC METABOLIC PNL TOTAL CA: CPT

## 2024-07-31 PROCEDURE — 93005 ELECTROCARDIOGRAM TRACING: CPT | Performed by: STUDENT IN AN ORGANIZED HEALTH CARE EDUCATION/TRAINING PROGRAM

## 2024-07-31 PROCEDURE — 83550 IRON BINDING TEST: CPT

## 2024-07-31 PROCEDURE — 83540 ASSAY OF IRON: CPT

## 2024-07-31 PROCEDURE — 82962 GLUCOSE BLOOD TEST: CPT

## 2024-07-31 PROCEDURE — 6370000000 HC RX 637 (ALT 250 FOR IP): Performed by: STUDENT IN AN ORGANIZED HEALTH CARE EDUCATION/TRAINING PROGRAM

## 2024-07-31 PROCEDURE — 82746 ASSAY OF FOLIC ACID SERUM: CPT

## 2024-07-31 PROCEDURE — 83735 ASSAY OF MAGNESIUM: CPT

## 2024-07-31 RX ORDER — PAROXETINE HYDROCHLORIDE 20 MG/1
40 TABLET, FILM COATED ORAL DAILY
Status: DISCONTINUED | OUTPATIENT
Start: 2024-07-31 | End: 2024-08-02 | Stop reason: HOSPADM

## 2024-07-31 RX ORDER — POTASSIUM CHLORIDE 20 MEQ/1
40 TABLET, EXTENDED RELEASE ORAL ONCE
Status: DISCONTINUED | OUTPATIENT
Start: 2024-07-31 | End: 2024-08-02

## 2024-07-31 RX ADMIN — SODIUM CHLORIDE 125 MG: 9 INJECTION, SOLUTION INTRAVENOUS at 14:31

## 2024-07-31 RX ADMIN — ZIPRASIDONE MESYLATE 10 MG: 20 INJECTION, POWDER, LYOPHILIZED, FOR SOLUTION INTRAMUSCULAR at 05:07

## 2024-07-31 RX ADMIN — PREGABALIN 75 MG: 75 CAPSULE ORAL at 21:33

## 2024-07-31 RX ADMIN — SODIUM CHLORIDE, PRESERVATIVE FREE 10 ML: 5 INJECTION INTRAVENOUS at 21:33

## 2024-07-31 ASSESSMENT — PAIN SCALES - GENERAL
PAINLEVEL_OUTOF10: 0
PAINLEVEL_OUTOF10: 0

## 2024-07-31 NOTE — PROGRESS NOTES
[facility] PROGRESS NOTE      Chief complaint: Follow-up of Gram-positive cocci in clusters on blood culture    The patient is a 84 y.o. 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  On admission, she was afebrile and hemodynamically stable with leukocytosis up to 18,000. Urinalysis showed insignificant pyuria of 0-5 WBC with urine culture showing mixed abhijit including mixed gram-positive organisms.  Blood cultures showed gram-positive cocci in clusters (methicillin-resistant Staphylococcus epidermidis by PCR).     Subjective: Patient was seen and examined. She is sound asleep and difficult to awaken. Afebrile.      Objective:    Vitals:    07/31/24 0900   BP: 95/67   Pulse: 89   Resp: 18   Temp: 97.1 °F (36.2 °C)   SpO2: 100%     Constitutional: Asleep, not in distress  Respiratory: Clear breath sounds, no crackles, no wheezes  Cardiovascular: Regular rate and rhythm, no murmurs  Gastrointestinal: Bowel sounds present, soft, nontender. Colostomy in place  Skin: Warm and dry, no active dermatoses  Musculoskeletal: No joint swelling, no joint erythema    Labs, imaging, and medical records/notes were personally reviewed.    Assessment:  Gram-positive cocci in clusters (methicillin-resistant Staphylococcus epidermidis by PCR) on blood culture, suspect contaminant  Small bowel obstruction  Leukocytosis, resolved  Asymptomatic bacteriuria    Recommendations:  Monitor clinically off antibiotics for now.  Follow-up blood cultures.  Continue supportive care.     Thank you for involving me in the care of

## 2024-07-31 NOTE — PROGRESS NOTES
Hospitalist Progress Note      Chief Complaint:  had concerns including Abdominal Pain (With vomiting since last night. ).    Admission Date: 2024     SYNOPSIS:The patient is an 84-year-old female with past medical history of  Diverticulitis status post proctocolectomy with colostomy, diabetes, ventral hernia status post repair who presented to the emergency department and for abdominal pain nausea vomiting and decrease in ostomy output since yesterday.  On arrival to the emergency department she was hemodynamically stable.  Labs are pertinent for K of 3.4, lactic acid 3.3, glucose 163, troponin 28, WBC 11.8, hemoglobin 9.9.  CT scan of the abdomen confirmed small bowel obstruction.  Patient was given IV fluids, fentanyl and Zofran.  Patient was then transferred to [facility] for admission. On my evaluation the patient has no nausea but with severe abdominal pain, distended abdomen.     : General surgery following.  Patient does not want further surgery.  Conservative management for now.    : Diet advanced as tolerated , patient having hallucinations, confusion, as previous episodes when she was placed on morphine, will discontinue morphine    : Vitals stable, Tachycardia improved, On 1 L O2     SUBJECTIVE:    Patient seen and examined at bedside, patient was sleeping, spoke with nursing staff patient received Ativan, Geodon overnight records reviewed.    No overnight issues reported     Temp (24hrs), Av °F (36.1 °C), Min:96.2 °F (35.7 °C), Max:97.8 °F (36.6 °C)    DIET: ADULT DIET; Regular; 4 carb choices (60 gm/meal)  CODE: Limited    Intake/Output Summary (Last 24 hours) at 2024 1639  Last data filed at 2024 0626  Gross per 24 hour   Intake 0 ml   Output 50 ml   Net -50 ml       OBJECTIVE:    /60   Pulse 80   Temp 96.9 °F (36.1 °C) (Temporal)   Resp 18   Ht 1.575 m (5' 2\")   Wt 71.9 kg (158 lb 9.6 oz)   SpO2 91%   BMI 29.01 kg/m²     General  appearance: No apparent distress, appears stated age and cooperative.   HEENT:  Normocephalic. Conjunctivae/corneas clear. Moist mucosa   Neck: Supple. No jugular venous distention. Thyroid not visible, non tender   Respiratory: Normal respiratory effort. Clear to auscultation bilaterally. No stridor/wheezing/rhonchi/crackles   Cardiovascular: Regular heart beats, normal S1-S2. No M/G/R  Abdomen: Non distended, soft, non tender, no visceromegaly, no mass, normal bowel sounds, ostomy bag in place  Musculoskeletal: No clubbing, cyanosis, no bilateral lower extremity edema. Brisk capillary refill.   Skin:  No rashes  on visible skin  Neurologic: awake, alert oriented to self, following commands. No focal neurological deficit. Cranial nerves 2-12 grossly normal      ASSESSMENT and PLAN:    Small bowel obstruction with history of proctocolectomy with colostomy and ventral hernia s/p repair: Surgery consulted, conservative management for now, patient stated she does not want any further surgical intervention, status post NG tube decompression, IV fluids, on small bowel [facility] ,patient tolerating diet, small bowel obstruction resolved    Lactic acidosis: Resolved  Hypokalemia: Replacing potassium, monitor in a.m. labs  Blood cultures positive x 2 GPC: ID consulted, likely contaminant, monitoring clinically off antibiotics for now, appreciate ID recommendations  Elevated troponin: Denies chest  Chronic microcytic anemia: Iron low will replace with IV Iron for now  T2DM: On sliding scale insulin blood glucose under control  HTN: BP on the soft side, holding antihypertensives       DVT prophylaxis : Lovenox    DISPOSITION: Plan is to discharge to [facility], referral placed to [facility] pending acceptance, appreciate  assistance and DC planning    Medications:  REVIEWED DAILY    Infusion Medications    sodium chloride      dextrose       Scheduled Medications    potassium chloride

## 2024-07-31 NOTE — CARE COORDINATION
Per Cassie from [facility], they are not able to accept patient. I informed patent's daughter Ksenia and she would now like to try [facility]. Referral made to Jacqui at [facility] via confidential voicemail. Await acceptance. Will need a 7000 for the accepting facility. Will determine mode of transportation closer to discharge.     Chantell Sprague RN   [phone]

## 2024-07-31 NOTE — PROGRESS NOTES
Patient received medications overnight by staff for agitation. Pt vitals stable and in flowsheets. Unable to give morning medications at this time due to patient resting.

## 2024-07-31 NOTE — PLAN OF CARE
Problem: Pain  Goal: Verbalizes/displays adequate comfort level or baseline comfort level  Outcome: Progressing     Problem: Skin/Tissue Integrity  Goal: Absence of new skin breakdown  Description: 1.  Monitor for areas of redness and/or skin breakdown  2.  Assess vascular access sites hourly  3.  Every 4-6 hours minimum:  Change oxygen saturation probe site  4.  Every 4-6 hours:  If on nasal continuous positive airway pressure, respiratory therapy assess nares and determine need for appliance change or resting period.  Outcome: Progressing     Problem: Safety - Adult  Goal: Free from fall injury  Outcome: Progressing     Problem: ABCDS Injury Assessment  Goal: Absence of physical injury  Outcome: Progressing     Problem: Gastrointestinal - Adult  Goal: Minimal or absence of nausea and vomiting  Outcome: Progressing     Problem: Gastrointestinal - Adult  Goal: Maintains or returns to baseline bowel function  Outcome: Progressing     Problem: Gastrointestinal - Adult  Goal: Maintains adequate nutritional intake  Outcome: Progressing     Problem: Gastrointestinal - Adult  Goal: Establish and maintain optimal ostomy function  Outcome: Progressing     Problem: Chronic Conditions and Co-morbidities  Goal: Patient's chronic conditions and co-morbidity symptoms are monitored and maintained or improved  Outcome: Progressing

## 2024-08-01 LAB
GLUCOSE BLD-MCNC: 128 MG/DL (ref 74–99)
GLUCOSE BLD-MCNC: 141 MG/DL (ref 74–99)
GLUCOSE BLD-MCNC: 143 MG/DL (ref 74–99)
GLUCOSE BLD-MCNC: 73 MG/DL (ref 74–99)
GLUCOSE BLD-MCNC: 87 MG/DL (ref 74–99)

## 2024-08-01 PROCEDURE — 82962 GLUCOSE BLOOD TEST: CPT

## 2024-08-01 PROCEDURE — 6360000002 HC RX W HCPCS: Performed by: INTERNAL MEDICINE

## 2024-08-01 PROCEDURE — 6370000000 HC RX 637 (ALT 250 FOR IP): Performed by: STUDENT IN AN ORGANIZED HEALTH CARE EDUCATION/TRAINING PROGRAM

## 2024-08-01 PROCEDURE — 2580000003 HC RX 258: Performed by: INTERNAL MEDICINE

## 2024-08-01 PROCEDURE — 6360000002 HC RX W HCPCS: Performed by: STUDENT IN AN ORGANIZED HEALTH CARE EDUCATION/TRAINING PROGRAM

## 2024-08-01 PROCEDURE — 1200000000 HC SEMI PRIVATE

## 2024-08-01 PROCEDURE — 2700000000 HC OXYGEN THERAPY PER DAY

## 2024-08-01 PROCEDURE — 99232 SBSQ HOSP IP/OBS MODERATE 35: CPT | Performed by: STUDENT IN AN ORGANIZED HEALTH CARE EDUCATION/TRAINING PROGRAM

## 2024-08-01 PROCEDURE — 2580000003 HC RX 258: Performed by: STUDENT IN AN ORGANIZED HEALTH CARE EDUCATION/TRAINING PROGRAM

## 2024-08-01 RX ORDER — ATORVASTATIN CALCIUM 40 MG/1
40 TABLET, FILM COATED ORAL NIGHTLY
Status: DISCONTINUED | OUTPATIENT
Start: 2024-08-01 | End: 2024-08-01

## 2024-08-01 RX ORDER — DEXTROSE, SODIUM CHLORIDE, SODIUM LACTATE, POTASSIUM CHLORIDE, AND CALCIUM CHLORIDE 5; .6; .31; .03; .02 G/100ML; G/100ML; G/100ML; G/100ML; G/100ML
INJECTION, SOLUTION INTRAVENOUS CONTINUOUS
Status: ACTIVE | OUTPATIENT
Start: 2024-08-01 | End: 2024-08-02

## 2024-08-01 RX ORDER — DOCUSATE SODIUM 100 MG/1
100 CAPSULE, LIQUID FILLED ORAL DAILY
Status: DISCONTINUED | OUTPATIENT
Start: 2024-08-01 | End: 2024-08-02 | Stop reason: HOSPADM

## 2024-08-01 RX ORDER — PANTOPRAZOLE SODIUM 40 MG/1
40 TABLET, DELAYED RELEASE ORAL
Status: DISCONTINUED | OUTPATIENT
Start: 2024-08-01 | End: 2024-08-02 | Stop reason: HOSPADM

## 2024-08-01 RX ORDER — ROSUVASTATIN CALCIUM 20 MG/1
20 TABLET, COATED ORAL NIGHTLY
Status: DISCONTINUED | OUTPATIENT
Start: 2024-08-01 | End: 2024-08-02 | Stop reason: HOSPADM

## 2024-08-01 RX ORDER — ROSUVASTATIN CALCIUM 20 MG/1
20 TABLET, COATED ORAL DAILY
COMMUNITY

## 2024-08-01 RX ADMIN — PREGABALIN 75 MG: 75 CAPSULE ORAL at 08:12

## 2024-08-01 RX ADMIN — PAROXETINE HYDROCHLORIDE 40 MG: 20 TABLET, FILM COATED ORAL at 08:13

## 2024-08-01 RX ADMIN — SODIUM CHLORIDE, PRESERVATIVE FREE 10 ML: 5 INJECTION INTRAVENOUS at 20:53

## 2024-08-01 RX ADMIN — DOCUSATE SODIUM 100 MG: 100 CAPSULE, LIQUID FILLED ORAL at 08:12

## 2024-08-01 RX ADMIN — METOPROLOL TARTRATE 12.5 MG: 25 TABLET, FILM COATED ORAL at 08:11

## 2024-08-01 RX ADMIN — ROSUVASTATIN CALCIUM 20 MG: 20 TABLET, FILM COATED ORAL at 20:51

## 2024-08-01 RX ADMIN — SODIUM CHLORIDE, SODIUM LACTATE, POTASSIUM CHLORIDE, CALCIUM CHLORIDE AND DEXTROSE MONOHYDRATE: 5; 600; 310; 30; 20 INJECTION, SOLUTION INTRAVENOUS at 12:36

## 2024-08-01 RX ADMIN — OXYCODONE HYDROCHLORIDE AND ACETAMINOPHEN 1 TABLET: 5; 325 TABLET ORAL at 08:16

## 2024-08-01 RX ADMIN — SODIUM CHLORIDE 125 MG: 9 INJECTION, SOLUTION INTRAVENOUS at 11:29

## 2024-08-01 RX ADMIN — PREGABALIN 75 MG: 75 CAPSULE ORAL at 20:51

## 2024-08-01 RX ADMIN — ENOXAPARIN SODIUM 40 MG: 100 INJECTION SUBCUTANEOUS at 08:13

## 2024-08-01 RX ADMIN — METOPROLOL TARTRATE 12.5 MG: 25 TABLET, FILM COATED ORAL at 20:51

## 2024-08-01 RX ADMIN — PREGABALIN 75 MG: 75 CAPSULE ORAL at 16:08

## 2024-08-01 RX ADMIN — PANTOPRAZOLE SODIUM 40 MG: 40 TABLET, DELAYED RELEASE ORAL at 08:12

## 2024-08-01 ASSESSMENT — PAIN SCALES - WONG BAKER: WONGBAKER_NUMERICALRESPONSE: HURTS EVEN MORE

## 2024-08-01 ASSESSMENT — PAIN DESCRIPTION - LOCATION: LOCATION: EAR

## 2024-08-01 ASSESSMENT — PAIN SCALES - GENERAL: PAINLEVEL_OUTOF10: 0

## 2024-08-01 ASSESSMENT — ENCOUNTER SYMPTOMS
RESPIRATORY NEGATIVE: 1
EYES NEGATIVE: 1

## 2024-08-01 NOTE — PROGRESS NOTES
m (5' 2\")   Wt 71.9 kg (158 lb 9.6 oz)   SpO2 99%   BMI 29.01 kg/m²     General appearance: No apparent distress, appears stated age and cooperative.   HEENT:  Normocephalic. Conjunctivae/corneas clear. Moist mucosa, tenderness in the right ear  Neck: Supple. No jugular venous distention. Thyroid not visible, non tender   Respiratory: Normal respiratory effort. Clear to auscultation bilaterally. No stridor/wheezing/rhonchi/crackles   Cardiovascular: Regular heart beats, normal S1-S2. No M/G/R  Abdomen: Non distended, soft, non tender, no visceromegaly, no mass, normal bowel sounds, ostomy bag in place  Musculoskeletal: No clubbing, cyanosis, no bilateral lower extremity edema. Brisk capillary refill.   Skin:  No rashes  on visible skin  Neurologic: awake, alert oriented to self, following commands. No focal neurological deficit. Cranial nerves 2-12 grossly normal      ASSESSMENT and PLAN:    Small bowel obstruction with history of proctocolectomy with colostomy and ventral hernia s/p repair: Surgery consulted, conservative management for now, patient stated she does not want any further surgical intervention, status post NG tube decompression, IV fluids, on small bowel follow-through contrast passes to cortisone ,patient tolerating diet, small bowel obstruction resolved    Acute Right Otalgia: ENT consulted will follow recs    Sinus Tachycardia: Started low dose Beta blocker    Lactic acidosis: Resolved  Hypokalemia: Replacing potassium, monitor in a.m. labs  Blood cultures positive x 2 GPC: ID consulted, likely contaminant, monitoring clinically off antibiotics for now, appreciate ID recommendations  Elevated troponin: Denies chest  Chronic microcytic anemia: Iron low will replace with IV Iron for now  T2DM: On sliding scale insulin blood glucose under control  HTN: holding antihypertensives       DVT prophylaxis : Lovenox    DISPOSITION: Plan is to discharge to [facility], referral placed to [facility] pending  acceptance, appreciate  assistance and DC planning    Medications:  REVIEWED DAILY    Infusion Medications    dextrose 5% in lactated ringers 75 mL/hr at 08/01/24 1236    sodium chloride      dextrose       Scheduled Medications    metoprolol tartrate  12.5 mg Oral BID    docusate sodium  100 mg Oral Daily    pantoprazole  40 mg Oral QAM AC    rosuvastatin  20 mg Oral Nightly    potassium chloride  40 mEq Oral Once    PARoxetine  40 mg Oral Daily    ferric gluconate (FERRLECIT) 125 mg in sodium chloride 0.9 % 100 mL IVPB  125 mg IntraVENous Daily    pregabalin  75 mg Oral TID    insulin lispro  0-8 Units SubCUTAneous 4x Daily AC & HS    sodium chloride flush  5-40 mL IntraVENous 2 times per day    enoxaparin  40 mg SubCUTAneous Daily     PRN Meds: oxyCODONE-acetaminophen, diatrizoate meglumine-sodium, sodium chloride flush, sodium chloride, potassium chloride **OR** potassium alternative oral replacement **OR** potassium chloride, magnesium sulfate, ondansetron **OR** ondansetron, polyethylene glycol, acetaminophen **OR** acetaminophen, glucose, dextrose bolus **OR** dextrose bolus, glucagon (rDNA), dextrose    Labs:     Recent Labs     07/30/24  0432 07/31/24  0610   WBC 6.7 6.3   HGB 8.2* 7.8*   HCT 30.7* 28.4*    220       Recent Labs     07/30/24  0432 07/31/24  0610    143   K 3.3* 3.3*    104   CO2 30* 26   BUN 29* 29*   CREATININE 0.7 0.8   CALCIUM 7.7* 8.1*       No results for input(s): \"ALKPHOS\", \"ALT\", \"AST\", \"BILITOT\", \"AMYLASE\", \"LIPASE\" in the last 72 hours.    Invalid input(s): \"PROT\", \"ALB\"      No results for input(s): \"INR\" in the last 72 hours.    No results for input(s): \"CKTOTAL\", \"TROPONINI\" in the last 72 hours.    Chronic labs:    Lab Results   Component Value Date    CHOL 95 03/23/2023    TRIG 63 03/23/2023    HDL 42 03/23/2023    TSH 3.660 03/23/2023    INR 1.2 07/27/2024    LABA1C 6.7 (H) 07/27/2024       Radiology: REVIEWED

## 2024-08-01 NOTE — PLAN OF CARE
Problem: Pain  Goal: Verbalizes/displays adequate comfort level or baseline comfort level  7/31/2024 2231 by Abigail Dillard RN  Outcome: Progressing  7/31/2024 1408 by Amanda Tyler RN  Outcome: Progressing     Problem: Skin/Tissue Integrity  Goal: Absence of new skin breakdown  Description: 1.  Monitor for areas of redness and/or skin breakdown  2.  Assess vascular access sites hourly  3.  Every 4-6 hours minimum:  Change oxygen saturation probe site  4.  Every 4-6 hours:  If on nasal continuous positive airway pressure, respiratory therapy assess nares and determine need for appliance change or resting period.  7/31/2024 2231 by Abigail Dillard RN  Outcome: Progressing  7/31/2024 1408 by Amanda Tyler RN  Outcome: Progressing     Problem: Safety - Adult  Goal: Free from fall injury  7/31/2024 2231 by Abigail Dillard RN  Outcome: Progressing  7/31/2024 1408 by Amanda Tyler RN  Outcome: Progressing     Problem: ABCDS Injury Assessment  Goal: Absence of physical injury  7/31/2024 2231 by Abigail Dillard RN  Outcome: Progressing  7/31/2024 1408 by Amanda Tyler RN  Outcome: Progressing     Problem: Gastrointestinal - Adult  Goal: Minimal or absence of nausea and vomiting  7/31/2024 2231 by Abigail Dillard RN  Outcome: Progressing  7/31/2024 1408 by Amanda Tyler RN  Outcome: Progressing  Goal: Maintains or returns to baseline bowel function  7/31/2024 2231 by Abigail Dillard RN  Outcome: Progressing  7/31/2024 1408 by Amanda Tyler RN  Outcome: Progressing  Goal: Maintains adequate nutritional intake  7/31/2024 2231 by Abigail Dillard RN  Outcome: Progressing  7/31/2024 1408 by Amanda Tyler RN  Outcome: Progressing  Goal: Establish and maintain optimal ostomy function  7/31/2024 2231 by Abigail Dillard RN  Outcome: Progressing  7/31/2024 1408 by Amanda Tyler RN  Outcome: Progressing     Problem: Chronic Conditions and Co-morbidities  Goal: Patient's chronic conditions and co-morbidity symptoms are monitored and

## 2024-08-01 NOTE — CARE COORDINATION
Met with internal med, patient is tachycardic, hr 115. IVFS ordered and ENT ordered for right ear pain which ST could possibly be related to. Patient can possibly discharge tomorrow if ST revolves. [facility]. She will let me know if they can accept. Will need a 7000 for the accepting facility. Will determine mode of transportation closer to discharge.    Chantell Sprague RN CM  [phone]        Per [facility], they can accept patient when medically ready,no precert required. Patient can possibly discharge tomorrow if ST revolves. Patient's daughter Ksenia updated on all of the above and is agreeable. 7000 placed in envelope in soft chart. Will determine mode of transportation closer to discharge.   Chantell Sprague RN CM  [phone]    .

## 2024-08-02 ENCOUNTER — APPOINTMENT (OUTPATIENT)
Dept: GENERAL RADIOLOGY | Age: 84
End: 2024-08-02
Payer: MEDICARE

## 2024-08-02 VITALS
RESPIRATION RATE: 20 BRPM | DIASTOLIC BLOOD PRESSURE: 64 MMHG | TEMPERATURE: 97.3 F | OXYGEN SATURATION: 94 % | WEIGHT: 158.6 LBS | HEIGHT: 62 IN | BODY MASS INDEX: 29.19 KG/M2 | HEART RATE: 76 BPM | SYSTOLIC BLOOD PRESSURE: 114 MMHG

## 2024-08-02 PROBLEM — H92.01 RIGHT EAR PAIN: Status: ACTIVE | Noted: 2024-08-02

## 2024-08-02 PROBLEM — M26.621 ARTHRALGIA OF RIGHT TEMPOROMANDIBULAR JOINT: Status: ACTIVE | Noted: 2024-08-02

## 2024-08-02 LAB
ANION GAP SERPL CALCULATED.3IONS-SCNC: 9 MMOL/L (ref 7–16)
BASOPHILS # BLD: 0.08 K/UL (ref 0–0.2)
BASOPHILS NFR BLD: 1 % (ref 0–2)
BUN SERPL-MCNC: 20 MG/DL (ref 6–23)
CALCIUM SERPL-MCNC: 8.2 MG/DL (ref 8.6–10.2)
CHLORIDE SERPL-SCNC: 99 MMOL/L (ref 98–107)
CO2 SERPL-SCNC: 29 MMOL/L (ref 22–29)
CREAT SERPL-MCNC: 0.8 MG/DL (ref 0.5–1)
EOSINOPHIL # BLD: 0.23 K/UL (ref 0.05–0.5)
EOSINOPHILS RELATIVE PERCENT: 3 % (ref 0–6)
ERYTHROCYTE [DISTWIDTH] IN BLOOD BY AUTOMATED COUNT: 18.1 % (ref 11.5–15)
GFR, ESTIMATED: 77 ML/MIN/1.73M2
GLUCOSE BLD-MCNC: 145 MG/DL (ref 74–99)
GLUCOSE BLD-MCNC: 173 MG/DL (ref 74–99)
GLUCOSE SERPL-MCNC: 147 MG/DL (ref 74–99)
HCT VFR BLD AUTO: 30.3 % (ref 34–48)
HGB BLD-MCNC: 8.1 G/DL (ref 11.5–15.5)
LYMPHOCYTES NFR BLD: 2.26 K/UL (ref 1.5–4)
LYMPHOCYTES RELATIVE PERCENT: 27 % (ref 20–42)
MAGNESIUM SERPL-MCNC: 1.4 MG/DL (ref 1.6–2.6)
MCH RBC QN AUTO: 20.9 PG (ref 26–35)
MCHC RBC AUTO-ENTMCNC: 26.7 G/DL (ref 32–34.5)
MCV RBC AUTO: 78.3 FL (ref 80–99.9)
MONOCYTES NFR BLD: 0.75 K/UL (ref 0.1–0.95)
MONOCYTES NFR BLD: 9 % (ref 2–12)
NEUTROPHILS NFR BLD: 61 % (ref 43–80)
NEUTS SEG NFR BLD: 5.19 K/UL (ref 1.8–7.3)
PLATELET # BLD AUTO: 262 K/UL (ref 130–450)
PMV BLD AUTO: 9.9 FL (ref 7–12)
POTASSIUM SERPL-SCNC: 3.4 MMOL/L (ref 3.5–5)
RBC # BLD AUTO: 3.87 M/UL (ref 3.5–5.5)
RBC # BLD: ABNORMAL 10*6/UL
SODIUM SERPL-SCNC: 137 MMOL/L (ref 132–146)
WBC # BLD: ABNORMAL 10*3/UL
WBC OTHER # BLD: 8.5 K/UL (ref 4.5–11.5)

## 2024-08-02 PROCEDURE — 2700000000 HC OXYGEN THERAPY PER DAY

## 2024-08-02 PROCEDURE — 2580000003 HC RX 258: Performed by: STUDENT IN AN ORGANIZED HEALTH CARE EDUCATION/TRAINING PROGRAM

## 2024-08-02 PROCEDURE — 6360000002 HC RX W HCPCS: Performed by: STUDENT IN AN ORGANIZED HEALTH CARE EDUCATION/TRAINING PROGRAM

## 2024-08-02 PROCEDURE — 83735 ASSAY OF MAGNESIUM: CPT

## 2024-08-02 PROCEDURE — 6370000000 HC RX 637 (ALT 250 FOR IP): Performed by: STUDENT IN AN ORGANIZED HEALTH CARE EDUCATION/TRAINING PROGRAM

## 2024-08-02 PROCEDURE — 6360000002 HC RX W HCPCS: Performed by: INTERNAL MEDICINE

## 2024-08-02 PROCEDURE — 80048 BASIC METABOLIC PNL TOTAL CA: CPT

## 2024-08-02 PROCEDURE — 74018 RADEX ABDOMEN 1 VIEW: CPT

## 2024-08-02 PROCEDURE — 99231 SBSQ HOSP IP/OBS SF/LOW 25: CPT | Performed by: OTOLARYNGOLOGY

## 2024-08-02 PROCEDURE — 36415 COLL VENOUS BLD VENIPUNCTURE: CPT

## 2024-08-02 PROCEDURE — 82962 GLUCOSE BLOOD TEST: CPT

## 2024-08-02 PROCEDURE — 85025 COMPLETE CBC W/AUTO DIFF WBC: CPT

## 2024-08-02 PROCEDURE — 99239 HOSP IP/OBS DSCHRG MGMT >30: CPT | Performed by: STUDENT IN AN ORGANIZED HEALTH CARE EDUCATION/TRAINING PROGRAM

## 2024-08-02 RX ORDER — SENNA AND DOCUSATE SODIUM 50; 8.6 MG/1; MG/1
2 TABLET, FILM COATED ORAL 2 TIMES DAILY
Status: DISCONTINUED | OUTPATIENT
Start: 2024-08-02 | End: 2024-08-02 | Stop reason: HOSPADM

## 2024-08-02 RX ORDER — POLYETHYLENE GLYCOL 3350 17 G/17G
17 POWDER, FOR SOLUTION ORAL DAILY
Status: DISCONTINUED | OUTPATIENT
Start: 2024-08-02 | End: 2024-08-02 | Stop reason: HOSPADM

## 2024-08-02 RX ORDER — POLYETHYLENE GLYCOL 3350 17 G/17G
17 POWDER, FOR SOLUTION ORAL DAILY PRN
Qty: 527 G | Refills: 1 | DISCHARGE
Start: 2024-08-02

## 2024-08-02 RX ORDER — MAGNESIUM SULFATE IN WATER 40 MG/ML
4000 INJECTION, SOLUTION INTRAVENOUS ONCE
Status: COMPLETED | OUTPATIENT
Start: 2024-08-02 | End: 2024-08-02

## 2024-08-02 RX ORDER — OXYCODONE HYDROCHLORIDE AND ACETAMINOPHEN 5; 325 MG/1; MG/1
1 TABLET ORAL EVERY 8 HOURS PRN
Qty: 9 TABLET | Refills: 0 | Status: SHIPPED | OUTPATIENT
Start: 2024-08-02 | End: 2024-08-05

## 2024-08-02 RX ORDER — POTASSIUM CHLORIDE 20 MEQ/1
40 TABLET, EXTENDED RELEASE ORAL ONCE
Status: COMPLETED | OUTPATIENT
Start: 2024-08-02 | End: 2024-08-02

## 2024-08-02 RX ORDER — LACTULOSE 10 G/15ML
20 SOLUTION ORAL 3 TIMES DAILY
Status: DISCONTINUED | OUTPATIENT
Start: 2024-08-02 | End: 2024-08-02 | Stop reason: HOSPADM

## 2024-08-02 RX ORDER — SENNA AND DOCUSATE SODIUM 50; 8.6 MG/1; MG/1
2 TABLET, FILM COATED ORAL 2 TIMES DAILY
DISCHARGE
Start: 2024-08-02

## 2024-08-02 RX ORDER — LACTULOSE 10 G/15ML
20 SOLUTION ORAL 3 TIMES DAILY
Refills: 1 | DISCHARGE
Start: 2024-08-02 | End: 2024-08-07

## 2024-08-02 RX ORDER — LACTULOSE 10 G/15ML
20 SOLUTION ORAL 3 TIMES DAILY
Status: DISCONTINUED | OUTPATIENT
Start: 2024-08-02 | End: 2024-08-02

## 2024-08-02 RX ADMIN — MAGNESIUM SULFATE HEPTAHYDRATE 4000 MG: 40 INJECTION, SOLUTION INTRAVENOUS at 09:29

## 2024-08-02 RX ADMIN — SENNOSIDES AND DOCUSATE SODIUM 2 TABLET: 50; 8.6 TABLET ORAL at 12:36

## 2024-08-02 RX ADMIN — SODIUM CHLORIDE 125 MG: 9 INJECTION, SOLUTION INTRAVENOUS at 08:19

## 2024-08-02 RX ADMIN — POTASSIUM CHLORIDE 40 MEQ: 1500 TABLET, EXTENDED RELEASE ORAL at 08:27

## 2024-08-02 RX ADMIN — DOCUSATE SODIUM 100 MG: 100 CAPSULE, LIQUID FILLED ORAL at 08:26

## 2024-08-02 RX ADMIN — PREGABALIN 75 MG: 75 CAPSULE ORAL at 12:36

## 2024-08-02 RX ADMIN — POLYETHYLENE GLYCOL 3350 17 G: 17 POWDER, FOR SOLUTION ORAL at 12:35

## 2024-08-02 RX ADMIN — PANTOPRAZOLE SODIUM 40 MG: 40 TABLET, DELAYED RELEASE ORAL at 05:56

## 2024-08-02 RX ADMIN — PAROXETINE HYDROCHLORIDE 40 MG: 20 TABLET, FILM COATED ORAL at 08:26

## 2024-08-02 RX ADMIN — ENOXAPARIN SODIUM 40 MG: 100 INJECTION SUBCUTANEOUS at 08:28

## 2024-08-02 RX ADMIN — METOPROLOL TARTRATE 12.5 MG: 25 TABLET, FILM COATED ORAL at 08:27

## 2024-08-02 RX ADMIN — OXYCODONE HYDROCHLORIDE AND ACETAMINOPHEN 1 TABLET: 5; 325 TABLET ORAL at 02:43

## 2024-08-02 RX ADMIN — LACTULOSE 20 G: 20 SOLUTION ORAL at 12:36

## 2024-08-02 RX ADMIN — PREGABALIN 75 MG: 75 CAPSULE ORAL at 08:28

## 2024-08-02 RX ADMIN — OXYCODONE HYDROCHLORIDE AND ACETAMINOPHEN 1 TABLET: 5; 325 TABLET ORAL at 08:26

## 2024-08-02 ASSESSMENT — PAIN SCALES - GENERAL
PAINLEVEL_OUTOF10: 7
PAINLEVEL_OUTOF10: 4
PAINLEVEL_OUTOF10: 9

## 2024-08-02 ASSESSMENT — PAIN DESCRIPTION - LOCATION: LOCATION: JAW

## 2024-08-02 ASSESSMENT — PAIN DESCRIPTION - ONSET: ONSET: ON-GOING

## 2024-08-02 ASSESSMENT — PAIN DESCRIPTION - ORIENTATION
ORIENTATION: RIGHT
ORIENTATION: RIGHT

## 2024-08-02 ASSESSMENT — PAIN - FUNCTIONAL ASSESSMENT: PAIN_FUNCTIONAL_ASSESSMENT: ACTIVITIES ARE NOT PREVENTED

## 2024-08-02 ASSESSMENT — PAIN DESCRIPTION - PAIN TYPE: TYPE: ACUTE PAIN

## 2024-08-02 ASSESSMENT — PAIN DESCRIPTION - DESCRIPTORS: DESCRIPTORS: DISCOMFORT;THROBBING;SORE

## 2024-08-02 ASSESSMENT — PAIN DESCRIPTION - FREQUENCY: FREQUENCY: CONTINUOUS

## 2024-08-02 NOTE — CARE COORDINATION
Patient has no output from ostomy, XR of the abdomen ordered. Plan is for patient to discharge to [facility] when medically cleared, no pre-cert needed. Ambulette form, 7000 and PEBBLES completed; envelope on the soft chart. Patient's daughter, Ksenia will need notified when patient is discharged.    12:05P  Received a call from patient's daughter-in-law, Prisca; she is requesting an update regarding patient's status and d/c plan. She states her sister-in-law, Ksenia has been updating them but she wanted to make sure nothing was left out. She states no one has called her , Juan R in regards to the patient's d/c plan. JENNIFER informed her, d/c plan already in place and CM has been working with Ksenia; she expressed understanding. JENNIFER explained discharge progress and discussed advanced directives; all questions answered.    Electronically signed by SU Alicia on 8/2/2024 at 11:29 AM

## 2024-08-02 NOTE — CONSULTS
Attending Physician Statement:    Alisa De La Fuente  1940      I have discussed the case, including pertinent history and exam findings with the resident. I have seen and examined the patient and the key elements of the encounter have been performed by me.  I agree with the assessment, plan and orders as documented by the resident.      Patient is consulted as a new patient.  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        Remainder of medical problems as per resident note.      Aashish Meeks DO  8/2/24  9:28 AM EDT           OTOLARYNGOLOGY  CONSULT NOTE  8/1/2024    Physician Consulted: Dr. Meeks  Reason for Consult: Right ear pain  Referring Physician: Dr. Jason MURILLO  Alisa De La Fuente is a 84 y.o. female who ENT was consulted for evaluation of right ear pain. Patient notes the pain acutely worsened yesterday and notes it starts in front of her ear and radiates down her neck and up into her right temporal region. Also notes occasional right sided clicking with jaw opening. Reports a history of intermittent jaw pain of the same pattern throughout the past few years. Denies any hearing changes, drainage or tinnitus.    Review of Systems   HENT:  Negative for congestion, dental problem, ear pain and tinnitus.    Eyes: Negative.    Respiratory: Negative.     All other systems reviewed and are negative.      Past Medical History:   Diagnosis Date    Arthritis     Colostomy in place (HCC)     Diabetes mellitus (HCC)     Diverticulitis     GERD (gastroesophageal reflux disease)     Hernia     History of blood transfusion     Hyperlipidemia     Hypertension        Past Surgical History:   Procedure Laterality Date    ABDOMEN SURGERY      DIVERTICULITIS    ANKLE FRACTURE SURGERY Left 5/1/2021    LEFT ANKLE OPEN REDUCTION INTERNAL FIXATION--SYNTHES performed by Chilo Garg MD at [facility] OR    APPENDECTOMY

## 2024-08-02 NOTE — PROGRESS NOTES
Comprehensive Nutrition Assessment    Type and Reason for Visit:  Initial, RD Nutrition Re-Screen/LOS    Nutrition Recommendations/Plan:   Continue current diet. Recommend and start ONS: Glucerna BID to promote nutrient/PO intake. Will continue to monitor.     Malnutrition Assessment:  Malnutrition Status:  At risk for malnutrition (Comment) (08/02/24 1305)    Context:  Acute Illness     Findings of the 6 clinical characteristics of malnutrition:  Energy Intake:  Mild decrease in energy intake (Comment)  Weight Loss:  Unable to assess (d/t lack of measured/actual wt hx)     Body Fat Loss:  Unable to assess     Muscle Mass Loss:  Unable to assess    Fluid Accumulation:  No significant fluid accumulation     Strength:  Not Performed    Nutrition Assessment:    Pt. admitted for abdominal pain accompanied by N/V and decreasing ostomy output. Pt. found to have SBO (resolved). GS following; noted KUB this morning shows nonobstructive pattern with mild to moderate stool burden. Noted Acute Right Otalgia. PMHx of DM, Diverticulitis s/p proctocolectomy with colostomy,ventral hernia s/p repair. Pt. tolerating diet but w/ limited intakes since admit. Will start ONS to promote PO intake and monitor.    Nutrition Related Findings:    A&Ox1, -I/O, tender/distended abd, reduced appetite, +1 edema, low K/Mg, hyperglycemia, colostomy, upper dentures Wound Type: None       Current Nutrition Intake & Therapies:    Average Meal Intake: 26-50%  Average Supplements Intake: None Ordered  ADULT DIET; Regular; 4 carb choices (60 gm/meal)    Anthropometric Measures:  Height: 157.5 cm (5' 2.01\")  Ideal Body Weight (IBW): 110 lbs (50 kg)    Admission Body Weight: 71.9 kg (158 lb 8.2 oz) (7/27 first measured)  Current Body Weight: 71.9 kg (158 lb 8.2 oz) (7/27 no method), 144.1 % IBW. Weight Source: Not Specified  Current BMI (kg/m2): 29  Usual Body Weight:  (UTO d/t lack of actual/measured wt hx on file)     Weight Adjustment For: No

## 2024-08-02 NOTE — PLAN OF CARE
Problem: Pain  Goal: Verbalizes/displays adequate comfort level or baseline comfort level  8/2/2024 1116 by Kirstin Talley, RN  Outcome: Progressing  8/1/2024 2301 by Abigail Dillard RN  Outcome: Progressing     Problem: Skin/Tissue Integrity  Goal: Absence of new skin breakdown  Description: 1.  Monitor for areas of redness and/or skin breakdown  2.  Assess vascular access sites hourly  3.  Every 4-6 hours minimum:  Change oxygen saturation probe site  4.  Every 4-6 hours:  If on nasal continuous positive airway pressure, respiratory therapy assess nares and determine need for appliance change or resting period.  8/2/2024 1116 by Kirstin Talley, RN  Outcome: Progressing  8/1/2024 2301 by Abigail Dillard RN  Outcome: Progressing     Problem: Safety - Adult  Goal: Free from fall injury  8/2/2024 1116 by Kirstin Talley, RN  Outcome: Progressing  8/1/2024 2301 by Abigail Dillard, RN  Outcome: Progressing

## 2024-08-02 NOTE — DISCHARGE INSTR - COC
Continuity of Care Form    Patient Name: Alisa De La Fuente   :  1940  MRN:  [id number]    Admit date:  2024  Discharge date:  24    Code Status Order: Limited   Advance Directives:     Admitting Physician:  Ledy Chatman MD  PCP: Jean Carlos Espinosa MD    Discharging Nurse: AMY Fulton  Discharging Hospital Unit/Room#: 8404/8404-B  Discharging Unit Phone Number: [phone]    Emergency Contact:   Extended Emergency Contact Information  Primary Emergency Contact: momo de la fuente  Home Phone: [phone]  Relation: Child  Preferred language: English   needed? No  Secondary Emergency Contact: Ksenia De La Fuente  Address: [address] [place] [address]  Home Phone: [phone]  Mobile Phone: [phone]  Relation: Child    Past Surgical History:  Past Surgical History:   Procedure Laterality Date    ABDOMEN SURGERY      DIVERTICULITIS    ANKLE FRACTURE SURGERY Left 2021    LEFT ANKLE OPEN REDUCTION INTERNAL FIXATION--SYNTHES performed by [facility]arg MD at [facility]    APPENDECTOMY      CHOLECYSTECTOMY      COLOSTOMY      HERNIA REPAIR      OCT 2012    HYSTERECTOMY (CERVIX STATUS UNKNOWN)      ILEOSTOMY OR JEJUNOSTOMY      done and reversed    THYROID SURGERY      partial thyroidectomy       Immunization History:   Immunization History   Administered Date(s) Administered    COVID-19, PFIZER GRAY top, DO NOT Dilute, (age 12 y+), IM, 30 mcg/0.3 mL 2022    COVID-19, PFIZER PURPLE top, DILUTE for use, (age 12 y+), 30mcg/0.3mL 2021, 2021, 2021    TDaP, ADACEL (age 10y-64y), BOOSTRIX (age 10y+), IM, 0.5mL 2016       Active Problems:  Patient Active Problem List   Diagnosis Code    Pelvic abscess in female N73.9    Bowel obstruction (HCC) K56.609    Hyperkalemia E87.5    Acute hypoxemic respiratory failure (HCC) J96.01    Chest pain R07.9    Acute cystitis with hematuria N30.01    Osteoarthritis of spine with  days.     Update Admission H&P: No change in H&P    PHYSICIAN SIGNATURE:  Electronically signed by Cinthya Gomez MD on 8/2/24 at 4:31 PM EDT

## 2024-08-02 NOTE — PROGRESS NOTES
[facility], no one available to take report at this time. Call back information given. Await return call

## 2024-08-02 NOTE — PROGRESS NOTES
Received page that patient's ostomy is  not having output today.  Patient seen and examined.  She says that it is her normal to have a bowel movement once every 2 or 3 days.  She last had output in her ostomy yesterday which she says was her normal.  She denies any abdominal pain, nausea, or vomiting.  Abdomen soft, nontender, nondistended.  Ventral hernia reducible.  No overlying skin changes.  Ostomy pink and patent.  KUB this morning shows nonobstructive pattern with mild to moderate stool burden.  Continue bowel regimen.  Please call or page general surgery resident with any more concerns.    Raza Rangel,    11:28 AM 08/02/2024

## 2024-08-02 NOTE — PLAN OF CARE
Problem: Pain  Goal: Verbalizes/displays adequate comfort level or baseline comfort level  8/1/2024 2301 by Abigail Dillard RN  Outcome: Progressing  8/1/2024 1827 by Kirstin Talley RN  Outcome: Progressing     Problem: Skin/Tissue Integrity  Goal: Absence of new skin breakdown  Description: 1.  Monitor for areas of redness and/or skin breakdown  2.  Assess vascular access sites hourly  3.  Every 4-6 hours minimum:  Change oxygen saturation probe site  4.  Every 4-6 hours:  If on nasal continuous positive airway pressure, respiratory therapy assess nares and determine need for appliance change or resting period.  8/1/2024 2301 by Abigail Dillard RN  Outcome: Progressing  8/1/2024 1827 by Kirstin Talley RN  Outcome: Progressing     Problem: Safety - Adult  Goal: Free from fall injury  8/1/2024 2301 by Abigail Dillard RN  Outcome: Progressing  8/1/2024 1827 by Kirstin Talley RN  Outcome: Progressing     Problem: ABCDS Injury Assessment  Goal: Absence of physical injury  Outcome: Progressing     Problem: Gastrointestinal - Adult  Goal: Minimal or absence of nausea and vomiting  Outcome: Progressing  Goal: Maintains or returns to baseline bowel function  Outcome: Progressing  Goal: Maintains adequate nutritional intake  Outcome: Progressing  Goal: Establish and maintain optimal ostomy function  Outcome: Progressing     Problem: Chronic Conditions and Co-morbidities  Goal: Patient's chronic conditions and co-morbidity symptoms are monitored and maintained or improved  Outcome: Progressing

## 2024-08-02 NOTE — PROGRESS NOTES
[facility] PROGRESS NOTE      Chief complaint: Follow-up of Gram-positive cocci in clusters on blood culture    The patient is a 84 y.o. 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  On admission, she was afebrile and hemodynamically stable with leukocytosis up to 18,000. Urinalysis showed insignificant pyuria of 0-5 WBC with urine culture showing mixed abhijit including mixed gram-positive organisms.  Blood cultures showed gram-positive cocci in clusters (methicillin-resistant Staphylococcus epidermidis by PCR).     Subjective: Patient was seen and examined. No chills, no abdominal pain, no rash, no itching. Afebrile.       Objective:  /64   Pulse 76   Temp 97.3 °F (36.3 °C) (Temporal)   Resp 20   Ht 1.575 m (5' 2.01\")   Wt 71.9 kg (158 lb 9.6 oz)   SpO2 94% Comment: 86 room air  BMI 29.00 kg/m²   Constitutional: Alert, not in distress  Respiratory: Clear breath sounds, no crackles, no wheezes  Cardiovascular: Regular rate and rhythm, no murmurs  Gastrointestinal: Bowel sounds present, soft, nontender. Colostomy in place  Skin: Warm and dry, no active dermatoses  Musculoskeletal: No joint swelling, no joint erythema    Labs, imaging, and medical records/notes were personally reviewed.    Assessment:  Gram-positive cocci in clusters (methicillin-resistant Staphylococcus epidermidis by PCR) on blood culture, deemed contaminant  Small bowel obstruction  Leukocytosis, resolved  Asymptomatic bacteriuria    Recommendations:  Monitor clinically off antibiotics for now.  Follow-up blood

## 2024-08-02 NOTE — CARE COORDINATION
Discharge order noted. Call made to [facility] to check if the facility is able to transport; transport set up for 4p via [facility]. Call made to patient's daughter, Ksenia, provided update regarding discharge to facility today; all questions answered. Called the floor and provided update.    Electronically signed by SU Alicia on 8/2/2024 at 3:24 PM

## 2024-08-02 NOTE — DISCHARGE SUMMARY
Hospitalist Discharge Summary    Patient ID: Alisa De La Fuente   Patient : 1940  Patient's PCP: Jean Carlos Espinosa MD    Admit Date: 2024   Admitting Physician: Ledy Chatman MD    Discharge Date:  2024   Discharge Physician: Cinthya Gomez MD   Discharge Condition: Stable  Discharge Disposition: Skilled Facility      Hospital course in brief:  (Please refer to daily progress notes for a comprehensive review of the hospitalization by requesting medical records)    The patient is a 84 y.o. female with history of DM, hypertension, hyperlipidemia, diverticulitis s/p proctocolectomy with colostomy, colovaginal and colocutaneous fistula takedown and sigmoidectomy , appendectomy, cholecystectomy, hysterectomy and bowel resection for diverticulitis, laparoscopic ventral and parastomal hernia repair with mesh in , presented on  with abdominal pain accompanied by nausea and vomiting and decreasing ostomy output found to have small bowel obstruction.  General surgery consulted, patient stated she did not want to have any further surgeries, conservative management was opted, she was kept n.p.o., IV fluids were given, NG tube was placed in low intermittent suction, pain and nausea control was provided, serial bowel exams were done, small bowel follow-through was done which showed resolution of SBO, patient had output from the ostomy bag.  Patient was started on diet, advanced as tolerated, general surgery signed off.  On 2024 due to no output from the ostomy bag x-ray abdomen was done which was negative for bowel obstruction, showed constipation, aggressive bowel regimen started, she will be discharged to nursing facility on scheduled bowel regimen with multiple medications, which can be titrated once constipation resolves and can be used as needed.    On admission, she was afebrile and hemodynamically stable with leukocytosis up to 18,000. Urinalysis showed insignificant pyuria

## 2024-08-03 LAB
MICROORGANISM SPEC CULT: NORMAL
MICROORGANISM SPEC CULT: NORMAL
SERVICE CMNT-IMP: NORMAL
SERVICE CMNT-IMP: NORMAL
SPECIMEN DESCRIPTION: NORMAL
SPECIMEN DESCRIPTION: NORMAL

## 2024-08-19 ENCOUNTER — HOSPITAL ENCOUNTER (EMERGENCY)
Age: 84
Discharge: HOME | End: 2024-08-20
Attending: EMERGENCY MEDICINE
Payer: MEDICARE

## 2024-08-19 DIAGNOSIS — E87.5 HYPERKALEMIA: Primary | ICD-10-CM

## 2024-08-19 DIAGNOSIS — N17.9 AKI (ACUTE KIDNEY INJURY) (HCC): ICD-10-CM

## 2024-08-19 LAB
ALBUMIN SERPL-MCNC: 3.1 G/DL (ref 3.5–5.2)
ALP SERPL-CCNC: 105 U/L (ref 35–104)
ALT SERPL-CCNC: 14 U/L (ref 0–32)
ANION GAP SERPL CALCULATED.3IONS-SCNC: 11 MMOL/L (ref 7–16)
AST SERPL-CCNC: 41 U/L (ref 0–31)
BASOPHILS # BLD: 0.04 K/UL (ref 0–0.2)
BASOPHILS NFR BLD: 1 % (ref 0–2)
BILIRUB DIRECT SERPL-MCNC: <0.2 MG/DL (ref 0–0.3)
BILIRUB INDIRECT SERPL-MCNC: ABNORMAL MG/DL (ref 0–1)
BILIRUB SERPL-MCNC: 0.2 MG/DL (ref 0–1.2)
BUN SERPL-MCNC: 23 MG/DL (ref 6–23)
CALCIUM SERPL-MCNC: 8.9 MG/DL (ref 8.6–10.2)
CHLORIDE SERPL-SCNC: 100 MMOL/L (ref 98–107)
CO2 SERPL-SCNC: 20 MMOL/L (ref 22–29)
CREAT SERPL-MCNC: 1.3 MG/DL (ref 0.5–1)
EOSINOPHIL # BLD: 0.29 K/UL (ref 0.05–0.5)
EOSINOPHILS RELATIVE PERCENT: 3 % (ref 0–6)
ERYTHROCYTE [DISTWIDTH] IN BLOOD BY AUTOMATED COUNT: 23.5 % (ref 11.5–15)
GFR, ESTIMATED: 39 ML/MIN/1.73M2
GLUCOSE BLD-MCNC: 161 MG/DL (ref 74–99)
GLUCOSE BLD-MCNC: 172 MG/DL (ref 74–99)
GLUCOSE BLD-MCNC: 223 MG/DL (ref 74–99)
GLUCOSE BLD-MCNC: 50 MG/DL (ref 74–99)
GLUCOSE BLD-MCNC: 75 MG/DL (ref 74–99)
GLUCOSE SERPL-MCNC: 50 MG/DL (ref 74–99)
HCT VFR BLD AUTO: 37.2 % (ref 34–48)
HGB BLD-MCNC: 10.3 G/DL (ref 11.5–15.5)
IMM GRANULOCYTES # BLD AUTO: 0.05 K/UL (ref 0–0.58)
IMM GRANULOCYTES NFR BLD: 1 % (ref 0–5)
LACTATE BLDV-SCNC: 1.9 MMOL/L (ref 0.5–2.2)
LIPASE SERPL-CCNC: 33 U/L (ref 13–60)
LYMPHOCYTES NFR BLD: 3.04 K/UL (ref 1.5–4)
LYMPHOCYTES RELATIVE PERCENT: 34 % (ref 20–42)
MAGNESIUM SERPL-MCNC: 1.6 MG/DL (ref 1.6–2.6)
MCH RBC QN AUTO: 22.8 PG (ref 26–35)
MCHC RBC AUTO-ENTMCNC: 27.7 G/DL (ref 32–34.5)
MCV RBC AUTO: 82.5 FL (ref 80–99.9)
MONOCYTES NFR BLD: 0.84 K/UL (ref 0.1–0.95)
MONOCYTES NFR BLD: 10 % (ref 2–12)
NEUTROPHILS NFR BLD: 52 % (ref 43–80)
NEUTS SEG NFR BLD: 4.59 K/UL (ref 1.8–7.3)
PLATELET # BLD AUTO: 208 K/UL (ref 130–450)
PMV BLD AUTO: 10.3 FL (ref 7–12)
POTASSIUM SERPL-SCNC: 6.1 MMOL/L (ref 3.5–5)
PROT SERPL-MCNC: 6.6 G/DL (ref 6.4–8.3)
RBC # BLD AUTO: 4.51 M/UL (ref 3.5–5.5)
RBC # BLD: ABNORMAL 10*6/UL
SODIUM SERPL-SCNC: 131 MMOL/L (ref 132–146)
WBC OTHER # BLD: 8.9 K/UL (ref 4.5–11.5)

## 2024-08-19 PROCEDURE — 93005 ELECTROCARDIOGRAM TRACING: CPT | Performed by: EMERGENCY MEDICINE

## 2024-08-19 PROCEDURE — 2500000003 HC RX 250 WO HCPCS: Performed by: EMERGENCY MEDICINE

## 2024-08-19 PROCEDURE — 96375 TX/PRO/DX INJ NEW DRUG ADDON: CPT

## 2024-08-19 PROCEDURE — 6360000002 HC RX W HCPCS: Performed by: EMERGENCY MEDICINE

## 2024-08-19 PROCEDURE — 83605 ASSAY OF LACTIC ACID: CPT

## 2024-08-19 PROCEDURE — 82962 GLUCOSE BLOOD TEST: CPT

## 2024-08-19 PROCEDURE — 83735 ASSAY OF MAGNESIUM: CPT

## 2024-08-19 PROCEDURE — 83690 ASSAY OF LIPASE: CPT

## 2024-08-19 PROCEDURE — 80053 COMPREHEN METABOLIC PANEL: CPT

## 2024-08-19 PROCEDURE — 84132 ASSAY OF SERUM POTASSIUM: CPT

## 2024-08-19 PROCEDURE — 82248 BILIRUBIN DIRECT: CPT

## 2024-08-19 PROCEDURE — 85025 COMPLETE CBC W/AUTO DIFF WBC: CPT

## 2024-08-19 PROCEDURE — 6370000000 HC RX 637 (ALT 250 FOR IP): Performed by: EMERGENCY MEDICINE

## 2024-08-19 PROCEDURE — 99284 EMERGENCY DEPT VISIT MOD MDM: CPT

## 2024-08-19 PROCEDURE — 2580000003 HC RX 258: Performed by: EMERGENCY MEDICINE

## 2024-08-19 PROCEDURE — 96365 THER/PROPH/DIAG IV INF INIT: CPT

## 2024-08-19 RX ORDER — FUROSEMIDE 10 MG/ML
40 INJECTION INTRAMUSCULAR; INTRAVENOUS ONCE
Status: COMPLETED | OUTPATIENT
Start: 2024-08-19 | End: 2024-08-19

## 2024-08-19 RX ORDER — GLUCAGON 1 MG/ML
1 KIT INJECTION PRN
Status: DISCONTINUED | OUTPATIENT
Start: 2024-08-19 | End: 2024-08-20 | Stop reason: HOSPADM

## 2024-08-19 RX ORDER — CALCIUM GLUCONATE 10 MG/ML
1000 INJECTION, SOLUTION INTRAVENOUS ONCE
Status: COMPLETED | OUTPATIENT
Start: 2024-08-19 | End: 2024-08-19

## 2024-08-19 RX ORDER — 0.9 % SODIUM CHLORIDE 0.9 %
1000 INTRAVENOUS SOLUTION INTRAVENOUS ONCE
Status: COMPLETED | OUTPATIENT
Start: 2024-08-19 | End: 2024-08-20

## 2024-08-19 RX ORDER — DEXTROSE MONOHYDRATE 100 MG/ML
INJECTION, SOLUTION INTRAVENOUS CONTINUOUS PRN
Status: DISCONTINUED | OUTPATIENT
Start: 2024-08-19 | End: 2024-08-20 | Stop reason: HOSPADM

## 2024-08-19 RX ORDER — DEXTROSE MONOHYDRATE 25 G/50ML
25 INJECTION, SOLUTION INTRAVENOUS PRN
Status: DISCONTINUED | OUTPATIENT
Start: 2024-08-19 | End: 2024-08-20 | Stop reason: HOSPADM

## 2024-08-19 RX ADMIN — FUROSEMIDE 40 MG: 10 INJECTION, SOLUTION INTRAMUSCULAR; INTRAVENOUS at 21:53

## 2024-08-19 RX ADMIN — INSULIN HUMAN 10 UNITS: 100 INJECTION, SOLUTION PARENTERAL at 21:44

## 2024-08-19 RX ADMIN — DEXTROSE MONOHYDRATE 250 ML: 100 INJECTION, SOLUTION INTRAVENOUS at 21:46

## 2024-08-19 RX ADMIN — SODIUM CHLORIDE 1000 ML: 9 INJECTION, SOLUTION INTRAVENOUS at 22:12

## 2024-08-19 RX ADMIN — DEXTROSE MONOHYDRATE 25 G: 25 INJECTION, SOLUTION INTRAVENOUS at 21:10

## 2024-08-19 RX ADMIN — CALCIUM GLUCONATE 1000 MG: 10 INJECTION, SOLUTION INTRAVENOUS at 21:53

## 2024-08-19 ASSESSMENT — PAIN - FUNCTIONAL ASSESSMENT: PAIN_FUNCTIONAL_ASSESSMENT: NONE - DENIES PAIN

## 2024-08-19 ASSESSMENT — LIFESTYLE VARIABLES: HOW MANY STANDARD DRINKS CONTAINING ALCOHOL DO YOU HAVE ON A TYPICAL DAY: PATIENT DOES NOT DRINK

## 2024-08-20 VITALS
WEIGHT: 159 LBS | OXYGEN SATURATION: 93 % | BODY MASS INDEX: 29.07 KG/M2 | HEART RATE: 80 BPM | SYSTOLIC BLOOD PRESSURE: 128 MMHG | RESPIRATION RATE: 23 BRPM | DIASTOLIC BLOOD PRESSURE: 52 MMHG | TEMPERATURE: 98.2 F

## 2024-08-20 LAB
EKG ATRIAL RATE: 66 BPM
EKG P AXIS: 26 DEGREES
EKG P-R INTERVAL: 138 MS
EKG Q-T INTERVAL: 426 MS
EKG QRS DURATION: 84 MS
EKG QTC CALCULATION (BAZETT): 446 MS
EKG R AXIS: -5 DEGREES
EKG T AXIS: 41 DEGREES
EKG VENTRICULAR RATE: 66 BPM
GLUCOSE BLD-MCNC: 73 MG/DL (ref 74–99)
GLUCOSE BLD-MCNC: 80 MG/DL (ref 74–99)
POTASSIUM SERPL-SCNC: 4.4 MMOL/L (ref 3.5–5)

## 2024-08-20 PROCEDURE — 82962 GLUCOSE BLOOD TEST: CPT

## 2024-08-20 PROCEDURE — 93010 ELECTROCARDIOGRAM REPORT: CPT | Performed by: INTERNAL MEDICINE

## 2024-08-20 NOTE — ED PROVIDER NOTES
[facility] EMERGENCY DEPARTMENT  EMERGENCY DEPARTMENT ENCOUNTER        Pt Name: Alisa De La Fuente  MRN: [id number]  Birthdate 1940  Date of evaluation: 8/19/2024  Provider: Rosa Napier DO  PCP: Jean Carlos Espinosa MD  Note Started: 12:06 AM EDT 8/20/24    CHIEF COMPLAINT       Chief Complaint   Patient presents with    Abnormal Lab     Labs drawn on 8-15-24. , K 6.0  no pain       HISTORY OF PRESENT ILLNESS: 1 or more Elements        Limitations to history : None    Alisa De La Fuente is a 84 y.o. female brought in by EMS from nursing home for evaluation of abnormal labs.  She had labs drawn on 8/15/2024 that showed a creatinine of 6.0 and a sodium of 131.  Mental patient is alert and oriented to person and place and denies any complaints at this time.    Nursing Notes were all reviewed and agreed with or any disagreements were addressed in the HPI.      REVIEW OF EXTERNAL NOTE :       Reviewed previous fever at home hospital counter on 7/27/2024 for osteoarthritis.      Chart Review/External Note Review    Last Echo reviewed by Me:  Lab Results   Component Value Date    LVEF 60 03/24/2023             Controlled Substance Monitoring:    Acute and Chronic Pain Monitoring:        No data to display                    REVIEW OF SYSTEMS :      Positives and Pertinent negatives as per HPI.     SURGICAL HISTORY     Past Surgical History:   Procedure Laterality Date    ABDOMEN SURGERY      DIVERTICULITIS    ANKLE FRACTURE SURGERY Left 5/1/2021    LEFT ANKLE OPEN REDUCTION INTERNAL FIXATION--SYNTHES performed by Chilo Garg MD at [facility] [place]    APPENDECTOMY      CHOLECYSTECTOMY      COLOSTOMY      HERNIA REPAIR      OCT 2012    HYSTERECTOMY (CERVIX STATUS UNKNOWN)      ILEOSTOMY OR JEJUNOSTOMY      done and reversed    THYROID SURGERY      partial thyroidectomy       CURRENTMEDICATIONS       Previous Medications    ACETAMINOPHEN (TYLENOL) 325 MG TABLET    Take 2 tablets by

## 2024-10-21 ENCOUNTER — APPOINTMENT (OUTPATIENT)
Dept: CT IMAGING | Age: 84
End: 2024-10-21
Payer: MEDICARE

## 2024-10-21 ENCOUNTER — HOSPITAL ENCOUNTER (EMERGENCY)
Age: 84
Discharge: HOME | End: 2024-10-22
Attending: EMERGENCY MEDICINE
Payer: MEDICARE

## 2024-10-21 ENCOUNTER — APPOINTMENT (OUTPATIENT)
Dept: GENERAL RADIOLOGY | Age: 84
End: 2024-10-21
Payer: MEDICARE

## 2024-10-21 VITALS
TEMPERATURE: 98.2 F | RESPIRATION RATE: 16 BRPM | HEART RATE: 75 BPM | SYSTOLIC BLOOD PRESSURE: 125 MMHG | DIASTOLIC BLOOD PRESSURE: 63 MMHG | OXYGEN SATURATION: 97 %
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
## 2024-10-21 PROCEDURE — 70450 CT HEAD/BRAIN W/O DYE: CPT

## 2024-10-21 PROCEDURE — 72125 CT NECK SPINE W/O DYE: CPT

## 2024-10-21 PROCEDURE — 6370000000 HC RX 637 (ALT 250 FOR IP)

## 2024-10-21 PROCEDURE — 72170 X-RAY EXAM OF PELVIS: CPT

## 2024-10-21 PROCEDURE — 72131 CT LUMBAR SPINE W/O DYE: CPT

## 2024-10-21 PROCEDURE — 99284 EMERGENCY DEPT VISIT MOD MDM: CPT

## 2024-10-21 RX ORDER — OXYCODONE AND ACETAMINOPHEN 5; 325 MG/1; MG/1
1 TABLET ORAL ONCE
Status: COMPLETED | OUTPATIENT
Start: 2024-10-21 | End: 2024-10-21

## 2024-10-21 RX ADMIN — OXYCODONE HYDROCHLORIDE AND ACETAMINOPHEN 1 TABLET: 5; 325 TABLET ORAL at 16:19

## 2024-10-21 ASSESSMENT — PAIN DESCRIPTION - LOCATION: LOCATION: BACK

## 2024-10-21 ASSESSMENT — PAIN SCALES - GENERAL: PAINLEVEL_OUTOF10: 6

## 2024-10-21 ASSESSMENT — PAIN DESCRIPTION - DESCRIPTORS: DESCRIPTORS: ACHING;DULL;POUNDING

## 2024-10-21 NOTE — ED PROVIDER NOTES
Discharge 10/21/2024 10:53:32 PM  Condition at Disposition: Data Unavailable      PATIENT REFERRED TO:  Jean Carlos Espinosa MD  [address] [facility] [address] [phone]    Schedule an appointment as soon as possible for a visit   As needed      DISCHARGE MEDICATIONS:  New Prescriptions    No medications on file       DISCONTINUED MEDICATIONS:  Discontinued Medications    No medications on file              (Please note that portions of this note were completed with a voice recognition program.  Efforts were made to edit the dictations but occasionally words are mis-transcribed.)    Patrice Baker,  PGY-2

## 2024-10-22 PROCEDURE — 6370000000 HC RX 637 (ALT 250 FOR IP): Performed by: EMERGENCY MEDICINE

## 2024-10-22 RX ORDER — OXYCODONE AND ACETAMINOPHEN 5; 325 MG/1; MG/1
1 TABLET ORAL ONCE
Status: COMPLETED | OUTPATIENT
Start: 2024-10-22 | End: 2024-10-22

## 2024-10-22 RX ADMIN — OXYCODONE HYDROCHLORIDE AND ACETAMINOPHEN 1 TABLET: 5; 325 TABLET ORAL at 02:00

## 2024-10-22 ASSESSMENT — PAIN SCALES - GENERAL: PAINLEVEL_OUTOF10: 10

## 2024-10-22 ASSESSMENT — PAIN DESCRIPTION - ORIENTATION: ORIENTATION: LOWER

## 2024-10-22 ASSESSMENT — PAIN DESCRIPTION - LOCATION: LOCATION: BACK

## 2024-10-22 ASSESSMENT — PAIN DESCRIPTION - DESCRIPTORS: DESCRIPTORS: OTHER (COMMENT)

## 2024-10-22 NOTE — ED NOTES
Please call patient's daughter Steffany with an update as soon as possible. Her number is [phone]

## 2024-10-31 ENCOUNTER — APPOINTMENT (OUTPATIENT)
Dept: GENERAL RADIOLOGY | Age: 84
End: 2024-10-31
Payer: MEDICARE

## 2024-10-31 ENCOUNTER — APPOINTMENT (OUTPATIENT)
Dept: CT IMAGING | Age: 84
End: 2024-10-31
Payer: MEDICARE

## 2024-10-31 ENCOUNTER — HOSPITAL ENCOUNTER (EMERGENCY)
Age: 84
Discharge: HOME | End: 2024-11-01
Attending: EMERGENCY MEDICINE
Payer: MEDICARE

## 2024-10-31 VITALS
RESPIRATION RATE: 14 BRPM | TEMPERATURE: 97.9 F | OXYGEN SATURATION: 96 % | DIASTOLIC BLOOD PRESSURE: 64 MMHG | HEART RATE: 62 BPM | SYSTOLIC BLOOD PRESSURE: 113 MMHG
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
## 2024-10-31 DIAGNOSIS — M25.551 PAIN OF RIGHT HIP: ICD-10-CM

## 2024-10-31 DIAGNOSIS — G89.29 CHRONIC BACK PAIN, UNSPECIFIED BACK LOCATION, UNSPECIFIED BACK PAIN LATERALITY: ICD-10-CM

## 2024-10-31 DIAGNOSIS — M54.9 CHRONIC BACK PAIN, UNSPECIFIED BACK LOCATION, UNSPECIFIED BACK PAIN LATERALITY: ICD-10-CM

## 2024-10-31 DIAGNOSIS — S09.90XA INJURY OF HEAD, INITIAL ENCOUNTER: ICD-10-CM

## 2024-10-31 LAB — GLUCOSE BLD-MCNC: 97 MG/DL (ref 74–99)

## 2024-10-31 PROCEDURE — 72128 CT CHEST SPINE W/O DYE: CPT

## 2024-10-31 PROCEDURE — 99284 EMERGENCY DEPT VISIT MOD MDM: CPT

## 2024-10-31 PROCEDURE — 74176 CT ABD & PELVIS W/O CONTRAST: CPT

## 2024-10-31 PROCEDURE — 72131 CT LUMBAR SPINE W/O DYE: CPT

## 2024-10-31 PROCEDURE — 70450 CT HEAD/BRAIN W/O DYE: CPT

## 2024-10-31 PROCEDURE — 72125 CT NECK SPINE W/O DYE: CPT

## 2024-10-31 PROCEDURE — 82962 GLUCOSE BLOOD TEST: CPT

## 2024-10-31 PROCEDURE — 6370000000 HC RX 637 (ALT 250 FOR IP): Performed by: EMERGENCY MEDICINE

## 2024-10-31 PROCEDURE — 71250 CT THORAX DX C-: CPT

## 2024-10-31 PROCEDURE — 73552 X-RAY EXAM OF FEMUR 2/>: CPT

## 2024-10-31 RX ORDER — OXYCODONE AND ACETAMINOPHEN 5; 325 MG/1; MG/1
1 TABLET ORAL ONCE
Status: COMPLETED | OUTPATIENT
Start: 2024-10-31 | End: 2024-10-31

## 2024-10-31 RX ADMIN — OXYCODONE HYDROCHLORIDE AND ACETAMINOPHEN 1 TABLET: 5; 325 TABLET ORAL at 19:05

## 2024-10-31 ASSESSMENT — PAIN DESCRIPTION - ORIENTATION: ORIENTATION: RIGHT

## 2024-10-31 ASSESSMENT — PAIN SCALES - GENERAL
PAINLEVEL_OUTOF10: 0
PAINLEVEL_OUTOF10: 10

## 2024-10-31 ASSESSMENT — PAIN DESCRIPTION - ONSET: ONSET: ON-GOING

## 2024-10-31 ASSESSMENT — PAIN DESCRIPTION - DESCRIPTORS
DESCRIPTORS: ACHING;SORE;TENDER;THROBBING
DESCRIPTORS: DISCOMFORT;ACHING

## 2024-10-31 ASSESSMENT — PAIN DESCRIPTION - PAIN TYPE: TYPE: ACUTE PAIN

## 2024-10-31 ASSESSMENT — PAIN - FUNCTIONAL ASSESSMENT: PAIN_FUNCTIONAL_ASSESSMENT: NONE - DENIES PAIN

## 2024-10-31 ASSESSMENT — PAIN DESCRIPTION - LOCATION
LOCATION: OTHER (COMMENT)
LOCATION: HEAD

## 2024-10-31 ASSESSMENT — PAIN DESCRIPTION - FREQUENCY: FREQUENCY: CONTINUOUS

## 2024-10-31 NOTE — ED PROVIDER NOTES
HPI:  10/31/24,   Time: 6:03 PM EDT         Alisa De La Fuente is a 84 y.o. female presenting to the ED for mechanical fall and hitting her head her neck or back right ribs and right hip, beginning just prior to arrival ago.  The complaint has been persistent, moderate in severity, and worsened by nothing.  Patient is from a nursing home per paramedics her vital signs are stable she has breath sounds bilateral she has pain with movement of her right hip she is neuro vastly intact she is awake alert and oriented x 3 she did hit her head but no specific loss of consciousness no numbness or weakness in her arms or legs no sensory deficits    ROS:   Pertinent positives and negatives are stated within HPI, all other systems reviewed and are negative.  --------------------------------------------- PAST HISTORY ---------------------------------------------  Past Medical History:  has a past medical history of Arthritis, Colostomy in place (HCC), Diabetes mellitus (HCC), Diverticulitis, GERD (gastroesophageal reflux disease), Hernia, History of blood transfusion, Hyperlipidemia, and Hypertension.    Past Surgical History:  has a past surgical history that includes Hysterectomy; Cholecystectomy; hernia repair; Abdomen surgery; colostomy; Appendectomy; Thyroid surgery; Jejunostomy; and Ankle fracture surgery (Left, 5/1/2021).    Social History:  reports that she has never smoked. She has quit using smokeless tobacco. She reports current alcohol use. She reports that she does not use drugs.    Family History: family history is not on file.     The patient’s home medications have been reviewed.    Allergies: Daptomycin, Vancomycin, and Adhesive tape    -------------------------------------------------- RESULTS -------------------------------------------------  All laboratory and radiology results have been personally reviewed by myself   LABS:  Results for orders placed or performed during the hospital encounter of 10/31/24   POCT

## 2024-10-31 NOTE — DISCHARGE INSTRUCTIONS
Return if any new neurodeficits or unable to ambulate follow-up with your family doctor soon as possible and orthopedist

## 2024-11-01 NOTE — ED NOTES
Attempted to call SNF multiple times to give N2N and discusses pt not having colostomy bag placed.

## 2024-11-01 NOTE — ED NOTES
Staff from [facility] called for update; patient is discharged and awaiting transport . ETA for transport will be 1200am-100am

## 2024-11-01 NOTE — ED NOTES
When checking pt's colostomy, there was no bag over the stoma, only a paper towel covering the area.

## 2024-11-14 ENCOUNTER — OFFICE VISIT (OUTPATIENT)
Dept: ORTHOPEDIC SURGERY | Age: 84
End: 2024-11-14

## 2024-11-14 VITALS — HEART RATE: 60 BPM | SYSTOLIC BLOOD PRESSURE: 124 MMHG | TEMPERATURE: 97.5 F | DIASTOLIC BLOOD PRESSURE: 52 MMHG

## 2024-11-14 DIAGNOSIS — G89.29 CHRONIC MIDLINE LOW BACK PAIN WITHOUT SCIATICA: ICD-10-CM

## 2024-11-14 DIAGNOSIS — M54.50 CHRONIC MIDLINE LOW BACK PAIN WITHOUT SCIATICA: ICD-10-CM

## 2024-11-14 DIAGNOSIS — M25.551 PAIN OF RIGHT HIP: Primary | ICD-10-CM

## 2024-11-14 RX ORDER — LACTULOSE 10 G/15ML
20 SOLUTION ORAL 3 TIMES DAILY
COMMUNITY

## 2024-11-14 RX ORDER — RIVASTIGMINE 9.5 MG/24H
1 PATCH, EXTENDED RELEASE TRANSDERMAL DAILY
COMMUNITY

## 2024-11-14 NOTE — PATIENT INSTRUCTIONS
INSTRUCTIONS FOR FACILITY      Weight Bearing: Weight bearing as tolerated bilateral extremity. Use assistive devices when needed.    Therapy: Continue PT/OT      Pain control: per facility physician            Follow up:     Referral made to Dr. Rasheed with physical medicine and rehabilitation for evaluation of low back pain and further treatment    No future appointments.    Call office with any questions or concerns.

## 2024-11-14 NOTE — PROGRESS NOTES
coronal plane deformity lumbar spine  L2 chronic burst fracture  Ambulatory dysfunction  Chronic low back pain    PLAN  -This is an 84-year-old female lives in a nursing home with lower back pain.  She has no hip pathology noted.  I did review all of her old images.  We need to get her to see physical medicine and rehabilitation for evaluation.  She may need a neurosurgical consultation as well.  She has no neurologic symptoms just significant chronic pain.  All of her questions were answered to her satisfaction.  She will follow-up with me on an as-needed basis.                Eriberto Avilez DO   Orthopaedic Surgery   11/14/24  8:06 AM    Note: This report was completed using computerStaxxon voiced recognition software.  Every effort has been made to ensure accuracy; however, inadvertent computerized transcription errors may be present.

## 2024-12-23 ENCOUNTER — OFFICE VISIT (OUTPATIENT)
Dept: PHYSICAL MEDICINE AND REHAB | Age: 84
End: 2024-12-23

## 2024-12-23 VITALS — BODY MASS INDEX: 29.26 KG/M2 | WEIGHT: 159 LBS | HEIGHT: 62 IN

## 2024-12-23 DIAGNOSIS — G89.29 CHRONIC BILATERAL LOW BACK PAIN WITHOUT SCIATICA: Primary | ICD-10-CM

## 2024-12-23 DIAGNOSIS — M54.50 CHRONIC BILATERAL LOW BACK PAIN WITHOUT SCIATICA: Primary | ICD-10-CM

## 2024-12-30 NOTE — PROGRESS NOTES
Patient presented today for appointment as referral from Orthopedics for compression fracture. Of note, her transport team mentioned that she was just recently seen by Spine Surgeon Dr. Harrison at [facility] for the same condition and they preferred to cancel appointment given this was for her low back pain. I did review her medications with her and transport team so she is aware what she is taking for the pain. No charge for cancelled appointment.  Heaven Rasheed MD  Board Certified Physical Medicine and Rehabilitation

## 2025-01-01 ENCOUNTER — APPOINTMENT (OUTPATIENT)
Dept: GENERAL RADIOLOGY | Age: 85
End: 2025-01-01
Payer: MEDICARE

## 2025-01-01 ENCOUNTER — APPOINTMENT (OUTPATIENT)
Dept: CT IMAGING | Age: 85
End: 2025-01-01
Payer: MEDICARE

## 2025-01-01 ENCOUNTER — HOSPITAL ENCOUNTER (INPATIENT)
Age: 85
LOS: 6 days | Discharge: HOSPICE | End: 2025-01-10
Attending: EMERGENCY MEDICINE | Admitting: SURGERY
Payer: MEDICARE

## 2025-01-01 ENCOUNTER — APPOINTMENT (OUTPATIENT)
Dept: CT IMAGING | Age: 85
End: 2025-01-01
Attending: EMERGENCY MEDICINE
Payer: MEDICARE

## 2025-01-01 VITALS
RESPIRATION RATE: 20 BRPM | HEART RATE: 88 BPM | TEMPERATURE: 97.6 F | DIASTOLIC BLOOD PRESSURE: 96 MMHG | WEIGHT: 145 LBS | BODY MASS INDEX: 26.68 KG/M2 | HEIGHT: 62 IN | OXYGEN SATURATION: 93 % | SYSTOLIC BLOOD PRESSURE: 150 MMHG
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
## 2025-01-01 DIAGNOSIS — S32.9XXA CLOSED DISPLACED FRACTURE OF PELVIS, UNSPECIFIED PART OF PELVIS, INITIAL ENCOUNTER (HCC): ICD-10-CM

## 2025-01-01 DIAGNOSIS — S22.39XA CLOSED FRACTURE OF ONE RIB, UNSPECIFIED LATERALITY, INITIAL ENCOUNTER: ICD-10-CM

## 2025-01-01 DIAGNOSIS — S06.5XAA SUBDURAL HEMATOMA: Primary | ICD-10-CM

## 2025-01-01 LAB
ALBUMIN SERPL-MCNC: 3.5 G/DL (ref 3.5–5.2)
ALP SERPL-CCNC: 104 U/L (ref 35–104)
ALT SERPL-CCNC: 8 U/L (ref 0–32)
AMPHET UR QL SCN: NEGATIVE
ANION GAP SERPL CALCULATED.3IONS-SCNC: 10 MMOL/L (ref 7–16)
ANION GAP SERPL CALCULATED.3IONS-SCNC: 10 MMOL/L (ref 7–16)
ANION GAP SERPL CALCULATED.3IONS-SCNC: 11 MMOL/L (ref 7–16)
ANION GAP SERPL CALCULATED.3IONS-SCNC: 8 MMOL/L (ref 7–16)
AST SERPL-CCNC: 17 U/L (ref 0–31)
BARBITURATES UR QL SCN: NEGATIVE
BASOPHILS # BLD: 0 K/UL (ref 0–0.2)
BASOPHILS # BLD: 0.03 K/UL (ref 0–0.2)
BASOPHILS # BLD: 0.04 K/UL (ref 0–0.2)
BASOPHILS # BLD: 0.04 K/UL (ref 0–0.2)
BASOPHILS NFR BLD: 0 % (ref 0–2)
BENZODIAZ UR QL: NEGATIVE
BILIRUB SERPL-MCNC: 0.3 MG/DL (ref 0–1.2)
BILIRUB UR QL STRIP: NEGATIVE
BUN SERPL-MCNC: 25 MG/DL (ref 6–23)
BUN SERPL-MCNC: 27 MG/DL (ref 6–23)
BUN SERPL-MCNC: 27 MG/DL (ref 6–23)
BUN SERPL-MCNC: 28 MG/DL (ref 6–23)
BUPRENORPHINE UR QL: NEGATIVE
CALCIUM SERPL-MCNC: 8.9 MG/DL (ref 8.6–10.2)
CALCIUM SERPL-MCNC: 8.9 MG/DL (ref 8.6–10.2)
CALCIUM SERPL-MCNC: 9 MG/DL (ref 8.6–10.2)
CALCIUM SERPL-MCNC: 9.1 MG/DL (ref 8.6–10.2)
CANNABINOIDS UR QL SCN: NEGATIVE
CHLORIDE SERPL-SCNC: 100 MMOL/L (ref 98–107)
CHLORIDE SERPL-SCNC: 105 MMOL/L (ref 98–107)
CHLORIDE SERPL-SCNC: 110 MMOL/L (ref 98–107)
CHLORIDE SERPL-SCNC: 111 MMOL/L (ref 98–107)
CLARITY UR: CLEAR
CO2 SERPL-SCNC: 25 MMOL/L (ref 22–29)
CO2 SERPL-SCNC: 26 MMOL/L (ref 22–29)
CO2 SERPL-SCNC: 27 MMOL/L (ref 22–29)
CO2 SERPL-SCNC: 28 MMOL/L (ref 22–29)
COCAINE UR QL SCN: NEGATIVE
COLOR UR: YELLOW
CREAT SERPL-MCNC: 0.9 MG/DL (ref 0.5–1)
CREAT SERPL-MCNC: 0.9 MG/DL (ref 0.5–1)
CREAT SERPL-MCNC: 1.1 MG/DL (ref 0.5–1)
CREAT SERPL-MCNC: 1.2 MG/DL (ref 0.5–1)
EOSINOPHIL # BLD: 0 K/UL (ref 0.05–0.5)
EOSINOPHIL # BLD: 0.1 K/UL (ref 0.05–0.5)
EOSINOPHIL # BLD: 0.29 K/UL (ref 0.05–0.5)
EOSINOPHIL # BLD: 0.3 K/UL (ref 0.05–0.5)
EOSINOPHIL # BLD: 0.4 K/UL (ref 0.05–0.5)
EOSINOPHIL # BLD: 0.67 K/UL (ref 0.05–0.5)
EOSINOPHILS RELATIVE PERCENT: 0 % (ref 0–6)
EOSINOPHILS RELATIVE PERCENT: 1 % (ref 0–6)
EOSINOPHILS RELATIVE PERCENT: 2 % (ref 0–6)
EOSINOPHILS RELATIVE PERCENT: 3 % (ref 0–6)
EOSINOPHILS RELATIVE PERCENT: 4 % (ref 0–6)
EOSINOPHILS RELATIVE PERCENT: 6 % (ref 0–6)
EPI CELLS #/AREA URNS HPF: ABNORMAL /HPF
ERYTHROCYTE [DISTWIDTH] IN BLOOD BY AUTOMATED COUNT: 15 % (ref 11.5–15)
ERYTHROCYTE [DISTWIDTH] IN BLOOD BY AUTOMATED COUNT: 15.4 % (ref 11.5–15)
ERYTHROCYTE [DISTWIDTH] IN BLOOD BY AUTOMATED COUNT: 15.5 % (ref 11.5–15)
ERYTHROCYTE [DISTWIDTH] IN BLOOD BY AUTOMATED COUNT: 15.7 % (ref 11.5–15)
ERYTHROCYTE [DISTWIDTH] IN BLOOD BY AUTOMATED COUNT: 15.8 % (ref 11.5–15)
ERYTHROCYTE [DISTWIDTH] IN BLOOD BY AUTOMATED COUNT: 15.9 % (ref 11.5–15)
ERYTHROCYTE [DISTWIDTH] IN BLOOD BY AUTOMATED COUNT: 16 % (ref 11.5–15)
ERYTHROCYTE [DISTWIDTH] IN BLOOD BY AUTOMATED COUNT: 16 % (ref 11.5–15)
ERYTHROCYTE [DISTWIDTH] IN BLOOD BY AUTOMATED COUNT: 16.1 % (ref 11.5–15)
ERYTHROCYTE [DISTWIDTH] IN BLOOD BY AUTOMATED COUNT: 16.2 % (ref 11.5–15)
ERYTHROCYTE [DISTWIDTH] IN BLOOD BY AUTOMATED COUNT: 16.3 % (ref 11.5–15)
ERYTHROCYTE [DISTWIDTH] IN BLOOD BY AUTOMATED COUNT: NORMAL % (ref 11.8–14.4)
ETHANOLAMINE SERPL-MCNC: <10 MG/DL (ref 0–0.08)
FENTANYL UR QL: NEGATIVE
GFR, ESTIMATED: 46 ML/MIN/1.73M2
GFR, ESTIMATED: 50 ML/MIN/1.73M2
GFR, ESTIMATED: 63 ML/MIN/1.73M2
GFR, ESTIMATED: 64 ML/MIN/1.73M2
GLUCOSE BLD-MCNC: 107 MG/DL (ref 74–99)
GLUCOSE BLD-MCNC: 115 MG/DL (ref 74–99)
GLUCOSE BLD-MCNC: 116 MG/DL (ref 74–99)
GLUCOSE BLD-MCNC: 118 MG/DL (ref 74–99)
GLUCOSE BLD-MCNC: 127 MG/DL (ref 74–99)
GLUCOSE BLD-MCNC: 130 MG/DL (ref 74–99)
GLUCOSE BLD-MCNC: 132 MG/DL (ref 74–99)
GLUCOSE BLD-MCNC: 132 MG/DL (ref 74–99)
GLUCOSE BLD-MCNC: 137 MG/DL (ref 74–99)
GLUCOSE BLD-MCNC: 138 MG/DL (ref 74–99)
GLUCOSE BLD-MCNC: 143 MG/DL (ref 74–99)
GLUCOSE BLD-MCNC: 154 MG/DL (ref 74–99)
GLUCOSE BLD-MCNC: 156 MG/DL (ref 74–99)
GLUCOSE BLD-MCNC: 157 MG/DL (ref 74–99)
GLUCOSE BLD-MCNC: 159 MG/DL (ref 74–99)
GLUCOSE SERPL-MCNC: 117 MG/DL (ref 74–99)
GLUCOSE SERPL-MCNC: 121 MG/DL (ref 74–99)
GLUCOSE SERPL-MCNC: 134 MG/DL (ref 74–99)
GLUCOSE SERPL-MCNC: 138 MG/DL (ref 74–99)
GLUCOSE UR STRIP-MCNC: NEGATIVE MG/DL
HCT VFR BLD AUTO: 27.5 % (ref 34–48)
HCT VFR BLD AUTO: 27.6 % (ref 34–48)
HCT VFR BLD AUTO: 27.6 % (ref 34–48)
HCT VFR BLD AUTO: 28 % (ref 34–48)
HCT VFR BLD AUTO: 28.4 % (ref 34–48)
HCT VFR BLD AUTO: 28.7 % (ref 34–48)
HCT VFR BLD AUTO: 29.3 % (ref 34–48)
HCT VFR BLD AUTO: 29.5 % (ref 34–48)
HCT VFR BLD AUTO: 29.5 % (ref 34–48)
HCT VFR BLD AUTO: 29.7 % (ref 34–48)
HCT VFR BLD AUTO: 30.3 % (ref 34–48)
HCT VFR BLD AUTO: 31.2 % (ref 34–48)
HCT VFR BLD AUTO: 31.5 % (ref 34–48)
HCT VFR BLD AUTO: 31.6 % (ref 34–48)
HCT VFR BLD AUTO: 32.3 % (ref 34–48)
HCT VFR BLD AUTO: NORMAL % (ref 36.3–47.1)
HGB BLD-MCNC: 7.9 G/DL (ref 11.5–15.5)
HGB BLD-MCNC: 8.1 G/DL (ref 11.5–15.5)
HGB BLD-MCNC: 8.1 G/DL (ref 11.5–15.5)
HGB BLD-MCNC: 8.4 G/DL (ref 11.5–15.5)
HGB BLD-MCNC: 8.5 G/DL (ref 11.5–15.5)
HGB BLD-MCNC: 8.5 G/DL (ref 11.5–15.5)
HGB BLD-MCNC: 8.6 G/DL (ref 11.5–15.5)
HGB BLD-MCNC: 8.7 G/DL (ref 11.5–15.5)
HGB BLD-MCNC: 8.8 G/DL (ref 11.5–15.5)
HGB BLD-MCNC: 8.9 G/DL (ref 11.5–15.5)
HGB BLD-MCNC: 9.1 G/DL (ref 11.5–15.5)
HGB BLD-MCNC: 9.4 G/DL (ref 11.5–15.5)
HGB BLD-MCNC: 9.8 G/DL (ref 11.5–15.5)
HGB BLD-MCNC: NORMAL G/DL (ref 11.9–15.1)
HGB UR QL STRIP.AUTO: NEGATIVE
IMM GRANULOCYTES # BLD AUTO: 0.07 K/UL (ref 0–0.58)
IMM GRANULOCYTES # BLD AUTO: 0.07 K/UL (ref 0–0.58)
IMM GRANULOCYTES # BLD AUTO: 0.08 K/UL (ref 0–0.58)
IMM GRANULOCYTES # BLD AUTO: 0.09 K/UL (ref 0–0.58)
IMM GRANULOCYTES # BLD AUTO: 0.19 K/UL (ref 0–0.58)
IMM GRANULOCYTES NFR BLD: 1 % (ref 0–5)
IMM GRANULOCYTES NFR BLD: 2 % (ref 0–5)
KETONES UR STRIP-MCNC: NEGATIVE MG/DL
LEUKOCYTE ESTERASE UR QL STRIP: ABNORMAL
LYMPHOCYTES NFR BLD: 1.14 K/UL (ref 1.5–4)
LYMPHOCYTES NFR BLD: 1.27 K/UL (ref 1.5–4)
LYMPHOCYTES NFR BLD: 1.39 K/UL (ref 1.5–4)
LYMPHOCYTES NFR BLD: 1.99 K/UL (ref 1.5–4)
LYMPHOCYTES NFR BLD: 2.04 K/UL (ref 1.5–4)
LYMPHOCYTES NFR BLD: 2.14 K/UL (ref 1.5–4)
LYMPHOCYTES RELATIVE PERCENT: 10 % (ref 20–42)
LYMPHOCYTES RELATIVE PERCENT: 10 % (ref 20–42)
LYMPHOCYTES RELATIVE PERCENT: 12 % (ref 20–42)
LYMPHOCYTES RELATIVE PERCENT: 14 % (ref 20–42)
LYMPHOCYTES RELATIVE PERCENT: 17 % (ref 20–42)
LYMPHOCYTES RELATIVE PERCENT: 17 % (ref 20–42)
MCH RBC QN AUTO: 25.1 PG (ref 26–35)
MCH RBC QN AUTO: 25.2 PG (ref 26–35)
MCH RBC QN AUTO: 25.2 PG (ref 26–35)
MCH RBC QN AUTO: 25.3 PG (ref 26–35)
MCH RBC QN AUTO: 25.3 PG (ref 26–35)
MCH RBC QN AUTO: 25.4 PG (ref 26–35)
MCH RBC QN AUTO: 25.5 PG (ref 26–35)
MCH RBC QN AUTO: 25.6 PG (ref 26–35)
MCH RBC QN AUTO: 25.7 PG (ref 26–35)
MCH RBC QN AUTO: 25.8 PG (ref 26–35)
MCH RBC QN AUTO: 25.8 PG (ref 26–35)
MCH RBC QN AUTO: 25.9 PG (ref 26–35)
MCH RBC QN AUTO: NORMAL PG (ref 25.2–33.5)
MCHC RBC AUTO-ENTMCNC: 27.3 G/DL (ref 32–34.5)
MCHC RBC AUTO-ENTMCNC: 28.1 G/DL (ref 32–34.5)
MCHC RBC AUTO-ENTMCNC: 28.5 G/DL (ref 32–34.5)
MCHC RBC AUTO-ENTMCNC: 28.7 G/DL (ref 32–34.5)
MCHC RBC AUTO-ENTMCNC: 28.8 G/DL (ref 32–34.5)
MCHC RBC AUTO-ENTMCNC: 29.3 G/DL (ref 32–34.5)
MCHC RBC AUTO-ENTMCNC: 29.6 G/DL (ref 32–34.5)
MCHC RBC AUTO-ENTMCNC: 29.8 G/DL (ref 32–34.5)
MCHC RBC AUTO-ENTMCNC: 30.1 G/DL (ref 32–34.5)
MCHC RBC AUTO-ENTMCNC: 30.3 G/DL (ref 32–34.5)
MCHC RBC AUTO-ENTMCNC: 30.4 G/DL (ref 32–34.5)
MCHC RBC AUTO-ENTMCNC: 30.4 G/DL (ref 32–34.5)
MCHC RBC AUTO-ENTMCNC: NORMAL G/DL (ref 28.4–34.8)
MCV RBC AUTO: 83.6 FL (ref 80–99.9)
MCV RBC AUTO: 83.9 FL (ref 80–99.9)
MCV RBC AUTO: 84.5 FL (ref 80–99.9)
MCV RBC AUTO: 85 FL (ref 80–99.9)
MCV RBC AUTO: 85.2 FL (ref 80–99.9)
MCV RBC AUTO: 85.5 FL (ref 80–99.9)
MCV RBC AUTO: 86.3 FL (ref 80–99.9)
MCV RBC AUTO: 86.7 FL (ref 80–99.9)
MCV RBC AUTO: 87.3 FL (ref 80–99.9)
MCV RBC AUTO: 87.6 FL (ref 80–99.9)
MCV RBC AUTO: 88 FL (ref 80–99.9)
MCV RBC AUTO: 88.1 FL (ref 80–99.9)
MCV RBC AUTO: 89.1 FL (ref 80–99.9)
MCV RBC AUTO: 90.7 FL (ref 80–99.9)
MCV RBC AUTO: 94.3 FL (ref 80–99.9)
MCV RBC AUTO: NORMAL FL (ref 82.6–102.9)
METHADONE UR QL: NEGATIVE
MONOCYTES NFR BLD: 0.61 K/UL (ref 0.1–0.95)
MONOCYTES NFR BLD: 0.76 K/UL (ref 0.1–0.95)
MONOCYTES NFR BLD: 0.82 K/UL (ref 0.1–0.95)
MONOCYTES NFR BLD: 0.9 K/UL (ref 0.1–0.95)
MONOCYTES NFR BLD: 0.99 K/UL (ref 0.1–0.95)
MONOCYTES NFR BLD: 1.14 K/UL (ref 0.1–0.95)
MONOCYTES NFR BLD: 4 % (ref 2–12)
MONOCYTES NFR BLD: 6 % (ref 2–12)
MONOCYTES NFR BLD: 8 % (ref 2–12)
MONOCYTES NFR BLD: 9 % (ref 2–12)
NEUTROPHILS NFR BLD: 68 % (ref 43–80)
NEUTROPHILS NFR BLD: 72 % (ref 43–80)
NEUTROPHILS NFR BLD: 76 % (ref 43–80)
NEUTROPHILS NFR BLD: 80 % (ref 43–80)
NEUTROPHILS NFR BLD: 81 % (ref 43–80)
NEUTROPHILS NFR BLD: 82 % (ref 43–80)
NEUTS SEG NFR BLD: 10.71 K/UL (ref 1.8–7.3)
NEUTS SEG NFR BLD: 12.55 K/UL (ref 1.8–7.3)
NEUTS SEG NFR BLD: 8.08 K/UL (ref 1.8–7.3)
NEUTS SEG NFR BLD: 8.16 K/UL (ref 1.8–7.3)
NEUTS SEG NFR BLD: 8.74 K/UL (ref 1.8–7.3)
NEUTS SEG NFR BLD: 9.72 K/UL (ref 1.8–7.3)
NITRITE UR QL STRIP: NEGATIVE
NRBC BLD-RTO: NORMAL PER 100 WBC
OPIATES UR QL SCN: NEGATIVE
OXYCODONE UR QL SCN: NEGATIVE
PCP UR QL SCN: NEGATIVE
PH UR STRIP: 5 [PH] (ref 5–9)
PLATELET # BLD AUTO: 127 K/UL (ref 130–450)
PLATELET # BLD AUTO: 130 K/UL (ref 130–450)
PLATELET # BLD AUTO: 131 K/UL (ref 130–450)
PLATELET # BLD AUTO: 132 K/UL (ref 130–450)
PLATELET # BLD AUTO: 136 K/UL (ref 130–450)
PLATELET # BLD AUTO: 136 K/UL (ref 130–450)
PLATELET # BLD AUTO: 142 K/UL (ref 130–450)
PLATELET # BLD AUTO: 143 K/UL (ref 130–450)
PLATELET # BLD AUTO: 146 K/UL (ref 130–450)
PLATELET # BLD AUTO: 148 K/UL (ref 130–450)
PLATELET # BLD AUTO: 149 K/UL (ref 130–450)
PLATELET # BLD AUTO: 171 K/UL (ref 130–450)
PLATELET # BLD AUTO: 192 K/UL (ref 130–450)
PLATELET # BLD AUTO: NORMAL K/UL (ref 138–453)
PLATELET, FLUORESCENCE: NORMAL K/UL (ref 138–453)
PLATELETS.RETICULATED NFR BLD AUTO: NORMAL % (ref 1.1–10.3)
PMV BLD AUTO: 10.5 FL (ref 7–12)
PMV BLD AUTO: 10.8 FL (ref 7–12)
PMV BLD AUTO: 10.9 FL (ref 7–12)
PMV BLD AUTO: 11 FL (ref 7–12)
PMV BLD AUTO: 11.1 FL (ref 7–12)
PMV BLD AUTO: 11.2 FL (ref 7–12)
PMV BLD AUTO: 11.2 FL (ref 7–12)
PMV BLD AUTO: 11.3 FL (ref 7–12)
PMV BLD AUTO: 11.3 FL (ref 7–12)
PMV BLD AUTO: 11.4 FL (ref 7–12)
PMV BLD AUTO: 11.7 FL (ref 7–12)
PMV BLD AUTO: NORMAL FL (ref 8.1–13.5)
POTASSIUM SERPL-SCNC: 4.1 MMOL/L (ref 3.5–5)
POTASSIUM SERPL-SCNC: 4.2 MMOL/L (ref 3.5–5)
POTASSIUM SERPL-SCNC: 4.6 MMOL/L (ref 3.5–5)
POTASSIUM SERPL-SCNC: 4.8 MMOL/L (ref 3.5–5)
PROT SERPL-MCNC: 6.6 G/DL (ref 6.4–8.3)
PROT UR STRIP-MCNC: NEGATIVE MG/DL
RBC # BLD AUTO: 3.14 M/UL (ref 3.5–5.5)
RBC # BLD AUTO: 3.16 M/UL (ref 3.5–5.5)
RBC # BLD AUTO: 3.2 M/UL (ref 3.5–5.5)
RBC # BLD AUTO: 3.31 M/UL (ref 3.5–5.5)
RBC # BLD AUTO: 3.31 M/UL (ref 3.5–5.5)
RBC # BLD AUTO: 3.34 M/UL (ref 3.5–5.5)
RBC # BLD AUTO: 3.35 M/UL (ref 3.5–5.5)
RBC # BLD AUTO: 3.37 M/UL (ref 3.5–5.5)
RBC # BLD AUTO: 3.44 M/UL (ref 3.5–5.5)
RBC # BLD AUTO: 3.49 M/UL (ref 3.5–5.5)
RBC # BLD AUTO: 3.59 M/UL (ref 3.5–5.5)
RBC # BLD AUTO: 3.65 M/UL (ref 3.5–5.5)
RBC # BLD AUTO: 3.85 M/UL (ref 3.5–5.5)
RBC # BLD AUTO: NORMAL M/UL (ref 3.95–5.11)
RBC # BLD: ABNORMAL 10*6/UL
RBC #/AREA URNS HPF: ABNORMAL /HPF
SODIUM SERPL-SCNC: 138 MMOL/L (ref 132–146)
SODIUM SERPL-SCNC: 141 MMOL/L (ref 132–146)
SODIUM SERPL-SCNC: 146 MMOL/L (ref 132–146)
SODIUM SERPL-SCNC: 146 MMOL/L (ref 132–146)
SP GR UR STRIP: 1.01 (ref 1–1.03)
TEST INFORMATION: NORMAL
TROPONIN I SERPL HS-MCNC: 24 NG/L (ref 0–9)
TROPONIN I SERPL HS-MCNC: 25 NG/L (ref 0–9)
UROBILINOGEN UR STRIP-ACNC: 0.2 EU/DL (ref 0–1)
WBC #/AREA URNS HPF: ABNORMAL /HPF
WBC OTHER # BLD: 10.7 K/UL (ref 4.5–11.5)
WBC OTHER # BLD: 12 K/UL (ref 4.5–11.5)
WBC OTHER # BLD: 12.1 K/UL (ref 4.5–11.5)
WBC OTHER # BLD: 12.1 K/UL (ref 4.5–11.5)
WBC OTHER # BLD: 12.2 K/UL (ref 4.5–11.5)
WBC OTHER # BLD: 12.5 K/UL (ref 4.5–11.5)
WBC OTHER # BLD: 13.3 K/UL (ref 4.5–11.5)
WBC OTHER # BLD: 13.3 K/UL (ref 4.5–11.5)
WBC OTHER # BLD: 13.5 K/UL (ref 4.5–11.5)
WBC OTHER # BLD: 13.6 K/UL (ref 4.5–11.5)
WBC OTHER # BLD: 13.8 K/UL (ref 4.5–11.5)
WBC OTHER # BLD: 14.7 K/UL (ref 4.5–11.5)
WBC OTHER # BLD: 14.8 K/UL (ref 4.5–11.5)
WBC OTHER # BLD: 15.3 K/UL (ref 4.5–11.5)
WBC OTHER # BLD: 15.6 K/UL (ref 4.5–11.5)
WBC OTHER # BLD: NORMAL K/UL (ref 3.5–11.3)

## 2025-01-01 PROCEDURE — 6360000002 HC RX W HCPCS

## 2025-01-01 PROCEDURE — 2580000003 HC RX 258

## 2025-01-01 PROCEDURE — 6370000000 HC RX 637 (ALT 250 FOR IP)

## 2025-01-01 PROCEDURE — 99233 SBSQ HOSP IP/OBS HIGH 50: CPT | Performed by: STUDENT IN AN ORGANIZED HEALTH CARE EDUCATION/TRAINING PROGRAM

## 2025-01-01 PROCEDURE — 97530 THERAPEUTIC ACTIVITIES: CPT

## 2025-01-01 PROCEDURE — 1200000000 HC SEMI PRIVATE

## 2025-01-01 PROCEDURE — G0480 DRUG TEST DEF 1-7 CLASSES: HCPCS

## 2025-01-01 PROCEDURE — 6360000004 HC RX CONTRAST MEDICATION: Performed by: RADIOLOGY

## 2025-01-01 PROCEDURE — 72131 CT LUMBAR SPINE W/O DYE: CPT

## 2025-01-01 PROCEDURE — 36415 COLL VENOUS BLD VENIPUNCTURE: CPT

## 2025-01-01 PROCEDURE — 85025 COMPLETE CBC W/AUTO DIFF WBC: CPT

## 2025-01-01 PROCEDURE — 85027 COMPLETE CBC AUTOMATED: CPT

## 2025-01-01 PROCEDURE — 73502 X-RAY EXAM HIP UNI 2-3 VIEWS: CPT

## 2025-01-01 PROCEDURE — 96374 THER/PROPH/DIAG INJ IV PUSH: CPT

## 2025-01-01 PROCEDURE — 80048 BASIC METABOLIC PNL TOTAL CA: CPT

## 2025-01-01 PROCEDURE — 99222 1ST HOSP IP/OBS MODERATE 55: CPT | Performed by: NEUROLOGICAL SURGERY

## 2025-01-01 PROCEDURE — 6360000002 HC RX W HCPCS: Performed by: STUDENT IN AN ORGANIZED HEALTH CARE EDUCATION/TRAINING PROGRAM

## 2025-01-01 PROCEDURE — 97161 PT EVAL LOW COMPLEX 20 MIN: CPT

## 2025-01-01 PROCEDURE — 72125 CT NECK SPINE W/O DYE: CPT

## 2025-01-01 PROCEDURE — 81001 URINALYSIS AUTO W/SCOPE: CPT

## 2025-01-01 PROCEDURE — 2500000003 HC RX 250 WO HCPCS

## 2025-01-01 PROCEDURE — 6370000000 HC RX 637 (ALT 250 FOR IP): Performed by: STUDENT IN AN ORGANIZED HEALTH CARE EDUCATION/TRAINING PROGRAM

## 2025-01-01 PROCEDURE — 82962 GLUCOSE BLOOD TEST: CPT

## 2025-01-01 PROCEDURE — 72128 CT CHEST SPINE W/O DYE: CPT

## 2025-01-01 PROCEDURE — 71260 CT THORAX DX C+: CPT

## 2025-01-01 PROCEDURE — 92526 ORAL FUNCTION THERAPY: CPT

## 2025-01-01 PROCEDURE — 99232 SBSQ HOSP IP/OBS MODERATE 35: CPT | Performed by: STUDENT IN AN ORGANIZED HEALTH CARE EDUCATION/TRAINING PROGRAM

## 2025-01-01 PROCEDURE — 6360000002 HC RX W HCPCS: Performed by: EMERGENCY MEDICINE

## 2025-01-01 PROCEDURE — 2060000000 HC ICU INTERMEDIATE R&B

## 2025-01-01 PROCEDURE — 99231 SBSQ HOSP IP/OBS SF/LOW 25: CPT | Performed by: STUDENT IN AN ORGANIZED HEALTH CARE EDUCATION/TRAINING PROGRAM

## 2025-01-01 PROCEDURE — 99222 1ST HOSP IP/OBS MODERATE 55: CPT | Performed by: STUDENT IN AN ORGANIZED HEALTH CARE EDUCATION/TRAINING PROGRAM

## 2025-01-01 PROCEDURE — 51702 INSERT TEMP BLADDER CATH: CPT

## 2025-01-01 PROCEDURE — 99223 1ST HOSP IP/OBS HIGH 75: CPT | Performed by: SURGERY

## 2025-01-01 PROCEDURE — 80053 COMPREHEN METABOLIC PANEL: CPT

## 2025-01-01 PROCEDURE — 84484 ASSAY OF TROPONIN QUANT: CPT

## 2025-01-01 PROCEDURE — 92610 EVALUATE SWALLOWING FUNCTION: CPT

## 2025-01-01 PROCEDURE — 97167 OT EVAL HIGH COMPLEX 60 MIN: CPT

## 2025-01-01 PROCEDURE — 80307 DRUG TEST PRSMV CHEM ANLYZR: CPT

## 2025-01-01 PROCEDURE — 74177 CT ABD & PELVIS W/CONTRAST: CPT

## 2025-01-01 PROCEDURE — 97535 SELF CARE MNGMENT TRAINING: CPT

## 2025-01-01 PROCEDURE — 72193 CT PELVIS W/DYE: CPT

## 2025-01-01 PROCEDURE — 73030 X-RAY EXAM OF SHOULDER: CPT

## 2025-01-01 PROCEDURE — 2700000000 HC OXYGEN THERAPY PER DAY

## 2025-01-01 PROCEDURE — 70450 CT HEAD/BRAIN W/O DYE: CPT

## 2025-01-01 PROCEDURE — 2500000003 HC RX 250 WO HCPCS: Performed by: SURGERY

## 2025-01-01 PROCEDURE — 99285 EMERGENCY DEPT VISIT HI MDM: CPT

## 2025-01-01 PROCEDURE — 71250 CT THORAX DX C-: CPT

## 2025-01-01 RX ORDER — POLYETHYLENE GLYCOL 3350 17 G/17G
17 POWDER, FOR SOLUTION ORAL DAILY PRN
Status: DISCONTINUED | OUTPATIENT
Start: 2025-01-01 | End: 2025-01-01 | Stop reason: HOSPADM

## 2025-01-01 RX ORDER — ACETAMINOPHEN 325 MG/1
650 TABLET ORAL EVERY 6 HOURS
Status: DISCONTINUED | OUTPATIENT
Start: 2025-01-01 | End: 2025-01-01 | Stop reason: HOSPADM

## 2025-01-01 RX ORDER — OXYCODONE HYDROCHLORIDE 5 MG/1
5 TABLET ORAL EVERY 4 HOURS PRN
Status: DISCONTINUED | OUTPATIENT
Start: 2025-01-01 | End: 2025-01-01

## 2025-01-01 RX ORDER — ONDANSETRON 4 MG/1
4 TABLET, ORALLY DISINTEGRATING ORAL EVERY 8 HOURS PRN
Status: DISCONTINUED | OUTPATIENT
Start: 2025-01-01 | End: 2025-01-01 | Stop reason: HOSPADM

## 2025-01-01 RX ORDER — SODIUM CHLORIDE 9 MG/ML
INJECTION, SOLUTION INTRAVENOUS CONTINUOUS
Status: DISCONTINUED | OUTPATIENT
Start: 2025-01-01 | End: 2025-01-01 | Stop reason: HOSPADM

## 2025-01-01 RX ORDER — INSULIN LISPRO 100 [IU]/ML
0-8 INJECTION, SOLUTION INTRAVENOUS; SUBCUTANEOUS
Status: DISCONTINUED | OUTPATIENT
Start: 2025-01-01 | End: 2025-01-01

## 2025-01-01 RX ORDER — PRAMOXINE HYDROCHLORIDE 10 MG/ML
LOTION TOPICAL DAILY
COMMUNITY

## 2025-01-01 RX ORDER — HEPARIN SODIUM 5000 [USP'U]/ML
5000 INJECTION, SOLUTION INTRAVENOUS; SUBCUTANEOUS EVERY 8 HOURS SCHEDULED
Status: DISCONTINUED | OUTPATIENT
Start: 2025-01-01 | End: 2025-01-01 | Stop reason: HOSPADM

## 2025-01-01 RX ORDER — LEVETIRACETAM 500 MG/1
500 TABLET ORAL 2 TIMES DAILY
Status: DISCONTINUED | OUTPATIENT
Start: 2025-01-01 | End: 2025-01-01

## 2025-01-01 RX ORDER — LEVETIRACETAM 500 MG/5ML
500 INJECTION, SOLUTION, CONCENTRATE INTRAVENOUS EVERY 12 HOURS
Status: DISCONTINUED | OUTPATIENT
Start: 2025-01-01 | End: 2025-01-01

## 2025-01-01 RX ORDER — ACETAMINOPHEN 325 MG/1
650 TABLET ORAL EVERY 6 HOURS
Status: DISCONTINUED | OUTPATIENT
Start: 2025-01-01 | End: 2025-01-01

## 2025-01-01 RX ORDER — ONDANSETRON 2 MG/ML
4 INJECTION INTRAMUSCULAR; INTRAVENOUS EVERY 6 HOURS PRN
Status: DISCONTINUED | OUTPATIENT
Start: 2025-01-01 | End: 2025-01-01 | Stop reason: HOSPADM

## 2025-01-01 RX ORDER — METHOCARBAMOL 750 MG/1
750 TABLET, FILM COATED ORAL 4 TIMES DAILY
Status: DISCONTINUED | OUTPATIENT
Start: 2025-01-01 | End: 2025-01-01

## 2025-01-01 RX ORDER — SENNA AND DOCUSATE SODIUM 50; 8.6 MG/1; MG/1
1 TABLET, FILM COATED ORAL 2 TIMES DAILY PRN
Status: DISCONTINUED | OUTPATIENT
Start: 2025-01-01 | End: 2025-01-01 | Stop reason: HOSPADM

## 2025-01-01 RX ORDER — HYDRALAZINE HYDROCHLORIDE 20 MG/ML
10 INJECTION INTRAMUSCULAR; INTRAVENOUS EVERY 30 MIN PRN
Status: DISCONTINUED | OUTPATIENT
Start: 2025-01-01 | End: 2025-01-01

## 2025-01-01 RX ORDER — LIDOCAINE HYDROCHLORIDE 20 MG/ML
JELLY TOPICAL ONCE
Status: DISCONTINUED | OUTPATIENT
Start: 2025-01-01 | End: 2025-01-01

## 2025-01-01 RX ORDER — SODIUM CHLORIDE 9 MG/ML
INJECTION, SOLUTION INTRAVENOUS CONTINUOUS
Status: DISCONTINUED | OUTPATIENT
Start: 2025-01-01 | End: 2025-01-01

## 2025-01-01 RX ORDER — ACETAMINOPHEN 500 MG
1000 TABLET ORAL EVERY 8 HOURS SCHEDULED
Status: DISCONTINUED | OUTPATIENT
Start: 2025-01-01 | End: 2025-01-01

## 2025-01-01 RX ORDER — OXYCODONE HYDROCHLORIDE 10 MG/1
10 TABLET ORAL EVERY 4 HOURS PRN
Status: DISCONTINUED | OUTPATIENT
Start: 2025-01-01 | End: 2025-01-01

## 2025-01-01 RX ORDER — LEVETIRACETAM 500 MG/5ML
500 INJECTION, SOLUTION, CONCENTRATE INTRAVENOUS EVERY 12 HOURS
Qty: 50 ML | Refills: 0 | Status: SHIPPED | OUTPATIENT
Start: 2025-01-01 | End: 2025-01-14

## 2025-01-01 RX ORDER — DEXTROSE MONOHYDRATE 100 MG/ML
INJECTION, SOLUTION INTRAVENOUS CONTINUOUS PRN
Status: DISCONTINUED | OUTPATIENT
Start: 2025-01-01 | End: 2025-01-01

## 2025-01-01 RX ORDER — METOPROLOL TARTRATE 25 MG/1
12.5 TABLET, FILM COATED ORAL 2 TIMES DAILY
Status: DISCONTINUED | OUTPATIENT
Start: 2025-01-01 | End: 2025-01-01

## 2025-01-01 RX ORDER — PREGABALIN 75 MG/1
75 CAPSULE ORAL 3 TIMES DAILY
Status: DISCONTINUED | OUTPATIENT
Start: 2025-01-01 | End: 2025-01-01

## 2025-01-01 RX ORDER — ACETAMINOPHEN 650 MG/1
650 SUPPOSITORY RECTAL EVERY 6 HOURS SCHEDULED
Status: DISCONTINUED | OUTPATIENT
Start: 2025-01-01 | End: 2025-01-01

## 2025-01-01 RX ORDER — METOPROLOL TARTRATE 1 MG/ML
2.5 INJECTION, SOLUTION INTRAVENOUS EVERY 6 HOURS
Status: DISCONTINUED | OUTPATIENT
Start: 2025-01-01 | End: 2025-01-01

## 2025-01-01 RX ORDER — LEVETIRACETAM 500 MG/1
500 TABLET ORAL EVERY 12 HOURS
Status: DISCONTINUED | OUTPATIENT
Start: 2025-01-01 | End: 2025-01-01

## 2025-01-01 RX ORDER — LEVETIRACETAM 500 MG/5ML
1000 INJECTION, SOLUTION, CONCENTRATE INTRAVENOUS ONCE
Status: COMPLETED | OUTPATIENT
Start: 2025-01-01 | End: 2025-01-01

## 2025-01-01 RX ORDER — OXYMETAZOLINE HYDROCHLORIDE 0.05 G/100ML
2 SPRAY NASAL ONCE
Status: DISCONTINUED | OUTPATIENT
Start: 2025-01-01 | End: 2025-01-01

## 2025-01-01 RX ORDER — TRAMADOL HYDROCHLORIDE 50 MG/1
50 TABLET ORAL EVERY 6 HOURS
COMMUNITY

## 2025-01-01 RX ORDER — LACTULOSE 10 G/15ML
20 SOLUTION ORAL 3 TIMES DAILY
Status: DISCONTINUED | OUTPATIENT
Start: 2025-01-01 | End: 2025-01-01 | Stop reason: HOSPADM

## 2025-01-01 RX ORDER — INSULIN LISPRO 100 [IU]/ML
0-4 INJECTION, SOLUTION INTRAVENOUS; SUBCUTANEOUS EVERY 6 HOURS
Status: DISCONTINUED | OUTPATIENT
Start: 2025-01-01 | End: 2025-01-01

## 2025-01-01 RX ORDER — LABETALOL HYDROCHLORIDE 5 MG/ML
10 INJECTION, SOLUTION INTRAVENOUS EVERY 30 MIN PRN
Status: DISCONTINUED | OUTPATIENT
Start: 2025-01-01 | End: 2025-01-01

## 2025-01-01 RX ORDER — RIVASTIGMINE 9.5 MG/24H
1 PATCH, EXTENDED RELEASE TRANSDERMAL DAILY
Status: DISCONTINUED | OUTPATIENT
Start: 2025-01-01 | End: 2025-01-01

## 2025-01-01 RX ORDER — OXYCODONE HYDROCHLORIDE 5 MG/1
5 TABLET ORAL EVERY 4 HOURS PRN
Status: DISCONTINUED | OUTPATIENT
Start: 2025-01-01 | End: 2025-01-01 | Stop reason: HOSPADM

## 2025-01-01 RX ORDER — LEVETIRACETAM 500 MG/5ML
500 INJECTION, SOLUTION, CONCENTRATE INTRAVENOUS EVERY 12 HOURS
Status: DISCONTINUED | OUTPATIENT
Start: 2025-01-01 | End: 2025-01-01 | Stop reason: HOSPADM

## 2025-01-01 RX ORDER — PAROXETINE 20 MG/1
40 TABLET, FILM COATED ORAL DAILY
Status: DISCONTINUED | OUTPATIENT
Start: 2025-01-01 | End: 2025-01-01

## 2025-01-01 RX ORDER — SODIUM CHLORIDE 0.9 % (FLUSH) 0.9 %
10 SYRINGE (ML) INJECTION EVERY 12 HOURS SCHEDULED
Status: DISCONTINUED | OUTPATIENT
Start: 2025-01-01 | End: 2025-01-01

## 2025-01-01 RX ORDER — IOPAMIDOL 755 MG/ML
75 INJECTION, SOLUTION INTRAVASCULAR
Status: COMPLETED | OUTPATIENT
Start: 2025-01-01 | End: 2025-01-01

## 2025-01-01 RX ORDER — LOSARTAN POTASSIUM 25 MG/1
25 TABLET ORAL DAILY
Status: DISCONTINUED | OUTPATIENT
Start: 2025-01-01 | End: 2025-01-01

## 2025-01-01 RX ORDER — SODIUM CHLORIDE 9 MG/ML
INJECTION, SOLUTION INTRAVENOUS PRN
Status: DISCONTINUED | OUTPATIENT
Start: 2025-01-01 | End: 2025-01-01

## 2025-01-01 RX ORDER — GLUCAGON 1 MG/ML
1 KIT INJECTION PRN
Status: DISCONTINUED | OUTPATIENT
Start: 2025-01-01 | End: 2025-01-01

## 2025-01-01 RX ORDER — HYDROMORPHONE HYDROCHLORIDE 1 MG/ML
1 INJECTION, SOLUTION INTRAMUSCULAR; INTRAVENOUS; SUBCUTANEOUS
Status: DISCONTINUED | OUTPATIENT
Start: 2025-01-01 | End: 2025-01-01 | Stop reason: HOSPADM

## 2025-01-01 RX ORDER — SODIUM CHLORIDE 0.9 % (FLUSH) 0.9 %
10 SYRINGE (ML) INJECTION PRN
Status: DISCONTINUED | OUTPATIENT
Start: 2025-01-01 | End: 2025-01-01

## 2025-01-01 RX ORDER — ROSUVASTATIN CALCIUM 20 MG/1
20 TABLET, COATED ORAL DAILY
Status: DISCONTINUED | OUTPATIENT
Start: 2025-01-01 | End: 2025-01-01

## 2025-01-01 RX ORDER — DULAGLUTIDE 1.5 MG/.5ML
1.5 INJECTION, SOLUTION SUBCUTANEOUS WEEKLY
COMMUNITY

## 2025-01-01 RX ORDER — MENTHOL 100 MG/G
CREAM TOPICAL 3 TIMES DAILY PRN
COMMUNITY

## 2025-01-01 RX ORDER — DEXTROSE MONOHYDRATE 25 G/50ML
50 INJECTION, SOLUTION INTRAVENOUS PRN
Status: DISCONTINUED | OUTPATIENT
Start: 2025-01-01 | End: 2025-01-01

## 2025-01-01 RX ORDER — OXYCODONE HYDROCHLORIDE 5 MG/1
2.5 TABLET ORAL EVERY 4 HOURS PRN
Status: DISCONTINUED | OUTPATIENT
Start: 2025-01-01 | End: 2025-01-01 | Stop reason: HOSPADM

## 2025-01-01 RX ORDER — METOPROLOL TARTRATE 25 MG/1
12.5 TABLET, FILM COATED ORAL EVERY 6 HOURS
Status: DISCONTINUED | OUTPATIENT
Start: 2025-01-01 | End: 2025-01-01

## 2025-01-01 RX ORDER — DEXTROSE MONOHYDRATE 25 G/50ML
25 INJECTION, SOLUTION INTRAVENOUS PRN
Status: DISCONTINUED | OUTPATIENT
Start: 2025-01-01 | End: 2025-01-01

## 2025-01-01 RX ORDER — PANTOPRAZOLE SODIUM 40 MG/1
40 TABLET, DELAYED RELEASE ORAL
Status: DISCONTINUED | OUTPATIENT
Start: 2025-01-01 | End: 2025-01-01

## 2025-01-01 RX ORDER — LEVETIRACETAM 500 MG/5ML
500 INJECTION, SOLUTION, CONCENTRATE INTRAVENOUS EVERY 12 HOURS
Qty: 50 ML | Refills: 0 | Status: SHIPPED | OUTPATIENT
Start: 2025-01-01 | End: 2025-01-01 | Stop reason: HOSPADM

## 2025-01-01 RX ADMIN — DIATRIZOATE MEGLUMINE 150 ML: 300 INJECTION, SOLUTION INTRAVENOUS at 12:36

## 2025-01-01 RX ADMIN — HEPARIN SODIUM 5000 UNITS: 5000 INJECTION INTRAVENOUS; SUBCUTANEOUS at 14:07

## 2025-01-01 RX ADMIN — ACETAMINOPHEN 650 MG: 650 SUPPOSITORY RECTAL at 16:55

## 2025-01-01 RX ADMIN — OXYCODONE 5 MG: 5 TABLET ORAL at 23:45

## 2025-01-01 RX ADMIN — LEVETIRACETAM 500 MG: 100 INJECTION INTRAVENOUS at 05:46

## 2025-01-01 RX ADMIN — HYDROMORPHONE HYDROCHLORIDE 0.5 MG: 1 INJECTION, SOLUTION INTRAMUSCULAR; INTRAVENOUS; SUBCUTANEOUS at 09:24

## 2025-01-01 RX ADMIN — HYDROMORPHONE HYDROCHLORIDE 0.5 MG: 1 INJECTION, SOLUTION INTRAMUSCULAR; INTRAVENOUS; SUBCUTANEOUS at 09:53

## 2025-01-01 RX ADMIN — METOPROLOL TARTRATE 2.5 MG: 1 INJECTION, SOLUTION INTRAVENOUS at 23:26

## 2025-01-01 RX ADMIN — HYDROMORPHONE HYDROCHLORIDE 1 MG: 1 INJECTION, SOLUTION INTRAMUSCULAR; INTRAVENOUS; SUBCUTANEOUS at 14:06

## 2025-01-01 RX ADMIN — HYDROMORPHONE HYDROCHLORIDE 0.5 MG: 1 INJECTION, SOLUTION INTRAMUSCULAR; INTRAVENOUS; SUBCUTANEOUS at 18:29

## 2025-01-01 RX ADMIN — HEPARIN SODIUM 5000 UNITS: 5000 INJECTION INTRAVENOUS; SUBCUTANEOUS at 14:03

## 2025-01-01 RX ADMIN — HYDROMORPHONE HYDROCHLORIDE 0.5 MG: 1 INJECTION, SOLUTION INTRAMUSCULAR; INTRAVENOUS; SUBCUTANEOUS at 16:58

## 2025-01-01 RX ADMIN — LEVETIRACETAM 500 MG: 100 INJECTION INTRAVENOUS at 05:56

## 2025-01-01 RX ADMIN — LEVETIRACETAM 1000 MG: 100 INJECTION INTRAVENOUS at 21:08

## 2025-01-01 RX ADMIN — LEVETIRACETAM 500 MG: 100 INJECTION INTRAVENOUS at 17:15

## 2025-01-01 RX ADMIN — LEVETIRACETAM 500 MG: 100 INJECTION INTRAVENOUS at 16:55

## 2025-01-01 RX ADMIN — METOPROLOL TARTRATE 2.5 MG: 1 INJECTION, SOLUTION INTRAVENOUS at 17:15

## 2025-01-01 RX ADMIN — METOPROLOL TARTRATE 2.5 MG: 1 INJECTION, SOLUTION INTRAVENOUS at 23:59

## 2025-01-01 RX ADMIN — ACETAMINOPHEN 650 MG: 650 SUPPOSITORY RECTAL at 23:26

## 2025-01-01 RX ADMIN — METOPROLOL TARTRATE 2.5 MG: 1 INJECTION, SOLUTION INTRAVENOUS at 18:12

## 2025-01-01 RX ADMIN — HEPARIN SODIUM 5000 UNITS: 5000 INJECTION INTRAVENOUS; SUBCUTANEOUS at 22:04

## 2025-01-01 RX ADMIN — LEVETIRACETAM 500 MG: 100 INJECTION INTRAVENOUS at 05:44

## 2025-01-01 RX ADMIN — HYDROMORPHONE HYDROCHLORIDE 0.5 MG: 1 INJECTION, SOLUTION INTRAMUSCULAR; INTRAVENOUS; SUBCUTANEOUS at 23:59

## 2025-01-01 RX ADMIN — LEVETIRACETAM 500 MG: 100 INJECTION INTRAVENOUS at 18:12

## 2025-01-01 RX ADMIN — HEPARIN SODIUM 5000 UNITS: 5000 INJECTION INTRAVENOUS; SUBCUTANEOUS at 14:15

## 2025-01-01 RX ADMIN — HYDROMORPHONE HYDROCHLORIDE 0.5 MG: 1 INJECTION, SOLUTION INTRAMUSCULAR; INTRAVENOUS; SUBCUTANEOUS at 10:23

## 2025-01-01 RX ADMIN — METOPROLOL TARTRATE 2.5 MG: 1 INJECTION, SOLUTION INTRAVENOUS at 11:53

## 2025-01-01 RX ADMIN — METOPROLOL TARTRATE 2.5 MG: 1 INJECTION, SOLUTION INTRAVENOUS at 01:32

## 2025-01-01 RX ADMIN — ACETAMINOPHEN 650 MG: 650 SUPPOSITORY RECTAL at 18:22

## 2025-01-01 RX ADMIN — LEVETIRACETAM 500 MG: 100 INJECTION INTRAVENOUS at 17:19

## 2025-01-01 RX ADMIN — HYDROMORPHONE HYDROCHLORIDE 0.5 MG: 1 INJECTION, SOLUTION INTRAMUSCULAR; INTRAVENOUS; SUBCUTANEOUS at 17:55

## 2025-01-01 RX ADMIN — METOPROLOL TARTRATE 2.5 MG: 1 INJECTION, SOLUTION INTRAVENOUS at 05:43

## 2025-01-01 RX ADMIN — LEVETIRACETAM 500 MG: 100 INJECTION INTRAVENOUS at 07:05

## 2025-01-01 RX ADMIN — HEPARIN SODIUM 5000 UNITS: 5000 INJECTION INTRAVENOUS; SUBCUTANEOUS at 22:13

## 2025-01-01 RX ADMIN — METOPROLOL TARTRATE 2.5 MG: 1 INJECTION, SOLUTION INTRAVENOUS at 11:46

## 2025-01-01 RX ADMIN — IOPAMIDOL 75 ML: 755 INJECTION, SOLUTION INTRAVENOUS at 21:34

## 2025-01-01 RX ADMIN — ACETAMINOPHEN 1000 MG: 500 TABLET, FILM COATED ORAL at 22:46

## 2025-01-01 RX ADMIN — METOPROLOL TARTRATE 2.5 MG: 1 INJECTION, SOLUTION INTRAVENOUS at 11:29

## 2025-01-01 RX ADMIN — METOPROLOL TARTRATE 2.5 MG: 1 INJECTION, SOLUTION INTRAVENOUS at 17:19

## 2025-01-01 RX ADMIN — METOPROLOL TARTRATE 2.5 MG: 1 INJECTION, SOLUTION INTRAVENOUS at 00:22

## 2025-01-01 RX ADMIN — HEPARIN SODIUM 5000 UNITS: 5000 INJECTION INTRAVENOUS; SUBCUTANEOUS at 22:09

## 2025-01-01 RX ADMIN — HEPARIN SODIUM 5000 UNITS: 5000 INJECTION INTRAVENOUS; SUBCUTANEOUS at 07:05

## 2025-01-01 RX ADMIN — SODIUM CHLORIDE: 9 INJECTION, SOLUTION INTRAVENOUS at 02:00

## 2025-01-01 RX ADMIN — METOPROLOL TARTRATE 2.5 MG: 1 INJECTION, SOLUTION INTRAVENOUS at 16:55

## 2025-01-01 RX ADMIN — HEPARIN SODIUM 5000 UNITS: 5000 INJECTION INTRAVENOUS; SUBCUTANEOUS at 05:56

## 2025-01-01 RX ADMIN — MICONAZOLE NITRATE: 2 POWDER TOPICAL at 23:49

## 2025-01-01 RX ADMIN — ACETAMINOPHEN 650 MG: 650 SUPPOSITORY RECTAL at 05:36

## 2025-01-01 RX ADMIN — ACETAMINOPHEN 650 MG: 650 SUPPOSITORY RECTAL at 11:23

## 2025-01-01 RX ADMIN — SODIUM CHLORIDE: 9 INJECTION, SOLUTION INTRAVENOUS at 11:47

## 2025-01-01 RX ADMIN — LEVETIRACETAM 500 MG: 100 INJECTION INTRAVENOUS at 19:15

## 2025-01-01 RX ADMIN — ACETAMINOPHEN 650 MG: 325 TABLET ORAL at 11:29

## 2025-01-01 RX ADMIN — HYDROMORPHONE HYDROCHLORIDE 0.5 MG: 1 INJECTION, SOLUTION INTRAMUSCULAR; INTRAVENOUS; SUBCUTANEOUS at 04:00

## 2025-01-01 RX ADMIN — SODIUM CHLORIDE: 9 INJECTION, SOLUTION INTRAVENOUS at 05:25

## 2025-01-01 RX ADMIN — HYDROMORPHONE HYDROCHLORIDE 0.5 MG: 1 INJECTION, SOLUTION INTRAMUSCULAR; INTRAVENOUS; SUBCUTANEOUS at 14:54

## 2025-01-01 RX ADMIN — HYDROMORPHONE HYDROCHLORIDE 0.5 MG: 1 INJECTION, SOLUTION INTRAMUSCULAR; INTRAVENOUS; SUBCUTANEOUS at 22:22

## 2025-01-01 RX ADMIN — METOPROLOL TARTRATE 2.5 MG: 1 INJECTION, SOLUTION INTRAVENOUS at 05:56

## 2025-01-01 RX ADMIN — METOPROLOL TARTRATE 2.5 MG: 1 INJECTION, SOLUTION INTRAVENOUS at 05:44

## 2025-01-01 RX ADMIN — HYDROMORPHONE HYDROCHLORIDE 0.5 MG: 1 INJECTION, SOLUTION INTRAMUSCULAR; INTRAVENOUS; SUBCUTANEOUS at 03:03

## 2025-01-01 ASSESSMENT — PAIN DESCRIPTION - LOCATION
LOCATION: HIP
LOCATION: GENERALIZED
LOCATION: BACK
LOCATION: BACK
LOCATION: ABDOMEN;BUTTOCKS
LOCATION: NECK;HIP
LOCATION: GENERALIZED
LOCATION: BACK

## 2025-01-01 ASSESSMENT — PAIN SCALES - PAIN ASSESSMENT IN ADVANCED DEMENTIA (PAINAD)
FACIALEXPRESSION: SMILING OR INEXPRESSIVE
BREATHING: NORMAL
FACIALEXPRESSION: SAD, FRIGHTENED, FROWN
BODYLANGUAGE: TENSE, DISTRESSED PACING, FIDGETING
CONSOLABILITY: UNABLE TO CONSOLE, DISTRACT OR REASSURE
TOTALSCORE: 5
BREATHING: NORMAL
BODYLANGUAGE: RELAXED
CONSOLABILITY: NO NEED TO CONSOLE
FACIALEXPRESSION: SMILING OR INEXPRESSIVE
TOTALSCORE: 1
TOTALSCORE: 0
CONSOLABILITY: DISTRACTED OR REASSURED BY VOICE/TOUCH
BREATHING: NORMAL
CONSOLABILITY: NO NEED TO CONSOLE
FACIALEXPRESSION: SMILING OR INEXPRESSIVE
BODYLANGUAGE: TENSE, DISTRESSED PACING, FIDGETING
BREATHING: NORMAL
CONSOLABILITY: NO NEED TO CONSOLE
NEGVOCALIZATION: OCCASIONAL MOAN/GROAN, LOW SPEECH, NEGATIVE/DISAPPROVING QUALITY
CONSOLABILITY: NO NEED TO CONSOLE
BODYLANGUAGE: RELAXED
BREATHING: NORMAL
BODYLANGUAGE: TENSE, DISTRESSED PACING, FIDGETING
TOTALSCORE: 0
CONSOLABILITY: DISTRACTED OR REASSURED BY VOICE/TOUCH
CONSOLABILITY: NO NEED TO CONSOLE
BODYLANGUAGE: RELAXED
FACIALEXPRESSION: SMILING OR INEXPRESSIVE
TOTALSCORE: 0
TOTALSCORE: 4
NEGVOCALIZATION: OCCASIONAL MOAN/GROAN, LOW SPEECH, NEGATIVE/DISAPPROVING QUALITY
TOTALSCORE: 2
CONSOLABILITY: NO NEED TO CONSOLE
TOTALSCORE: 10
BODYLANGUAGE: RELAXED
FACIALEXPRESSION: SAD, FRIGHTENED, FROWN
BREATHING: NOISY LABORED BREATHING, LONG PERIODS HYPERVENTILATION, CHEYNE-STOKES RESPIRATIONS
FACIALEXPRESSION: SMILING OR INEXPRESSIVE
NEGVOCALIZATION: REPEATED TROUBLED CALLING OUT, LOUD MOANING/GROANING, CRYING
TOTALSCORE: 2
FACIALEXPRESSION: SMILING OR INEXPRESSIVE
BREATHING: NORMAL
NEGVOCALIZATION: OCCASIONAL MOAN/GROAN, LOW SPEECH, NEGATIVE/DISAPPROVING QUALITY
BODYLANGUAGE: RIGID, FISTS CLENCHED, KNEES UP, PUSHING/PULLING AWAY, STRIKES OUT
FACIALEXPRESSION: SMILING OR INEXPRESSIVE
NEGVOCALIZATION: OCCASIONAL MOAN/GROAN, LOW SPEECH, NEGATIVE/DISAPPROVING QUALITY
CONSOLABILITY: NO NEED TO CONSOLE
BODYLANGUAGE: RELAXED
NEGVOCALIZATION: REPEATED TROUBLED CALLING OUT, LOUD MOANING/GROANING, CRYING
TOTALSCORE: 0
FACIALEXPRESSION: FACIAL GRIMACING
BREATHING: NORMAL
BODYLANGUAGE: TENSE, DISTRESSED PACING, FIDGETING

## 2025-01-01 ASSESSMENT — PAIN DESCRIPTION - DESCRIPTORS
DESCRIPTORS: DISCOMFORT
DESCRIPTORS: ACHING;CRAMPING;DISCOMFORT
DESCRIPTORS: PRESSURE
DESCRIPTORS: ACHING;DISCOMFORT;SORE
DESCRIPTORS: ACHING;THROBBING
DESCRIPTORS: ACHING;CRAMPING;DISCOMFORT

## 2025-01-01 ASSESSMENT — PAIN SCALES - GENERAL
PAINLEVEL_OUTOF10: 9
PAINLEVEL_OUTOF10: 10
PAINLEVEL_OUTOF10: 0
PAINLEVEL_OUTOF10: 0
PAINLEVEL_OUTOF10: 5
PAINLEVEL_OUTOF10: 10
PAINLEVEL_OUTOF10: 0
PAINLEVEL_OUTOF10: 9
PAINLEVEL_OUTOF10: 9
PAINLEVEL_OUTOF10: 10
PAINLEVEL_OUTOF10: 9

## 2025-01-01 ASSESSMENT — LIFESTYLE VARIABLES
HOW OFTEN DO YOU HAVE A DRINK CONTAINING ALCOHOL: NEVER
HOW MANY STANDARD DRINKS CONTAINING ALCOHOL DO YOU HAVE ON A TYPICAL DAY: PATIENT DOES NOT DRINK
HOW OFTEN DO YOU HAVE A DRINK CONTAINING ALCOHOL: NEVER
HOW MANY STANDARD DRINKS CONTAINING ALCOHOL DO YOU HAVE ON A TYPICAL DAY: PATIENT DOES NOT DRINK

## 2025-01-01 ASSESSMENT — PAIN DESCRIPTION - ORIENTATION
ORIENTATION: LOWER
ORIENTATION: LOWER

## 2025-01-01 ASSESSMENT — PAIN - FUNCTIONAL ASSESSMENT
PAIN_FUNCTIONAL_ASSESSMENT: PREVENTS OR INTERFERES WITH ALL ACTIVE AND SOME PASSIVE ACTIVITIES
PAIN_FUNCTIONAL_ASSESSMENT: PREVENTS OR INTERFERES WITH MANY ACTIVE NOT PASSIVE ACTIVITIES

## 2025-01-04 PROBLEM — S06.5XAA SUBDURAL HEMATOMA: Status: ACTIVE | Noted: 2025-01-04

## 2025-01-05 PROBLEM — N94.89 PELVIC HEMATOMA IN FEMALE: Status: ACTIVE | Noted: 2025-01-05

## 2025-01-05 PROBLEM — S32.591A CLOSED FRACTURE OF MULTIPLE PUBIC RAMI, RIGHT, INITIAL ENCOUNTER (HCC): Status: ACTIVE | Noted: 2025-01-05
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
## 2025-01-05 NOTE — ED NOTES
Pt continues to talk to unseen others and can be difficult to understand. Pt is able to report increased pain in abdomen and hips, but is not able to give a number for the pain. Pt is noted to be tearful. PRN pain medication given.

## 2025-01-05 NOTE — ED NOTES
Surgery resident was sent Perfect[employer] by AMY Avila regarding pt failing swallow screen and abdominal distention.

## 2025-01-05 NOTE — PROCEDURES
PROCEDURE NOTE  Date: 1/5/2025   Name: Alisa De La Fuente  YOB: 1940    Procedures    Please call CT when patient is ready to come for exams

## 2025-01-05 NOTE — PROGRESS NOTES
Trauma Tertiary Survey    Admit Date: 1/4/2025  Hospital day 0    CC:    Chief Complaint   Patient presents with    Fall     Unwitnessed fall at NH, complaining of neck pain and R hip pain, small lac on head. C-collar placed     Alcohol pre-screening:  Women: How many times in the past year have you had 4 or more drinks in a day? none  How much do you drink on a daily basis? none    Drug Pre-screening:    How many times in the past year have you used a recreational drug or used a prescription medication for non medical reasons?  none    Mood Prescreening:    During the past two weeks, have you been bothered by little interest or pleasure doing things?  Yes  During the past two weeks, have you been bothered by feeling down, depressed or hopeless?  yes      Scheduled Meds:   methocarbamol  750 mg Oral 4x Daily    lactulose  20 g Oral TID    sodium chloride flush  10 mL IntraVENous 2 times per day    acetaminophen  650 mg Oral Q6H    levETIRAcetam  500 mg Oral BID    pantoprazole  40 mg Oral QAM AC    insulin lispro  0-8 Units SubCUTAneous 4x Daily AC & HS    [Held by provider] losartan  25 mg Oral Daily    metoprolol tartrate  12.5 mg Oral BID    PARoxetine  40 mg Oral Daily    pregabalin  75 mg Oral TID    rosuvastatin  20 mg Oral Daily    rivastigmine  1 patch TransDERmal Daily     Continuous Infusions:   sodium chloride      sodium chloride      dextrose       PRN Meds:HYDROmorphone, sodium chloride flush, sodium chloride, oxyCODONE **OR** oxyCODONE, ondansetron **OR** ondansetron, polyethylene glycol, sennosides-docusate sodium, glucose, dextrose **OR** dextrose, glucagon (rDNA), dextrose    Subjective:     Confused. Complaining from right chest wall pain.  SpO2 decreased to 88% overnight patient was placed on 2 L nasal cannula.    Objective:   Patient Vitals for the past 8 hrs:   BP Temp Temp src Pulse Resp SpO2   01/05/25 0450 -- -- -- -- -- 98 %   01/05/25 0445 112/68 97.6 °F (36.4 °C) Temporal 81 16 (!) 88 %  monitor Cr   CT cystogram to evaluate for bladder injury   Will plan on discontinuing pro catheter if CT cysto is negative   UA no RBCs   ID: afebrile, no acute issues.     Endocrine: DM - Medium ISS   MSK: R superior/inferior pubic rami fx   Ortho - plan for non-operative mgmt, f/u 2 weeks  with repeat imaging   WBAT RLE   PT/OT   Right scalp contusion - local wound care   Heme: Hgb 8.9, monitor for acute blood loss anemia.    Psych: Depression -Home Paxil, SBI     Bowel regime: g;ycolax, senna, lactulose.   Pain control/Sedation: tylenol, oxycodone, Dilaudid as needed, Robaxin, Lyrica,  DVT prophylaxis: SCDs    GI: diet   Glucose protocol: medium ISS  Mouth/Eye care: per patient.   Pro: in place   Code status:    Full Code    Patient/Family update:  As available     Disposition:  continue care       Electronically signed by Lindy Page MD on 1/5/25 at 6:16 AM EST

## 2025-01-05 NOTE — H&P
TRAUMA HISTORY & PHYSICAL  Attending/Surgical Resident/Advance Practice Nurse  1/5/2025  12:03 AM    PRIMARY SURVEY    CHIEF COMPLAINT:  Trauma consult.    Injury occurred just prior to arrival. Patient suffered a mechanical fall at home. She hit her head but denies loss of consciousness. Denies feeling dizzy prior to the fall. CT head demonstrates a small right sided subdural hematoma. CT C/T/L spine negative for acute injury. CT abdomen/pelvis demonstrates superior and inferior pubic rami fractures.Imaging also demonstrates chronic lumbar spine fractures and right rib fractures. Patient is afebrile and hemodynamically stable. CBC and CMP WNL.    AIRWAY:   Airway Normal    EMS ETT Absent  Noisy respirations Absent  Retractions: Absent  Vomiting/bleeding: Absent    BREATHING:    Midaxillary breath sound left:  Normal  Midaxillary breath sound right:  Normal    Cough sound intensity:  good    FiO2:  Room air    SMI deferred    CIRCULATION:   Femerol pulse intensity: Strong  Palpebral conjunctiva: Red    Vitals:    01/04/25 2200   BP: 125/63   Pulse: 65   Resp: 18   Temp:    SpO2: 95%       Vitals:    01/04/25 1806 01/04/25 1807 01/04/25 2100 01/04/25 2200   BP: (!) 154/65  128/61 125/63   Pulse: 56  66 65   Resp: 20  19 18   Temp:  98 °F (36.7 °C)     TempSrc:  Oral     SpO2: 94%  94% 95%   Weight:  65.8 kg (145 lb)          FAST EXAM: Deferred    Central Nervous System    GCS Initial 15 minutes   Eye  Motor  Verbal 4 - Opens eyes on own  6 - Follows simple motor commands  5 - Alert and oriented 4 - Opens eyes on own  6 - Follows simple motor commands  5 - Alert and oriented     Neuromuscular blockade: No  Pupil size:  Left 4 mm    Right 4 mm  Pupil reaction: Yes    Wiggles fingers: Left Yes Right Yes  Wiggles toes: Left Yes   Right Yes    Hand grasp:   Left  Present      Right  Present  Plantar flexion: Left  Present      Right   Present    Loss of consciousness:  No     History Obtained From:  Patient &  none    In the event of Emergency Blood Transfusion:  Due to the critical condition of this patient, I request the immediate release of blood products for emergency transfusion secondary to shock. I understand the increased risks incurred by the lack of complete transfusion testing.      Radiology: Chest Xray, Pelvic Xray, Ct head, Ct cervical spine, CT chest, CT abdomen, CT Thoracic , and CT Lumbar      Consultations: NSGY, Ortho    Admission/Diagnosis: Fall, SDH, pelvic fracture    Plan of Treatment:   - Admit to intermediate floor   - NSGY consulted, appreciate recs   - Interval head CT ordered   - Keppra 500 BID x 7 days   - SBP <140, home losartan ordered   - Ortho consulted, appreciate recs   - Multimodal analgesia   - NPO, IVF pending repeat scan   - PT/OT as able   - Tertiary exam   - Dispo planning    Plan discussed with Dr. NORMA Farrell at 1/5/2025 on 12:03 AM    Electronically signed by Rachael Lujan MD on 1/5/2025 at 12:03 AM

## 2025-01-05 NOTE — ED NOTES
Pt repeatedly taking off c-collar, pt educated on the importance of keeping c-collar in place. C-collar placed back on.

## 2025-01-05 NOTE — ED NOTES
Patient is alert to self, told me that we are in [place] \"[place]\" and its \"[place]\" and its \"feburary\". She is taking her gown off and talking to herself. Patient is having pain when trying to sit her up, but able to swallow water laying down, Spoke with patient's daughter and she reports that she is a bit goofy at times, and keeps falling in the nursing home.

## 2025-01-05 NOTE — ED PROVIDER NOTES
Department of Emergency Medicine   ED  Provider Note  Admit Date/RoomTime: 2025  6:01 PM  ED Room: [facility]        Written by: Christiana Patel DO  Patient Name: Alisa De La Fuente  Attending Provider: Arpit Farrell, *  Admit Date: 2025  6:01 PM  MRN: [id number]    : 1940      History of Present Illness:  25, Time: 11:44 PM EST  Chief Complaint   Patient presents with    Fall     Unwitnessed fall at NH, complaining of neck pain and R hip pain, small lac on head. C-collar placed           HPI   Patient is a 84 y.o. female with a past medical history of bowel obstruction, presenting to the Emergency Department for fall. History obtained by patient and EMS.  Patient presented as an unwitnessed fall from the nursing facility.  She states she fell outside.  She had the right side of her head on the ground.  She denies any anticoagulation to me.  She is also noticing pain in the right hip region.  A c-collar was placed on arrival.  She denies any numbness, tingling or weakness.  She does have an ostomy to her anterior abdominal wall.        Review of Systems:   A complete review of systems was performed and pertinent positives and negatives are stated within HPI, all other systems reviewed and are negative.        --------------------------------------------- PAST HISTORY ---------------------------------------------  Past Medical History:  has a past medical history of Arthritis, Colostomy in place (HCC), Diabetes mellitus (HCC), Diverticulitis, GERD (gastroesophageal reflux disease), Hernia, History of blood transfusion, Hyperlipidemia, and Hypertension.    Past Surgical History:  has a past surgical history that includes Hysterectomy; Cholecystectomy; hernia repair; Abdomen surgery; colostomy; Appendectomy; Thyroid surgery; Jejunostomy; and Ankle fracture surgery (Left, 2021).    Social History:  reports that she has never smoked. She has quit using smokeless tobacco. She  re-evaluated.    Workup ensued.  ATLS initiated.  She is confused but no focal deficits on exam.    EKG performed no acute arrhythmia.  Troponin stable.  CBC is essentially reassuring and hemoglobin is stable.  Creatinine is near baseline.    CT imaging resulted with a subdural hematoma.  Clinically patient is confused but no other focal deficits.  Keppra ordered.  Blood pressure stable at this time.  CT of the chest with a rib fracture but no pneumothorax.  Cervical, thoracic and lumbar imaging without acute fractures.  Her CT abdomen pelvis did demonstrate a pelvic fracture with hematoma.    With patient's CT findings, consult was placed to trauma service.  Trauma service came to the emergency department to evaluate the patient.  They deemed at this time patient should be admitted to trauma service.    Please see above for name and route of medication administered.         Labs & imaging were reviewed and interpreted, see RESULTS. I have personally reviewed all laboratory and imaging results for this patient.                  Please see ED course for any additional MDM documentation.      This patient's ED course included: a personal history and physicial examination, multiple bedside re-evaluations, cardiac monitoring, and continuous pulse oximetry    This patient has remained hemodynamically stable and been closely monitored during their ED course.      CONSULTATIONS:            Trauma service, discussed case, will admit under their service.      PROCEDURES:            none.      COUNSELING:   ED physician have spoken with the patient and discussed today’s results, in addition to providing specific details for the plan of care and counseling regarding the diagnosis and prognosis.       --------------------------------------- IMPRESSION & DISPOSITION --------------------------------     IMPRESSION(s):  1. Subdural hematoma    2. Closed displaced fracture of pelvis, unspecified part of pelvis, initial encounter

## 2025-01-05 NOTE — ED NOTES
Pt was given water through a straw and could be heard gurgling and coughing after she swallowed. PO meds not given. Swallow screening was documented as failed. Will notify provider.

## 2025-01-05 NOTE — ED NOTES
Radiology Procedure Waiver   Name: Alisa De La Fuente  : 1940  MRN: [id number]    Date:  25    Time: 9:10 PM EST    Benefits of immediately proceeding with radiology exam(s) without pre-testing outweigh the risks or are not indicated as specified below and therefore the following is/are being waived:    [x] Benefits of immediate radiology exam(s) outweigh any risk.                                               OR    Pre-exam testing is not indicated for the following reason(s):  [] Pregnancy test   [] Patients LMP on-time and regular.   [] Patient had Tubal Ligation or has other Contraception Device.   [] Patient  is Menopausal or Premenarcheal.    [] Patient had Full or Partial Hysterectomy.    [] Protocol for CT contrast allegry   [] Patient has tolerated well previously   [] Patient does not have a true allergy    [] MRI Questionnaire     [] BUN/Creatinine   [] Patient age w/no hx of renal dysfunction.   [] Patient on Dialysis.   [] Recent Normal Labs.  Electronically signed by Christiana Patel DO on 25 at 9:10 PM EST               Christiana Patel DO  [phone]

## 2025-01-05 NOTE — ED NOTES
Dr. Page notified of pt's current vitals and need for tylenol suppository. Dr. Page to put in orders.

## 2025-01-05 NOTE — CONSULTS
[facility]              [address] [place] [address]                              CONSULTATION      PATIENT NAME: WILLA GERARDO               : 1940  MED REC NO: [id number]                        ROOM: [facility]  ACCOUNT NO: [id number]                       ADMIT DATE: 2025  PROVIDER: Olivia Zaman MD    NEUROSURGERY CONSULT    CONSULT DATE: 2025      REASON FOR CONSULT:  Right-sided subdural hematoma.    HISTORY OF PRESENT ILLNESS:  The patient is an 84-year-old lady who apparently fell at her nursing facility.  The fall was actually unwitnessed and immediately after the fall she was complaining of generalized pain and was brought to [facility] for further evaluation and management.  At this time, she does complain of some generalized pain, however, the interview and examination are somewhat difficult to complete because the patient is intermittently confused and history is obtained from the medical record.    PAST MEDICAL HISTORY:  Positive for arthritis, diabetes, gastroesophageal reflux, hyperlipidemia, hypertension.    PAST SURGICAL HISTORY:  Positive for diverticulitis surgery, repair of left ankle fracture, appendectomy, cholecystectomy, colostomy, hernia repair, hysterectomy, and thyroid surgery.    FAMILY HISTORY:  Noncontributory.    SOCIAL HISTORY:  Negative for tobacco use.  She does consume alcoholic beverages socially.    ALLERGIES:  INCLUDE DAPTOMYCIN AND VANCOMYCIN.      HOME MEDICATIONS:  Include Cozaar, Paxil, Crestor, Exelon, Lyrica, metformin.    REVIEW OF SYSTEMS:  HEENT:  Positive for headache.  Negative for double vision or blurred vision.  CARDIOVASCULAR:  Negative for chest pain.  No arrhythmia or palpitations.  RESPIRATORY:  Negative for shortness of breath, asthma, bronchitis, or pneumonia.  GASTROINTESTINAL:  Positive for gastroesophageal reflux.  GENITOURINARY:  Negative for hematuria or  exams.  She will be placed on Keppra 500 mg b.i.d. for 7 days.  She can follow up in the office in 4 weeks for repeat CT scan of her head.          FRIDA OSBORN MD      D:  01/05/2025 06:45:55     T:  01/05/2025 07:17:05     REMY/CHARISMA  Job #:  [id number]     Doc#:  [id number]

## 2025-01-05 NOTE — ED NOTES
Daughter, Ksenia, was updated at this time. Daughter reports pt is often confused and confusion gets worse with pain medications.

## 2025-01-05 NOTE — ED NOTES
Perfect serve sent to Dr Lujan regarding CT that is ordered; CT called and said she needs to have a pro cath in place prior to testing; awaiting response

## 2025-01-05 NOTE — DISCHARGE INSTRUCTIONS
[facility] Department of Orthopedic Surgery  [address] [facility] [address]    Dr. Michael Villanueva, DO         MD Dr. Michael Kelly MD Frank Ansevin, PA-C Sara Zatchok, PA-C Tyler Tsangaris PA-C      Orthopaedics Discharge Instructions   Weight bearing Status - Weight bearing as tolerated - on right lower Extremity  Pain Medication   Contact Office for Medication Refill- [phone]  Office can refill pain medications no sooner than every 7 days  If patient discharging to facility then pain control will be continued per facility physician  Ice to operative/injured site for 15-30 minutes of each hour for next 5 days    Recommend that you continue to ice the area 2-3 times per day after this   Elevate operative/injured limb on 2 pillows at home  Goal is to have limb above the heart if able  Follow up in office in approximately 2 weeks. Your first follow up appointment is often with one of our physician assistants.     Call the office at [phone] if having any concerns.     Watch for these significant complications.  Call physician if they or any other problems occur:  Fever over 101°, redness, swelling or warmth at the operative site  Unrelieved nausea    Foul smelling or cloudy drainage at the operative site   Unrelieved pain    Blood soaked dressing. (Some oozing may be normal)     Numb, pale, blue, cold or tingling extremity          It is the Department of Orthopaedic Trauma's standard of practice that providers will de-escalate (wean) all patients from narcotic (opioid) medications during the post-operative period.   We provide multimodal pain control, but opioid medications are tapered in all of our patients.  If patient requires referral to pain management for prolonged taper from opioid pain medication, we will facilitate this process.        Weight bearing is an important component to your healing fracture or injury. If you put weight on your extremity too soon, the

## 2025-01-05 NOTE — CONSULTS
Department of Orthopedic Surgery  Consult note    Chief complaint: Head trauma from fall, right pelvis pain    HISTORY OF PRESENT ILLNESS:       Patient is a 84 y.o. female who presents to the ED after a fall and hitting her head.  At that time patient was also complaining of some pain to the right anterior aspect of her pelvis and imaging demonstrated superior and inferior pubic rami fractures on the right for which orthopedics was consulted for management.  Upon this presentation patient is resting comfortably in bed she is mildly confused and unable to provide majority of the history but she does endorse pain to her right pelvis area.  Denies pain anywhere else on the body at this time.  Denies any increasing numbness tingling paresthesias to having down either upper or lower extremity at this time.  Is of note patient slightly confused so this may be unreliable  No other orthopedic complaints    Past Medical History:        Diagnosis Date    Arthritis     Colostomy in place (HCC)     Diabetes mellitus (HCC)     Diverticulitis     GERD (gastroesophageal reflux disease)     Hernia     History of blood transfusion     Hyperlipidemia     Hypertension      Past Surgical History:        Procedure Laterality Date    ABDOMEN SURGERY      DIVERTICULITIS    ANKLE FRACTURE SURGERY Left 5/1/2021    LEFT ANKLE OPEN REDUCTION INTERNAL FIXATION--SYNTHES [place] [facility]    APPENDECTOMY      CHOLECYSTECTOMY      COLOSTOMY      HERNIA REPAIR      OCT 2012    HYSTERECTOMY (CERVIX STATUS UNKNOWN)      ILEOSTOMY OR JEJUNOSTOMY      done and reversed    THYROID SURGERY      partial thyroidectomy     Current Medications:   Current Facility-Administered Medications: methocarbamol (ROBAXIN) tablet 750 mg, 750 mg, Oral, 4x Daily  lactulose (CHRONULAC) 10 GM/15ML solution 20 g, 20 g, Oral, TID  HYDROmorphone (DILAUDID) injection 0.5 mg, 0.5 mg, IntraVENous, Q3H PRN  0.9 % sodium chloride infusion, ,

## 2025-01-05 NOTE — DISCHARGE SUMMARY
Physician Discharge Summary     Patient ID:  Alisa De La Fuente  [id number]  84 y.o.  1940    Admit date: 1/4/2025    Discharge date and time: 3:39 PM 01/09/25     Admitting Physician: Arpit Farrell MD     Admission Diagnoses: Subdural hematoma [S06.5XAA]  Closed fracture of one rib, unspecified laterality, initial encounter [S22.39XA]  Closed displaced fracture of pelvis, unspecified part of pelvis, initial encounter ([facility]) [S32.9XXA]    Discharge Diagnoses: Principal Problem:    Subdural hematoma  Active Problems:    Closed fracture of multiple pubic rami, right, initial encounter ([facility])    Fall    Pelvic hematoma in female    Acute traumatic pain    Dementia ([facility])    Dysphagia    Encounter for palliative care  Resolved Problems:    * No resolved hospital problems. *      Admission Condition: stable    Discharged Condition: stable      Hospital Course:  1/5: Patient suffered a mechanical fall at home. She hit her head but denies loss of consciousness. Denies feeling dizzy prior to the fall. CT head demonstrates a small right sided subdural hematoma. CT C/T/L spine negative for acute injury. CT abdomen/pelvis demonstrates superior and inferior pubic rami fractures.Imaging also demonstrates chronic lumbar spine fractures and right rib fractures. Patient is afebrile and hemodynamically stable. CBC and CMP WNL.   1/5: CT head #2 stable. Ortho - plan for non-operative mgmt, f/u 2 weeks with repeat imaging. CT cystogram to evaluate for bladder injury pending.   1/6: Small amount of BRBPR s/p tylenol r/s. Hemorrhoid on exam. Confused and agitated this morning. Follows commands.   1/7: No issues overnight. Trouble with pain control and constipation but also not receiving any oral meds.  1/8: No issues overnight. More alert today, follows command. Saw palliative today and code status changed to limited x4.   1/9: Switched to DNR-CC yesterday. No issues overnight, awaiting hopsice placement. Pt will be discharged to  pelvis October 31st. 2.  Multiple old fractures of the transverse process vertebrae in the lumbar spine: L1, L2, and L3 3.  Comminuted displaced fracture of the right pubic bone with significant degree of bilateral pelvic sidewall hematoma more on the right-side. Further evaluation with CT abdomen pelvis recommended. Preliminary report was given to Dr. Patel, ER physician at the time of the interpretation.     CT CHEST WO CONTRAST    Result Date: 1/4/2025  EXAMINATION: CT OF THE CHEST WITHOUT CONTRAST; CT OF THE THORACIC SPINE WITHOUT CONTRAST; CT OF THE LUMBAR SPINE WITHOUT CONTRAST 1/4/2025 7:44 pm TECHNIQUE: CT of the chest was performed without the administration of intravenous contrast. Multiplanar reformatted images are provided for review. Automated exposure control, iterative reconstruction, and/or weight based adjustment of the mA/kV was utilized to reduce the radiation dose to as low as reasonably achievable.; CT of the thoracic spine was performed without the administration of intravenous contrast. Multiplanar reformatted images are provided for review. Automated exposure control, iterative reconstruction, and/or weight based adjustment of the mA/kV was utilized to reduce the radiation dose to as low as reasonably achievable.; CT of the lumbar spine was performed without the administration of intravenous contrast. Multiplanar reformatted images are provided for review.  Adjustment of mA and/or kV according to patient size was utilized.  Automated exposure control, iterative reconstruction, and/or weight based adjustment of the mA/kV was utilized to reduce the radiation dose to as low as reasonably achievable. COMPARISON: None. HISTORY: ORDERING SYSTEM PROVIDED HISTORY: fall, pain TECHNOLOGIST PROVIDED HISTORY: Reason for exam:->fall, pain Decision Support Exception - unselect if not a suspected or confirmed emergency medical condition->Emergency Medical Condition (MA) What reading provider

## 2025-01-06 NOTE — PROGRESS NOTES
OT SESSION ATTEMPT     Date:2025  Patient Name: Alisa De La Fuente  MRN: [id number]  : 1940  Room: [address] [place] [address]-A     Occupational therapy orders received/chart review completed and OT session attempted this date:   Hold per RN 2/2 pain and intolerance of functional mobility. Also - trauma noted TLSO; questionable chronic spinal fractures. Per case management- facility stated there is no order a TLSO 2/2 ostomy. Need clarification regarding TLSO.       Will reattempt OT at a later time/date.  Thank you,   Winston Wilson OTR/L [id number]

## 2025-01-06 NOTE — PROGRESS NOTES
Department of Neurosurgery  Progress Note    CHIEF COMPLAINT: right SDH    SUBJECTIVE:  denies headache    REVIEW OF SYSTEMS :  Constitutional: Negative for chills and fever.    Neurological: Negative for dizziness, tremors and speech change.   CVS: negative for chest pain  Resp: negative for SOB      OBJECTIVE:   VITALS:  /88   Pulse 88   Temp 98 °F (36.7 °C) (Axillary)   Resp 16   Ht 1.575 m (5' 2\")   Wt 65.8 kg (145 lb)   SpO2 95%   BMI 26.52 kg/m²   PHYSICAL:  CONSTITUTIONAL:  awake, alert, cooperative, no apparent distress, and appears stated age  Skin is warm  Abdomen is soft  LOW 4/5  CN 2-12 intact    DATA:  CBC:   Lab Results   Component Value Date/Time    WBC DUPLICATE ORDER 01/06/2025 06:42 AM    RBC DUPLICATE ORDER 01/06/2025 06:42 AM    HGB DUPLICATE ORDER 01/06/2025 06:42 AM    HCT DUPLICATE ORDER 01/06/2025 06:42 AM    MCV DUPLICATE ORDER 01/06/2025 06:42 AM    MCH DUPLICATE ORDER 01/06/2025 06:42 AM    MCHC DUPLICATE ORDER 01/06/2025 06:42 AM    RDW DUPLICATE ORDER 01/06/2025 06:42 AM    PLT DUPLICATE ORDER 01/06/2025 06:42 AM    MPV DUPLICATE ORDER 01/06/2025 06:42 AM     BMP:    Lab Results   Component Value Date/Time     01/06/2025 06:40 AM    K 4.2 01/06/2025 06:40 AM    K 4.8 04/22/2023 06:46 PM     01/06/2025 06:40 AM    CO2 25 01/06/2025 06:40 AM    BUN 28 01/06/2025 06:40 AM    CREATININE 1.1 01/06/2025 06:40 AM    CALCIUM 8.9 01/06/2025 06:40 AM    GFRAA >60 10/14/2022 01:29 PM    LABGLOM 50 01/06/2025 06:40 AM    LABGLOM 65 03/25/2024 08:01 AM    GLUCOSE 134 01/06/2025 06:40 AM    GLUCOSE 107 03/31/2012 07:45 PM     PT/INR:    Lab Results   Component Value Date/Time    PROTIME 13.0 07/27/2024 04:30 PM    INR 1.2 07/27/2024 04:30 PM     PTT:    Lab Results   Component Value Date/Time    APTT 31.1 07/27/2024 04:30 PM    APTT 27.9 04/26/2021 08:11 PM   [APTT}    Current Inpatient Medications  Current Facility-Administered Medications: heparin (porcine) injection 5,000

## 2025-01-06 NOTE — PROGRESS NOTES
Cervical Spine C Collar Clearance -  Patient CT Spine Imaging normal.  Patient does not complain of Cervical Spine tenderness upon palpation.  Patients C-Spine ranged.  C-spine clear, no need for C-Collar.  Electronically signed by Raza Rangel DO on 1/6/2025 at 6:09 AM

## 2025-01-06 NOTE — PROGRESS NOTES
4 Eyes Skin Assessment     NAME:  Alisa De La Fuente  YOB: 1940  MEDICAL RECORD NUMBER:  [id number]    The patient is being assessed for  Admission    I agree that at least one RN has performed a thorough Head to Toe Skin Assessment on the patient. ALL assessment sites listed below have been assessed.      Areas assessed by both nurses:    Head, Face, Ears, Shoulders, Back, Chest, Arms, Elbows, Hands, Sacrum. Buttock, Coccyx, Ischium, Legs. Feet and Heels, and Under Medical Devices         Does the Patient have a Wound? Yes wound(s) were present on assessment. LDA wound assessment was Initiated and completed by RN       Subhash Prevention initiated by RN: Yes  Wound Care Orders initiated by RN: Yes    Pressure Injury (Stage 3,4, Unstageable, DTI, NWPT, and Complex wounds) if present, place Wound referral order by RN under : No    New Ostomies, if present place, Ostomy referral order under : No     Nurse 1 eSignature: Electronically signed by Leo Cortez RN on 1/6/25 at 6:59 AM EST    **SHARE this note so that the co-signing nurse can place an eSignature**    Nurse 2 eSignature: {Esignature:[id number]}

## 2025-01-06 NOTE — CARE COORDINATION
Pt is from [facility] and is to return per the son , Momo, on admissions. All discharge paper work is in place. Precert is not needed. Pt is on iv lopressor, keppra and fluids. She has a pro from the [facility] pta. Speech to see since she failed her bed side swallow. Neurosurgery signed off.  and  c-collar is in place. PT and OT to eval. Pt is alert to person only..JESUS Ernst  1/6/2025    Case Management Assessment  Initial Evaluation    Date/Time of Evaluation: 1/6/2025 10:26 AM  Assessment Completed by: JESUS Ernst    If patient is discharged prior to next notation, then this note serves as note for discharge by case management.    Patient Name: Alisa De La Fuente                   YOB: 1940  Diagnosis: Subdural hematoma [S06.5XAA]  Closed fracture of one rib, unspecified laterality, initial encounter [S22.39XA]  Closed displaced fracture of pelvis, unspecified part of pelvis, initial encounter ([facility]) [S32.9XXA]                   Date / Time: 1/4/2025  6:01 PM    Patient Admission Status: Inpatient   Readmission Risk (Low < 19, Mod (19-27), High > 27): Readmission Risk Score: 16.3    Current PCP: Jean Carlos Espinosa MD  PCP verified by ? Yes    Chart Reviewed: Yes      History Provided by: Other (see comment) (from [facility]  Patient Orientation: Alert and Oriented, Person    Patient Cognition: Alert    Hospitalization in the last 30 days (Readmission):  No    If yes, Readmission Assessment in  Navigator will be completed.    Advance Directives:      Code Status: Full Code   Patient's Primary Decision Maker is: Legal Next of Kin (no documents in epic.)    Primary Decision Maker: momo de la fuente - Child - [phone]    Secondary Decision Maker: Ksenia De La Fuente - Child - [phone]    Discharge Planning:    Patient lives with: Other (Comment) Type of Home: Skilled Nursing Facility  Primary Care Giver: Other (Comment) ([facility])  Patient Support Systems include: Children   Current Financial  resources:    Current community resources:    Current services prior to admission:              Current DME:              Type of Home Care services:  Nursing Services    ADLS  Prior functional level: Assistance with the following:, Bathing, Dressing, Toileting, Cooking, Housework, Shopping, Mobility  Current functional level: Assistance with the following:, Bathing, Dressing, Toileting, Cooking, Shopping, Housework, Mobility, Other (see comment) (therapy to eval.)    PT AM-PAC:   /24  OT AM-PAC:   /24    Family can provide assistance at DC: No  Would you like Case Management to discuss the discharge plan with any other family members/significant others, and if so, who? Yes (son , jose)  Plans to Return to Present Housing: Yes  Other Identified Issues/Barriers to RETURNING to current housing:  return to [facility]  Potential Assistance needed at discharge: Skilled Nursing Facility            Potential DME:    Patient expects to discharge to: Skilled nursing facility  Plan for transportation at discharge:      Financial    Payor: MEDICARE / Plan: MEDICARE PART A AND B / Product Type: *No Product type* /     Does insurance require precert for SNF: No    Potential assistance Purchasing Medications:    Meds-to-Beds request:        [id number] [address] [facility] [phone] - F [phone]  [address] [place] [id number]  Phone: [phone] Fax: [phone]      Notes:    Factors facilitating achievement of predicted outcomes: Family support and Pleasant    Barriers to discharge: Confusion    Additional Case Management Notes: see above     The Plan for Transition of Care is related to the following treatment goals of Subdural hematoma [S06.5XAA]  Closed fracture of one rib, unspecified laterality, initial encounter [S22.39XA]  Closed displaced fracture of pelvis, unspecified part of pelvis, initial encounter ([facility]) [S32.9XXA]    IF APPLICABLE: The Patient and/or patient

## 2025-01-06 NOTE — PROGRESS NOTES
Physical Therapy    Patient received and chart reviewed for PT evaluation. Will hold therapy evaluation this date pending clarification if TLSO is needed for mobility - RN to clarify. Pt also limited by pain.   Will follow up as appropriate.     Catarina Felton, PT, DPT  [id number]

## 2025-01-06 NOTE — PROGRESS NOTES
GERIATRIC TRAUMA RESIDENT INITIAL ASSESSMENT    Chief Complaint   Patient presents with    Fall     Unwitnessed fall at NH, complaining of neck pain and R hip pain, small lac on head. C-collar placed       Mechanism of Injury:  fall   Loss of consciousness:  No    HPI:  Alisa De La Fuente is an 84 year old female w/ PMH of hypertension, hyperlipidemia, diabetes, diverticulitis s/p proctocolectomy s/ colostomy. She presented to [facility] after unwitnessed fall at her nursing facility. Reports that she hit her head but denies LOC. In ED CTAP showed comminuted fractures of superior and inferior right pubic rami. CTH showed right parietal subdural hematoma of 3 mm. She was evaluated by orthopedic surgery who recommend no surgical intervention, weightbearing as tolerated to the RLE. She was evaluated by neurosurgery who recommend no antiplatelet or anticoagulation and keppra 500mg BID for 7 days.     Past Medical History:   Diagnosis Date    Arthritis     Colostomy in place (HCC)     Diabetes mellitus (HCC)     Diverticulitis     GERD (gastroesophageal reflux disease)     Hernia     History of blood transfusion     Hyperlipidemia     Hypertension        Past Surgical History:   Procedure Laterality Date    ABDOMEN SURGERY      DIVERTICULITIS    ANKLE FRACTURE SURGERY Left 5/1/2021    LEFT ANKLE OPEN REDUCTION INTERNAL FIXATION--SYNTHES performed by Chilo Garg MD at [facility] [place]    APPENDECTOMY      CHOLECYSTECTOMY      COLOSTOMY      HERNIA REPAIR      OCT 2012    HYSTERECTOMY (CERVIX STATUS UNKNOWN)      ILEOSTOMY OR JEJUNOSTOMY      done and reversed    THYROID SURGERY      partial thyroidectomy       No family history on file.    Allergies   Allergen Reactions    Daptomycin Anaphylaxis     Wheezing/Stridor    Vancomycin Anaphylaxis and Rash    Adhesive Tape Itching and Rash       Social History     Tobacco Use    Smoking status: Never    Smokeless tobacco: Former   Substance Use Topics    Alcohol use: Yes     Comment:  (36.9 °C) (Axillary)   Resp 16   Ht 1.575 m (5' 2\")   Wt 65.8 kg (145 lb)   SpO2 94%   BMI 26.52 kg/m²   Physical Exam  Vitals reviewed.   Constitutional:       Appearance: Normal appearance.   HENT:      Head: Normocephalic and atraumatic.      Nose: Nose normal.      Mouth/Throat:      Mouth: Mucous membranes are moist.   Eyes:      Pupils: Pupils are equal, round, and reactive to light.   Cardiovascular:      Rate and Rhythm: Normal rate and regular rhythm.      Pulses: Normal pulses.      Heart sounds: Normal heart sounds.   Pulmonary:      Effort: Pulmonary effort is normal.      Breath sounds: Normal breath sounds.   Abdominal:      General: Bowel sounds are normal.      Palpations: Abdomen is soft.   Musculoskeletal:         General: Normal range of motion.      Cervical back: Normal range of motion.   Skin:     General: Skin is warm.   Neurological:      General: No focal deficit present.      Mental Status: She is alert.      Comments: Oriented only to self           Labs:  All labs since admission were reviewed, and these were the abnormalities I observed:   WBC 13.5    Radiology:  I have reviewed all the radiological studies this admission    ASSESSMENT/PLAN:  Assessment  SDH, 3 mm  Superior & inferior R pubic rami fractures  DM2  HTN  HLD    Plan  POCT glucose q6h while NPO  LDSSI   Continue home lopressor, losartan- hold for SBP <100, HR <70  No antiplatelet or anticoagulation therapy until cleared by neurosurgery  Continue keppra 500mg BID 7 days per neurosurgery  RLE weightbearing as tolerated per ortho

## 2025-01-06 NOTE — PROGRESS NOTES
SPEECH/LANGUAGE PATHOLOGY  CLINICAL ASSESSMENT OF SWALLOWING FUNCTION   and PLAN OF CARE      PATIENT NAME:  Alisa De La Fuente  (female)     MRN:  [id number]    :  1940  (84 y.o.)  STATUS:  Inpatient: Room 8503/8503-A    TODAY'S DATE:  2025  ORDER DATE, DESCRIPTION AND REFERRING PROVIDER:    SLP swallowing-dysphagia evaluation and treatment  Start:  25 1800,   End:  25 1800,   ONE TIME,   Standing Count:  1 Occurrences,   R       Lindy Page MD  REASON FOR REFERRAL: Dysphagia   EVALUATING THERAPIST: YESICA Mayo                 RESULTS:    DYSPHAGIA DIAGNOSIS:   limited intake on exam, not able to determine type or severity of dysphagia       DIET RECOMMENDATIONS:  NPO with ongoing PO analysis by SLP only to determine if PO diet can be initiated        Patient was orally defensive at bedside. In addition, she would not allow the therapist to elevate the head of her bed and she did not follow directions. Therefore, no trials of food or liquid were present at this time to reduce her risk for choking.      FEEDING RECOMMENDATIONS:     Assistance level:  Not applicable      Compensatory strategies recommended: Thorough oral care to prevent colonization of oral bacteria, Upright in bed/ chair as tolerated      Discussed recommendations with:  patient nurse in person    SPEECH THERAPY  PLAN OF CARE   The dysphagia POC is established based on physician order, dysphagia diagnosis and results of clinical assessment     Skilled SLP intervention for dysphagia management on acute care up to 5 x per week until goals met, pt plateaus in function and/or discharged from hospital    Conditions Requiring Skilled Therapeutic Intervention for dysphagia:    Patient is performing below functional baseline d/t  current acute condition, respiratory compromise, multiple medications, and/or increased dependency upon caregivers.  patient unable to self feed which increases risk of aspiration pneumonia (Dina

## 2025-01-06 NOTE — DISCHARGE INSTR - COC
Continuity of Care Form    Patient Name: Alisa De La Fuente   :  1940  MRN:  [id number]    Admit date:  2025  Discharge date:  ***    Code Status Order: Full Code   Advance Directives:   Advance Care Flowsheet Documentation             Admitting Physician:  Arpit Farrell MD  PCP: Jean Carlos Espinosa MD    Discharging Nurse: ***  Discharging Hospital Unit/Room#: 8503/8503-A  Discharging Unit Phone Number: ***    Emergency Contact:   Extended Emergency Contact Information  Primary Emergency Contact: momo de la fuente  Home Phone: [phone]  Relation: Child  Preferred language: English   needed? No  Secondary Emergency Contact: Ksenia De La Fuente  Address: [address] [place] [address]  Home Phone: [phone]  Mobile Phone: [phone]  Relation: Child    Past Surgical History:  Past Surgical History:   Procedure Laterality Date    ABDOMEN SURGERY      DIVERTICULITIS    ANKLE FRACTURE SURGERY Left 2021    LEFT ANKLE OPEN REDUCTION INTERNAL FIXATION--SYNTHES performed by Chilo Garg MD at [facility] [place]    APPENDECTOMY      CHOLECYSTECTOMY      COLOSTOMY      HERNIA REPAIR      OCT 2012    HYSTERECTOMY (CERVIX STATUS UNKNOWN)      ILEOSTOMY OR JEJUNOSTOMY      done and reversed    THYROID SURGERY      partial thyroidectomy       Immunization History:   Immunization History   Administered Date(s) Administered    COVID-19, PFIZER GRAY top, DO NOT Dilute, (age 12 y+), IM, 30 mcg/0.3 mL 2022    COVID-19, PFIZER PURPLE top, DILUTE for use, (age 12 y+), 30mcg/0.3mL 2021, 2021, 2021    TDaP, ADACEL (age 10y-64y), BOOSTRIX (age 10y+), IM, 0.5mL 2016       Active Problems:  Patient Active Problem List   Diagnosis Code    Pelvic abscess in female N73.9    Bowel obstruction (HCC) K56.609    Hyperkalemia E87.5    Acute hypoxemic respiratory failure J96.01    Chest pain R07.9    Acute cystitis with hematuria N30.01    Osteoarthritis of  Description Clear 25   Number of days: 0        Elimination:  Continence:   Bowel: {YES / NO:}  Bladder: {YES / NO:}  Urinary Catheter: {Urinary Catheter:069364982}   Colostomy/Ileostomy/Ileal Conduit: {YES / NO:}       Date of Last BM: ***    Intake/Output Summary (Last 24 hours) at 2025 1026  Last data filed at 2025 0633  Gross per 24 hour   Intake --   Output 700 ml   Net -700 ml     I/O last 3 completed shifts:  In: -   Out: 700 [Urine:700]    Safety Concerns:     { PEBBLES Safety Concerns:775911719}    Impairments/Disabilities:      { PEBBLES Impairments/Disabilities:355251318}    Nutrition Therapy:  Current Nutrition Therapy:   { PEBBLES Diet List:573740726}    Routes of Feeding: {CHP DME Other Feedings:532467923}  Liquids: {Slp liquid thickness:69925}  Daily Fluid Restriction: {CHP DME Yes amt example:206331614}  Last Modified Barium Swallow with Video (Video Swallowing Test): {Done Not Done Date:178422463}    Treatments at the Time of Hospital Discharge:   Respiratory Treatments: ***  Oxygen Therapy:  {Therapy; copd oxygen:85305}  Ventilator:    { CC Vent List:495261390}    Rehab Therapies: Physical Therapy, Occupational Therapy, and Speech/Language Therapy  Weight Bearing Status/Restrictions: {[facility] Weight Bearin}  Other Medical Equipment (for information only, NOT a DME order):  {EQUIPMENT:702577872}  Other Treatments: ***    Patient's personal belongings (please select all that are sent with patient):  {[facility] DME Belongings:734117034}    RN SIGNATURE:  {Esignature:[id number]}    CASE MANAGEMENT/SOCIAL WORK SECTION    Inpatient Status Date: ***    Readmission Risk Assessment Score:  [facility] RISK OF UNPLANNED READMISSION 2.0             16.3 Total Score        Discharging to Facility/ Agency   Name: [facility]y [facility]   Address:  Phone:  Fax:    Dialysis Facility (if applicable)   Name:  Address:  Dialysis Schedule:  Phone:  Fax:    / signature:

## 2025-01-06 NOTE — ACP (ADVANCE CARE PLANNING)
Advance Care Planning   Healthcare Decision Maker:    Primary Decision Maker: momo elise - Child - [phone]    Secondary Decision Maker: Ksenia Elise - Child - [phone]    Click here to complete Healthcare Decision Makers including selection of the Healthcare Decision Maker Relationship (ie \"Primary\").          documents are in not scanned in to epic. Will try and obtain them. emergent status  1/6/2025

## 2025-01-06 NOTE — PROGRESS NOTES
Called and spoke with Dr. Zaman about pt needing TLSO brace. Per Dr. Zaman, no need for TLSO brace and okay to work with PT/OT.

## 2025-01-06 NOTE — PROGRESS NOTES
Paged regarding bright red blood per rectum per nursing staff.  Patient is hemodynamically stable.  Upon exam patient has palpable hemorrhoid with small amount of bright red blood in rectal vault.  She did receive a Tylenol rectal suppository earlier secondary to her being febrile and failing bedside swallow test.  Bleeding likely result of aggravated hemorrhoid secondary to the suppository.  Will check a stat CBC and continue to monitor for signs of bleeding.    Electronically signed by Raza Rangel DO on 1/5/2025 at 10:35 PM

## 2025-01-06 NOTE — PROGRESS NOTES
Trauma  DAILY PROGRESS NOTE  1/6/2025  Cc:   Chief Complaint   Patient presents with    Fall     Unwitnessed fall at NH, complaining of neck pain and R hip pain, small lac on head. C-collar placed       SUBJECTIVE:  Small amount of BRBPR s/p tylenol r/s. Hemorrhoid on exam. Confused and agitated this morning. Follows commands.     OBJECTIVE:  BP (!) 119/54   Pulse 90   Temp 98.4 °F (36.9 °C) (Axillary)   Resp 16   Ht 1.575 m (5' 2\")   Wt 65.8 kg (145 lb)   SpO2 95%   BMI 26.52 kg/m²   No intake/output data recorded.    GENERAL: Confused, agitated   HEAD: NCAT.   EYES: Anicteric. Round symmetric pupils.  CV: RR.  LUNGS/CHEST: No increased work of breathing on RA.  ABDOMEN: Soft, no tenderness, non distended.    LABS:  CBC  Recent Labs     01/05/25  1943 01/05/25  2246 01/06/25  0159   WBC 13.8* 15.3* 14.7*   RBC 3.35* 3.37* 3.20*   HGB 8.5* 8.7* 8.1*   HCT 28.0* 29.7* 27.6*   MCV 83.6 88.1 86.3   MCH 25.4* 25.8* 25.3*   MCHC 30.4* 29.3* 29.3*   RDW 15.7* 16.0* 15.8*    131 131   MPV 11.0 11.1 11.4     BMP  Recent Labs     01/04/25  1831      K 4.8      CO2 28   BUN 25*   CREATININE 1.2*   GLUCOSE 138*   CALCIUM 9.0     Liver Function  Recent Labs     01/04/25  1831   ALKPHOS 104   ALT 8   AST 17   BILITOT 0.3     No results for input(s): \"LIPASE\" in the last 72 hours.  No results for input(s): \"LACTATE\" in the last 72 hours.  No results for input(s): \"INR\" in the last 72 hours.    Invalid input(s): \"PT\", \"PTT\"    RADIOLOGY:  I have personally reviewed all relevant imaging:      ASSESSMENT/PLAN:  84 y.o. female s/p mechanical fall    Rectal bleeding s/p tylenol r/s - likely hemorrhoidal bleeding, Hgb stable, monitor   R SDH, GCS 14, continue neurochecks, neurosurgery recs - TLSO brace  Superior/inferior R pubic rami fx (nonop), CT cysto negative   Chronic right rib fx, L2/L5 body comp fx, L1-L3 TP fx.   Multimodal pain control  PT/OT  Dispo planning   SCDs, DVT ppx 1/7    Raza Rangel,    General Surgery Resident, PGY-1    Electronically signed by Raza Rangel DO on 1/6/25 at 6:09 AM EST    Attending Attestation     I have seen and examined this patient.  I have personally reviewed and interpreted all relevant labs and imaging.  I agree with the resident documentation.    /88   Pulse 88   Temp 98 °F (36.7 °C) (Axillary)   Resp 16   Ht 1.575 m (5' 2\")   Wt 65.8 kg (145 lb)   SpO2 95%   BMI 26.52 kg/m²       CBC:   Lab Results   Component Value Date/Time    WBC DUPLICATE ORDER 01/06/2025 06:42 AM    RBC DUPLICATE ORDER 01/06/2025 06:42 AM    HGB DUPLICATE ORDER 01/06/2025 06:42 AM    HCT DUPLICATE ORDER 01/06/2025 06:42 AM    MCV DUPLICATE ORDER 01/06/2025 06:42 AM    MCH DUPLICATE ORDER 01/06/2025 06:42 AM    MCHC DUPLICATE ORDER 01/06/2025 06:42 AM    RDW DUPLICATE ORDER 01/06/2025 06:42 AM    PLT DUPLICATE ORDER 01/06/2025 06:42 AM    MPV DUPLICATE ORDER 01/06/2025 06:42 AM     CMP:    Lab Results   Component Value Date/Time     01/06/2025 06:40 AM    K 4.2 01/06/2025 06:40 AM    K 4.8 04/22/2023 06:46 PM     01/06/2025 06:40 AM    CO2 25 01/06/2025 06:40 AM    BUN 28 01/06/2025 06:40 AM    CREATININE 1.1 01/06/2025 06:40 AM    GFRAA >60 10/14/2022 01:29 PM    LABGLOM 50 01/06/2025 06:40 AM    LABGLOM 65 03/25/2024 08:01 AM    GLUCOSE 134 01/06/2025 06:40 AM    GLUCOSE 107 03/31/2012 07:45 PM    CALCIUM 8.9 01/06/2025 06:40 AM    BILITOT 0.3 01/04/2025 06:31 PM    ALKPHOS 104 01/04/2025 06:31 PM    AST 17 01/04/2025 06:31 PM    ALT 8 01/04/2025 06:31 PM       Imaging: No new imaging to review     Leo Madden MD   Trauma/Critical care

## 2025-01-06 NOTE — PROGRESS NOTES
Dr. Rangel notified of small amount of blood at pt anus upon admission. Following examination, he states that it is likely from suppository given in ED aggravating a hemorrhoid. Per Dr. Rangel, continue to monitor and notify if amount of blood increases.

## 2025-01-06 NOTE — PLAN OF CARE
Problem: Safety - Adult  Goal: Free from fall injury  Outcome: Progressing     Problem: Chronic Conditions and Co-morbidities  Goal: Patient's chronic conditions and co-morbidity symptoms are monitored and maintained or improved  Outcome: Progressing     Problem: Discharge Planning  Goal: Discharge to home or other facility with appropriate resources  Outcome: Progressing     Problem: ABCDS Injury Assessment  Goal: Absence of physical injury  Outcome: Progressing     Problem: Skin/Tissue Integrity  Goal: Absence of new skin breakdown  Description: 1.  Monitor for areas of redness and/or skin breakdown  2.  Assess vascular access sites hourly  3.  Every 4-6 hours minimum:  Change oxygen saturation probe site  4.  Every 4-6 hours:  If on nasal continuous positive airway pressure, respiratory therapy assess nares and determine need for appliance change or resting period.  Outcome: Progressing     Problem: Pain  Goal: Verbalizes/displays adequate comfort level or baseline comfort level  Outcome: Progressing     Problem: Neurosensory - Adult  Goal: Achieves stable or improved neurological status  Outcome: Progressing     Problem: Skin/Tissue Integrity - Adult  Goal: Skin integrity remains intact  Outcome: Progressing  Goal: Incisions, wounds, or drain sites healing without S/S of infection  Outcome: Progressing     Problem: Musculoskeletal - Adult  Goal: Return mobility to safest level of function  Outcome: Progressing  Goal: Maintain proper alignment of affected body part  Outcome: Progressing  Goal: Return ADL status to a safe level of function  Outcome: Progressing     Problem: Gastrointestinal - Adult  Goal: Maintains adequate nutritional intake  Outcome: Progressing  Goal: Establish and maintain optimal ostomy function  Outcome: Progressing     Problem: Genitourinary - Adult  Goal: Urinary catheter remains patent  Outcome: Progressing     Problem: Hematologic - Adult  Goal: Maintains hematologic

## 2025-01-06 NOTE — PROGRESS NOTES
Patient unable to tolerate HOB being up 30 degrees due to pain. Cannot safely place ng tube. Notifed ordering team

## 2025-01-06 NOTE — PROGRESS NOTES
BRIEF COMPREHENSIVE GERIATRIC ASSESSMENT    Clinical Frailty Score (Brady Scale):  7-SEVERELY FRAIL    Cognition:   []  Within normal limits     []  Mild cognitive impairment   [x]  Dementia  []  Delirium    Abbreviated Mental Test (AMT-4) score:  2 (age, , place, year; one point for knowing each)            []  Mental Capacity Assessment required (if AMT-4 score <4/4, consult speech for cog eval)  Main life-long occupation:  patient unable to report  Highest level of education:  patient unable to report    Emotional:  [x]  Within normal limits     []  Dec mood     []  Depression  []  Anxiety  []  Fatigue    []  Hallucination     []  Delusion  []  Other  Motivation:    []  High    [x]  Usual  []  Low  Health attitude:     []  Excellent    []  Good    []  Fair    []  Poor     [x]  Couldn't say  Communication:  Speech:   [x]  WNL   []  Impaired Hearing:   [x]  WNL   []  Impaired           Vision:     [x]  WNL   []  Impaired Understanding:   []  WNL   [x]  Impaired  Strength:   [x]  WNL   []  Weak   Exercise:   []  Frequent    []  Occasional    [x]  None   What type of exercise?    Balance:   []  WNL   []  Impaired  # falls in last month:  patient unable to report  #falls in the last 6 months:  patient unable to report   Mobility:  walk inside      []  Independent    []  Slow    [x]  Assisted    []  Can't         Walk outside   []  Independent    []  Slow    [x]  Assisted    []  Can't         Transfers         []  Independent    []  Slow    [x]  Assisted    []  Can't                    Bed (in/out)     []  Independent    []  Slow    [x]  Assisted    []  Can't                    Aid use            []  Independent    []  Slow    [x]  Assisted    []  Can't   Nutrition:  Weight:  []  Normal    []  Under    [x]  Over    []  Obese   []  Dietary consult           Appetite: []  WNL    []  Fair    [x]  Poor         Patient reported symptoms of dysphagia?  No Nurse reported? Yes         Patient passed a bedside swallow if

## 2025-01-07 DIAGNOSIS — S06.5XAA SUBDURAL HEMATOMA: Primary | ICD-10-CM

## 2025-01-07 DIAGNOSIS — S09.90XA INJURY OF HEAD, INITIAL ENCOUNTER: ICD-10-CM

## 2025-01-07 NOTE — PLAN OF CARE
Problem: Safety - Adult  Goal: Free from fall injury  1/7/2025 1001 by Devnote Nolen RN  Outcome: Progressing  1/7/2025 0043 by Leo Cortez RN  Outcome: Progressing     Problem: Chronic Conditions and Co-morbidities  Goal: Patient's chronic conditions and co-morbidity symptoms are monitored and maintained or improved  1/7/2025 1001 by Devonte Nolen RN  Outcome: Progressing  1/7/2025 0043 by Leo Cortez RN  Outcome: Progressing     Problem: Discharge Planning  Goal: Discharge to home or other facility with appropriate resources  1/7/2025 1001 by Devonte Nolen RN  Outcome: Progressing  1/7/2025 0043 by Leo Cortez RN  Outcome: Progressing     Problem: ABCDS Injury Assessment  Goal: Absence of physical injury  1/7/2025 1001 by Devonte Nolen RN  Outcome: Progressing  1/7/2025 0043 by Leo Cortez RN  Outcome: Progressing     Problem: Skin/Tissue Integrity  Goal: Absence of new skin breakdown  Description: 1.  Monitor for areas of redness and/or skin breakdown  2.  Assess vascular access sites hourly  3.  Every 4-6 hours minimum:  Change oxygen saturation probe site  4.  Every 4-6 hours:  If on nasal continuous positive airway pressure, respiratory therapy assess nares and determine need for appliance change or resting period.  1/7/2025 1001 by Devonte Nolen RN  Outcome: Progressing  1/7/2025 0043 by Leo Cortez RN  Outcome: Progressing     Problem: Pain  Goal: Verbalizes/displays adequate comfort level or baseline comfort level  1/7/2025 1001 by Devonte Nolen RN  Outcome: Progressing  1/7/2025 0043 by Leo Cortez RN  Outcome: Progressing     Problem: Neurosensory - Adult  Goal: Achieves stable or improved neurological status  1/7/2025 1001 by Devonte Nolen RN  Outcome: Progressing  1/7/2025 0043 by Leo Cortez RN  Outcome: Progressing     Problem: Skin/Tissue Integrity - Adult  Goal: Skin integrity remains intact  1/7/2025 1001 by Devonte Nolen RN  Outcome: Progressing  1/7/2025 0043  by Frasso, Leo, RN  Outcome: Progressing  Goal: Incisions, wounds, or drain sites healing without S/S of infection  1/7/2025 0043 by Leo Cortez RN  Outcome: Progressing     Problem: Musculoskeletal - Adult  Goal: Return mobility to safest level of function  1/7/2025 1001 by Devonte Nolen RN  Outcome: Progressing  1/7/2025 0043 by Leo Cortez RN  Outcome: Progressing  Goal: Maintain proper alignment of affected body part  1/7/2025 1001 by Devonte Nolen RN  Outcome: Progressing  1/7/2025 0043 by Leo Cortez RN  Outcome: Progressing  Goal: Return ADL status to a safe level of function  1/7/2025 1001 by Devonte Nolen RN  Outcome: Progressing  1/7/2025 0043 by Leo Cortez RN  Outcome: Progressing     Problem: Gastrointestinal - Adult  Goal: Maintains adequate nutritional intake  1/7/2025 1001 by Devonte Nolen RN  Outcome: Progressing  1/7/2025 0043 by Leo Cortez RN  Outcome: Progressing  Goal: Establish and maintain optimal ostomy function  1/7/2025 1001 by Devonte Nolen RN  Outcome: Progressing  1/7/2025 0043 by Leo Cortez RN  Outcome: Progressing     Problem: Genitourinary - Adult  Goal: Urinary catheter remains patent  1/7/2025 1001 by Devonte Nolen RN  Outcome: Progressing  1/7/2025 0043 by Leo Cortez RN  Outcome: Progressing     Problem: Hematologic - Adult  Goal: Maintains hematologic stability  1/7/2025 1001 by Devonte Nolen RN  Outcome: Progressing  1/7/2025 0043 by Leo Cortez RN  Outcome: Progressing

## 2025-01-07 NOTE — PROGRESS NOTES
Attempted to place small bowel feeding tube using ENvue tracking device..  the tube keeps coursing into the bronch.  Pt also very adgitated.. tube removed.. RN notified of.

## 2025-01-07 NOTE — CONSULTS
Palliative Care Department  [phone]  Palliative Care Initial Consult  Christina Persaud MD    Alisa De La Fuente  [id number]  Hospital Day: 4  Location of Service: [facility]  Date of Initial Consult: 01/06/2024  Referring Provider: Doyle Hernández DO  Palliative Medicine was consulted for assistance with: Assist with goals of care    ASSESSMENT & PLAN:     Subdural hematoma  History of fall  AMS  GCS 14 on admission  No focal neurologic deficit  CT head revealed right parietal subdural hematoma up to 3 mm    Dementia  Cognition dysfunction declined over the past year  Patient on rivastigmine    Pain  Patient received 4 as needed doses of Dilaudid 0.5 mg in the past 24 hours  Start Dilaudid 0.5 mg every 6 hours around-the-clock  Continue as needed Dilaudid 0.5 mg every 3 hours  Switch to p.o. meds once passes SLP    Palliative Care Encounter  Patient does not have the capacity for medical decision-making at this time  ROSALBAOA/her son Juan R is the medical decision maker at the time of conversation.  Will continue to follow for ongoing monitoring of progression of Pain as well as for appropriateness for hospice care due to Head Trauma  Will continue to evaluate test results related to and response to treatment of Head Trauma and medication effectiveness for Pain,  Will obtain testing as needed to monitor medication results:N/A  Will continue to assess patient status including monitor for side effects of treatments-Dilaudid and monitor for opiate induced constipation  Ongoing counseling of patient and family regarding diagnoses and prognosis of Head Trauma  Will continue treatment including dilaudid    Goals of care: Cure, Live Longer, Improve or Maintain Function/Quality of Life, and Continue Current Management  Surrogate: Child  Prognosis: unknown  Spiritual assessment: No spiritual distress identified   Bereavement and grief: Low Risk Bereavement    Advance Care Planning Documents:    Healthcare  cannula.  Was unable to obtain history due to patient's mental status today.    Family Meeting:  Participants: Child/HCPOA Juan R over the phone  Family meeting was held to discuss: Diagnosis, Prognosis, Treatment Options, Goals of Care, Prior Expressed Wishes, and Symptom Management.    Details of Conversation: Juan R reports, his mother has frequent fall. Her memory cognitive function started declining in the past year, especially since March after his father passed away.  The patient lives in a nursing facility.  As per son, she complains about upper leg pain.  He reports she eats well.  We discussed with him the CODE STATUS.  He confirmed that his mother wish to be limited code with no intubation/reintubation, no chest compression, no defibrillation and no resuscitative medication.  He wants to continue current medical treatment.  All questions were answered.  Emotional support was provided.  Will continue follow-up.      ROS:   See palliative care ROS/ESAS below; Detailed ROS unable to obtain due to patient's mental status    Review of Systems   Unable to perform ROS: Mental status change         Past Medical History:   Diagnosis Date    Arthritis     Colostomy in place (HCC)     Diabetes mellitus (HCC)     Diverticulitis     GERD (gastroesophageal reflux disease)     Hernia     History of blood transfusion     Hyperlipidemia     Hypertension        Past Surgical History:   Procedure Laterality Date    ABDOMEN SURGERY      DIVERTICULITIS    ANKLE FRACTURE SURGERY Left 5/1/2021    LEFT ANKLE OPEN REDUCTION INTERNAL FIXATION--SYNTHES performed by Chilo Garg MD at [facility] OR    APPENDECTOMY      CHOLECYSTECTOMY      COLOSTOMY      HERNIA REPAIR      OCT 2012    HYSTERECTOMY (CERVIX STATUS UNKNOWN)      ILEOSTOMY OR JEJUNOSTOMY      done and reversed    THYROID SURGERY      partial thyroidectomy       No family history on file.    Unable to obtain family history due to dementia    Allergies   Allergen Reactions

## 2025-01-07 NOTE — PROGRESS NOTES
OCCUPATIONAL THERAPY INITIAL EVALUATION    [facility] [address] [place], [place]       Date:2025                                                  Patient Name: Alisa De La Fuente  MRN: [id number]  : 1940  Room: [address] [place] [address]    Evaluating OT: Winston Wilson OTR/L [id number]    Referring Provider: Doyle Hernández DO      Specific Provider Orders/Date: OT evaluation and treatment 24 0582    Diagnosis:  Subdural hematoma [S06.5XAA]  Closed fracture of one rib, unspecified laterality, initial encounter [S22.39XA]  Closed displaced fracture of pelvis, unspecified part of pelvis, initial encounter ([facility]) [S32.9XXA]      Pertinent Medical History:  has a past medical history of Arthritis, Colostomy in place ([facility]), Diabetes mellitus ([facility]), Diverticulitis, GERD (gastroesophageal reflux disease), Hernia, History of blood transfusion, Hyperlipidemia, and Hypertension.   Past Surgical History:   Procedure Laterality Date    ABDOMEN SURGERY      DIVERTICULITIS    ANKLE FRACTURE SURGERY Left 2021    LEFT ANKLE OPEN REDUCTION INTERNAL FIXATION--SYNTHES performed by Chilo Garg MD at [facility]    APPENDECTOMY      CHOLECYSTECTOMY      COLOSTOMY      HERNIA REPAIR      OCT 2012    HYSTERECTOMY (CERVIX STATUS UNKNOWN)      ILEOSTOMY OR JEJUNOSTOMY      done and reversed    THYROID SURGERY      partial thyroidectomy       Pt presented to the emergency dept on  s/p fall   Right superior and inferior pubic rami fractures   Chronic right rib fx, L2/L5 body comp fx, L1-L3 TP fx.      Orders received, chart reviewed, eval complete.     Precautions:  Fall Risk, cognition, TSM, spinal precautions, WBAT BLE    Assessment of current deficits   [x] Functional mobility   [x]ADLs  [x] Strength               [x]Cognition   [x] Functional transfers   [] IADLs         [x] Safety Awareness   []Endurance   [] Fine Motor Coordination [x] Balance []

## 2025-01-07 NOTE — PROGRESS NOTES
Trauma  DAILY PROGRESS NOTE  1/7/2025  Cc:   Chief Complaint   Patient presents with    Fall     Unwitnessed fall at NH, complaining of neck pain and R hip pain, small lac on head. C-collar placed       SUBJECTIVE:  No issues overnight. Trouble with pain control and constipation but also not receiving any oral meds.    OBJECTIVE:  /66   Pulse 94   Temp 97.9 °F (36.6 °C) (Axillary)   Resp 15   Ht 1.575 m (5' 2\")   Wt 65.8 kg (145 lb)   SpO2 93%   BMI 26.52 kg/m²   I/O last 3 completed shifts:  In: -   Out: 1100 [Urine:1100]    GENERAL: Confused, agitated   HEAD: NCAT.   EYES: Anicteric. Round symmetric pupils.  CV: RR.  LUNGS/CHEST: No increased work of breathing on RA.  ABDOMEN: Soft, no tenderness, non distended. Ostomy with minimal OP.     LABS:  CBC  Recent Labs     01/06/25  1504 01/06/25  1921 01/07/25  0109   WBC 13.3* 13.6* 13.3*   RBC 3.31* 3.59 3.16*   HGB 8.4* 9.1* 8.1*   HCT 28.7* 31.6* 27.6*   MCV 86.7 88.0 87.3   MCH 25.4* 25.3* 25.6*   MCHC 29.3* 28.8* 29.3*   RDW 15.9* 15.9* 15.9*    127* 130   MPV 11.3 11.3 11.2     BMP  Recent Labs     01/04/25  1831 01/06/25  0640    141   K 4.8 4.2    105   CO2 28 25   BUN 25* 28*   CREATININE 1.2* 1.1*   GLUCOSE 138* 134*   CALCIUM 9.0 8.9     Liver Function  Recent Labs     01/04/25  1831   ALKPHOS 104   ALT 8   AST 17   BILITOT 0.3     No results for input(s): \"LIPASE\" in the last 72 hours.  No results for input(s): \"LACTATE\" in the last 72 hours.  No results for input(s): \"INR\" in the last 72 hours.    Invalid input(s): \"PT\", \"PTT\"    RADIOLOGY:  I have personally reviewed all relevant imaging:      ASSESSMENT/PLAN:  84 y.o. female s/p mechanical fall    R SDH, GCS 13, continue neurochecks, neurosurgery recs   Superior/inferior R pubic rami fx (nonop), CT cysto negative   Chronic right rib fx, L2/L5 body comp fx, L1-L3 TP fx.   Multimodal pain control  PT/OT- ok to work with them as back fx are old   Palliative care consult for

## 2025-01-07 NOTE — PROGRESS NOTES
Physical Therapy  Physical Therapy Initial Assessment    Name: Alisa De La Fuente  : 1940  MRN: [id number]      Date of Service: 2025    Evaluating PT:  Oral Villasenor PT, DPT [id number]    Room #:  8503/8503-A  Diagnosis:  Subdural hematoma [S06.5XAA]  Closed fracture of one rib, unspecified laterality, initial encounter [S22.39XA]  Closed displaced fracture of pelvis, unspecified part of pelvis, initial encounter ([facility]) [S32.9XXA]  PMHx/PSHx:   has a past medical history of Arthritis, Colostomy in place ([facility]), Diabetes mellitus ([facility]), Diverticulitis, GERD (gastroesophageal reflux disease), Hernia, History of blood transfusion, Hyperlipidemia, and Hypertension.   has a past surgical history that includes Hysterectomy; Cholecystectomy; hernia repair; Abdomen surgery; colostomy; Appendectomy; Thyroid surgery; Jejunostomy; and Ankle fracture surgery (Left, 2021).  Procedure/Surgery:  None  Precautions:  Falls, cognition, TSM, WBAT RLE, pro catheter, O2  Equipment Needs:  TBD    SUBJECTIVE:    Per chart, patient is from nursing facility.    OBJECTIVE:   Initial Evaluation  Date: 2025 Treatment Short Term/ Long Term   Goals   AM-PAC 6 Clicks      Was pt agreeable to Eval/treatment? Yes     Does pt have pain? Generalized pain with movement     Bed Mobility  Rolling: MaxA  Supine to sit: NT  Sit to supine: NT  Scooting: NT  Rolling: Choco  Supine to sit: MaxA  Sit to supine: MaxA  Scooting: Choco   Transfers Sit to stand: NT  Stand to sit: NT  Stand pivot: NT  Sit to stand: TBD  Stand to sit: TBD  Stand pivot: TBD   Ambulation    NT  TBD   Stair negotiation: ascended and descended  NT  TBD   ROM BUE:  See OT note  BLE:  Knee WFL; ankle dorsiflexion limited     Strength BUE:  See OT note  BLE:  NT     Balance Sitting EOB:  NT  Dynamic Standing:  NT  Sitting EOB:  Choco  Dynamic Standing:  TBD     Sensation: NT  Edema:  Unremarkable    Therapeutic Exercises/Activities:  - B knee flexion/extension PROM (1 x  of pelvis, initial encounter ([facility]) [S32.9XXA]  Specific instructions for next treatment:  Continue to facilitate safe performance of functional mobility; progress as appropriate.    Current Treatment Recommendations:     [x] Strengthening to improve independence with functional mobility   [x] ROM to improve independence with functional mobility   [x] Balance Training to improve static/dynamic balance and to reduce fall risk  [x] Endurance Training to improve activity tolerance during functional mobility   [] Transfer Training to improve safety and independence with all functional transfers   [] Gait Training to improve gait mechanics, endurance and assess need for appropriate assistive device  [] Stair Training in preparation for safe discharge home and/or into the community   [x] Positioning to prevent skin breakdown and contractures  [x] Safety and Education Training   [x] Patient/Caregiver Education   [] HEP  [] Other     PT long term treatment goals are located in above grid    Frequency of treatments: 2-5x/week x 1-2 weeks.    Time in  1135  Time out  1155    Total Treatment Time  10 minutes     Evaluation Time includes thorough review of current medical information, gathering information on past medical history/social history and prior level of function, completion of standardized testing/informal observation of tasks, assessment of data and education on plan of care and goals.    CPT codes:  [x] Low Complexity PT evaluation 59318  [] Moderate Complexity PT evaluation 83684  [] High Complexity PT evaluation 59231  [] PT Re-evaluation 59440  [] Gait training 81425 0 minutes  [] Manual therapy 03903 0 minutes  [x] Therapeutic activities 41437 10 minutes  [] Therapeutic exercises 24527 0 minutes  [] Neuromuscular reeducation 70399 0 minutes     Oral Villasenor, PT, DPT  [id number]

## 2025-01-07 NOTE — PROGRESS NOTES
0930 hrs-  iv team present for ng tube insertion.  This nurse assisted with intervention.  Pt irritable and uncooperative while attempt was being made.  After multiple attempts, unable to insert ng tube.  Primary notified.  Awaiting orders

## 2025-01-07 NOTE — PROGRESS NOTES
SPEECH LANGUAGE PATHOLOGY  DAILY PROGRESS NOTE      PATIENT NAME:  Alisa De La Fuente      :  1940          TODAY'S DATE:  2025 ROOM:  8503/8503-A    Current Diet: Diet NPO    Patient was seen for ongoing dysphagia therapy. RN cleared patient for treatment and reported that a Corpak placement was attempted earlier this morning. During the session, patient allowed therapist to sit her upright in bed. She presented with xerostomia. Therapist utilized a toothette to moisten patient's mouth. She was unable to elicit a volitional swallow on command due to her reduced ability to follow directions. Patient also displayed an increase in unintelligible speech. This may be due to the fact the RN reported pain medication was administered prior to session. Patient was administered an ice chip and a coated spoonful of puree. She was unable to achieved adequate lip closure around the spoon given verbal cues. Patient also pocketed the coated spoon of puree and required therapist to clear her oral cavity of residue to reduce choking.     Recommendation: At this time, patient is NOT appropriate to recommend a diet due to cognitive status and patient pocketing a coated spoonful of puree WITHOUT achieving a swallow response. She is recommended NPO with ongoing PO analysis by SLP only to determine if PO diet can be initiated. Therapist also recommends ORAL CARE DAILY to improve oral hygiene. RN was aware of the recommendations.       CPT code(s) 07675  dysphagia tx  Total minutes :  20 minutes    Neftali Chavira M.S., CCC-SLP/L   Speech-Language Pathologist  [id number]

## 2025-01-07 NOTE — CARE COORDINATION
SOCIAL WORK/CASEMANAGEMENT TRANSITION OF CARE PLANNING( LEIDY PEGUERO, [phone]): Plan is back to [facility], [facility] not needed.  They will not take with a corpak. All discharge paper work is in place. Pt is in the room crying out. Galindo catheter is in place, c collar, on 2l nc, and pt is on iv lopressor and keppra. No TLSO brace is needed per neurosurgery, PT and OT to eval. Speech on with pt currently npo. Pallative care to see. Pt is confused at this time. Leidy Peguero, JESUS  ,1/7/2025

## 2025-01-08 PROBLEM — Z51.5 ENCOUNTER FOR PALLIATIVE CARE: Status: ACTIVE | Noted: 2025-01-08

## 2025-01-08 PROBLEM — R13.10 DYSPHAGIA: Status: ACTIVE | Noted: 2025-01-01

## 2025-01-08 PROBLEM — F03.90 DEMENTIA (HCC): Status: ACTIVE | Noted: 2025-01-08

## 2025-01-08 PROBLEM — G89.11 ACUTE TRAUMATIC PAIN: Status: ACTIVE | Noted: 2025-01-08

## 2025-01-08 NOTE — PROGRESS NOTES
SPEECH LANGUAGE PATHOLOGY  DAILY PROGRESS NOTE      PATIENT NAME:  Alisa De La Fuente      :  1940          TODAY'S DATE:  2025 ROOM:  8503/8503-A    Current Diet: Diet NPO    RN cleared patient for dysphagia therapy. He reported that she was restless all night. During the session, patient allowed therapist to sit her upright in bed. She was not oriented and displayed unintelligible speech. Patient also presented with xerostomia. Therapist utilized a toothette to moisten patient's mouth. Patient was administered a few coated spoonfuls of puree. She was unable to achieve adequate lip closure around the spoon. Therapist placed coated spoon on patient's tongue. Lingual pumping and labial spillage was displayed with the coated spoonfuls of puree. A significant swallow delay was also observed. Intermittent throat clearing was noted at bedside; therefore, laryngeal penetration or aspiration cannot be ruled out at this time. A reduction in pocketing of the puree was displayed from the previous session; however, the therapist provided oral care at the end of the session to ensure her oral cavity was clear of the coated spoonfuls of puree.     Recommendation: At this time, patient is NOT appropriate to recommend a diet due to cognitive status and reduced level of alertness. She is recommended NPO with ongoing PO analysis by SLP only to determine if PO diet can be initiated. Therapist also recommends ORAL CARE DAILY to improve oral hygiene. RN was aware of the recommendations.       CPT code(s) 64464  dysphagia tx  Total minutes :  20 minutes    Neftali Chavira M.S., CCC-SLP/L   Speech-Language Pathologist  [id number]

## 2025-01-08 NOTE — PROGRESS NOTES
Trauma  DAILY PROGRESS NOTE  1/8/2025  Cc:   Chief Complaint   Patient presents with    Fall     Unwitnessed fall at NH, complaining of neck pain and R hip pain, small lac on head. C-collar placed       SUBJECTIVE:  No issues overnight. More alert today, follows command. Saw palliative today and code status changed to limited x4.     OBJECTIVE:  /87   Pulse 97   Temp 97.2 °F (36.2 °C) (Oral)   Resp 18   Ht 1.575 m (5' 2\")   Wt 65.8 kg (145 lb)   SpO2 97%   BMI 26.52 kg/m²   I/O last 3 completed shifts:  In: -   Out: 1400 [Urine:1400]    GENERAL: Confused, Follows commands   HEAD: NCAT.   EYES: Anicteric. Round symmetric pupils.  CV: RR.  LUNGS/CHEST: No increased work of breathing on 2L nC.  ABDOMEN: Soft, no tenderness, non distended. Ostomy with minimal OP.     LABS:  CBC  Recent Labs     01/07/25  0711 01/07/25  1304 01/07/25 2000   WBC 12.1* 15.6* 12.5*   RBC 3.34* 3.31* 3.14*   HGB 8.6* 8.4* 7.9*   HCT 31.5* 29.5* 27.5*   MCV 94.3 89.1 87.6   MCH 25.7* 25.4* 25.2*   MCHC 27.3* 28.5* 28.7*   RDW 16.3* 16.0* 16.1*    171 146   MPV 11.4 11.1 10.8     BMP  Recent Labs     01/06/25  0640 01/07/25  0711    146   K 4.2 4.6    110*   CO2 25 26   BUN 28* 27*   CREATININE 1.1* 0.9   GLUCOSE 134* 121*   CALCIUM 8.9 8.9     Liver Function  No results for input(s): \"ALKPHOS\", \"ALT\", \"AST\", \"BILITOT\", \"BILIDIR\", \"LABALBU\" in the last 72 hours.    Invalid input(s): \"PROT\"    No results for input(s): \"LIPASE\" in the last 72 hours.  No results for input(s): \"LACTATE\" in the last 72 hours.  No results for input(s): \"INR\" in the last 72 hours.    Invalid input(s): \"PT\", \"PTT\"    RADIOLOGY:  I have personally reviewed all relevant imaging:      ASSESSMENT/PLAN:  84 y.o. female s/p mechanical fall    R SDH, GCS 13, continue neurochecks, neurosurgery recs   Superior/inferior R pubic rami fx (nonop), CT cysto negative - will remove pro today   Chronic right rib fx, L2/L5 body comp fx, L1-L3 TP fx.    Multimodal pain control  PT/OT- ok to work with them as back fx are old   Palliative care consult for GoC discussion - Changed to Limited x4  Will discuss with family today if they want PEG tube placed.  Dispo planning   SCDs, DVT ppx 1/7    Doyle Hernández DO  General Surgery Resident, PGY-2    Electronically signed by Doyle Hernández DO on 1/8/2025 at 6:09 AM    Attending Attestation      I have seen and examined this patient.  I have personally reviewed and interpreted all relevant labs and imaging.  I agree with the resident documentation.     Failed Corpak placement yesterday, will attempt NGT placement for enteral access for medications/nutrition.   Pending family decision regarding PEG placement.      BP (!) 121/53   Pulse 95   Temp 97.1 °F (36.2 °C) (Temporal)   Resp 17   Ht 1.575 m (5' 2\")   Wt 65.8 kg (145 lb)   SpO2 98%   BMI 26.52 kg/m²     CBC:   Lab Results   Component Value Date/Time    WBC 10.7 01/08/2025 06:33 AM    RBC 3.34 01/08/2025 06:33 AM    HGB 8.5 01/08/2025 06:33 AM    HCT 30.3 01/08/2025 06:33 AM    MCV 90.7 01/08/2025 06:33 AM    MCH 25.4 01/08/2025 06:33 AM    MCHC 28.1 01/08/2025 06:33 AM    RDW 16.2 01/08/2025 06:33 AM     01/08/2025 06:33 AM    MPV 11.7 01/08/2025 06:33 AM     CMP:    Lab Results   Component Value Date/Time     01/08/2025 06:33 AM    K 4.1 01/08/2025 06:33 AM    K 4.8 04/22/2023 06:46 PM     01/08/2025 06:33 AM    CO2 27 01/08/2025 06:33 AM    BUN 27 01/08/2025 06:33 AM    CREATININE 0.9 01/08/2025 06:33 AM    GFRAA >60 10/14/2022 01:29 PM    LABGLOM 64 01/08/2025 06:33 AM    LABGLOM 65 03/25/2024 08:01 AM    GLUCOSE 117 01/08/2025 06:33 AM    GLUCOSE 107 03/31/2012 07:45 PM    CALCIUM 9.1 01/08/2025 06:33 AM    BILITOT 0.3 01/04/2025 06:31 PM    ALKPHOS 104 01/04/2025 06:31 PM    AST 17 01/04/2025 06:31 PM    ALT 8 01/04/2025 06:31 PM     Imaging: No new imaging to review this morning      Leo Madden MD   Trauma/Critical care

## 2025-01-08 NOTE — CARE COORDINATION
SOCIAL WORK/CASEMANAGEMENT TRANSITION OF CARE PLANNING( LEIDY BEDOYA, [phone]):  pt is from [facility] where a precert  is not needed to return. All discharge paper work with ambulance form is in place. Pt continues to yell out. She is on iv lopressor, keppra and fluids. Neurosurgery has signed off, brace is not needed. Pt is npo with speech. Galindo removed today. Pallative to talk with family about a peg tube since they could not put in a corpak. PT and OT saw yesterday with ampac of 6 and 7. Pt is now a limited code status. Pt is on 2l nc. Leidy Marielena, ANTWONW  1/8/2025    Pallative care spoke with son , momo, and he doesn't want to put in a peg tube and wants to make pt comfort care. I called son, Momo, and he wants to move pt to a yasmine near him in [place], [place]. He is going to have his wife call me tomorrow in the a.m. in the mean time I have called [facility] and faxed them the chart as they are a hospice house. They can be reached at [phone] and fax is [phone].JESUS Ernst  1/8/2025

## 2025-01-08 NOTE — PROGRESS NOTES
Palliative Care Department  [phone]  Palliative Care Initial Consult  Christina Persaud MD    Alisa De La Fuente  [id number]  Hospital Day: 5  Location of Service: [facility]  Date of Initial Consult: 01/06/2024  Referring Provider: Doyle Hernández DO  Palliative Medicine was consulted for assistance with: Assist with goals of care    ASSESSMENT & PLAN:     Subdural hematoma  History of fall  AMS  GCS 14 on admission  No focal neurologic deficit  CT head revealed right parietal subdural hematoma up to 3 mm    Dementia  Cognition dysfunction declined over the past year  Patient on rivastigmine    Dysphagia   Patient failed SLP   Failed to insert NGT  Recommendation for PEG tube placemnt  Patient's son/HCPOA Juan R refused PEG tube  Juan R agreed with pleasure feeding and Hospice care    Pain  Patient received 2 as needed doses of Dilaudid 0.5 mg in the past 24 hours   Dilaudid 0.5 mg every 6 hours around-the-clock was discontinued  Continue as needed Dilaudid 0.5 mg every 3 hours  Switch to p.o. meds once passes SLP    Palliative Care Encounter  Patient does not have the capacity for medical decision-making at this time  HCPKADIE/her son Juan R is the medical decision maker at the time of conversation.  Will continue to follow for ongoing monitoring of progression of Pain as well as for appropriateness for hospice care due to Head Trauma, dementia, dysphagia  Will continue to evaluate test results related to and response to treatment of Head Trauma , dementia, dysphagia and medication effectiveness for Pain,  Will obtain testing as needed to monitor medication results:N/A  Will continue to assess patient status including monitor for side effects of treatments-Dilaudid and monitor for opiate induced constipation  Ongoing counseling of patient and family regarding diagnoses and prognosis of Head Trauma  Will continue treatment including dilaudid    Goals of care: Provide Comfort  FIXATION--SYNTHES performed by Chilo Garg MD at [facility]    APPENDECTOMY      CHOLECYSTECTOMY      COLOSTOMY      HERNIA REPAIR      OCT 2012    HYSTERECTOMY (CERVIX STATUS UNKNOWN)      ILEOSTOMY OR JEJUNOSTOMY      done and reversed    THYROID SURGERY      partial thyroidectomy       No family history on file.    Unable to obtain family history due to dementia    Allergies   Allergen Reactions    Daptomycin Anaphylaxis     Wheezing/Stridor    Vancomycin Anaphylaxis and Rash    Adhesive Tape Itching and Rash       Social History     Tobacco Use    Smoking status: Never    Smokeless tobacco: Former   Substance Use Topics    Alcohol use: Yes     Comment: very seldom    Drug use: No       Social history:  [employer] status: no  Marital status:   Living status: nursing home       OBJECTIVE:     Physical Exam:  BP (!) 121/53   Pulse 95   Temp 97.1 °F (36.2 °C) (Temporal)   Resp 17   Ht 1.575 m (5' 2\")   Wt 65.8 kg (145 lb)   SpO2 98%   BMI 26.52 kg/m²   Physical Exam  Vitals and nursing note reviewed.   Cardiovascular:      Rate and Rhythm: Normal rate.      Pulses: Normal pulses.      Heart sounds: Murmur heard.   Pulmonary:      Effort: Pulmonary effort is normal. No respiratory distress.      Breath sounds: Normal breath sounds.      Comments: 2L NC  Abdominal:      General: Bowel sounds are normal.   Neurological:      Mental Status: She is alert. She is disoriented.           Objective data reviewed: labs, images, records, medication use, vitals, and chart      All other systems were reviewed and are negative.    Current Medications:  Inpatient medications reviewed: yes  Home Medications reviewed: yes  OARRS Reviewed: Not reviewed  24 Hour PRN Meds:  Dilaudid      Note: This report was completed using Kudoala voiced recognition software.  Every effort has been made to ensure accuracy; however, inadvertent computerized transcription errors may be present.

## 2025-01-09 NOTE — PROGRESS NOTES
Trauma  DAILY PROGRESS NOTE  1/9/2025  Cc:   Chief Complaint   Patient presents with    Fall     Unwitnessed fall at NH, complaining of neck pain and R hip pain, small lac on head. C-collar placed       SUBJECTIVE:     Switched to DNR-CC yesterday. No issues overnight, awaiting hopsice placement.     OBJECTIVE:  /88   Pulse 99   Temp 98 °F (36.7 °C) (Oral)   Resp 22   Ht 1.575 m (5' 2\")   Wt 65.8 kg (145 lb)   SpO2 94%   BMI 26.52 kg/m²   I/O last 3 completed shifts:  In: -   Out: 800 [Urine:800]    GENERAL: Confused, Follows commands   HEAD: NCAT.   EYES: Anicteric. Round symmetric pupils.  CV: RR.  LUNGS/CHEST: No increased work of breathing on 2L nC.  ABDOMEN: Soft, no tenderness, non distended. Ostomy with minimal OP.     LABS:  CBC  Recent Labs     01/07/25  1304 01/07/25  2000 01/08/25  0633   WBC 15.6* 12.5* 10.7   RBC 3.31* 3.14* 3.34*   HGB 8.4* 7.9* 8.5*   HCT 29.5* 27.5* 30.3*   MCV 89.1 87.6 90.7   MCH 25.4* 25.2* 25.4*   MCHC 28.5* 28.7* 28.1*   RDW 16.0* 16.1* 16.2*    146 192   MPV 11.1 10.8 11.7     BMP  Recent Labs     01/06/25  0640 01/07/25  0711 01/08/25  0633    146 146   K 4.2 4.6 4.1    110* 111*   CO2 25 26 27   BUN 28* 27* 27*   CREATININE 1.1* 0.9 0.9   GLUCOSE 134* 121* 117*   CALCIUM 8.9 8.9 9.1     Liver Function  No results for input(s): \"ALKPHOS\", \"ALT\", \"AST\", \"BILITOT\", \"BILIDIR\", \"LABALBU\" in the last 72 hours.    Invalid input(s): \"PROT\"    No results for input(s): \"LIPASE\" in the last 72 hours.  No results for input(s): \"LACTATE\" in the last 72 hours.  No results for input(s): \"INR\" in the last 72 hours.    Invalid input(s): \"PT\", \"PTT\"    RADIOLOGY:  I have personally reviewed all relevant imaging:      ASSESSMENT/PLAN:  84 y.o. female s/p mechanical fall    - Switched to DNR-CC yesterday - awaiting hospice placement   - Dilaudid for pain control   - Ok for mouth wash and sips of water for pleasure   - C/w keppra for seizure prophalyxis   - Voided

## 2025-01-09 NOTE — FLOWSHEET NOTE
Inpatient Wound Care (Initial consult) 8503A    Admit Date: 1/4/2025  6:01 PM    Reason for consult:  Right scalp    Significant history: Per H&P    CHIEF COMPLAINT:  Trauma consult.    Injury occurred just prior to arrival. Patient suffered a mechanical fall at home. She hit her head but denies loss of consciousness. Denies feeling dizzy prior to the fall. CT head demonstrates a small right sided subdural hematoma. CT C/T/L spine negative for acute injury. CT abdomen/pelvis demonstrates superior and inferior pubic rami fractures.Imaging also demonstrates chronic lumbar spine fractures and right rib fractures. Patient is afebrile and hemodynamically stable. CBC and CMP WNL.       Findings:     01/09/25 1540   Skin Integumentary    Skin Integrity Ecchymosis   Location BUEm Right eye, right side of head   Skin Integrity Site 2   Skin Integrity Location 2 Excoriation   Location 2 breast folds         Impression:  Skin assessment complete: See above documentation    Plan: Antifungal powder  TAPS  Heel protectors  Patient will need continued preventative care      Candy Sanabria RN 1/9/2025 3:42 PM

## 2025-01-09 NOTE — CARE COORDINATION
SOCIAL WORK/CASEMANAGEMENT TRANSITION OF CARE PLANNING( HARSH ELKE, -[phone]):  I  have emailed the chart to [facility] who has a hospice house., I spoke with mingo , who said they are looking at it and call me back.  I also have made a referral to [facility] - no beds and [facility] at [phone] and emailed them the chart as they have private rooms open. Cost for room and board is $255, I am waiting for a decision.  I spoke with mary beth the son's wife and they are in agreement to either. JESUS Ernst  1/9/2025    Pt was accepted to the [facility] in [place]. I do not have any information on the address or nurse/nurse or fax number at this time. I placed a envelope in the soft chart with a ambulance form. Paz the rn there can be reached at [phone]. She is waiting for information and consents from family, Juan R,  before they can request transfer and will keep me updated for potential transfer today. JESUS Ernst  1/9/2025    Pt will be going to [address] [place] [address], no nurse/nurse is needed. They request fax to [phone] and I faxed  her mars from today. I have put [facility] on a will call. Bonnie said they are waiting for the son to sign the consents on the email they sent him. They dont accept pt between 6-8 p.m. any time before or after. She will call me back around 3:45 p.m. or sooner to see where things are. JESUS Ernst  1/9/2025    The son signed the papers for hospice. [facility] is set up for 7 p.m per the hospice house request. JESUS Ernst  1/9/2025

## 2025-01-09 NOTE — FLOWSHEET NOTE
ET Nurse (Initial consult) 8503A  Admit Date: 1/4/2025  6:01 PM    Reason for consult:  Colostomy      Stoma assessment:     01/09/25 1547   Colostomy LLQ   No Placement Date or Time found.   Pre-existing: Yes  Location: LLQ   Stomal Appliance 1 piece;Changed   Stoma  Assessment Red;Moist;Flush   Peristomal Assessment Intact   Treatment Barrier ring;Pouch change;Site care   Stool Appearance Loose;Watery   Stool Color Brown   Stool Amount Large         Plan:  Pouch change  Following patient    Candy Sanabria RN 1/9/2025 3:47 PM

## 2025-01-09 NOTE — PROGRESS NOTES
SPEECH LANGUAGE PATHOLOGY  DAILY PROGRESS NOTE      PATIENT NAME:  Alisa De La Fuente      :  1940          TODAY'S DATE:  2025 ROOM:  8503/8503-A    Current Diet: No diet orders on file    Patient was seen for dysphagia therapy on this date. She appeared more alert and demonstrated an improvement in speech intelligibility. However, patient continues to display intermittent confusion as she is only oriented to self and does not follow directions consistently.     During session, patient was sitting upright in bed and verbalized that she was thirsty. Prior to this, she did not initiate in wanting to eat or drink. Therapist administered a few ice chips and coated spoonfuls of puree. No overt s/s of aspiration were displayed. She also received trials of thin liquid via spoon and straw. Patient was unable to hold a cup during the session to trial thin liquid via cup.  Immediate coughing and throat clears were observed; therefore, laryngeal penetration or aspiration cannot be ruled out at this time.     Recommendation: Patient is recommended for ongoing PO analysis by SLP only to determine if PO diet can be initiated. She is OK for ice chips (2-3 per hour) PRN ONLY WHEN ALERT AND SITTING UPRIGHT IN BED  s/p oral care to promote pharyngeal muscle use, decrease risk of further muscle disuse atrophy, and thin pharyngeal secretions. Monitor respiratory status and discontinue ice chips if concern arises. RN was informed of the recommendation.       CPT code(s) 32198  dysphagia tx  Total minutes :  15 minutes    Neftali Chavira M.S., CCC-SLP/L   Speech-Language Pathologist  [id number]

## 2025-01-09 NOTE — PROGRESS NOTES
Palliative Care Department  [phone]  Palliative Care Initial Consult  Christina Persaud MD    Alisa De La Fuente  [id number]  Hospital Day: 6  Location of Service: [facility]  Date of Initial Consult: 01/06/2024  Referring Provider: Doyle Hernández DO  Palliative Medicine was consulted for assistance with: Assist with goals of care    ASSESSMENT & PLAN:     Subdural hematoma  History of fall  AMS  GCS 14 on admission  No focal neurologic deficit  CT head revealed right parietal subdural hematoma up to 3 mm    Dementia  Cognition dysfunction declined over the past year  Patient on rivastigmine    Dysphagia   Patient failed SLP   Failed to insert NGT  Recommendation for PEG tube placemnt  Patient's son/HCPOA Juan R refused PEG tube  Juan R agreed with pleasure feeding and Hospice care    Pain  Patient received 2 as needed doses of Dilaudid 0.5 mg in the past 24 hours   Dilaudid 0.5 mg every 6 hours around-the-clock was discontinued  Continue as needed Dilaudid 0.5 mg every 3 hours  Switch to p.o. meds once passes SLP    Palliative Care Encounter  Patient does not have the capacity for medical decision-making at this time  HCPKADIE/her son Juan R is the medical decision maker at the time of conversation.  Will continue to follow for ongoing monitoring of progression of Pain as well as for appropriateness for hospice care due to Head Trauma, dementia, dysphagia  Will continue to evaluate test results related to and response to treatment of Head Trauma , dementia, dysphagia and medication effectiveness for Pain,  Will obtain testing as needed to monitor medication results:N/A  Will continue to assess patient status including monitor for side effects of treatments-Dilaudid and monitor for opiate induced constipation  Ongoing counseling of patient and family regarding diagnoses and prognosis of Head Trauma  Will continue treatment including dilaudid    Goals of care: Provide Comfort

## (undated) DEVICE — CHLORAPREP 26ML ORANGE

## (undated) DEVICE — BIT DRL L110MM DIA2.5MM ST G QUIK CPL NONRADIOPAQUE W/O STP

## (undated) DEVICE — APPLICATOR MEDICATED 26 CC SOLUTION HI LT ORNG CHLORAPREP

## (undated) DEVICE — CLOTH SURG PREP PREOPERATIVE CHLORHEXIDINE GLUC 2% READYPREP

## (undated) DEVICE — INTENDED FOR TISSUE SEPARATION, AND OTHER PROCEDURES THAT REQUIRE A SHARP SURGICAL BLADE TO PUNCTURE OR CUT.: Brand: BARD-PARKER ® STAINLESS STEEL BLADES

## (undated) DEVICE — 3M™ STERI-DRAPE™ U-DRAPE 1015: Brand: STERI-DRAPE™

## (undated) DEVICE — DRILL SYSTEM 7

## (undated) DEVICE — BIT DRL L125MM DIA2.7MM QUIK CPL 3 FLUT

## (undated) DEVICE — GLOVE SURG SZ 8 L12IN FNGR THK79MIL GRN LTX FREE

## (undated) DEVICE — BANDAGE COMPR W4INXL10YD WHITE/BEIGE E MTRX HK LOOP CLSR

## (undated) DEVICE — BIT DRL L160MM DIA2.7MM ST CANN QUIK CPL NONRADIOLUCENT ADJ

## (undated) DEVICE — GOWN,BREATHABLE SLV,AURORA,XLG,STRL: Brand: MEDLINE

## (undated) DEVICE — ZIMMER® STERILE DISPOSABLE TOURNIQUET CUFF WITH PROTECTIVE SLEEVE AND PLC, DUAL PORT, SINGLE BLADDER, 34 IN. (86 CM)

## (undated) DEVICE — GUIDEWIRE ORTH L150MM DIA1.25MM S STL NTHRD FOR 4MM CANN

## (undated) DEVICE — BANDAGE COMPR W6INXL12FT SMOOTH FOR LIMB EXSANG ESMARCH

## (undated) DEVICE — DRAPE,REIN 53X77,STERILE: Brand: MEDLINE

## (undated) DEVICE — SHEET,DRAPE,53X77,STERILE: Brand: MEDLINE

## (undated) DEVICE — TUBING SUCT 12FR MAL ALUM SHFT FN CAP VENT UNIV CONN W/ OBT

## (undated) DEVICE — GOWN,AURORA,BRTHSLV,2XL,18/CS: Brand: MEDLINE

## (undated) DEVICE — TOTAL KNEE PK

## (undated) DEVICE — GAUZE,SPONGE,AVANT,4"X4",4PLY,STRL,10/TR: Brand: MEDLINE

## (undated) DEVICE — DRESSING,GAUZE,XEROFORM,CURAD,5"X9",ST: Brand: CURAD

## (undated) DEVICE — DRAPE EQUIP CARM 72X42 IN RUBBER BND CLP

## (undated) DEVICE — SET ORTHO STD STORTSTD2

## (undated) DEVICE — BANDAGE,GAUZE,4.5"X4.1YD,STERILE,LF: Brand: MEDLINE

## (undated) DEVICE — BIT DRL L125MM DIA2MM S STL QUIK CPL FOR LOK COMPR PLT

## (undated) DEVICE — SPLINT ORTH W5XL30IN PLSTR OF PARIS FAST IMMOB OF FRAC HI

## (undated) DEVICE — SET ORTHO STD STORTSTD1

## (undated) DEVICE — NEEDLE HYPO 21GA L1.5IN GRN POLYPR HUB S STL REG BVL STR

## (undated) DEVICE — SURGICAL PROCEDURE PACK BASIC

## (undated) DEVICE — SYRINGE MED 10ML TRNSLUC BRL PLUNG BLK MRK POLYPR CTRL

## (undated) DEVICE — GOWN,BREATHABLE,IMP SLV 3XL AURORA: Brand: MEDLINE

## (undated) DEVICE — PATIENT RETURN ELECTRODE, SINGLE-USE, CONTACT QUALITY MONITORING, ADULT, WITH 9FT CORD, FOR PATIENTS WEIGING OVER 33LBS. (15KG): Brand: MEGADYNE

## (undated) DEVICE — Device

## (undated) DEVICE — DRIP REDUCTION MANIFOLD

## (undated) DEVICE — GLOVE ORTHO 8   MSG9480

## (undated) DEVICE — PADDING,UNDERCAST,COTTON, 4"X4YD STERILE: Brand: MEDLINE